# Patient Record
Sex: MALE | Race: WHITE | NOT HISPANIC OR LATINO | Employment: OTHER | URBAN - METROPOLITAN AREA
[De-identification: names, ages, dates, MRNs, and addresses within clinical notes are randomized per-mention and may not be internally consistent; named-entity substitution may affect disease eponyms.]

---

## 2017-01-24 ENCOUNTER — HOSPITAL ENCOUNTER (OUTPATIENT)
Dept: OTHER | Facility: HOSPITAL | Age: 57
Setting detail: RECURRING SERIES
Discharge: HOME | End: 2017-01-24
Attending: INTERNAL MEDICINE | Admitting: INTERNAL MEDICINE

## 2017-01-24 LAB
FERRITIN SERPL-MCNC: 44 NG/ML (ref 24–336)
HCT VFR BLD AUTO: 42.8 % (ref 40–54)
HGB BLD-MCNC: 14.6 G/DL (ref 14–18)

## 2017-03-02 ENCOUNTER — CONVERSION ENCOUNTER (OUTPATIENT)
Dept: FAMILY MEDICINE CLINIC | Facility: CLINIC | Age: 57
End: 2017-03-02

## 2017-03-07 ENCOUNTER — HOSPITAL ENCOUNTER (OUTPATIENT)
Dept: INFUSION THERAPY | Facility: HOSPITAL | Age: 57
Setting detail: RECURRING SERIES
Discharge: HOME | End: 2017-03-08
Attending: INTERNAL MEDICINE | Admitting: INTERNAL MEDICINE

## 2017-03-07 LAB
FERRITIN SERPL-MCNC: 66 NG/ML (ref 24–336)
HCT VFR BLD AUTO: 42 % (ref 40–54)
HGB BLD-MCNC: 14.4 G/DL (ref 14–18)

## 2017-04-18 ENCOUNTER — HOSPITAL ENCOUNTER (OUTPATIENT)
Dept: OTHER | Facility: HOSPITAL | Age: 57
Setting detail: RECURRING SERIES
Discharge: HOME | End: 2017-04-18
Attending: INTERNAL MEDICINE | Admitting: INTERNAL MEDICINE

## 2017-04-18 LAB
FERRITIN SERPL-MCNC: 50 NG/ML (ref 24–336)
HCT VFR BLD AUTO: 39.3 % (ref 40–54)
HGB BLD-MCNC: 13.6 G/DL (ref 14–18)

## 2017-05-30 ENCOUNTER — HOSPITAL ENCOUNTER (OUTPATIENT)
Dept: OTHER | Facility: HOSPITAL | Age: 57
Discharge: HOME | End: 2017-05-31
Attending: INTERNAL MEDICINE | Admitting: INTERNAL MEDICINE

## 2017-05-30 LAB
FERRITIN SERPL-MCNC: 84 NG/ML (ref 24–336)
HCT VFR BLD AUTO: 40.6 % (ref 40–54)
HGB BLD-MCNC: 14.1 G/DL (ref 14–18)

## 2017-06-06 ENCOUNTER — OFFICE (AMBULATORY)
Dept: URBAN - METROPOLITAN AREA CLINIC 64 | Facility: CLINIC | Age: 57
End: 2017-06-06

## 2017-06-06 VITALS
HEART RATE: 66 BPM | WEIGHT: 219 LBS | SYSTOLIC BLOOD PRESSURE: 158 MMHG | DIASTOLIC BLOOD PRESSURE: 88 MMHG | HEIGHT: 72 IN

## 2017-06-06 DIAGNOSIS — Z72.89 OTHER PROBLEMS RELATED TO LIFESTYLE: ICD-10-CM

## 2017-06-06 DIAGNOSIS — E83.110 HEREDITARY HEMOCHROMATOSIS: ICD-10-CM

## 2017-06-06 PROCEDURE — 99214 OFFICE O/P EST MOD 30 MIN: CPT | Performed by: INTERNAL MEDICINE

## 2017-08-22 ENCOUNTER — HOSPITAL ENCOUNTER (OUTPATIENT)
Dept: INFUSION THERAPY | Facility: HOSPITAL | Age: 57
Setting detail: RECURRING SERIES
Discharge: HOME | End: 2017-08-23
Attending: INTERNAL MEDICINE | Admitting: INTERNAL MEDICINE

## 2017-08-22 LAB
FERRITIN SERPL-MCNC: 51 NG/ML (ref 24–336)
HCT VFR BLD AUTO: 42.1 % (ref 40–54)
HGB BLD-MCNC: 14.5 G/DL (ref 14–18)

## 2017-08-31 ENCOUNTER — HOSPITAL ENCOUNTER (OUTPATIENT)
Dept: FAMILY MEDICINE CLINIC | Facility: CLINIC | Age: 57
Setting detail: SPECIMEN
Discharge: HOME | End: 2017-08-31
Attending: INTERNAL MEDICINE | Admitting: INTERNAL MEDICINE

## 2017-08-31 ENCOUNTER — CONVERSION ENCOUNTER (OUTPATIENT)
Dept: FAMILY MEDICINE CLINIC | Facility: CLINIC | Age: 57
End: 2017-08-31

## 2017-08-31 ENCOUNTER — HOSPITAL ENCOUNTER (OUTPATIENT)
Dept: LAB | Facility: HOSPITAL | Age: 57
Setting detail: SPECIMEN
Discharge: HOME | End: 2017-08-31
Attending: INTERNAL MEDICINE | Admitting: INTERNAL MEDICINE

## 2017-08-31 LAB
ALBUMIN SERPL-MCNC: 4.1 G/DL (ref 3.5–4.8)
ALBUMIN/GLOB SERPL: 1.3 {RATIO} (ref 1–1.7)
ALP SERPL-CCNC: 49 IU/L (ref 32–91)
ALT SERPL-CCNC: 68 IU/L (ref 17–63)
ANION GAP SERPL CALC-SCNC: 17 MMOL/L (ref 10–20)
AST SERPL-CCNC: 109 IU/L (ref 15–41)
BASOPHILS # BLD AUTO: 0.1 10*3/UL (ref 0–0.2)
BASOPHILS NFR BLD AUTO: 2 % (ref 0–2)
BILIRUB SERPL-MCNC: 1.4 MG/DL (ref 0.3–1.2)
BUN SERPL-MCNC: 6 MG/DL (ref 8–20)
BUN/CREAT SERPL: 10 (ref 6.2–20.3)
CALCIUM SERPL-MCNC: 9.5 MG/DL (ref 8.9–10.3)
CHLORIDE SERPL-SCNC: 100 MMOL/L (ref 101–111)
CHOLEST SERPL-MCNC: 249 MG/DL
CHOLEST/HDLC SERPL: 3.9 {RATIO}
CONV CO2: 25 MMOL/L (ref 22–32)
CONV LDL CHOLESTEROL DIRECT: 169 MG/DL (ref 0–100)
CONV TOTAL PROTEIN: 7.2 G/DL (ref 6.1–7.9)
CREAT UR-MCNC: 0.6 MG/DL (ref 0.7–1.2)
DIFFERENTIAL METHOD BLD: (no result)
EOSINOPHIL # BLD AUTO: 0.1 10*3/UL (ref 0–0.3)
EOSINOPHIL # BLD AUTO: 2 % (ref 0–3)
ERYTHROCYTE [DISTWIDTH] IN BLOOD BY AUTOMATED COUNT: 16.8 % (ref 11.5–14.5)
GLOBULIN UR ELPH-MCNC: 3.1 G/DL (ref 2.5–3.8)
GLUCOSE SERPL-MCNC: 107 MG/DL (ref 65–99)
HCT VFR BLD AUTO: 42.3 % (ref 40–54)
HDLC SERPL-MCNC: 64 MG/DL
HGB BLD-MCNC: 14.3 G/DL (ref 14–18)
LDLC/HDLC SERPL: 2.7 {RATIO}
LIPID INTERPRETATION: ABNORMAL
LYMPHOCYTES # BLD AUTO: 1.6 10*3/UL (ref 0.8–4.8)
LYMPHOCYTES NFR BLD AUTO: 33 % (ref 18–42)
MCH RBC QN AUTO: 32.2 PG (ref 26–32)
MCHC RBC AUTO-ENTMCNC: 33.9 G/DL (ref 32–36)
MCV RBC AUTO: 94.9 FL (ref 80–94)
MONOCYTES # BLD AUTO: 0.6 10*3/UL (ref 0.1–1.3)
MONOCYTES NFR BLD AUTO: 13 % (ref 2–11)
NEUTROPHILS # BLD AUTO: 2.6 10*3/UL (ref 2.3–8.6)
NEUTROPHILS NFR BLD AUTO: 50 % (ref 50–75)
NRBC BLD AUTO-RTO: 0 /100{WBCS}
NRBC/RBC NFR BLD MANUAL: 0 10*3/UL
PLATELET # BLD AUTO: 192 10*3/UL (ref 150–450)
PMV BLD AUTO: 8.7 FL (ref 7.4–10.4)
POTASSIUM SERPL-SCNC: 4 MMOL/L (ref 3.6–5.1)
RBC # BLD AUTO: 4.46 10*6/UL (ref 4.6–6)
SODIUM SERPL-SCNC: 138 MMOL/L (ref 136–144)
TRIGL SERPL-MCNC: 145 MG/DL
TSH SERPL-ACNC: 3.69 UIU/ML (ref 0.34–5.6)
VLDLC SERPL CALC-MCNC: 16.6 MG/DL
WBC # BLD AUTO: 5 10*3/UL (ref 4.5–11.5)

## 2017-09-18 ENCOUNTER — CONVERSION ENCOUNTER (OUTPATIENT)
Dept: FAMILY MEDICINE CLINIC | Facility: CLINIC | Age: 57
End: 2017-09-18

## 2017-11-21 ENCOUNTER — HOSPITAL ENCOUNTER (OUTPATIENT)
Dept: OTHER | Facility: HOSPITAL | Age: 57
Setting detail: RECURRING SERIES
Discharge: HOME | End: 2017-11-22
Attending: INTERNAL MEDICINE | Admitting: INTERNAL MEDICINE

## 2017-11-21 LAB
FERRITIN SERPL-MCNC: 86 NG/ML (ref 24–336)
HCT VFR BLD AUTO: 43.9 % (ref 40–54)
HGB BLD-MCNC: 15.4 G/DL (ref 14–18)

## 2018-02-20 ENCOUNTER — HOSPITAL ENCOUNTER (OUTPATIENT)
Dept: OTHER | Facility: HOSPITAL | Age: 58
Setting detail: RECURRING SERIES
Discharge: HOME | End: 2018-02-21
Attending: INTERNAL MEDICINE | Admitting: INTERNAL MEDICINE

## 2018-02-20 LAB
HCT VFR BLD AUTO: 41.6 % (ref 40–54)
HGB BLD-MCNC: 14.5 G/DL (ref 14–18)

## 2018-03-29 ENCOUNTER — CONVERSION ENCOUNTER (OUTPATIENT)
Dept: FAMILY MEDICINE CLINIC | Facility: CLINIC | Age: 58
End: 2018-03-29

## 2018-03-29 ENCOUNTER — HOSPITAL ENCOUNTER (OUTPATIENT)
Dept: LAB | Facility: HOSPITAL | Age: 58
Setting detail: SPECIMEN
Discharge: HOME | End: 2018-03-29
Attending: INTERNAL MEDICINE | Admitting: INTERNAL MEDICINE

## 2018-03-29 ENCOUNTER — HOSPITAL ENCOUNTER (OUTPATIENT)
Dept: FAMILY MEDICINE CLINIC | Facility: CLINIC | Age: 58
Setting detail: SPECIMEN
Discharge: HOME | End: 2018-03-29
Attending: INTERNAL MEDICINE | Admitting: INTERNAL MEDICINE

## 2018-03-29 LAB
ALBUMIN SERPL-MCNC: 4.3 G/DL (ref 3.5–4.8)
ALBUMIN/GLOB SERPL: 1.4 {RATIO} (ref 1–1.7)
ALP SERPL-CCNC: 52 IU/L (ref 32–91)
ALT SERPL-CCNC: 62 IU/L (ref 17–63)
ANION GAP SERPL CALC-SCNC: 16.2 MMOL/L (ref 10–20)
AST SERPL-CCNC: 113 IU/L (ref 15–41)
BILIRUB SERPL-MCNC: 1.4 MG/DL (ref 0.3–1.2)
BUN SERPL-MCNC: 6 MG/DL (ref 8–20)
BUN/CREAT SERPL: 10 (ref 6.2–20.3)
CALCIUM SERPL-MCNC: 9.4 MG/DL (ref 8.9–10.3)
CHLORIDE SERPL-SCNC: 87 MMOL/L (ref 101–111)
CHOLEST SERPL-MCNC: 244 MG/DL
CHOLEST/HDLC SERPL: 2.7 {RATIO}
CONV CO2: 29 MMOL/L (ref 22–32)
CONV LDL CHOLESTEROL DIRECT: 136 MG/DL (ref 0–100)
CONV TOTAL PROTEIN: 7.3 G/DL (ref 6.1–7.9)
CREAT UR-MCNC: 0.6 MG/DL (ref 0.7–1.2)
GLOBULIN UR ELPH-MCNC: 3 G/DL (ref 2.5–3.8)
GLUCOSE SERPL-MCNC: 96 MG/DL (ref 65–99)
HDLC SERPL-MCNC: 91 MG/DL
LDLC/HDLC SERPL: 1.5 {RATIO}
LIPID INTERPRETATION: ABNORMAL
POTASSIUM SERPL-SCNC: 3.2 MMOL/L (ref 3.6–5.1)
SODIUM SERPL-SCNC: 129 MMOL/L (ref 136–144)
TRIGL SERPL-MCNC: 91 MG/DL
VLDLC SERPL CALC-MCNC: 16.5 MG/DL

## 2018-05-15 ENCOUNTER — HOSPITAL ENCOUNTER (OUTPATIENT)
Dept: OTHER | Facility: HOSPITAL | Age: 58
Setting detail: RECURRING SERIES
Discharge: HOME | End: 2018-05-16
Attending: INTERNAL MEDICINE | Admitting: INTERNAL MEDICINE

## 2018-05-15 LAB
HCT VFR BLD AUTO: 42 % (ref 40–54)
HGB BLD-MCNC: 14.5 G/DL (ref 14–18)

## 2018-06-11 ENCOUNTER — OFFICE (AMBULATORY)
Dept: URBAN - METROPOLITAN AREA CLINIC 64 | Facility: CLINIC | Age: 58
End: 2018-06-11

## 2018-06-11 VITALS
HEART RATE: 77 BPM | DIASTOLIC BLOOD PRESSURE: 89 MMHG | SYSTOLIC BLOOD PRESSURE: 151 MMHG | HEIGHT: 72 IN | WEIGHT: 215 LBS

## 2018-06-11 DIAGNOSIS — E83.110 HEREDITARY HEMOCHROMATOSIS: ICD-10-CM

## 2018-06-11 DIAGNOSIS — Z12.11 ENCOUNTER FOR SCREENING FOR MALIGNANT NEOPLASM OF COLON: ICD-10-CM

## 2018-06-11 PROCEDURE — 99214 OFFICE O/P EST MOD 30 MIN: CPT | Performed by: INTERNAL MEDICINE

## 2018-07-02 ENCOUNTER — ON CAMPUS - OUTPATIENT (AMBULATORY)
Dept: URBAN - METROPOLITAN AREA HOSPITAL 2 | Facility: HOSPITAL | Age: 58
End: 2018-07-02
Payer: COMMERCIAL

## 2018-07-02 ENCOUNTER — OFFICE (AMBULATORY)
Dept: URBAN - METROPOLITAN AREA PATHOLOGY 4 | Facility: PATHOLOGY | Age: 58
End: 2018-07-02
Payer: COMMERCIAL

## 2018-07-02 VITALS
SYSTOLIC BLOOD PRESSURE: 166 MMHG | HEART RATE: 90 BPM | OXYGEN SATURATION: 91 % | SYSTOLIC BLOOD PRESSURE: 135 MMHG | HEIGHT: 72 IN | SYSTOLIC BLOOD PRESSURE: 152 MMHG | DIASTOLIC BLOOD PRESSURE: 83 MMHG | DIASTOLIC BLOOD PRESSURE: 93 MMHG | DIASTOLIC BLOOD PRESSURE: 97 MMHG | OXYGEN SATURATION: 92 % | RESPIRATION RATE: 18 BRPM | WEIGHT: 213 LBS | OXYGEN SATURATION: 96 % | SYSTOLIC BLOOD PRESSURE: 154 MMHG | SYSTOLIC BLOOD PRESSURE: 115 MMHG | HEART RATE: 73 BPM | DIASTOLIC BLOOD PRESSURE: 96 MMHG | HEART RATE: 99 BPM | HEART RATE: 97 BPM | TEMPERATURE: 98.3 F | HEART RATE: 80 BPM | SYSTOLIC BLOOD PRESSURE: 162 MMHG | DIASTOLIC BLOOD PRESSURE: 92 MMHG | OXYGEN SATURATION: 97 % | DIASTOLIC BLOOD PRESSURE: 99 MMHG | RESPIRATION RATE: 16 BRPM | SYSTOLIC BLOOD PRESSURE: 138 MMHG | HEART RATE: 85 BPM | DIASTOLIC BLOOD PRESSURE: 82 MMHG | OXYGEN SATURATION: 95 % | HEART RATE: 107 BPM | SYSTOLIC BLOOD PRESSURE: 155 MMHG | DIASTOLIC BLOOD PRESSURE: 61 MMHG

## 2018-07-02 DIAGNOSIS — K64.1 SECOND DEGREE HEMORRHOIDS: ICD-10-CM

## 2018-07-02 DIAGNOSIS — Z12.11 ENCOUNTER FOR SCREENING FOR MALIGNANT NEOPLASM OF COLON: ICD-10-CM

## 2018-07-02 DIAGNOSIS — K57.30 DIVERTICULOSIS OF LARGE INTESTINE WITHOUT PERFORATION OR ABS: ICD-10-CM

## 2018-07-02 DIAGNOSIS — D12.3 BENIGN NEOPLASM OF TRANSVERSE COLON: ICD-10-CM

## 2018-07-02 DIAGNOSIS — D12.2 BENIGN NEOPLASM OF ASCENDING COLON: ICD-10-CM

## 2018-07-02 LAB
GI HISTOLOGY: A. SELECT: (no result)
GI HISTOLOGY: PDF REPORT: (no result)

## 2018-07-02 PROCEDURE — 88305 TISSUE EXAM BY PATHOLOGIST: CPT | Mod: 33 | Performed by: INTERNAL MEDICINE

## 2018-07-02 PROCEDURE — 45385 COLONOSCOPY W/LESION REMOVAL: CPT | Mod: 33 | Performed by: INTERNAL MEDICINE

## 2018-07-02 RX ADMIN — PROPOFOL: 10 INJECTION, EMULSION INTRAVENOUS at 16:04

## 2018-09-17 ENCOUNTER — HOSPITAL ENCOUNTER (OUTPATIENT)
Dept: FAMILY MEDICINE CLINIC | Facility: CLINIC | Age: 58
Setting detail: SPECIMEN
Discharge: HOME | End: 2018-09-17
Attending: INTERNAL MEDICINE | Admitting: INTERNAL MEDICINE

## 2018-09-17 LAB
ALBUMIN SERPL-MCNC: 4 G/DL (ref 3.5–4.8)
ALBUMIN/GLOB SERPL: 1.1 {RATIO} (ref 1–1.7)
ALP SERPL-CCNC: 54 IU/L (ref 32–91)
ALT SERPL-CCNC: 56 IU/L (ref 17–63)
ANION GAP SERPL CALC-SCNC: 14.4 MMOL/L (ref 10–20)
AST SERPL-CCNC: 104 IU/L (ref 15–41)
BASOPHILS # BLD AUTO: 0.1 10*3/UL (ref 0–0.2)
BASOPHILS NFR BLD AUTO: 2 % (ref 0–2)
BILIRUB SERPL-MCNC: 1.1 MG/DL (ref 0.3–1.2)
BUN SERPL-MCNC: 4 MG/DL (ref 8–20)
BUN/CREAT SERPL: 6.7 (ref 6.2–20.3)
CALCIUM SERPL-MCNC: 9.1 MG/DL (ref 8.9–10.3)
CHLORIDE SERPL-SCNC: 98 MMOL/L (ref 101–111)
CHOLEST SERPL-MCNC: 241 MG/DL
CHOLEST/HDLC SERPL: 3.6 {RATIO}
CONV CO2: 26 MMOL/L (ref 22–32)
CONV LDL CHOLESTEROL DIRECT: 170 MG/DL (ref 0–100)
CONV TOTAL PROTEIN: 7.6 G/DL (ref 6.1–7.9)
CREAT UR-MCNC: 0.6 MG/DL (ref 0.7–1.2)
DIFFERENTIAL METHOD BLD: (no result)
EOSINOPHIL # BLD AUTO: 0.1 10*3/UL (ref 0–0.3)
EOSINOPHIL # BLD AUTO: 1 % (ref 0–3)
ERYTHROCYTE [DISTWIDTH] IN BLOOD BY AUTOMATED COUNT: 16 % (ref 11.5–14.5)
GLOBULIN UR ELPH-MCNC: 3.6 G/DL (ref 2.5–3.8)
GLUCOSE SERPL-MCNC: 113 MG/DL (ref 65–99)
HCT VFR BLD AUTO: 42.9 % (ref 40–54)
HDLC SERPL-MCNC: 67 MG/DL
HGB BLD-MCNC: 14.7 G/DL (ref 14–18)
LDLC/HDLC SERPL: 2.5 {RATIO}
LIPID INTERPRETATION: ABNORMAL
LYMPHOCYTES # BLD AUTO: 1.4 10*3/UL (ref 0.8–4.8)
LYMPHOCYTES NFR BLD AUTO: 35 % (ref 18–42)
MCH RBC QN AUTO: 32.2 PG (ref 26–32)
MCHC RBC AUTO-ENTMCNC: 34.2 G/DL (ref 32–36)
MCV RBC AUTO: 93.9 FL (ref 80–94)
MONOCYTES # BLD AUTO: 0.5 10*3/UL (ref 0.1–1.3)
MONOCYTES NFR BLD AUTO: 12 % (ref 2–11)
NEUTROPHILS # BLD AUTO: 2 10*3/UL (ref 2.3–8.6)
NEUTROPHILS NFR BLD AUTO: 50 % (ref 50–75)
NRBC BLD AUTO-RTO: 0 /100{WBCS}
NRBC/RBC NFR BLD MANUAL: 0 10*3/UL
PLATELET # BLD AUTO: 224 10*3/UL (ref 150–450)
PMV BLD AUTO: 9.1 FL (ref 7.4–10.4)
POTASSIUM SERPL-SCNC: 3.4 MMOL/L (ref 3.6–5.1)
RBC # BLD AUTO: 4.57 10*6/UL (ref 4.6–6)
SODIUM SERPL-SCNC: 135 MMOL/L (ref 136–144)
TRIGL SERPL-MCNC: 89 MG/DL
VLDLC SERPL CALC-MCNC: 4.2 MG/DL
WBC # BLD AUTO: 4 10*3/UL (ref 4.5–11.5)

## 2018-09-24 ENCOUNTER — CONVERSION ENCOUNTER (OUTPATIENT)
Dept: FAMILY MEDICINE CLINIC | Facility: CLINIC | Age: 58
End: 2018-09-24

## 2018-11-13 ENCOUNTER — HOSPITAL ENCOUNTER (OUTPATIENT)
Dept: OTHER | Facility: HOSPITAL | Age: 58
Setting detail: RECURRING SERIES
Discharge: HOME | End: 2018-11-14
Attending: INTERNAL MEDICINE | Admitting: INTERNAL MEDICINE

## 2018-11-13 LAB
HCT VFR BLD AUTO: 41.6 % (ref 40–54)
HGB BLD-MCNC: 14.7 G/DL (ref 14–18)

## 2018-12-27 ENCOUNTER — CONVERSION ENCOUNTER (OUTPATIENT)
Dept: FAMILY MEDICINE CLINIC | Facility: CLINIC | Age: 58
End: 2018-12-27

## 2019-01-09 ENCOUNTER — HOSPITAL ENCOUNTER (OUTPATIENT)
Dept: FAMILY MEDICINE CLINIC | Facility: CLINIC | Age: 59
Setting detail: SPECIMEN
Discharge: HOME | End: 2019-01-09
Attending: INTERNAL MEDICINE | Admitting: INTERNAL MEDICINE

## 2019-01-09 LAB
ALBUMIN SERPL-MCNC: 4 G/DL (ref 3.5–4.8)
ALBUMIN/GLOB SERPL: 1.3 {RATIO} (ref 1–1.7)
ALP SERPL-CCNC: 81 IU/L (ref 32–91)
ALT SERPL-CCNC: 89 IU/L (ref 17–63)
ANION GAP SERPL CALC-SCNC: 18.5 MMOL/L (ref 10–20)
AST SERPL-CCNC: 139 IU/L (ref 15–41)
BILIRUB SERPL-MCNC: 1.4 MG/DL (ref 0.3–1.2)
BUN SERPL-MCNC: 7 MG/DL (ref 8–20)
BUN/CREAT SERPL: 14 (ref 6.2–20.3)
CALCIUM SERPL-MCNC: 9.1 MG/DL (ref 8.9–10.3)
CHLORIDE SERPL-SCNC: 83 MMOL/L (ref 101–111)
CHOLEST SERPL-MCNC: 210 MG/DL
CHOLEST/HDLC SERPL: 2.8 {RATIO}
CONV CO2: 23 MMOL/L (ref 22–32)
CONV LDL CHOLESTEROL DIRECT: 131 MG/DL (ref 0–100)
CONV TOTAL PROTEIN: 7.2 G/DL (ref 6.1–7.9)
CREAT UR-MCNC: 0.5 MG/DL (ref 0.7–1.2)
GLOBULIN UR ELPH-MCNC: 3.2 G/DL (ref 2.5–3.8)
GLUCOSE SERPL-MCNC: 111 MG/DL (ref 65–99)
HDLC SERPL-MCNC: 75 MG/DL
LDLC/HDLC SERPL: 1.8 {RATIO}
LIPID INTERPRETATION: ABNORMAL
POTASSIUM SERPL-SCNC: 3.5 MMOL/L (ref 3.6–5.1)
SODIUM SERPL-SCNC: 121 MMOL/L (ref 136–144)
TRIGL SERPL-MCNC: 78 MG/DL
VLDLC SERPL CALC-MCNC: 4.6 MG/DL

## 2019-01-25 ENCOUNTER — HOSPITAL ENCOUNTER (OUTPATIENT)
Dept: FAMILY MEDICINE CLINIC | Facility: CLINIC | Age: 59
Setting detail: SPECIMEN
Discharge: HOME | End: 2019-01-25
Attending: INTERNAL MEDICINE | Admitting: INTERNAL MEDICINE

## 2019-01-25 LAB
ALBUMIN SERPL-MCNC: 4.1 G/DL (ref 3.5–4.8)
ALBUMIN/GLOB SERPL: 1.3 {RATIO} (ref 1–1.7)
ALP SERPL-CCNC: 75 IU/L (ref 32–91)
ALT SERPL-CCNC: 67 IU/L (ref 17–63)
ANION GAP SERPL CALC-SCNC: 14.7 MMOL/L (ref 10–20)
AST SERPL-CCNC: 97 IU/L (ref 15–41)
BILIRUB SERPL-MCNC: 1.6 MG/DL (ref 0.3–1.2)
BUN SERPL-MCNC: 6 MG/DL (ref 8–20)
BUN/CREAT SERPL: 10 (ref 6.2–20.3)
CALCIUM SERPL-MCNC: 9.5 MG/DL (ref 8.9–10.3)
CHLORIDE SERPL-SCNC: 89 MMOL/L (ref 101–111)
CONV CO2: 27 MMOL/L (ref 22–32)
CONV TOTAL PROTEIN: 7.2 G/DL (ref 6.1–7.9)
CREAT UR-MCNC: 0.6 MG/DL (ref 0.7–1.2)
GLOBULIN UR ELPH-MCNC: 3.1 G/DL (ref 2.5–3.8)
GLUCOSE SERPL-MCNC: 106 MG/DL (ref 65–99)
POTASSIUM SERPL-SCNC: 3.7 MMOL/L (ref 3.6–5.1)
SODIUM SERPL-SCNC: 127 MMOL/L (ref 136–144)

## 2019-02-25 ENCOUNTER — CONVERSION ENCOUNTER (OUTPATIENT)
Dept: FAMILY MEDICINE CLINIC | Facility: CLINIC | Age: 59
End: 2019-02-25

## 2019-04-14 ENCOUNTER — CONVERSION ENCOUNTER (OUTPATIENT)
Dept: FAMILY MEDICINE CLINIC | Facility: CLINIC | Age: 59
End: 2019-04-14

## 2019-04-14 ENCOUNTER — HOSPITAL ENCOUNTER (OUTPATIENT)
Dept: URGENT CARE | Facility: CLINIC | Age: 59
Discharge: HOME | End: 2019-04-14
Attending: FAMILY MEDICINE | Admitting: FAMILY MEDICINE

## 2019-04-19 ENCOUNTER — CONVERSION ENCOUNTER (OUTPATIENT)
Dept: FAMILY MEDICINE CLINIC | Facility: CLINIC | Age: 59
End: 2019-04-19

## 2019-05-14 ENCOUNTER — HOSPITAL ENCOUNTER (OUTPATIENT)
Dept: OTHER | Facility: HOSPITAL | Age: 59
Setting detail: RECURRING SERIES
Discharge: HOME | End: 2019-05-15
Attending: INTERNAL MEDICINE | Admitting: INTERNAL MEDICINE

## 2019-05-14 LAB
ALBUMIN SERPL-MCNC: 4.1 G/DL (ref 3.5–4.8)
ALBUMIN/GLOB SERPL: 1.3 {RATIO} (ref 1–1.7)
ALP SERPL-CCNC: 53 IU/L (ref 32–91)
ALT SERPL-CCNC: 32 IU/L (ref 17–63)
ANION GAP SERPL CALC-SCNC: 18.3 MMOL/L (ref 10–20)
AST SERPL-CCNC: 51 IU/L (ref 15–41)
BASOPHILS # BLD AUTO: 0.1 10*3/UL (ref 0–0.2)
BASOPHILS NFR BLD AUTO: 1 % (ref 0–2)
BILIRUB SERPL-MCNC: 1.1 MG/DL (ref 0.3–1.2)
BUN SERPL-MCNC: 5 MG/DL (ref 8–20)
BUN/CREAT SERPL: 10 (ref 6.2–20.3)
CALCIUM SERPL-MCNC: 9.1 MG/DL (ref 8.9–10.3)
CHLORIDE SERPL-SCNC: 92 MMOL/L (ref 101–111)
CONV CO2: 24 MMOL/L (ref 22–32)
CONV TOTAL PROTEIN: 7.2 G/DL (ref 6.1–7.9)
CREAT UR-MCNC: 0.5 MG/DL (ref 0.7–1.2)
DIFFERENTIAL METHOD BLD: (no result)
EOSINOPHIL # BLD AUTO: 0.1 10*3/UL (ref 0–0.3)
EOSINOPHIL # BLD AUTO: 2 % (ref 0–3)
ERYTHROCYTE [DISTWIDTH] IN BLOOD BY AUTOMATED COUNT: 14 % (ref 11.5–14.5)
FERRITIN SERPL-MCNC: 185 NG/ML (ref 24–336)
GLOBULIN UR ELPH-MCNC: 3.1 G/DL (ref 2.5–3.8)
GLUCOSE SERPL-MCNC: 108 MG/DL (ref 65–99)
HCT VFR BLD AUTO: 39.4 % (ref 40–54)
HGB BLD-MCNC: 14 G/DL (ref 14–18)
IRON SERPL-MCNC: 130 UG/DL (ref 45–182)
LYMPHOCYTES # BLD AUTO: 2.2 10*3/UL (ref 0.8–4.8)
LYMPHOCYTES NFR BLD AUTO: 45 % (ref 18–42)
MCH RBC QN AUTO: 35.2 PG (ref 26–32)
MCHC RBC AUTO-ENTMCNC: 35.4 G/DL (ref 32–36)
MCV RBC AUTO: 99.4 FL (ref 80–94)
MONOCYTES # BLD AUTO: 0.5 10*3/UL (ref 0.1–1.3)
MONOCYTES NFR BLD AUTO: 10 % (ref 2–11)
NEUTROPHILS # BLD AUTO: 2.1 10*3/UL (ref 2.3–8.6)
NEUTROPHILS NFR BLD AUTO: 42 % (ref 50–75)
NRBC BLD AUTO-RTO: 0 /100{WBCS}
NRBC/RBC NFR BLD MANUAL: 0 10*3/UL
PLATELET # BLD AUTO: 253 10*3/UL (ref 150–450)
PMV BLD AUTO: 8 FL (ref 7.4–10.4)
POTASSIUM SERPL-SCNC: 3.3 MMOL/L (ref 3.6–5.1)
RBC # BLD AUTO: 3.96 10*6/UL (ref 4.6–6)
SODIUM SERPL-SCNC: 131 MMOL/L (ref 136–144)
WBC # BLD AUTO: 4.9 10*3/UL (ref 4.5–11.5)

## 2019-06-04 VITALS
HEART RATE: 109 BPM | WEIGHT: 217.6 LBS | HEIGHT: 72 IN | HEART RATE: 66 BPM | OXYGEN SATURATION: 94 % | BODY MASS INDEX: 28.91 KG/M2 | BODY MASS INDEX: 30.42 KG/M2 | HEART RATE: 92 BPM | HEIGHT: 72 IN | BODY MASS INDEX: 28.74 KG/M2 | WEIGHT: 217.8 LBS | SYSTOLIC BLOOD PRESSURE: 140 MMHG | DIASTOLIC BLOOD PRESSURE: 77 MMHG | RESPIRATION RATE: 20 BRPM | WEIGHT: 213.4 LBS | SYSTOLIC BLOOD PRESSURE: 162 MMHG | SYSTOLIC BLOOD PRESSURE: 170 MMHG | RESPIRATION RATE: 18 BRPM | DIASTOLIC BLOOD PRESSURE: 71 MMHG | HEART RATE: 124 BPM | HEART RATE: 102 BPM | WEIGHT: 224 LBS | HEART RATE: 84 BPM | SYSTOLIC BLOOD PRESSURE: 160 MMHG | WEIGHT: 224.4 LBS | RESPIRATION RATE: 20 BRPM | DIASTOLIC BLOOD PRESSURE: 86 MMHG | BODY MASS INDEX: 29.47 KG/M2 | OXYGEN SATURATION: 95 % | OXYGEN SATURATION: 94 % | DIASTOLIC BLOOD PRESSURE: 81 MMHG | SYSTOLIC BLOOD PRESSURE: 118 MMHG | RESPIRATION RATE: 16 BRPM | DIASTOLIC BLOOD PRESSURE: 72 MMHG | SYSTOLIC BLOOD PRESSURE: 149 MMHG | DIASTOLIC BLOOD PRESSURE: 84 MMHG | SYSTOLIC BLOOD PRESSURE: 147 MMHG | HEART RATE: 75 BPM | HEIGHT: 72 IN | SYSTOLIC BLOOD PRESSURE: 158 MMHG | RESPIRATION RATE: 16 BRPM | BODY MASS INDEX: 28.13 KG/M2 | WEIGHT: 223.4 LBS | DIASTOLIC BLOOD PRESSURE: 66 MMHG | DIASTOLIC BLOOD PRESSURE: 81 MMHG | RESPIRATION RATE: 16 BRPM | OXYGEN SATURATION: 96 % | BODY MASS INDEX: 30.26 KG/M2 | HEART RATE: 68 BPM | WEIGHT: 212.2 LBS | HEART RATE: 77 BPM | DIASTOLIC BLOOD PRESSURE: 86 MMHG | HEIGHT: 72 IN | SYSTOLIC BLOOD PRESSURE: 132 MMHG | RESPIRATION RATE: 12 BRPM | WEIGHT: 224.6 LBS | WEIGHT: 207.38 LBS | HEIGHT: 72 IN

## 2019-07-08 ENCOUNTER — OFFICE (AMBULATORY)
Dept: URBAN - METROPOLITAN AREA CLINIC 64 | Facility: CLINIC | Age: 59
End: 2019-07-08

## 2019-07-08 VITALS
HEIGHT: 72 IN | SYSTOLIC BLOOD PRESSURE: 133 MMHG | HEART RATE: 91 BPM | DIASTOLIC BLOOD PRESSURE: 80 MMHG | WEIGHT: 210 LBS

## 2019-07-08 DIAGNOSIS — Z86.010 PERSONAL HISTORY OF COLONIC POLYPS: ICD-10-CM

## 2019-07-08 DIAGNOSIS — E83.110 HEREDITARY HEMOCHROMATOSIS: ICD-10-CM

## 2019-07-08 PROCEDURE — 99214 OFFICE O/P EST MOD 30 MIN: CPT | Performed by: INTERNAL MEDICINE

## 2019-07-18 DIAGNOSIS — R53.83 FATIGUE, UNSPECIFIED TYPE: ICD-10-CM

## 2019-07-18 DIAGNOSIS — Z12.5 SCREENING FOR PROSTATE CANCER: ICD-10-CM

## 2019-07-18 DIAGNOSIS — E78.5 HYPERLIPIDEMIA, UNSPECIFIED HYPERLIPIDEMIA TYPE: Primary | ICD-10-CM

## 2019-07-18 DIAGNOSIS — I10 HYPERTENSION, UNSPECIFIED TYPE: ICD-10-CM

## 2019-07-19 ENCOUNTER — LAB (OUTPATIENT)
Dept: FAMILY MEDICINE CLINIC | Facility: CLINIC | Age: 59
End: 2019-07-19

## 2019-07-19 DIAGNOSIS — E78.5 HYPERLIPIDEMIA, UNSPECIFIED HYPERLIPIDEMIA TYPE: ICD-10-CM

## 2019-07-19 DIAGNOSIS — Z12.5 SCREENING FOR PROSTATE CANCER: ICD-10-CM

## 2019-07-19 DIAGNOSIS — I10 HYPERTENSION, UNSPECIFIED TYPE: ICD-10-CM

## 2019-07-19 DIAGNOSIS — R53.83 FATIGUE, UNSPECIFIED TYPE: ICD-10-CM

## 2019-07-19 LAB
ALBUMIN SERPL-MCNC: 4.5 G/DL (ref 3.5–4.8)
ALBUMIN/GLOB SERPL: 1.5 G/DL (ref 1–1.7)
ALP SERPL-CCNC: 63 U/L (ref 32–91)
ALT SERPL W P-5'-P-CCNC: 57 U/L (ref 17–63)
ANION GAP SERPL CALCULATED.3IONS-SCNC: 18.8 MMOL/L (ref 5–15)
ARTICHOKE IGE QN: 119 MG/DL (ref 0–100)
AST SERPL-CCNC: 94 U/L (ref 15–41)
BASOPHILS # BLD AUTO: 0 10*3/MM3 (ref 0–0.2)
BASOPHILS NFR BLD AUTO: 0.9 % (ref 0–1.5)
BILIRUB SERPL-MCNC: 2 MG/DL (ref 0.3–1.2)
BUN BLD-MCNC: 6 MG/DL (ref 8–20)
BUN/CREAT SERPL: 8.6 (ref 6.2–20.3)
CALCIUM SPEC-SCNC: 9.6 MG/DL (ref 8.9–10.3)
CHLORIDE SERPL-SCNC: 88 MMOL/L (ref 101–111)
CHOLEST SERPL-MCNC: 223 MG/DL
CO2 SERPL-SCNC: 26 MMOL/L (ref 22–32)
CREAT BLD-MCNC: 0.7 MG/DL (ref 0.7–1.2)
DEPRECATED RDW RBC AUTO: 46.4 FL (ref 37–54)
EOSINOPHIL # BLD AUTO: 0.1 10*3/MM3 (ref 0–0.4)
EOSINOPHIL NFR BLD AUTO: 1.2 % (ref 0.3–6.2)
ERYTHROCYTE [DISTWIDTH] IN BLOOD BY AUTOMATED COUNT: 13.6 % (ref 12.3–15.4)
GFR SERPL CREATININE-BSD FRML MDRD: 115 ML/MIN/1.73
GLOBULIN UR ELPH-MCNC: 3.1 GM/DL (ref 2.5–3.8)
GLUCOSE BLD-MCNC: 96 MG/DL (ref 65–99)
HCT VFR BLD AUTO: 43.3 % (ref 37.5–51)
HDLC SERPL QL: 2.4
HDLC SERPL-MCNC: 93 MG/DL
HGB BLD-MCNC: 14.9 G/DL (ref 13–17.7)
LDLC/HDLC SERPL: 1.31 {RATIO}
LYMPHOCYTES # BLD AUTO: 1.1 10*3/MM3 (ref 0.7–3.1)
LYMPHOCYTES NFR BLD AUTO: 20.9 % (ref 19.6–45.3)
MCH RBC QN AUTO: 34.2 PG (ref 26.6–33)
MCHC RBC AUTO-ENTMCNC: 34.5 G/DL (ref 31.5–35.7)
MCV RBC AUTO: 99 FL (ref 79–97)
MONOCYTES # BLD AUTO: 0.6 10*3/MM3 (ref 0.1–0.9)
MONOCYTES NFR BLD AUTO: 11.3 % (ref 5–12)
NEUTROPHILS # BLD AUTO: 3.5 10*3/MM3 (ref 1.7–7)
NEUTROPHILS NFR BLD AUTO: 65.7 % (ref 42.7–76)
NRBC BLD AUTO-RTO: 0 /100 WBC (ref 0–0.2)
PLATELET # BLD AUTO: 224 10*3/MM3 (ref 140–450)
PMV BLD AUTO: 8.8 FL (ref 6–12)
POTASSIUM BLD-SCNC: 3.8 MMOL/L (ref 3.6–5.1)
PROT SERPL-MCNC: 7.6 G/DL (ref 6.1–7.9)
PSA SERPL-MCNC: 4.64 NG/ML (ref 0–4)
RBC # BLD AUTO: 4.37 10*6/MM3 (ref 4.14–5.8)
SODIUM BLD-SCNC: 129 MMOL/L (ref 136–144)
TRIGL SERPL-MCNC: 43 MG/DL
TSH SERPL DL<=0.05 MIU/L-ACNC: 4.25 MIU/ML (ref 0.34–5.6)
VLDLC SERPL-MCNC: 8.6 MG/DL
WBC NRBC COR # BLD: 5.3 10*3/MM3 (ref 3.4–10.8)

## 2019-07-19 PROCEDURE — 80053 COMPREHEN METABOLIC PANEL: CPT | Performed by: INTERNAL MEDICINE

## 2019-07-19 PROCEDURE — G0103 PSA SCREENING: HCPCS | Performed by: INTERNAL MEDICINE

## 2019-07-19 PROCEDURE — 85025 COMPLETE CBC W/AUTO DIFF WBC: CPT | Performed by: INTERNAL MEDICINE

## 2019-07-19 PROCEDURE — 84443 ASSAY THYROID STIM HORMONE: CPT | Performed by: INTERNAL MEDICINE

## 2019-07-19 PROCEDURE — 36415 COLL VENOUS BLD VENIPUNCTURE: CPT | Performed by: INTERNAL MEDICINE

## 2019-07-19 PROCEDURE — 80061 LIPID PANEL: CPT | Performed by: INTERNAL MEDICINE

## 2019-07-26 ENCOUNTER — TELEPHONE (OUTPATIENT)
Dept: FAMILY MEDICINE CLINIC | Facility: CLINIC | Age: 59
End: 2019-07-26

## 2019-07-26 ENCOUNTER — OFFICE VISIT (OUTPATIENT)
Dept: FAMILY MEDICINE CLINIC | Facility: CLINIC | Age: 59
End: 2019-07-26

## 2019-07-26 VITALS
HEIGHT: 73 IN | WEIGHT: 211 LBS | RESPIRATION RATE: 14 BRPM | OXYGEN SATURATION: 95 % | TEMPERATURE: 97.8 F | HEART RATE: 73 BPM | SYSTOLIC BLOOD PRESSURE: 122 MMHG | DIASTOLIC BLOOD PRESSURE: 62 MMHG | BODY MASS INDEX: 27.96 KG/M2

## 2019-07-26 DIAGNOSIS — M25.561 ARTHRALGIA OF RIGHT KNEE: ICD-10-CM

## 2019-07-26 DIAGNOSIS — E78.5 HYPERLIPIDEMIA, UNSPECIFIED HYPERLIPIDEMIA TYPE: ICD-10-CM

## 2019-07-26 DIAGNOSIS — I10 ESSENTIAL HYPERTENSION: Primary | ICD-10-CM

## 2019-07-26 DIAGNOSIS — E87.1 HYPONATREMIA: ICD-10-CM

## 2019-07-26 PROBLEM — N40.0 BENIGN PROSTATIC HYPERPLASIA: Status: ACTIVE | Noted: 2018-09-07

## 2019-07-26 PROCEDURE — 99213 OFFICE O/P EST LOW 20 MIN: CPT | Performed by: INTERNAL MEDICINE

## 2019-07-26 RX ORDER — TRIAMTERENE AND HYDROCHLOROTHIAZIDE 37.5; 25 MG/1; MG/1
1 TABLET ORAL DAILY
COMMUNITY
End: 2019-09-20 | Stop reason: SDUPTHER

## 2019-07-26 RX ORDER — FEXOFENADINE HCL 180 MG/1
180 TABLET ORAL DAILY
COMMUNITY

## 2019-07-26 RX ORDER — AZELASTINE HYDROCHLORIDE 0.5 MG/ML
2 SOLUTION/ DROPS OPHTHALMIC 2 TIMES DAILY
COMMUNITY
Start: 2019-06-12 | End: 2021-02-08

## 2019-07-26 RX ORDER — AMLODIPINE BESYLATE AND BENAZEPRIL HYDROCHLORIDE 5; 40 MG/1; MG/1
5-40 CAPSULE ORAL
COMMUNITY
Start: 2019-07-19 | End: 2019-10-24 | Stop reason: SDUPTHER

## 2019-07-26 NOTE — PROGRESS NOTES
Subjective   Erik Fuentes is a 59 y.o. male.     Pt is here for med check htn, hpld, hemochromatosis, and hyponatremia - here to f/u labs  Generally tries to stick with fluid restriction  But has been hot lately, so drinking gatorade  R knee is finally becoming painful enough that he is considering surgery  Has an appt with dr whitten on 8/5  Will aim to have surgery in October, when this golfing season is done    Overall has been doing fine except for fatigue  After having pna in April  Would like to have pna vaccine but will wait til after golf season           The following portions of the patient's history were reviewed and updated as appropriate: allergies, current medications, past family history, past medical history, past social history, past surgical history and problem list.    Review of Systems   Constitutional: Positive for fatigue. Negative for fever.   HENT: Negative for congestion, ear pain, rhinorrhea and sore throat.    Eyes: Negative for blurred vision and itching.   Respiratory: Negative for cough and shortness of breath.    Cardiovascular: Negative for chest pain and palpitations.   Gastrointestinal: Positive for diarrhea. Negative for abdominal pain and vomiting.   Endocrine: Negative for polydipsia and polyuria.   Genitourinary: Negative for dysuria, frequency, hematuria and urgency.   Musculoskeletal: Positive for arthralgias and joint swelling. Negative for myalgias.   Skin: Negative for rash and skin lesions.   Neurological: Negative for dizziness, numbness and headache.   Psychiatric/Behavioral: Negative for depressed mood. The patient is not nervous/anxious.          Current Outpatient Medications:   •  amLODIPine-benazepril (LOTREL) 5-40 MG per capsule, 5-40 capsules., Disp: , Rfl:   •  azelastine (OPTIVAR) 0.05 % ophthalmic solution, , Disp: , Rfl:   •  B Complex-C (SUPER B COMPLEX/VITAMIN C PO), Take  by mouth., Disp: , Rfl:   •  fexofenadine (ALLEGRA) 180 MG tablet, Take 180 mg by  "mouth Daily., Disp: , Rfl:   •  Glucosamine-Chondroitin (OSTEO BI-FLEX REGULAR STRENGTH PO), Take  by mouth., Disp: , Rfl:   •  Omega-3 Fatty Acids (FISH OIL ADULT GUMMIES PO), Take  by mouth., Disp: , Rfl:   •  triamterene-hydrochlorothiazide (MAXZIDE-25) 37.5-25 MG per tablet, Take 1 tablet by mouth Daily., Disp: , Rfl:     Objective   /62 (BP Location: Left arm, Patient Position: Sitting, Cuff Size: Large Adult)   Pulse 73   Temp 97.8 °F (36.6 °C) (Oral)   Resp 14   Ht 185.4 cm (73\")   Wt 95.7 kg (211 lb)   SpO2 95%   BMI 27.84 kg/m²   Physical Exam   Constitutional: He is oriented to person, place, and time. He appears well-developed and well-nourished. No distress.   HENT:   Head: Normocephalic and atraumatic.   Right Ear: External ear normal.   Left Ear: External ear normal.   Mouth/Throat: Oropharynx is clear and moist. No oropharyngeal exudate.   Eyes: Conjunctivae and EOM are normal. Right eye exhibits no discharge. Left eye exhibits no discharge. No scleral icterus.   Neck: Normal range of motion. Neck supple. No thyromegaly present.   Cardiovascular: Normal rate, regular rhythm and normal heart sounds. Exam reveals no gallop and no friction rub.   No murmur heard.  Pulmonary/Chest: Effort normal and breath sounds normal. No respiratory distress. He has no wheezes. He has no rales.   Musculoskeletal: Normal range of motion. He exhibits no edema or deformity.   Lymphadenopathy:     He has no cervical adenopathy.   Neurological: He is alert and oriented to person, place, and time. No cranial nerve deficit.   Skin: Skin is warm and dry. No rash noted. He is not diaphoretic. No erythema.   Psychiatric: He has a normal mood and affect. His behavior is normal. Thought content normal.   Vitals reviewed.        Assessment/Plan   Problems Addressed this Visit        Cardiovascular and Mediastinum    Essential hypertension - Primary    Relevant Medications    amLODIPine-benazepril (LOTREL) 5-40 MG per " capsule    triamterene-hydrochlorothiazide (MAXZIDE-25) 37.5-25 MG per tablet    Hyperlipidemia       Musculoskeletal and Integument    Arthralgia of right knee       Other    Hyponatremia        Labs and bp all acceptable  Cont current meds  Try probiotics - may not help completely resolve issue but worth a try  Ok to have pna vaccine after golf season  psa elevated but apparently lower than when urology check it previously         Procedures

## 2019-08-26 DIAGNOSIS — Z79.899 LONG-TERM USE OF HIGH-RISK MEDICATION: ICD-10-CM

## 2019-08-26 DIAGNOSIS — I10 HYPERTENSION, UNSPECIFIED TYPE: Primary | ICD-10-CM

## 2019-08-26 DIAGNOSIS — R77.2 ELEVATED ALPHA FETOPROTEIN: ICD-10-CM

## 2019-08-26 DIAGNOSIS — E78.5 HYPERLIPIDEMIA, UNSPECIFIED HYPERLIPIDEMIA TYPE: ICD-10-CM

## 2019-09-20 RX ORDER — TRIAMTERENE AND HYDROCHLOROTHIAZIDE 37.5; 25 MG/1; MG/1
TABLET ORAL
Qty: 30 TABLET | Refills: 5 | Status: SHIPPED | OUTPATIENT
Start: 2019-09-20 | End: 2020-03-19

## 2019-10-24 RX ORDER — AMLODIPINE BESYLATE AND BENAZEPRIL HYDROCHLORIDE 5; 40 MG/1; MG/1
CAPSULE ORAL
Qty: 30 CAPSULE | Refills: 5 | Status: SHIPPED | OUTPATIENT
Start: 2019-10-24 | End: 2020-04-20

## 2020-01-20 ENCOUNTER — LAB (OUTPATIENT)
Dept: FAMILY MEDICINE CLINIC | Facility: CLINIC | Age: 60
End: 2020-01-20

## 2020-01-20 DIAGNOSIS — Z00.00 ROUTINE MEDICAL EXAM: Primary | ICD-10-CM

## 2020-01-20 DIAGNOSIS — I10 HYPERTENSION, UNSPECIFIED TYPE: ICD-10-CM

## 2020-01-20 DIAGNOSIS — Z79.899 LONG-TERM USE OF HIGH-RISK MEDICATION: ICD-10-CM

## 2020-01-20 DIAGNOSIS — R77.2 ELEVATED ALPHA FETOPROTEIN: ICD-10-CM

## 2020-01-20 DIAGNOSIS — E78.5 HYPERLIPIDEMIA, UNSPECIFIED HYPERLIPIDEMIA TYPE: ICD-10-CM

## 2020-01-20 LAB
ALBUMIN SERPL-MCNC: 4.6 G/DL (ref 3.5–5.2)
ALBUMIN/GLOB SERPL: 1.5 G/DL
ALP SERPL-CCNC: 67 U/L (ref 39–117)
ALPHA-FETOPROTEIN: 6.18 NG/ML (ref 0–8.3)
ALT SERPL W P-5'-P-CCNC: 43 U/L (ref 1–41)
ANION GAP SERPL CALCULATED.3IONS-SCNC: 17.3 MMOL/L (ref 5–15)
AST SERPL-CCNC: 89 U/L (ref 1–40)
BASOPHILS # BLD AUTO: 0.08 10*3/MM3 (ref 0–0.2)
BASOPHILS NFR BLD AUTO: 1.6 % (ref 0–1.5)
BILIRUB SERPL-MCNC: 0.9 MG/DL (ref 0.2–1.2)
BUN BLD-MCNC: 8 MG/DL (ref 8–23)
BUN/CREAT SERPL: 11.1 (ref 7–25)
CALCIUM SPEC-SCNC: 9.1 MG/DL (ref 8.6–10.5)
CHLORIDE SERPL-SCNC: 86 MMOL/L (ref 98–107)
CHOLEST SERPL-MCNC: 208 MG/DL (ref 0–200)
CO2 SERPL-SCNC: 27.7 MMOL/L (ref 22–29)
CREAT BLD-MCNC: 0.72 MG/DL (ref 0.76–1.27)
DEPRECATED RDW RBC AUTO: 46.3 FL (ref 37–54)
EOSINOPHIL # BLD AUTO: 0.17 10*3/MM3 (ref 0–0.4)
EOSINOPHIL NFR BLD AUTO: 3.4 % (ref 0.3–6.2)
ERYTHROCYTE [DISTWIDTH] IN BLOOD BY AUTOMATED COUNT: 14 % (ref 12.3–15.4)
GFR SERPL CREATININE-BSD FRML MDRD: 111 ML/MIN/1.73
GLOBULIN UR ELPH-MCNC: 3 GM/DL
GLUCOSE BLD-MCNC: 100 MG/DL (ref 65–99)
HBA1C MFR BLD: 5.2 % (ref 3.5–5.6)
HCT VFR BLD AUTO: 39.1 % (ref 37.5–51)
HDLC SERPL-MCNC: 84 MG/DL (ref 40–60)
HGB BLD-MCNC: 13.4 G/DL (ref 13–17.7)
IMM GRANULOCYTES # BLD AUTO: 0.02 10*3/MM3 (ref 0–0.05)
IMM GRANULOCYTES NFR BLD AUTO: 0.4 % (ref 0–0.5)
LDLC SERPL CALC-MCNC: 102 MG/DL (ref 0–100)
LDLC/HDLC SERPL: 1.21 {RATIO}
LYMPHOCYTES # BLD AUTO: 1.88 10*3/MM3 (ref 0.7–3.1)
LYMPHOCYTES NFR BLD AUTO: 38 % (ref 19.6–45.3)
MCH RBC QN AUTO: 31.3 PG (ref 26.6–33)
MCHC RBC AUTO-ENTMCNC: 34.3 G/DL (ref 31.5–35.7)
MCV RBC AUTO: 91.4 FL (ref 79–97)
MONOCYTES # BLD AUTO: 0.66 10*3/MM3 (ref 0.1–0.9)
MONOCYTES NFR BLD AUTO: 13.3 % (ref 5–12)
NEUTROPHILS # BLD AUTO: 2.14 10*3/MM3 (ref 1.7–7)
NEUTROPHILS NFR BLD AUTO: 43.3 % (ref 42.7–76)
NRBC BLD AUTO-RTO: 0 /100 WBC (ref 0–0.2)
PLATELET # BLD AUTO: 299 10*3/MM3 (ref 140–450)
PMV BLD AUTO: 10.3 FL (ref 6–12)
POTASSIUM BLD-SCNC: 3.6 MMOL/L (ref 3.5–5.2)
PROT SERPL-MCNC: 7.6 G/DL (ref 6–8.5)
RBC # BLD AUTO: 4.28 10*6/MM3 (ref 4.14–5.8)
SODIUM BLD-SCNC: 131 MMOL/L (ref 136–145)
TRIGL SERPL-MCNC: 111 MG/DL (ref 0–150)
TSH SERPL DL<=0.05 MIU/L-ACNC: 3.07 UIU/ML (ref 0.27–4.2)
VLDLC SERPL-MCNC: 22.2 MG/DL (ref 5–40)
WBC NRBC COR # BLD: 4.95 10*3/MM3 (ref 3.4–10.8)

## 2020-01-20 PROCEDURE — 85025 COMPLETE CBC W/AUTO DIFF WBC: CPT | Performed by: INTERNAL MEDICINE

## 2020-01-20 PROCEDURE — 82105 ALPHA-FETOPROTEIN SERUM: CPT | Performed by: INTERNAL MEDICINE

## 2020-01-20 PROCEDURE — 83036 HEMOGLOBIN GLYCOSYLATED A1C: CPT | Performed by: INTERNAL MEDICINE

## 2020-01-20 PROCEDURE — 80061 LIPID PANEL: CPT | Performed by: INTERNAL MEDICINE

## 2020-01-20 PROCEDURE — 80053 COMPREHEN METABOLIC PANEL: CPT | Performed by: INTERNAL MEDICINE

## 2020-01-20 PROCEDURE — 36415 COLL VENOUS BLD VENIPUNCTURE: CPT

## 2020-01-20 PROCEDURE — 84443 ASSAY THYROID STIM HORMONE: CPT | Performed by: INTERNAL MEDICINE

## 2020-01-27 ENCOUNTER — OFFICE VISIT (OUTPATIENT)
Dept: FAMILY MEDICINE CLINIC | Facility: CLINIC | Age: 60
End: 2020-01-27

## 2020-01-27 VITALS
TEMPERATURE: 98.5 F | HEIGHT: 73 IN | WEIGHT: 202.6 LBS | DIASTOLIC BLOOD PRESSURE: 75 MMHG | SYSTOLIC BLOOD PRESSURE: 121 MMHG | HEART RATE: 89 BPM | RESPIRATION RATE: 20 BRPM | BODY MASS INDEX: 26.85 KG/M2

## 2020-01-27 DIAGNOSIS — E87.1 HYPONATREMIA: ICD-10-CM

## 2020-01-27 DIAGNOSIS — I10 ESSENTIAL HYPERTENSION: ICD-10-CM

## 2020-01-27 DIAGNOSIS — Z00.00 PREVENTATIVE HEALTH CARE: Primary | ICD-10-CM

## 2020-01-27 DIAGNOSIS — E83.119 HEMOCHROMATOSIS, UNSPECIFIED HEMOCHROMATOSIS TYPE: ICD-10-CM

## 2020-01-27 PROCEDURE — 99396 PREV VISIT EST AGE 40-64: CPT | Performed by: INTERNAL MEDICINE

## 2020-02-19 ENCOUNTER — TELEPHONE (OUTPATIENT)
Dept: FAMILY MEDICINE CLINIC | Facility: CLINIC | Age: 60
End: 2020-02-19

## 2020-02-19 NOTE — TELEPHONE ENCOUNTER
VM MESSAGE.  Wife, Valencia, called the Billing Office regarding a bill for patient. She was told it was a coding problem and asked if you could call her to get this corrected. Thank you.

## 2020-02-20 NOTE — TELEPHONE ENCOUNTER
Called and spoke to wife.   Erik received a bill for $65.23 which was patients deductible.  She states  came in for his physical blood work and it should have been coded as such.  I told her that since he was having a physical this could be corrected.  However, I did advise her that he had a AFP Tumor Marker drawn and I did not think this would be covered under physical.  I wanted her to be clear that once I changed the coding I would not change again and that Erik may receive a bill for that test.  She agreed and ask that it be changed to physical anyway.  Diagnosis has been changed per patients request.

## 2020-03-19 RX ORDER — TRIAMTERENE AND HYDROCHLOROTHIAZIDE 37.5; 25 MG/1; MG/1
TABLET ORAL
Qty: 30 TABLET | Refills: 4 | Status: SHIPPED | OUTPATIENT
Start: 2020-03-19 | End: 2020-05-21 | Stop reason: HOSPADM

## 2020-04-20 RX ORDER — AMLODIPINE BESYLATE AND BENAZEPRIL HYDROCHLORIDE 5; 40 MG/1; MG/1
CAPSULE ORAL
Qty: 30 CAPSULE | Refills: 4 | Status: SHIPPED | OUTPATIENT
Start: 2020-04-20 | End: 2020-09-15

## 2020-05-11 DIAGNOSIS — E83.110 HEMOCHROMATOSIS, HEREDITARY (HCC): Primary | ICD-10-CM

## 2020-05-11 RX ORDER — SODIUM CHLORIDE 9 MG/ML
250 INJECTION, SOLUTION INTRAVENOUS ONCE
Status: CANCELLED | OUTPATIENT
Start: 2020-05-11

## 2020-05-12 ENCOUNTER — HOSPITAL ENCOUNTER (OUTPATIENT)
Dept: INFUSION THERAPY | Facility: HOSPITAL | Age: 60
Setting detail: INFUSION SERIES
Discharge: HOME | End: 2020-05-12

## 2020-05-12 VITALS
TEMPERATURE: 98.3 F | SYSTOLIC BLOOD PRESSURE: 124 MMHG | HEART RATE: 98 BPM | HEIGHT: 73 IN | DIASTOLIC BLOOD PRESSURE: 71 MMHG | BODY MASS INDEX: 24.78 KG/M2 | WEIGHT: 187 LBS

## 2020-05-12 DIAGNOSIS — E83.110 HEMOCHROMATOSIS, HEREDITARY (HCC): ICD-10-CM

## 2020-05-12 LAB
ALBUMIN SERPL-MCNC: 4.8 G/DL (ref 3.5–5.2)
ALBUMIN/GLOB SERPL: 1.7 G/DL
ALP SERPL-CCNC: 82 U/L (ref 39–117)
ALT SERPL W P-5'-P-CCNC: 49 U/L (ref 1–41)
ANION GAP SERPL CALCULATED.3IONS-SCNC: 18 MMOL/L (ref 5–15)
AST SERPL-CCNC: 100 U/L (ref 1–40)
BASOPHILS # BLD AUTO: 0.1 10*3/MM3 (ref 0–0.2)
BASOPHILS NFR BLD AUTO: 1.5 % (ref 0–1.5)
BILIRUB SERPL-MCNC: 1.1 MG/DL (ref 0.2–1.2)
BUN BLD-MCNC: 10 MG/DL (ref 8–23)
BUN/CREAT SERPL: 14.3 (ref 7–25)
CALCIUM SPEC-SCNC: 9.6 MG/DL (ref 8.6–10.5)
CHLORIDE SERPL-SCNC: 84 MMOL/L (ref 98–107)
CO2 SERPL-SCNC: 24 MMOL/L (ref 22–29)
CREAT BLD-MCNC: 0.7 MG/DL (ref 0.76–1.27)
DEPRECATED RDW RBC AUTO: 45.9 FL (ref 37–54)
EOSINOPHIL # BLD AUTO: 0.1 10*3/MM3 (ref 0–0.4)
EOSINOPHIL NFR BLD AUTO: 2.6 % (ref 0.3–6.2)
ERYTHROCYTE [DISTWIDTH] IN BLOOD BY AUTOMATED COUNT: 13.5 % (ref 12.3–15.4)
FERRITIN SERPL-MCNC: 224.1 NG/ML (ref 30–400)
GFR SERPL CREATININE-BSD FRML MDRD: 114 ML/MIN/1.73
GLOBULIN UR ELPH-MCNC: 2.8 GM/DL
GLUCOSE BLD-MCNC: 101 MG/DL (ref 65–99)
HCT VFR BLD AUTO: 42.4 % (ref 37.5–51)
HGB BLD-MCNC: 15.4 G/DL (ref 13–17.7)
IRON 24H UR-MRATE: 168 MCG/DL (ref 59–158)
IRON SATN MFR SERPL: 44 % (ref 20–50)
LYMPHOCYTES # BLD AUTO: 1.3 10*3/MM3 (ref 0.7–3.1)
LYMPHOCYTES NFR BLD AUTO: 24.8 % (ref 19.6–45.3)
MCH RBC QN AUTO: 35.5 PG (ref 26.6–33)
MCHC RBC AUTO-ENTMCNC: 36.2 G/DL (ref 31.5–35.7)
MCV RBC AUTO: 97.9 FL (ref 79–97)
MONOCYTES # BLD AUTO: 0.8 10*3/MM3 (ref 0.1–0.9)
MONOCYTES NFR BLD AUTO: 15.3 % (ref 5–12)
NEUTROPHILS # BLD AUTO: 3 10*3/MM3 (ref 1.7–7)
NEUTROPHILS NFR BLD AUTO: 55.8 % (ref 42.7–76)
NRBC BLD AUTO-RTO: 0 /100 WBC (ref 0–0.2)
PLATELET # BLD AUTO: 214 10*3/MM3 (ref 140–450)
PMV BLD AUTO: 7.6 FL (ref 6–12)
POTASSIUM BLD-SCNC: 3.1 MMOL/L (ref 3.5–5.2)
PROT SERPL-MCNC: 7.6 G/DL (ref 6–8.5)
RBC # BLD AUTO: 4.33 10*6/MM3 (ref 4.14–5.8)
SODIUM BLD-SCNC: 126 MMOL/L (ref 136–145)
TIBC SERPL-MCNC: 386 MCG/DL (ref 298–536)
TRANSFERRIN SERPL-MCNC: 259 MG/DL (ref 200–360)
WBC NRBC COR # BLD: 5.3 10*3/MM3 (ref 3.4–10.8)

## 2020-05-12 PROCEDURE — 36415 COLL VENOUS BLD VENIPUNCTURE: CPT

## 2020-05-12 PROCEDURE — 80053 COMPREHEN METABOLIC PANEL: CPT | Performed by: INTERNAL MEDICINE

## 2020-05-12 PROCEDURE — 83540 ASSAY OF IRON: CPT | Performed by: INTERNAL MEDICINE

## 2020-05-12 PROCEDURE — 84466 ASSAY OF TRANSFERRIN: CPT | Performed by: INTERNAL MEDICINE

## 2020-05-12 PROCEDURE — 82728 ASSAY OF FERRITIN: CPT | Performed by: INTERNAL MEDICINE

## 2020-05-12 PROCEDURE — 85025 COMPLETE CBC W/AUTO DIFF WBC: CPT | Performed by: INTERNAL MEDICINE

## 2020-05-13 ENCOUNTER — HOSPITAL ENCOUNTER (OUTPATIENT)
Dept: INFUSION THERAPY | Facility: HOSPITAL | Age: 60
Setting detail: INFUSION SERIES
Discharge: HOME | End: 2020-05-13

## 2020-05-13 VITALS
HEIGHT: 73 IN | WEIGHT: 193 LBS | BODY MASS INDEX: 25.58 KG/M2 | HEART RATE: 76 BPM | TEMPERATURE: 98.2 F | SYSTOLIC BLOOD PRESSURE: 116 MMHG | RESPIRATION RATE: 16 BRPM | DIASTOLIC BLOOD PRESSURE: 63 MMHG

## 2020-05-13 DIAGNOSIS — E83.110 HEMOCHROMATOSIS, HEREDITARY (HCC): Primary | ICD-10-CM

## 2020-05-13 PROCEDURE — 99195 PHLEBOTOMY: CPT

## 2020-05-13 RX ORDER — SODIUM CHLORIDE 9 MG/ML
250 INJECTION, SOLUTION INTRAVENOUS ONCE
Status: CANCELLED | OUTPATIENT
Start: 2020-05-20

## 2020-05-13 NOTE — PROGRESS NOTES
PHLEBOTOMY    Start Time:0750    End Time: 0801    Hemaglobin Value: 15.4  Hematocrit Value:42.4    Ferritin Value: 222    Permit Signed: Self Signed    Prep with Chlorhexidine: yes    Needle Size: 16 Gauge    Number of Attempts: 1      Scale Calibration: yes    Amount Drawn: 450ml = 476grams    Phlebotomy Completed: yes    Phlebotomy Site: Left Antecubital    Dressing: Pressure Dressing Applied    Anchored: Held    Blood Discarded Per Protocol: yes    200+/- 5 grams: yes  500+/- 5 grams: yes    Self Care Assistance: Unassisted and Steady

## 2020-05-14 ENCOUNTER — TELEPHONE (OUTPATIENT)
Dept: FAMILY MEDICINE CLINIC | Facility: CLINIC | Age: 60
End: 2020-05-14

## 2020-05-14 NOTE — TELEPHONE ENCOUNTER
PATIENT WAS CALLING BECAUSE HE THOUGHT HE HAD A LAB APPT FOR DR DASH 5-20-20 BUT IT'S NOT ON THE SCHEDULE PLEASE ADVISE.     PATIENT ADVISED HE HAS HAD BLOOD TAKEN TWICE THIS WEEK AND FEELS LIKE IT'S REDUNDANT TO DO IT AGAIN NEXT WEEK.     PATIENT C/B #  [phone]

## 2020-05-19 ENCOUNTER — APPOINTMENT (OUTPATIENT)
Dept: CT IMAGING | Facility: HOSPITAL | Age: 60
End: 2020-05-19

## 2020-05-19 ENCOUNTER — HOSPITAL ENCOUNTER (OUTPATIENT)
Facility: HOSPITAL | Age: 60
Discharge: HOME | End: 2020-05-21
Attending: NURSE PRACTITIONER | Admitting: INTERNAL MEDICINE

## 2020-05-19 DIAGNOSIS — R42 ORTHOSTATIC DIZZINESS: ICD-10-CM

## 2020-05-19 DIAGNOSIS — F10.10 ALCOHOL ABUSE: ICD-10-CM

## 2020-05-19 DIAGNOSIS — R11.2 NON-INTRACTABLE VOMITING WITH NAUSEA, UNSPECIFIED VOMITING TYPE: Primary | ICD-10-CM

## 2020-05-19 PROBLEM — R11.10 NON-INTRACTABLE VOMITING: Status: ACTIVE | Noted: 2020-05-19

## 2020-05-19 LAB
ALBUMIN SERPL-MCNC: 4.1 G/DL (ref 3.5–5.2)
ALBUMIN/GLOB SERPL: 1.5 G/DL
ALP SERPL-CCNC: 60 U/L (ref 39–117)
ALT SERPL W P-5'-P-CCNC: 33 U/L (ref 1–41)
ANION GAP SERPL CALCULATED.3IONS-SCNC: 17 MMOL/L (ref 5–15)
AST SERPL-CCNC: 59 U/L (ref 1–40)
BASOPHILS # BLD AUTO: 0.1 10*3/MM3 (ref 0–0.2)
BASOPHILS NFR BLD AUTO: 0.7 % (ref 0–1.5)
BILIRUB SERPL-MCNC: 1 MG/DL (ref 0.2–1.2)
BUN BLD-MCNC: 22 MG/DL (ref 8–23)
BUN/CREAT SERPL: 31.4 (ref 7–25)
CALCIUM SPEC-SCNC: 8.9 MG/DL (ref 8.6–10.5)
CHLORIDE SERPL-SCNC: 90 MMOL/L (ref 98–107)
CO2 SERPL-SCNC: 23 MMOL/L (ref 22–29)
CREAT BLD-MCNC: 0.7 MG/DL (ref 0.76–1.27)
DEPRECATED RDW RBC AUTO: 45.1 FL (ref 37–54)
EOSINOPHIL # BLD AUTO: 0 10*3/MM3 (ref 0–0.4)
EOSINOPHIL NFR BLD AUTO: 0.1 % (ref 0.3–6.2)
ERYTHROCYTE [DISTWIDTH] IN BLOOD BY AUTOMATED COUNT: 13.2 % (ref 12.3–15.4)
GFR SERPL CREATININE-BSD FRML MDRD: 114 ML/MIN/1.73
GLOBULIN UR ELPH-MCNC: 2.7 GM/DL
GLUCOSE BLD-MCNC: 120 MG/DL (ref 65–99)
HCT VFR BLD AUTO: 33.4 % (ref 37.5–51)
HGB BLD-MCNC: 11.8 G/DL (ref 13–17.7)
HOLD SPECIMEN: NORMAL
LYMPHOCYTES # BLD AUTO: 0.4 10*3/MM3 (ref 0.7–3.1)
LYMPHOCYTES NFR BLD AUTO: 5.1 % (ref 19.6–45.3)
MCH RBC QN AUTO: 34.9 PG (ref 26.6–33)
MCHC RBC AUTO-ENTMCNC: 35.4 G/DL (ref 31.5–35.7)
MCV RBC AUTO: 98.6 FL (ref 79–97)
MONOCYTES # BLD AUTO: 0.6 10*3/MM3 (ref 0.1–0.9)
MONOCYTES NFR BLD AUTO: 7 % (ref 5–12)
NEUTROPHILS # BLD AUTO: 7.2 10*3/MM3 (ref 1.7–7)
NEUTROPHILS NFR BLD AUTO: 87.1 % (ref 42.7–76)
NRBC BLD AUTO-RTO: 0 /100 WBC (ref 0–0.2)
NT-PROBNP SERPL-MCNC: 12.9 PG/ML (ref 5–900)
PLATELET # BLD AUTO: 277 10*3/MM3 (ref 140–450)
PMV BLD AUTO: 8 FL (ref 6–12)
POTASSIUM BLD-SCNC: 3.5 MMOL/L (ref 3.5–5.2)
PROT SERPL-MCNC: 6.8 G/DL (ref 6–8.5)
RBC # BLD AUTO: 3.39 10*6/MM3 (ref 4.14–5.8)
SODIUM BLD-SCNC: 130 MMOL/L (ref 136–145)
TROPONIN T SERPL-MCNC: <0.01 NG/ML (ref 0–0.03)
WBC NRBC COR # BLD: 8.2 10*3/MM3 (ref 3.4–10.8)
WHOLE BLOOD HOLD SPECIMEN: NORMAL

## 2020-05-19 PROCEDURE — G0378 HOSPITAL OBSERVATION PER HR: HCPCS

## 2020-05-19 PROCEDURE — 84484 ASSAY OF TROPONIN QUANT: CPT | Performed by: NURSE PRACTITIONER

## 2020-05-19 PROCEDURE — 96374 THER/PROPH/DIAG INJ IV PUSH: CPT

## 2020-05-19 PROCEDURE — 25010000002 ONDANSETRON PER 1 MG: Performed by: NURSE PRACTITIONER

## 2020-05-19 PROCEDURE — 93005 ELECTROCARDIOGRAM TRACING: CPT | Performed by: NURSE PRACTITIONER

## 2020-05-19 PROCEDURE — 96361 HYDRATE IV INFUSION ADD-ON: CPT

## 2020-05-19 PROCEDURE — 83880 ASSAY OF NATRIURETIC PEPTIDE: CPT | Performed by: NURSE PRACTITIONER

## 2020-05-19 PROCEDURE — 96375 TX/PRO/DX INJ NEW DRUG ADDON: CPT

## 2020-05-19 PROCEDURE — 74150 CT ABDOMEN W/O CONTRAST: CPT

## 2020-05-19 PROCEDURE — 80307 DRUG TEST PRSMV CHEM ANLYZR: CPT | Performed by: NURSE PRACTITIONER

## 2020-05-19 PROCEDURE — 96376 TX/PRO/DX INJ SAME DRUG ADON: CPT

## 2020-05-19 PROCEDURE — 99219 PR INITIAL OBSERVATION CARE/DAY 50 MINUTES: CPT | Performed by: NURSE PRACTITIONER

## 2020-05-19 PROCEDURE — 36415 COLL VENOUS BLD VENIPUNCTURE: CPT

## 2020-05-19 PROCEDURE — 80053 COMPREHEN METABOLIC PANEL: CPT | Performed by: NURSE PRACTITIONER

## 2020-05-19 PROCEDURE — 82105 ALPHA-FETOPROTEIN SERUM: CPT | Performed by: NURSE PRACTITIONER

## 2020-05-19 PROCEDURE — 70450 CT HEAD/BRAIN W/O DYE: CPT

## 2020-05-19 PROCEDURE — 85025 COMPLETE CBC W/AUTO DIFF WBC: CPT | Performed by: NURSE PRACTITIONER

## 2020-05-19 PROCEDURE — 99284 EMERGENCY DEPT VISIT MOD MDM: CPT

## 2020-05-19 RX ORDER — CETIRIZINE HYDROCHLORIDE 10 MG/1
10 TABLET ORAL DAILY
Status: DISCONTINUED | OUTPATIENT
Start: 2020-05-20 | End: 2020-05-21 | Stop reason: HOSPADM

## 2020-05-19 RX ORDER — TRIAMTERENE AND HYDROCHLOROTHIAZIDE 37.5; 25 MG/1; MG/1
1 TABLET ORAL DAILY
Status: DISCONTINUED | OUTPATIENT
Start: 2020-05-20 | End: 2020-05-21

## 2020-05-19 RX ORDER — ACETAMINOPHEN 650 MG/1
650 SUPPOSITORY RECTAL EVERY 4 HOURS PRN
Status: DISCONTINUED | OUTPATIENT
Start: 2020-05-19 | End: 2020-05-21 | Stop reason: HOSPADM

## 2020-05-19 RX ORDER — SODIUM CHLORIDE 9 MG/ML
100 INJECTION, SOLUTION INTRAVENOUS CONTINUOUS
Status: DISCONTINUED | OUTPATIENT
Start: 2020-05-19 | End: 2020-05-21

## 2020-05-19 RX ORDER — LORAZEPAM 2 MG/ML
2 INJECTION INTRAMUSCULAR
Status: DISCONTINUED | OUTPATIENT
Start: 2020-05-19 | End: 2020-05-21 | Stop reason: HOSPADM

## 2020-05-19 RX ORDER — ONDANSETRON 4 MG/1
4 TABLET, FILM COATED ORAL EVERY 6 HOURS PRN
Status: DISCONTINUED | OUTPATIENT
Start: 2020-05-19 | End: 2020-05-21 | Stop reason: HOSPADM

## 2020-05-19 RX ORDER — LORAZEPAM 1 MG/1
2 TABLET ORAL
Status: DISCONTINUED | OUTPATIENT
Start: 2020-05-19 | End: 2020-05-21 | Stop reason: HOSPADM

## 2020-05-19 RX ORDER — KETOTIFEN FUMARATE 0.35 MG/ML
1 SOLUTION/ DROPS OPHTHALMIC 2 TIMES DAILY
Status: DISCONTINUED | OUTPATIENT
Start: 2020-05-20 | End: 2020-05-21 | Stop reason: HOSPADM

## 2020-05-19 RX ORDER — ONDANSETRON 2 MG/ML
4 INJECTION INTRAMUSCULAR; INTRAVENOUS ONCE
Status: COMPLETED | OUTPATIENT
Start: 2020-05-19 | End: 2020-05-19

## 2020-05-19 RX ORDER — SODIUM CHLORIDE 0.9 % (FLUSH) 0.9 %
10 SYRINGE (ML) INJECTION EVERY 12 HOURS SCHEDULED
Status: DISCONTINUED | OUTPATIENT
Start: 2020-05-19 | End: 2020-05-21 | Stop reason: HOSPADM

## 2020-05-19 RX ORDER — CHOLECALCIFEROL (VITAMIN D3) 125 MCG
5 CAPSULE ORAL NIGHTLY PRN
Status: DISCONTINUED | OUTPATIENT
Start: 2020-05-19 | End: 2020-05-21 | Stop reason: HOSPADM

## 2020-05-19 RX ORDER — SODIUM CHLORIDE 0.9 % (FLUSH) 0.9 %
10 SYRINGE (ML) INJECTION AS NEEDED
Status: DISCONTINUED | OUTPATIENT
Start: 2020-05-19 | End: 2020-05-21 | Stop reason: HOSPADM

## 2020-05-19 RX ORDER — ACETAMINOPHEN 160 MG/5ML
650 SOLUTION ORAL EVERY 4 HOURS PRN
Status: DISCONTINUED | OUTPATIENT
Start: 2020-05-19 | End: 2020-05-21 | Stop reason: HOSPADM

## 2020-05-19 RX ORDER — ACETAMINOPHEN 325 MG/1
650 TABLET ORAL EVERY 4 HOURS PRN
Status: DISCONTINUED | OUTPATIENT
Start: 2020-05-19 | End: 2020-05-21 | Stop reason: HOSPADM

## 2020-05-19 RX ORDER — THIAMINE HCL 100 MG
100 TABLET ORAL ONCE
Status: COMPLETED | OUTPATIENT
Start: 2020-05-20 | End: 2020-05-20

## 2020-05-19 RX ORDER — ONDANSETRON 2 MG/ML
4 INJECTION INTRAMUSCULAR; INTRAVENOUS EVERY 6 HOURS PRN
Status: DISCONTINUED | OUTPATIENT
Start: 2020-05-19 | End: 2020-05-21 | Stop reason: HOSPADM

## 2020-05-19 RX ORDER — NITROGLYCERIN 0.4 MG/1
0.4 TABLET SUBLINGUAL
Status: DISCONTINUED | OUTPATIENT
Start: 2020-05-19 | End: 2020-05-21 | Stop reason: HOSPADM

## 2020-05-19 RX ORDER — LORAZEPAM 2 MG/ML
1 INJECTION INTRAMUSCULAR
Status: DISCONTINUED | OUTPATIENT
Start: 2020-05-19 | End: 2020-05-21 | Stop reason: HOSPADM

## 2020-05-19 RX ORDER — LORAZEPAM 1 MG/1
1 TABLET ORAL
Status: DISCONTINUED | OUTPATIENT
Start: 2020-05-19 | End: 2020-05-21 | Stop reason: HOSPADM

## 2020-05-19 RX ADMIN — Medication 10 ML: at 22:00

## 2020-05-19 RX ADMIN — SODIUM CHLORIDE 500 ML: 0.9 INJECTION, SOLUTION INTRAVENOUS at 18:55

## 2020-05-19 RX ADMIN — ONDANSETRON 4 MG: 2 INJECTION INTRAMUSCULAR; INTRAVENOUS at 20:48

## 2020-05-19 RX ADMIN — ONDANSETRON 4 MG: 2 INJECTION INTRAMUSCULAR; INTRAVENOUS at 17:11

## 2020-05-19 RX ADMIN — SODIUM CHLORIDE 100 ML/HR: 900 INJECTION, SOLUTION INTRAVENOUS at 23:39

## 2020-05-19 RX ADMIN — ONDANSETRON 4 MG: 2 INJECTION INTRAMUSCULAR; INTRAVENOUS at 23:37

## 2020-05-19 RX ADMIN — SODIUM CHLORIDE 1000 ML: 0.9 INJECTION, SOLUTION INTRAVENOUS at 17:11

## 2020-05-19 RX ADMIN — ACETAMINOPHEN 650 MG: 325 TABLET ORAL at 23:37

## 2020-05-19 NOTE — ED NOTES
Pt is resting in bed vitals are stable denies pain no signs of distress awaiting labs to be resulted      Edith Velázquez RN  05/19/20 5819

## 2020-05-19 NOTE — ED NOTES
I attempted to get full set of orthostatics on pt when pt was laid flat he became very dizzy vitals were 136/62 with HR of 120 when he sat up the dizziness got even worse /57 with HR of 98.  Np is aware of pt status he is now back to baseline. Pt was given 500 normal saline and ordered for head CT     Edith Velázquez RN  05/19/20 2467

## 2020-05-20 ENCOUNTER — ANESTHESIA (OUTPATIENT)
Dept: GASTROENTEROLOGY | Facility: HOSPITAL | Age: 60
End: 2020-05-20

## 2020-05-20 ENCOUNTER — ANESTHESIA EVENT (OUTPATIENT)
Dept: GASTROENTEROLOGY | Facility: HOSPITAL | Age: 60
End: 2020-05-20

## 2020-05-20 ENCOUNTER — ON CAMPUS - OUTPATIENT (AMBULATORY)
Dept: URBAN - METROPOLITAN AREA HOSPITAL 85 | Facility: HOSPITAL | Age: 60
End: 2020-05-20

## 2020-05-20 DIAGNOSIS — K92.0 HEMATEMESIS: ICD-10-CM

## 2020-05-20 DIAGNOSIS — K70.30 ALCOHOLIC CIRRHOSIS OF LIVER WITHOUT ASCITES: ICD-10-CM

## 2020-05-20 DIAGNOSIS — K92.1 MELENA: ICD-10-CM

## 2020-05-20 DIAGNOSIS — E83.110 HEREDITARY HEMOCHROMATOSIS: ICD-10-CM

## 2020-05-20 DIAGNOSIS — F10.188 ALCOHOL ABUSE WITH OTHER ALCOHOL-INDUCED DISORDER: ICD-10-CM

## 2020-05-20 DIAGNOSIS — R93.3 ABNORMAL FINDINGS ON DIAGNOSTIC IMAGING OF OTHER PARTS OF DI: ICD-10-CM

## 2020-05-20 DIAGNOSIS — K25.9 GASTRIC ULCER, UNSPECIFIED AS ACUTE OR CHRONIC, WITHOUT HEMO: ICD-10-CM

## 2020-05-20 PROBLEM — F10.10 ALCOHOL ABUSE: Status: ACTIVE | Noted: 2020-05-20

## 2020-05-20 LAB
ALPHA-FETOPROTEIN: 6.65 NG/ML (ref 0–8.3)
AMMONIA BLD-SCNC: 30 UMOL/L (ref 16–60)
ANION GAP SERPL CALCULATED.3IONS-SCNC: 13 MMOL/L (ref 5–15)
BASOPHILS # BLD AUTO: 0.1 10*3/MM3 (ref 0–0.2)
BASOPHILS NFR BLD AUTO: 0.7 % (ref 0–1.5)
BUN BLD-MCNC: 22 MG/DL (ref 8–23)
BUN/CREAT SERPL: 25.9 (ref 7–25)
CALCIUM SPEC-SCNC: 8.8 MG/DL (ref 8.6–10.5)
CHLORIDE SERPL-SCNC: 91 MMOL/L (ref 98–107)
CK SERPL-CCNC: 77 U/L (ref 20–200)
CO2 SERPL-SCNC: 27 MMOL/L (ref 22–29)
CREAT BLD-MCNC: 0.85 MG/DL (ref 0.76–1.27)
DEPRECATED RDW RBC AUTO: 44.2 FL (ref 37–54)
EOSINOPHIL # BLD AUTO: 0 10*3/MM3 (ref 0–0.4)
EOSINOPHIL NFR BLD AUTO: 0.3 % (ref 0.3–6.2)
ERYTHROCYTE [DISTWIDTH] IN BLOOD BY AUTOMATED COUNT: 12.9 % (ref 12.3–15.4)
ETHANOL UR QL: <0.01 %
GFR SERPL CREATININE-BSD FRML MDRD: 91 ML/MIN/1.73
GLUCOSE BLD-MCNC: 93 MG/DL (ref 65–99)
HCT VFR BLD AUTO: 29 % (ref 37.5–51)
HEMOCCULT STL QL IA: NEGATIVE
HGB BLD-MCNC: 10.2 G/DL (ref 13–17.7)
LYMPHOCYTES # BLD AUTO: 1.1 10*3/MM3 (ref 0.7–3.1)
LYMPHOCYTES NFR BLD AUTO: 15.6 % (ref 19.6–45.3)
MAGNESIUM SERPL-MCNC: 1.3 MG/DL (ref 1.6–2.4)
MCH RBC QN AUTO: 34.7 PG (ref 26.6–33)
MCHC RBC AUTO-ENTMCNC: 35.2 G/DL (ref 31.5–35.7)
MCV RBC AUTO: 98.7 FL (ref 79–97)
MONOCYTES # BLD AUTO: 1 10*3/MM3 (ref 0.1–0.9)
MONOCYTES NFR BLD AUTO: 13.1 % (ref 5–12)
NEUTROPHILS # BLD AUTO: 5.1 10*3/MM3 (ref 1.7–7)
NEUTROPHILS NFR BLD AUTO: 70.3 % (ref 42.7–76)
NRBC BLD AUTO-RTO: 0 /100 WBC (ref 0–0.2)
PLATELET # BLD AUTO: 212 10*3/MM3 (ref 140–450)
PMV BLD AUTO: 7.4 FL (ref 6–12)
POTASSIUM BLD-SCNC: 3.3 MMOL/L (ref 3.5–5.2)
RBC # BLD AUTO: 2.93 10*6/MM3 (ref 4.14–5.8)
SODIUM BLD-SCNC: 131 MMOL/L (ref 136–145)
WBC NRBC COR # BLD: 7.2 10*3/MM3 (ref 3.4–10.8)

## 2020-05-20 PROCEDURE — 85025 COMPLETE CBC W/AUTO DIFF WBC: CPT | Performed by: NURSE PRACTITIONER

## 2020-05-20 PROCEDURE — G0378 HOSPITAL OBSERVATION PER HR: HCPCS

## 2020-05-20 PROCEDURE — 96361 HYDRATE IV INFUSION ADD-ON: CPT

## 2020-05-20 PROCEDURE — 82274 ASSAY TEST FOR BLOOD FECAL: CPT | Performed by: INTERNAL MEDICINE

## 2020-05-20 PROCEDURE — 80048 BASIC METABOLIC PNL TOTAL CA: CPT | Performed by: NURSE PRACTITIONER

## 2020-05-20 PROCEDURE — 96365 THER/PROPH/DIAG IV INF INIT: CPT

## 2020-05-20 PROCEDURE — 25010000002 LORAZEPAM PER 2 MG: Performed by: NURSE PRACTITIONER

## 2020-05-20 PROCEDURE — 25010000002 MAGNESIUM SULFATE 2 GM/50ML SOLUTION: Performed by: INTERNAL MEDICINE

## 2020-05-20 PROCEDURE — 25010000002 THIAMINE PER 100 MG: Performed by: NURSE PRACTITIONER

## 2020-05-20 PROCEDURE — 99243 OFF/OP CNSLTJ NEW/EST LOW 30: CPT | Mod: 25 | Performed by: NURSE PRACTITIONER

## 2020-05-20 PROCEDURE — 88342 IMHCHEM/IMCYTCHM 1ST ANTB: CPT | Performed by: INTERNAL MEDICINE

## 2020-05-20 PROCEDURE — 82550 ASSAY OF CK (CPK): CPT | Performed by: NURSE PRACTITIONER

## 2020-05-20 PROCEDURE — 88305 TISSUE EXAM BY PATHOLOGIST: CPT | Performed by: INTERNAL MEDICINE

## 2020-05-20 PROCEDURE — 25010000002 PROPOFOL 10 MG/ML EMULSION: Performed by: NURSE ANESTHETIST, CERTIFIED REGISTERED

## 2020-05-20 PROCEDURE — 96375 TX/PRO/DX INJ NEW DRUG ADDON: CPT

## 2020-05-20 PROCEDURE — 83735 ASSAY OF MAGNESIUM: CPT | Performed by: NURSE PRACTITIONER

## 2020-05-20 PROCEDURE — 82140 ASSAY OF AMMONIA: CPT | Performed by: NURSE PRACTITIONER

## 2020-05-20 PROCEDURE — 99225 PR SBSQ OBSERVATION CARE/DAY 25 MINUTES: CPT | Performed by: INTERNAL MEDICINE

## 2020-05-20 PROCEDURE — 43239 EGD BIOPSY SINGLE/MULTIPLE: CPT | Performed by: INTERNAL MEDICINE

## 2020-05-20 RX ORDER — PROMETHAZINE HYDROCHLORIDE 25 MG/ML
6.25 INJECTION, SOLUTION INTRAMUSCULAR; INTRAVENOUS ONCE AS NEEDED
Status: DISCONTINUED | OUTPATIENT
Start: 2020-05-20 | End: 2020-05-20 | Stop reason: HOSPADM

## 2020-05-20 RX ORDER — ACETAMINOPHEN 650 MG/1
650 SUPPOSITORY RECTAL ONCE AS NEEDED
Status: DISCONTINUED | OUTPATIENT
Start: 2020-05-20 | End: 2020-05-20 | Stop reason: HOSPADM

## 2020-05-20 RX ORDER — POTASSIUM CHLORIDE 7.45 MG/ML
10 INJECTION INTRAVENOUS
Status: DISCONTINUED | OUTPATIENT
Start: 2020-05-20 | End: 2020-05-21 | Stop reason: HOSPADM

## 2020-05-20 RX ORDER — SODIUM CHLORIDE 0.9 % (FLUSH) 0.9 %
3 SYRINGE (ML) INJECTION EVERY 12 HOURS SCHEDULED
Status: DISCONTINUED | OUTPATIENT
Start: 2020-05-20 | End: 2020-05-20 | Stop reason: HOSPADM

## 2020-05-20 RX ORDER — HYDRALAZINE HYDROCHLORIDE 20 MG/ML
5 INJECTION INTRAMUSCULAR; INTRAVENOUS
Status: DISCONTINUED | OUTPATIENT
Start: 2020-05-20 | End: 2020-05-20 | Stop reason: HOSPADM

## 2020-05-20 RX ORDER — LABETALOL HYDROCHLORIDE 5 MG/ML
5 INJECTION, SOLUTION INTRAVENOUS
Status: DISCONTINUED | OUTPATIENT
Start: 2020-05-20 | End: 2020-05-20 | Stop reason: HOSPADM

## 2020-05-20 RX ORDER — MAGNESIUM SULFATE HEPTAHYDRATE 40 MG/ML
4 INJECTION, SOLUTION INTRAVENOUS ONCE
Status: COMPLETED | OUTPATIENT
Start: 2020-05-20 | End: 2020-05-20

## 2020-05-20 RX ORDER — ONDANSETRON 2 MG/ML
4 INJECTION INTRAMUSCULAR; INTRAVENOUS ONCE AS NEEDED
Status: DISCONTINUED | OUTPATIENT
Start: 2020-05-20 | End: 2020-05-20 | Stop reason: HOSPADM

## 2020-05-20 RX ORDER — THIAMINE HCL 100 MG
100 TABLET ORAL DAILY
Status: DISCONTINUED | OUTPATIENT
Start: 2020-05-20 | End: 2020-05-21 | Stop reason: HOSPADM

## 2020-05-20 RX ORDER — PHENYLEPHRINE HCL IN 0.9% NACL 0.5 MG/5ML
.5-3 SYRINGE (ML) INTRAVENOUS
Status: DISCONTINUED | OUTPATIENT
Start: 2020-05-20 | End: 2020-05-20 | Stop reason: HOSPADM

## 2020-05-20 RX ORDER — PANTOPRAZOLE SODIUM 40 MG/10ML
40 INJECTION, POWDER, LYOPHILIZED, FOR SOLUTION INTRAVENOUS
Status: DISCONTINUED | OUTPATIENT
Start: 2020-05-20 | End: 2020-05-20

## 2020-05-20 RX ORDER — POTASSIUM CHLORIDE 1.5 G/1.77G
40 POWDER, FOR SOLUTION ORAL AS NEEDED
Status: DISCONTINUED | OUTPATIENT
Start: 2020-05-20 | End: 2020-05-21 | Stop reason: HOSPADM

## 2020-05-20 RX ORDER — ACETAMINOPHEN 325 MG/1
650 TABLET ORAL ONCE AS NEEDED
Status: DISCONTINUED | OUTPATIENT
Start: 2020-05-20 | End: 2020-05-20 | Stop reason: HOSPADM

## 2020-05-20 RX ORDER — ONDANSETRON 4 MG/1
4 TABLET, FILM COATED ORAL EVERY 6 HOURS PRN
Status: DISCONTINUED | OUTPATIENT
Start: 2020-05-20 | End: 2020-05-21 | Stop reason: HOSPADM

## 2020-05-20 RX ORDER — DIPHENHYDRAMINE HYDROCHLORIDE 50 MG/ML
12.5 INJECTION INTRAMUSCULAR; INTRAVENOUS
Status: DISCONTINUED | OUTPATIENT
Start: 2020-05-20 | End: 2020-05-20 | Stop reason: HOSPADM

## 2020-05-20 RX ORDER — ZINC SULFATE 50(220)MG
220 CAPSULE ORAL DAILY
Status: DISCONTINUED | OUTPATIENT
Start: 2020-05-20 | End: 2020-05-21 | Stop reason: HOSPADM

## 2020-05-20 RX ORDER — SODIUM CHLORIDE 0.9 % (FLUSH) 0.9 %
10 SYRINGE (ML) INJECTION AS NEEDED
Status: DISCONTINUED | OUTPATIENT
Start: 2020-05-20 | End: 2020-05-20 | Stop reason: HOSPADM

## 2020-05-20 RX ORDER — PROPOFOL 10 MG/ML
VIAL (ML) INTRAVENOUS AS NEEDED
Status: DISCONTINUED | OUTPATIENT
Start: 2020-05-20 | End: 2020-05-20 | Stop reason: SURG

## 2020-05-20 RX ORDER — POTASSIUM CHLORIDE 20 MEQ/1
40 TABLET, EXTENDED RELEASE ORAL AS NEEDED
Status: DISCONTINUED | OUTPATIENT
Start: 2020-05-20 | End: 2020-05-21 | Stop reason: HOSPADM

## 2020-05-20 RX ORDER — PANTOPRAZOLE SODIUM 40 MG/1
40 TABLET, DELAYED RELEASE ORAL
Status: DISCONTINUED | OUTPATIENT
Start: 2020-05-20 | End: 2020-05-21 | Stop reason: HOSPADM

## 2020-05-20 RX ORDER — SUCRALFATE 1 G/1
1 TABLET ORAL
Status: DISCONTINUED | OUTPATIENT
Start: 2020-05-20 | End: 2020-05-21 | Stop reason: HOSPADM

## 2020-05-20 RX ORDER — EPHEDRINE SULFATE 50 MG/ML
5 INJECTION, SOLUTION INTRAVENOUS ONCE AS NEEDED
Status: DISCONTINUED | OUTPATIENT
Start: 2020-05-20 | End: 2020-05-20 | Stop reason: HOSPADM

## 2020-05-20 RX ORDER — ONDANSETRON 2 MG/ML
4 INJECTION INTRAMUSCULAR; INTRAVENOUS EVERY 6 HOURS PRN
Status: DISCONTINUED | OUTPATIENT
Start: 2020-05-20 | End: 2020-05-21 | Stop reason: HOSPADM

## 2020-05-20 RX ORDER — LIDOCAINE HYDROCHLORIDE 20 MG/ML
INJECTION, SOLUTION EPIDURAL; INFILTRATION; INTRACAUDAL; PERINEURAL AS NEEDED
Status: DISCONTINUED | OUTPATIENT
Start: 2020-05-20 | End: 2020-05-20 | Stop reason: SURG

## 2020-05-20 RX ORDER — IPRATROPIUM BROMIDE AND ALBUTEROL SULFATE 2.5; .5 MG/3ML; MG/3ML
3 SOLUTION RESPIRATORY (INHALATION) ONCE AS NEEDED
Status: DISCONTINUED | OUTPATIENT
Start: 2020-05-20 | End: 2020-05-20 | Stop reason: HOSPADM

## 2020-05-20 RX ORDER — PROMETHAZINE HYDROCHLORIDE 25 MG/1
25 SUPPOSITORY RECTAL ONCE AS NEEDED
Status: DISCONTINUED | OUTPATIENT
Start: 2020-05-20 | End: 2020-05-20 | Stop reason: HOSPADM

## 2020-05-20 RX ORDER — PROMETHAZINE HYDROCHLORIDE 25 MG/1
25 TABLET ORAL ONCE AS NEEDED
Status: DISCONTINUED | OUTPATIENT
Start: 2020-05-20 | End: 2020-05-20 | Stop reason: HOSPADM

## 2020-05-20 RX ADMIN — POTASSIUM CHLORIDE 40 MEQ: 1500 TABLET, EXTENDED RELEASE ORAL at 14:14

## 2020-05-20 RX ADMIN — LORAZEPAM 2 MG: 2 INJECTION INTRAMUSCULAR; INTRAVENOUS at 00:43

## 2020-05-20 RX ADMIN — MAGNESIUM SULFATE HEPTAHYDRATE 4 G: 40 INJECTION, SOLUTION INTRAVENOUS at 14:01

## 2020-05-20 RX ADMIN — TRIAMTERENE AND HYDROCHLOROTHIAZIDE 1 TABLET: 37.5; 25 TABLET ORAL at 12:55

## 2020-05-20 RX ADMIN — PROPOFOL 100 MG: 10 INJECTION, EMULSION INTRAVENOUS at 12:08

## 2020-05-20 RX ADMIN — POTASSIUM CHLORIDE 40 MEQ: 1500 TABLET, EXTENDED RELEASE ORAL at 16:31

## 2020-05-20 RX ADMIN — SUCRALFATE 1 G: 1 TABLET ORAL at 16:31

## 2020-05-20 RX ADMIN — PROPOFOL 100 MG: 10 INJECTION, EMULSION INTRAVENOUS at 12:05

## 2020-05-20 RX ADMIN — PANTOPRAZOLE SODIUM 40 MG: 40 TABLET, DELAYED RELEASE ORAL at 16:31

## 2020-05-20 RX ADMIN — SUCRALFATE 1 G: 1 TABLET ORAL at 20:30

## 2020-05-20 RX ADMIN — ZINC SULFATE 220 MG (50 MG) CAPSULE 220 MG: CAPSULE at 12:45

## 2020-05-20 RX ADMIN — THIAMINE HYDROCHLORIDE 100 MG: 100 INJECTION, SOLUTION INTRAMUSCULAR; INTRAVENOUS at 00:41

## 2020-05-20 RX ADMIN — Medication 10 ML: at 08:59

## 2020-05-20 RX ADMIN — CETIRIZINE HYDROCHLORIDE 10 MG: 10 TABLET, FILM COATED ORAL at 08:52

## 2020-05-20 RX ADMIN — LIDOCAINE HYDROCHLORIDE 100 MG: 20 INJECTION, SOLUTION EPIDURAL; INFILTRATION; INTRACAUDAL; PERINEURAL at 12:03

## 2020-05-20 RX ADMIN — Medication 100 MG: at 12:56

## 2020-05-20 RX ADMIN — MELATONIN TAB 5 MG 5 MG: 5 TAB at 23:53

## 2020-05-20 RX ADMIN — PROPOFOL 40 MG: 10 INJECTION, EMULSION INTRAVENOUS at 12:11

## 2020-05-20 RX ADMIN — SUCRALFATE 1 G: 1 TABLET ORAL at 12:56

## 2020-05-20 RX ADMIN — MAGNESIUM SULFATE HEPTAHYDRATE 4 G: 40 INJECTION, SOLUTION INTRAVENOUS at 08:55

## 2020-05-20 RX ADMIN — SODIUM CHLORIDE 9 ML/HR: 900 INJECTION, SOLUTION INTRAVENOUS at 11:05

## 2020-05-20 RX ADMIN — LISINOPRIL: 20 TABLET ORAL at 08:51

## 2020-05-20 RX ADMIN — KETOTIFEN FUMARATE 1 DROP: 0.35 SOLUTION/ DROPS OPHTHALMIC at 20:34

## 2020-05-20 RX ADMIN — KETOTIFEN FUMARATE 1 DROP: 0.35 SOLUTION/ DROPS OPHTHALMIC at 12:55

## 2020-05-20 NOTE — ANESTHESIA POSTPROCEDURE EVALUATION
Patient: Erik Fuentes    Procedure Summary     Date:  05/20/20 Room / Location:  [facility] ENDOSCOPY 1 / [facility] ENDOSCOPY    Anesthesia Start:  1200 Anesthesia Stop:  1215    Procedure:  ESOPHAGOGASTRODUODENOSCOPY with biopsy x1 (N/A ) Diagnosis:       Non-intractable vomiting with nausea, unspecified vomiting type      Alcohol abuse      (Non-intractable vomiting with nausea, unspecified vomiting type [R11.2])      (Alcohol abuse [F10.10])    Surgeon:  Brody Boggs MD Provider:  Erik Muñoz MD    Anesthesia Type:  MAC ASA Status:  3          Anesthesia Type: MAC    Vitals  Vitals Value Taken Time   /56 5/20/2020 12:38 PM   Temp     Pulse 90 5/20/2020 12:41 PM   Resp 15 5/20/2020 12:38 PM   SpO2 95 % 5/20/2020 12:41 PM   Vitals shown include unvalidated device data.        Post Anesthesia Care and Evaluation    Patient location during evaluation: PACU  Patient participation: complete - patient participated  Level of consciousness: awake  Pain scale: See nurse's notes for pain score.  Pain management: adequate  Airway patency: patent  Anesthetic complications: No anesthetic complications  PONV Status: none  Cardiovascular status: acceptable  Respiratory status: acceptable  Hydration status: acceptable    Comments: Patient seen and examined postoperatively; vital signs stable; SpO2 greater than or equal to 90%; cardiopulmonary status stable; nausea/vomiting adequately controlled; pain adequately controlled; no apparent anesthesia complications; patient discharged from anesthesia care when discharge criteria were met

## 2020-05-20 NOTE — PROGRESS NOTES
"      [facility] Daily Progress Note      Hospitalist Team  LOS 0 days      Patient Care Team:  Meri Bahena MD as PCP - General  Meri Bahena MD as PCP - Family Medicine    Patient Location: [facility]/1      Subjective   Subjective     Chief Complaint / Subjective  Chief Complaint   Patient presents with   • Vomiting     Still feels generally weak.  Had some loose stool this morning that was with some darker material.    Brief Synopsis of Hospital Course/HPI            Review of Systems   Constitution: Positive for malaise/fatigue.   Gastrointestinal: Positive for abdominal pain, change in bowel habit and melena.         Objective   Objective      Vital Signs  Temp:  [98.1 °F (36.7 °C)-99 °F (37.2 °C)] 98.2 °F (36.8 °C)  Heart Rate:  [] 90  Resp:  [12-20] 15  BP: ()/(45-87) 102/56  Oxygen Therapy  SpO2: 96 %  Pulse Oximetry Type: Continuous  Device (Oxygen Therapy): room air  ETCO2 (mmHg): (!) 0 mmHg  Flowsheet Rows      First Filed Value   Admission Height  182.9 cm (72\") Documented at 05/19/2020 1658   Admission Weight  84.8 kg (187 lb) Documented at 05/19/2020 1658        Intake & Output (last 3 days)       05/17 0701 - 05/18 0700 05/18 0701 - 05/19 0700 05/19 0701 - 05/20 0700 05/20 0701 - 05/21 0700    I.V. (mL/kg)    200 (2.4)    IV Piggyback   1500     Total Intake(mL/kg)   1500 (17.7) 200 (2.4)    Net   +1500 +200                Lines, Drains & Airways    Active LDAs     Name:   Placement date:   Placement time:   Site:   Days:    Peripheral IV 05/19/20 1711 Right Hand   05/19/20    1711    Hand   less than 1                  Physical Exam:    Physical Exam   Constitutional: He is oriented to person, place, and time. No distress.   HENT:   Rhinophyma noted   Eyes: Pupils are equal, round, and reactive to light. No scleral icterus.   Cardiovascular: Normal rate and regular rhythm.   Pulmonary/Chest: Effort normal. No respiratory distress.   "   Abdominal: Soft.   Mild tender to palpate in the epigastric area   Musculoskeletal: He exhibits no tenderness.   Neurological: He is alert and oriented to person, place, and time.   Skin: Skin is warm and dry.             Procedures:    Procedure(s):  ESOPHAGOGASTRODUODENOSCOPY with biopsy x1          Results Review:     I reviewed the patient's new clinical results.      Lab Results (last 24 hours)     Procedure Component Value Units Date/Time    Occult Blood, Fecal By Immunoassay - Stool, Per Rectum [[id number]]  (Normal) Collected:  05/20/20 1011    Specimen:  Stool from Per Rectum Updated:  05/20/20 1132     Occult Blood, Fecal by Immunoassay Negative    AFP Tumor Marker [[id number]] Collected:  05/19/20 1723    Specimen:  Blood Updated:  05/20/20 1008    Basic Metabolic Panel [[id number]]  (Abnormal) Collected:  05/20/20 0433    Specimen:  Blood Updated:  05/20/20 0546     Glucose 93 mg/dL      BUN 22 mg/dL      Creatinine 0.85 mg/dL      Sodium 131 mmol/L      Potassium 3.3 mmol/L      Chloride 91 mmol/L      CO2 27.0 mmol/L      Calcium 8.8 mg/dL      eGFR Non African Amer 91 mL/min/1.73      BUN/Creatinine Ratio 25.9     Anion Gap 13.0 mmol/L     Narrative:       GFR Normal >60  Chronic Kidney Disease <60  Kidney Failure <15      CK [[id number]]  (Normal) Collected:  05/20/20 0433    Specimen:  Blood Updated:  05/20/20 0546     Creatine Kinase 77 U/L     Magnesium [[id number]]  (Abnormal) Collected:  05/20/20 0433    Specimen:  Blood Updated:  05/20/20 0511     Magnesium 1.3 mg/dL     Ammonia [[id number]]  (Normal) Collected:  05/20/20 0433    Specimen:  Blood Updated:  05/20/20 0500     Ammonia 30 umol/L     CBC Auto Differential [[id number]]  (Abnormal) Collected:  05/20/20 0433    Specimen:  Blood Updated:  05/20/20 0446     WBC 7.20 10*3/mm3      RBC 2.93 10*6/mm3      Hemoglobin 10.2 g/dL      Hematocrit 29.0 %      MCV 98.7 fL      MCH 34.7 pg      MCHC 35.2 g/dL      RDW 12.9 %      RDW-SD 44.2 fl      MPV  7.4 fL      Platelets 212 10*3/mm3      Neutrophil % 70.3 %      Lymphocyte % 15.6 %      Monocyte % 13.1 %      Eosinophil % 0.3 %      Basophil % 0.7 %      Neutrophils, Absolute 5.10 10*3/mm3      Lymphocytes, Absolute 1.10 10*3/mm3      Monocytes, Absolute 1.00 10*3/mm3      Eosinophils, Absolute 0.00 10*3/mm3      Basophils, Absolute 0.10 10*3/mm3      nRBC 0.0 /100 WBC     Ethanol [[id number]] Collected:  05/19/20 1723    Specimen:  Blood Updated:  05/20/20 0133     Ethanol % <0.010 %     Narrative:       Plasma Ethanol Clinical Symptoms:    ETOH (%)               Clinical Symptom  .01-.05              No apparent influence  .03-.12              Euphoria, Diminished judgment and attention   .09-.25              Impaired comprehension, Muscle incoordination  .18-.30              Confusion, Staggered gait, Slurred speech  .25-.40              Markedly decreased response to stimuli, unable to stand or                        walk, vomitting, sleep or stupor  .35-.50              Comatose, Anesthesia, Subnormal body temperature        Extra Tubes [[id number]] Collected:  05/19/20 1723    Specimen:  Blood Updated:  05/19/20 1830    Narrative:       The following orders were created for panel order Extra Tubes.  Procedure                               Abnormality         Status                     ---------                               -----------         ------                     Light Blue Top[[id number]]                                   Final result               Gold Top - SST[[id number]]                                   Final result                 Please view results for these tests on the individual orders.    Light Blue Top [[id number]] Collected:  05/19/20 1723    Specimen:  Blood Updated:  05/19/20 1830     Extra Tube hold for add-on     Comment: Auto resulted       Gold Top - SST [[id number]] Collected:  05/19/20 1723    Specimen:  Blood Updated:  05/19/20 1830     Extra Tube Hold for add-ons.     Comment:  Auto resulted.       CBC & Differential [[id number]] Collected:  05/19/20 1723    Specimen:  Blood from Arm, Right Updated:  05/19/20 1814    Narrative:       The following orders were created for panel order CBC & Differential.  Procedure                               Abnormality         Status                     ---------                               -----------         ------                     CBC Auto Differential[[id number]]        Abnormal            Final result                 Please view results for these tests on the individual orders.    CBC Auto Differential [[id number]]  (Abnormal) Collected:  05/19/20 1723    Specimen:  Blood from Arm, Right Updated:  05/19/20 1814     WBC 8.20 10*3/mm3      RBC 3.39 10*6/mm3      Hemoglobin 11.8 g/dL      Hematocrit 33.4 %      MCV 98.6 fL      MCH 34.9 pg      MCHC 35.4 g/dL      RDW 13.2 %      RDW-SD 45.1 fl      MPV 8.0 fL      Platelets 277 10*3/mm3      Neutrophil % 87.1 %      Lymphocyte % 5.1 %      Monocyte % 7.0 %      Eosinophil % 0.1 %      Basophil % 0.7 %      Neutrophils, Absolute 7.20 10*3/mm3      Lymphocytes, Absolute 0.40 10*3/mm3      Monocytes, Absolute 0.60 10*3/mm3      Eosinophils, Absolute 0.00 10*3/mm3      Basophils, Absolute 0.10 10*3/mm3      nRBC 0.0 /100 WBC     Narrative:       Appended report. These results have been appended to a previously verified report.    Comprehensive Metabolic Panel [[id number]]  (Abnormal) Collected:  05/19/20 1723    Specimen:  Blood from Arm, Right Updated:  05/19/20 1811     Glucose 120 mg/dL      BUN 22 mg/dL      Creatinine 0.70 mg/dL      Sodium 130 mmol/L      Potassium 3.5 mmol/L      Chloride 90 mmol/L      CO2 23.0 mmol/L      Calcium 8.9 mg/dL      Total Protein 6.8 g/dL      Albumin 4.10 g/dL      ALT (SGPT) 33 U/L      AST (SGOT) 59 U/L      Alkaline Phosphatase 60 U/L      Total Bilirubin 1.0 mg/dL      eGFR Non African Amer 114 mL/min/1.73      Globulin 2.7 gm/dL      A/G Ratio 1.5 g/dL       BUN/Creatinine Ratio 31.4     Anion Gap 17.0 mmol/L     Narrative:       GFR Normal >60  Chronic Kidney Disease <60  Kidney Failure <15      Troponin [[id number]]  (Normal) Collected:  05/19/20 1723    Specimen:  Blood from Arm, Right Updated:  05/19/20 1811     Troponin T <0.010 ng/mL     Narrative:       Troponin T Reference Range:  <= 0.03 ng/mL-   Negative for AMI  >0.03 ng/mL-     Abnormal for myocardial necrosis.  Clinicians would have to utilize clinical acumen, EKG, Troponin and serial changes to determine if it is an Acute Myocardial Infarction or myocardial injury due to an underlying chronic condition.       Results may be falsely decreased if patient taking Biotin.      BNP [[id number]]  (Normal) Collected:  05/19/20 1723    Specimen:  Blood from Arm, Right Updated:  05/19/20 1806     proBNP 12.9 pg/mL     Narrative:       Among patients with dyspnea, NT-proBNP is highly sensitive for the detection of acute congestive heart failure. In addition NT-proBNP of <300 pg/ml effectively rules out acute congestive heart failure with 99% negative predictive value.    Results may be falsely decreased if patient taking Biotin.          No results found for: HGBA1C            Lab Results   Component Value Date    LIPASE 34 02/22/2017     Lab Results   Component Value Date    CHOL 208 (H) 01/20/2020    TRIG 111 01/20/2020    HDL 84 (H) 01/20/2020     (H) 01/20/2020       No results found for: INTRAOP, PREDX, FINALDX, COMDX    Microbiology Results (last 10 days)     ** No results found for the last 240 hours. **          ECG/EMG Results (most recent)     Procedure Component Value Units Date/Time    ECG 12 Lead [[id number]] Collected:  05/19/20 1717     Updated:  05/19/20 1718    Narrative:       HEART RATE= 111  bpm  RR Interval= 540  ms  MI Interval= 187  ms  P Horizontal Axis= 0  deg  P Front Axis= 35  deg  QRSD Interval= 84  ms  QT Interval= 317  ms  QRS Axis= 43  deg  T Wave Axis= 19  deg  - ABNORMAL ECG  -  Sinus tachycardia  Multiform ventricular premature complexes  Anterior infarct, old  No previous ECG available for comparison  Electronically Signed By:   Date and Time of Study: 2020-05-19 17:17:13    ECG 12 Lead [[id number]]  (Abnormal) Resulted:  05/19/20 2044     Updated:  05/19/20 2044                    Ct Abdomen Without Contrast    Result Date: 5/19/2020   1. Advanced hepatic cirrhosis. 2. Bilateral renal enlargement with perinephric soft tissue stranding similar to the previous exam and of questionable etiology and significance. 3. The study is limited by noncontrast technique. The pelvis was not imaged per physician order.  Electronically Signed By-Farooq Sims On:5/19/2020 10:17 PM This report was finalized on [id number] by  Farooq Sims, .    Ct Head Without Contrast    Result Date: 5/19/2020   1. No acute findings. 2. Mild volume loss secondary to age-appropriate cerebral atrophy. 3. Stable small old lacunar infarct within the deep white matter of the right frontal lobe.  Electronically Signed By-Farooq Sims On:5/19/2020 7:21 PM This report was finalized on [id number] by  Farooq Sims, .          Xrays, labs reviewed personally by physician.    Medication Review:   I have reviewed the patient's current medication list      Scheduled Meds    amLODIPine-lisinopril 5-40 mg combo dose  Oral Q24H   cetirizine 10 mg Oral Daily   ketotifen 1 drop Both Eyes BID   pantoprazole 40 mg Oral BID AC   sodium chloride 10 mL Intravenous Q12H   sucralfate 1 g Oral 4x Daily AC & at Bedtime   triamterene-hydrochlorothiazide 1 tablet Oral Daily   thiamine 100 mg Oral Daily   zinc sulfate 220 mg Oral Daily       Meds Infusions    sodium chloride 100 mL/hr Last Rate: 9 mL/hr (05/20/20 1105)       Meds PRN  •  acetaminophen **OR** acetaminophen **OR** acetaminophen  •  LORazepam **OR** LORazepam **OR** LORazepam **OR** LORazepam **OR** LORazepam **OR** LORazepam  •  melatonin  •  nitroglycerin  •  ondansetron **OR**  ondansetron  •  ondansetron **OR** ondansetron  •  potassium chloride **OR** potassium chloride **OR** potassium chloride  •  [COMPLETED] Insert peripheral IV **AND** sodium chloride  •  sodium chloride        Assessment/Plan   Assessment/Plan     Active Hospital Problems    Diagnosis  POA   • Alcohol abuse [F10.10]  Yes   • Non-intractable vomiting [R11.10]  Yes      Resolved Hospital Problems   No resolved problems to display.       MEDICAL DECISION MAKING COMPLEXITY BY PROBLEM:     Anemia likely blood loss from antral ulcer  -That is post EGD 5/20/2020: Cratered ulcer on the antrum no intervention was done  -PPI twice daily for 3 months, repeat EGD in 3 months to assess ulcer  -Follow hemoglobin tomorrow  -No NSAIDs or blood thinners    Non-intractable vomiting  -Multifactorial to above  -Supportive care    Hepatic cirrhosis  Per CT with alcohol abuse   -GI following     Alcohol abuse-  -CIWA precautions in place,   -multivitamin ,  consulted      Dizziness  secondary to above   - troponin negative, CT head negative,   -Should resolve as above is improving     Hereditary hemachromatosis   - recent phlebotomy Wednesday   -Follow hemoglobin as noted above     Hyponatremia   -Sodium currently stable      Hypertension chronic   - controlled on Lotrel, Maxide, monitor      Allergic rhinitis   - on azelastine and Allegra- cetirizine used here       VTE Prophylaxis -   Mechanical Order History:      Ordered        05/19/20 2149  Place Sequential Compression Device  Once         05/19/20 2149  Maintain Sequential Compression Device  Continuous                 Pharmalogical Order History:     None            Code Status -   Code Status and Medical Interventions:   Ordered at: 05/19/20 2100     Code Status:    CPR     Medical Interventions (Level of Support Prior to Arrest):    Full       This patient has been examined wearing appropriate Personal Protective Equipment. 05/20/20        Discharge  Planning    Possibly home 24 hours if symptoms improve and hemoglobin stable      Destination      Coordination has not been started for this encounter.      Durable Medical Equipment      Coordination has not been started for this encounter.      Dialysis/Infusion      Coordination has not been started for this encounter.      Home Medical Care      Coordination has not been started for this encounter.      Therapy      Coordination has not been started for this encounter.      Community Resources      Coordination has not been started for this encounter.            Electronically signed by Hoagn Armijo DO, 05/20/20, 13:27.  [facility] Hospitalist Team

## 2020-05-20 NOTE — PLAN OF CARE
Problem: Patient Care Overview  Goal: Plan of Care Review  Outcome: Ongoing (interventions implemented as appropriate)  Flowsheets (Taken 5/20/2020 8299)  Outcome Summary: Pt had EGD done this AM. Potassium and magnesium replacement today. Doing well.

## 2020-05-20 NOTE — ED NOTES
Attempted to call report to floor. Stated nurse would call back.     Heidi Shelton, RN  05/19/20 3477

## 2020-05-20 NOTE — ANESTHESIA PREPROCEDURE EVALUATION
Anesthesia Evaluation     Patient summary reviewed and Nursing notes reviewed   no history of anesthetic complications:  NPO Solid Status: > 8 hours  NPO Liquid Status: > 8 hours           Airway   Dental      Pulmonary    Cardiovascular     ECG reviewed    (+) hypertension, hyperlipidemia,       Neuro/Psych  GI/Hepatic/Renal/Endo      Musculoskeletal     Abdominal    Substance History   (+) alcohol use,      OB/GYN          Other   blood dyscrasia anemia,     ROS/Med Hx Other: N/V, hyponatremia, hypomagnesemia, hypokalemia, allergies, hemachromotosis    Stress  No evidence for reversible myocardial ischemia noted.  Normal left ventricular ejection fraction of  52%.                Anesthesia Plan    ASA 3     MAC   (Patient identified; pre-operative vital signs, all relevant labs/studies, complete medical/surgical/anesthetic history, full medication list, full allergy list, and NPO status obtained/reviewed; physical assessment performed; anesthetic options, side effects, potential complications, risks, and benefits discussed; questions answered; written anesthesia consent obtained; patient cleared for procedure; anesthesia machine and equipment checked and functioning)    Anesthetic plan, all risks, benefits, and alternatives have been provided, discussed and informed consent has been obtained with: patient.    Plan discussed with CRNA.

## 2020-05-20 NOTE — H&P
"      [facility]      Patient Name: Erik Fuentes  : 1960  MRN: [id number]  Primary Care Physician: Meir Bahena MD  Date of admission: 2020    Patient Care Team:  Meri Bahena MD as PCP - General  Meri Bahena MD as PCP - Family Medicine          Subjective   History Present Illness     Chief Complaint:   Chief Complaint   Patient presents with   • Vomiting                60 year old male presents with complaints of severe nausea and vomiting since this morning. He denies abdominal pain, hematemesis , diarrhea, fever or hematochezia. He reports his stool is dark. His hands were shaking and he was asked if he drinks alcohol. He reports he drinks about a pint of hard liquor daily. He has rhinophyma. Blood alcohol ordered and pending . Ct abdomen WO today  Per radiology:    \" 1. Advanced hepatic cirrhosis.  2. Bilateral renal enlargement with perinephric soft tissue stranding  similar to the previous exam and of questionable etiology and  significance.  3. The study is limited by noncontrast technique. The pelvis was not  imaged per physician order.\"    EKG shows sinus tachycardia with PVC. He became dizzy in ED when orthostatic BP were attempted. CT head was done in ED which showed:  \"1. No acute findings.  2. Mild volume loss secondary to age-appropriate cerebral atrophy.  3. Stable small old lacunar infarct within the deep white matter of the  right frontal lobe.\"    Troponin was negative for ischemia , Na 130, recent TSH 3.070, Recent A1C 6.4. WBC was normal , AST 59 was 100 on 20. He has a PMH of hereditary hemochromatosis. Hgb today is 11.8. He reports he just has a phlebotomy on Wednesday because his ferritin was 224.   Review of records shows he had similar symptoms to today in 2017 and had a normal lexiscan and echo showing EF 50-55%. With enlarged left atrium. He denies cough, dyspnea, fever or known Covid-19 exposure. " He was given 1500cc bolus IVF in ED and will be admitted under CIWA precautions and GI consulted for melena and cirrhosis. He has seen Dr Ruth in past.      Review of Systems   Constitution: Negative.   HENT: Negative.    Eyes: Negative.    Cardiovascular: Negative.    Respiratory: Negative.    Endocrine: Negative.    Hematologic/Lymphatic: Negative.    Skin: Negative.    Musculoskeletal: Negative.    Gastrointestinal: Positive for nausea and vomiting.   Genitourinary: Negative.    Neurological: Positive for dizziness.   Psychiatric/Behavioral: Positive for substance abuse.   Allergic/Immunologic: Negative.            Personal History     Past Medical History:   Past Medical History:   Diagnosis Date   • Hemochromatosis, hereditary (CMS/HCC) 5/11/2020   • Hyperlipidemia    • Hypertension        Surgical History:      Past Surgical History:   Procedure Laterality Date   • APPENDECTOMY     • FINGER SURGERY     • REPLACEMENT TOTAL KNEE Left    • REPLACEMENT TOTAL KNEE Right 10/2019           Family History: family history is not on file. Otherwise pertinent FHx was reviewed and unremarkable.     Social History:  reports that he has never smoked. He has never used smokeless tobacco. He reports that he drinks alcohol. He reports that he does not use drugs.      Medications:  Prior to Admission medications    Medication Sig Start Date End Date Taking? Authorizing Provider   amLODIPine-benazepril (LOTREL) 5-40 MG per capsule TAKE ONE CAPSULE BY MOUTH DAILY  Patient taking differently: Take 1 capsule by mouth Daily. 4/20/20  Yes Meri Bahena MD   azelastine (OPTIVAR) 0.05 % ophthalmic solution Administer 2 drops to both eyes 2 (Two) Times a Day. 6/12/19  Yes Cade Yusuf MD   B Complex-C (SUPER B COMPLEX/VITAMIN C PO) Take 1 tablet by mouth Daily.   Yes ProviderCade MD   fexofenadine (ALLEGRA) 180 MG tablet Take 180 mg by mouth Daily.   Yes ProviderCade MD   Glucosamine-Chondroitin  (OSTEO BI-FLEX REGULAR STRENGTH PO) Take 1 tablet by mouth Daily.   Yes Provider, MD Cade   Omega-3 Fatty Acids (FISH OIL ADULT GUMMIES PO) Take 1 tablet by mouth Daily.   Yes ProviderCade MD   triamterene-hydrochlorothiazide (MAXZIDE-25) 37.5-25 MG per tablet TAKE ONE TABLET BY MOUTH DAILY  Patient taking differently: Take 1 tablet by mouth Daily. 3/19/20  Yes Meri Bahena MD       Allergies:  No Known Allergies    Objective   Objective     Vital Signs  Temp:  [98.1 °F (36.7 °C)-99 °F (37.2 °C)] 99 °F (37.2 °C)  Heart Rate:  [] 99  Resp:  [12-20] 16  BP: (112-150)/(45-87) 144/73  SpO2:  [96 %-99 %] 98 %  on   ;   Device (Oxygen Therapy): room air  Body mass index is 25.36 kg/m².    Physical Exam   Constitutional: He is oriented to person, place, and time. He appears well-developed and well-nourished.   HENT:   Head: Normocephalic and atraumatic.   Eyes: EOM are normal.   Neck: Normal range of motion. Neck supple.   Cardiovascular: Normal rate, regular rhythm and normal heart sounds.   Pulmonary/Chest: Effort normal and breath sounds normal.   Abdominal: Soft. Bowel sounds are normal.   Genitourinary:   Genitourinary Comments: deferred   Musculoskeletal: Normal range of motion.   Neurological: He is alert and oriented to person, place, and time.   Skin: Skin is warm and dry.   Psychiatric: He has a normal mood and affect. His behavior is normal. Judgment and thought content normal.   Vitals reviewed.      Results Review:  I have personally reviewed most recent radiology images and interpretations and agree with findings, most notably: Ct abdomen , CMP and clinical presentation .    Results from last 7 days   Lab Units 05/19/20  1723   WBC 10*3/mm3 8.20   HEMOGLOBIN g/dL 11.8*   HEMATOCRIT % 33.4*   PLATELETS 10*3/mm3 277     Results from last 7 days   Lab Units 05/19/20  1723   SODIUM mmol/L 130*   POTASSIUM mmol/L 3.5   CHLORIDE mmol/L 90*   CO2 mmol/L 23.0   BUN mg/dL 22   CREATININE  mg/dL 0.70*   GLUCOSE mg/dL 120*   CALCIUM mg/dL 8.9   ALT (SGPT) U/L 33   AST (SGOT) U/L 59*   TROPONIN T ng/mL <0.010   PROBNP pg/mL 12.9     Estimated Creatinine Clearance: 131.2 mL/min (A) (by C-G formula based on SCr of 0.7 mg/dL (L)).  Brief Urine Lab Results  (Last result in the past 365 days)      Color   Clarity   Blood   Leuk Est   Nitrite   Protein   CREAT   Urine HCG        08/20/19 1614 Amanda Slightly Hazy Negative Negative Negative                 Microbiology Results (last 10 days)     ** No results found for the last 240 hours. **          ECG/EMG Results (most recent)     Procedure Component Value Units Date/Time    ECG 12 Lead [[id number]] Collected:  05/19/20 1717     Updated:  05/19/20 1718    Narrative:       HEART RATE= 111  bpm  RR Interval= 540  ms  UT Interval= 187  ms  P Horizontal Axis= 0  deg  P Front Axis= 35  deg  QRSD Interval= 84  ms  QT Interval= 317  ms  QRS Axis= 43  deg  T Wave Axis= 19  deg  - ABNORMAL ECG -  Sinus tachycardia  Multiform ventricular premature complexes  Anterior infarct, old  No previous ECG available for comparison  Electronically Signed By:   Date and Time of Study: 2020-05-19 17:17:13    ECG 12 Lead [[id number]]  (Abnormal) Resulted:  05/19/20 2044     Updated:  05/19/20 2044                    Ct Abdomen Without Contrast    Result Date: 5/19/2020   1. Advanced hepatic cirrhosis. 2. Bilateral renal enlargement with perinephric soft tissue stranding similar to the previous exam and of questionable etiology and significance. 3. The study is limited by noncontrast technique. The pelvis was not imaged per physician order.  Electronically Signed By-Farooq Sims On:5/19/2020 10:17 PM This report was finalized on [id number] by  Farooq Sims, .    Ct Head Without Contrast    Result Date: 5/19/2020   1. No acute findings. 2. Mild volume loss secondary to age-appropriate cerebral atrophy. 3. Stable small old lacunar infarct within the deep white matter of the right  frontal lobe.  Electronically Signed By-Farooq Sims On:5/19/2020 7:21 PM This report was finalized on [id number] by  Farooq Sims, .        Estimated Creatinine Clearance: 131.2 mL/min (A) (by C-G formula based on SCr of 0.7 mg/dL (L)).    Assessment/Plan   Assessment/Plan       Active Hospital Problems    Diagnosis  POA   • Alcohol abuse [F10.10]  Yes   • Non-intractable vomiting [R11.10]  Yes      Resolved Hospital Problems   No resolved problems to display.     Non intractable vomiting- likely multifactorial    Hepatic cirrhosis  Per CT with alcohol abuse , LFT not too bad, GI consulted for complaints of dark stool and occult stool pending , ammonia pending     Alcohol abuse- reports 1 pint hard liquor daily- has rhinopyma , CIWA precautions in place, Mg in am , multivitamin ,  consulted , blood alcohol pending     Dizziness - maybe secondary to above - troponin negative, CT head negative, negative cardiac evaluation in 2017, fall precautions, NIH 0 no focal deficits , NS IVF hydration     Hereditary hemachromatosis - recent phlebotomy Wednesday for ferritin 224- Hgb today 11.8    Hyponatremia Na 130 likely secondary to dehydration- NS IVF , antiemetics and repeat Na in am       Hypertension chronic - controlled on Lotrel, Maxide, monitor     Allergic rhinitis - on azelastine and Allegra- cetirizine used here    Diet supp- no glucosamine chondroitin, Omega 3 or Super Vit B in pharmacy       VTE Prophylaxis -   Mechanical Order History:      Ordered        Signed and Held  Place Sequential Compression Device  Once         Signed and Held  Maintain Sequential Compression Device  Continuous                 Pharmalogical Order History:     None          CODE STATUS:    Code Status and Medical Interventions:   Ordered at: 05/19/20 2100     Code Status:    CPR     Medical Interventions (Level of Support Prior to Arrest):    Full       This patient has been examined wearing appropriate Personal  Protective Equipment  05/20/20      I discussed the patient's findings and my recommendations with patient.        Electronically signed by CARMEN Puckett, 05/19/20, 9:02 PM.  [facility]

## 2020-05-20 NOTE — PROGRESS NOTES
Discharge Planning Assessment  [facility]     Patient Name: Erik Fuentes  MRN: [id number]  Today's Date: 5/20/2020    Admit Date: 5/19/2020    Discharge Needs Assessment     Row Name 05/20/20 1022       Living Environment    Lives With  spouse    Current Living Arrangements  home/apartment/condo    Primary Care Provided by  self    Provides Primary Care For  no one    Family Caregiver if Needed  spouse    Quality of Family Relationships  helpful;supportive    Able to Return to Prior Arrangements  yes       Resource/Environmental Concerns    Resource/Environmental Concerns  none    Transportation Concerns  car, none       Transition Planning    Patient/Family Anticipates Transition to  home with family    Patient/Family Anticipated Services at Transition  none    Transportation Anticipated  family or friend will provide       Discharge Needs Assessment    Readmission Within the Last 30 Days  no previous admission in last 30 days    Concerns to be Addressed  denies needs/concerns at this time    Equipment Currently Used at Home  none    Anticipated Changes Related to Illness  none    Equipment Needed After Discharge  none        Discharge Plan     Row Name 05/20/20 1023       Plan    Plan  D/C Plan: Home with family.     Patient/Family in Agreement with Plan  yes    Plan Comments  Due to COVID-19 pandemic,  working off site. Called and spoke to patient's wife, Valencia, in room. Patient IADLs and drives. PCP JOSI Bahena. Pharmacy verified. Denies any difficulty affording meds. Denies any discharge needs at this time. Barrier to D/C: IVFs, EGD pending.         Expected Discharge Date and Time     Expected Discharge Date Expected Discharge Time    May 21, 2020         Demographic Summary     Row Name 05/20/20 1022       General Information    Admission Type  observation    Arrived From  emergency department    Referral Source  admission list    Reason for Consult  discharge planning    Preferred Language  English      Used During This Interaction  no        Functional Status     Row Name 05/20/20 1022       Functional Status    Usual Activity Tolerance  good    Current Activity Tolerance  good       Functional Status, IADL    Medications  independent    Meal Preparation  independent    Housekeeping  independent    Laundry  independent    Shopping  independent       Mental Status Summary    Recent Changes in Mental Status/Cognitive Functioning  no changes          Amanda Soto

## 2020-05-20 NOTE — ED PROVIDER NOTES
Subjective   Patient is a 60-year-old male who presents today with severe nausea and vomiting since 6:00 this morning.  He states that he has a slight headache that has started since his vomiting at the noon.  He denies any abdominal pain.-He states he has had some slight dizziness as well.  He feels that he is dehydrated.  He rates his pain a 0 at this time.          Review of Systems   Constitutional: Negative for chills, fatigue and fever.   HENT: Negative for congestion, tinnitus and trouble swallowing.    Eyes: Negative for photophobia, discharge and redness.   Respiratory: Negative for cough and shortness of breath.    Cardiovascular: Negative for chest pain and palpitations.   Gastrointestinal: Positive for nausea and vomiting. Negative for abdominal pain and diarrhea.   Genitourinary: Negative for dysuria, frequency and urgency.   Musculoskeletal: Negative for back pain, joint swelling and myalgias.   Skin: Negative for rash.   Neurological: Positive for dizziness and headaches.   Psychiatric/Behavioral: Negative for confusion.   All other systems reviewed and are negative.      Past Medical History:   Diagnosis Date   • Hemochromatosis, hereditary (CMS/HCC) 5/11/2020   • Hyperlipidemia    • Hypertension        No Known Allergies    Past Surgical History:   Procedure Laterality Date   • APPENDECTOMY     • FINGER SURGERY     • REPLACEMENT TOTAL KNEE Left    • REPLACEMENT TOTAL KNEE Right 10/2019       History reviewed. No pertinent family history.    Social History     Socioeconomic History   • Marital status:      Spouse name: Not on file   • Number of children: Not on file   • Years of education: Not on file   • Highest education level: Not on file   Tobacco Use   • Smoking status: Never Smoker   • Smokeless tobacco: Never Used   Substance and Sexual Activity   • Alcohol use: Yes   • Drug use: No   • Sexual activity: Defer           Objective   Physical Exam   Constitutional: He is oriented to  person, place, and time. He appears well-developed and well-nourished.   HENT:   Head: Normocephalic and atraumatic.   Right Ear: External ear normal.   Left Ear: External ear normal.   Nose: Nose normal.   Mouth/Throat: Oropharynx is clear and moist.   Eyes: Pupils are equal, round, and reactive to light. Conjunctivae and EOM are normal.   Neck: Normal range of motion. Neck supple. No JVD present.   Cardiovascular: Normal rate, regular rhythm, normal heart sounds and intact distal pulses.   No murmur heard.  Pulmonary/Chest: Breath sounds normal. He is in respiratory distress. He has no wheezes. He exhibits no tenderness.   Abdominal: Soft. Bowel sounds are normal. He exhibits no mass. There is no tenderness. There is no guarding.   Musculoskeletal: Normal range of motion.   Neurological: He is alert and oriented to person, place, and time. He has normal strength and normal reflexes. No cranial nerve deficit or sensory deficit. He displays a negative Romberg sign. GCS eye subscore is 4. GCS verbal subscore is 5. GCS motor subscore is 6.   Skin: Skin is warm and dry. Capillary refill takes less than 2 seconds. No rash noted.   Psychiatric: He has a normal mood and affect. His speech is normal and behavior is normal. Judgment and thought content normal. Cognition and memory are normal.   Vitals reviewed.      ECG 12 Lead    Date/Time: 5/19/2020 8:41 PM  Performed by: Radha Alcala APRN  Authorized by: Radha Alcala APRN   Interpreted by physician (Dr. Hobbs)  Comparison: not compared with previous ECG   Previous ECG: no previous ECG available  Rhythm: sinus tachycardia  Rate: tachycardic  BPM: 111  QRS axis: normal  Conduction: conduction normal  ST Segments: ST segments normal  T Waves: T waves normal  Other: no other findings  Clinical impression: abnormal ECG                 ED Course  ED Course as of May 19 2037   Tue May 19, 2020   1852 Nursing staff attempted orthostatics and the patient became  "very dizzy and lightheaded upon sitting he was laid back down and 500 cc normal saline was added and CT of the head was added.    [KW]      ED Course User Index  [KW] Radha Alcala, APRN      /50   Pulse 118   Temp 98.1 °F (36.7 °C) (Oral)   Resp 12   Ht 182.9 cm (72\")   Wt 84.8 kg (187 lb)   SpO2 98%   BMI 25.36 kg/m²   Labs Reviewed   COMPREHENSIVE METABOLIC PANEL - Abnormal; Notable for the following components:       Result Value    Glucose 120 (*)     Creatinine 0.70 (*)     Sodium 130 (*)     Chloride 90 (*)     AST (SGOT) 59 (*)     BUN/Creatinine Ratio 31.4 (*)     Anion Gap 17.0 (*)     All other components within normal limits    Narrative:     GFR Normal >60  Chronic Kidney Disease <60  Kidney Failure <15     CBC WITH AUTO DIFFERENTIAL - Abnormal; Notable for the following components:    RBC 3.39 (*)     Hemoglobin 11.8 (*)     Hematocrit 33.4 (*)     MCV 98.6 (*)     MCH 34.9 (*)     Neutrophil % 87.1 (*)     Lymphocyte % 5.1 (*)     Eosinophil % 0.1 (*)     Neutrophils, Absolute 7.20 (*)     Lymphocytes, Absolute 0.40 (*)     All other components within normal limits    Narrative:     Appended report. These results have been appended to a previously verified report.   BNP (IN-HOUSE) - Normal    Narrative:     Among patients with dyspnea, NT-proBNP is highly sensitive for the detection of acute congestive heart failure. In addition NT-proBNP of <300 pg/ml effectively rules out acute congestive heart failure with 99% negative predictive value.    Results may be falsely decreased if patient taking Biotin.     TROPONIN (IN-HOUSE) - Normal    Narrative:     Troponin T Reference Range:  <= 0.03 ng/mL-   Negative for AMI  >0.03 ng/mL-     Abnormal for myocardial necrosis.  Clinicians would have to utilize clinical acumen, EKG, Troponin and serial changes to determine if it is an Acute Myocardial Infarction or myocardial injury due to an underlying chronic condition.       Results may be " falsely decreased if patient taking Biotin.     CBC AND DIFFERENTIAL    Narrative:     The following orders were created for panel order CBC & Differential.  Procedure                               Abnormality         Status                     ---------                               -----------         ------                     CBC Auto Differential[[id number]]        Abnormal            Final result                 Please view results for these tests on the individual orders.   EXTRA TUBES    Narrative:     The following orders were created for panel order Extra Tubes.  Procedure                               Abnormality         Status                     ---------                               -----------         ------                     Light Blue Top[[id number]]                                   Final result               Gold Top - SST[[id number]]                                   Final result                 Please view results for these tests on the individual orders.   LIGHT BLUE TOP   GOLD TOP - SST     Medications   sodium chloride 0.9 % flush 10 mL (has no administration in time range)   ondansetron (ZOFRAN) injection 4 mg (has no administration in time range)   sodium chloride 0.9 % bolus 1,000 mL (0 mL Intravenous Stopped 5/19/20 1847)   ondansetron (ZOFRAN) injection 4 mg (4 mg Intravenous Given 5/19/20 1711)   sodium chloride 0.9 % bolus 500 mL (500 mL Intravenous New Bag 5/19/20 1855)     Ct Head Without Contrast    Result Date: 5/19/2020   1. No acute findings. 2. Mild volume loss secondary to age-appropriate cerebral atrophy. 3. Stable small old lacunar infarct within the deep white matter of the right frontal lobe.  Electronically Signed By-Farooq Sims On:5/19/2020 7:21 PM This report was finalized on [id number] by  Farooq Sims, .                                         MDM  Number of Diagnoses or Management Options  Non-intractable vomiting with nausea, unspecified vomiting type:   Orthostatic  dizziness:   Diagnosis management comments: Appropriate PPE was used in the evaluation of this patient.    Patient had IV established and blood work was obtained.  Patient was tachycardic and believes to be dehydrated was given 1 L normal saline and 4 of Zofran then orthostatics were attempted patient could not tolerate past sitting as he continued to have severe dizziness.  He was given another 1500 cc of normal saline.  His blood work returned and CT of the head was found to be normal.  The patient continued to have dizziness and nausea and will be admitted overnight for further evaluation and treatment patient verbalized understood the need for admission and was agreeable.  The patient was discussed with Sadie and will be admitted to Dr. Armijo       Amount and/or Complexity of Data Reviewed  Clinical lab tests: reviewed  Tests in the radiology section of CPT®: reviewed  Tests in the medicine section of CPT®: reviewed    Patient Progress  Patient progress: stable      Final diagnoses:   Non-intractable vomiting with nausea, unspecified vomiting type   Orthostatic dizziness            Radha Alcala, APRN  05/19/20 4487

## 2020-05-20 NOTE — PLAN OF CARE
Problem: Patient Care Overview  Goal: Plan of Care Review  Outcome: Ongoing (interventions implemented as appropriate)  Goal: Individualization and Mutuality  Outcome: Ongoing (interventions implemented as appropriate)  Goal: Discharge Needs Assessment  Outcome: Ongoing (interventions implemented as appropriate)  Flowsheets  Taken 5/19/2020 2215 by Olivia Moreno RN  Equipment Currently Used at Home: none  Taken 5/19/2020 2216 by Olivia Moreno RN  Transportation Anticipated: family or friend will provide  Patient/Family Anticipated Services at Transition: none  Patient/Family Anticipates Transition to: home with family  Taken 5/20/2020 0215 by Alexandria Thakkar RN  Concerns to be Addressed: no discharge needs identified  Readmission Within the Last 30 Days: no previous admission in last 30 days  Goal: Interprofessional Rounds/Family Conf  Outcome: Ongoing (interventions implemented as appropriate)     Problem: Fall Risk (Adult)  Goal: Identify Related Risk Factors and Signs and Symptoms  Outcome: Ongoing (interventions implemented as appropriate)  Goal: Absence of Fall  Outcome: Ongoing (interventions implemented as appropriate)  Flowsheets (Taken 5/20/2020 0215)  Absence of Fall: making progress toward outcome     Problem: Pain, Chronic (Adult)  Goal: Identify Related Risk Factors and Signs and Symptoms  Outcome: Ongoing (interventions implemented as appropriate)  Goal: Acceptable Pain/Comfort Level and Functional Ability  Outcome: Ongoing (interventions implemented as appropriate)  Flowsheets (Taken 5/20/2020 0215)  Acceptable Pain/Comfort Level and Functional Ability: making progress toward outcome     Problem: Alcohol Withdrawal Acute, Risk/Actual (Adult)  Goal: Signs and Symptoms of Listed Potential Problems Will be Absent, Minimized or Managed (Alcohol Withdrawal Acute, Risk/Actual)  Outcome: Ongoing (interventions implemented as appropriate)

## 2020-05-20 NOTE — OP NOTE
ESOPHAGOGASTRODUODENOSCOPY Procedure Report    Patient Name:  Erik Fuentes  YOB: 1960    Date of Surgery:  5/20/2020     Pre-Op Diagnosis:  Nausea vomiting  Hematemesis  Melena       Post-Op Diagnosis Codes:  Gastric ulcers      Procedure/CPT® Codes:      Procedure(s):  ESOPHAGOGASTRODUODENOSCOPY with biopsy x1    Staff:  Surgeon(s):  Brody Boggs MD      Anesthesia: Monitored Anesthesia Care    Description of Procedure:  A description of the procedure as well as risks, benefits and alternative methods were explained to the patient who voiced understanding and signed the corresponding consent form. A physical exam was performed and vital signs were monitored throughout the procedure.    An upper GI endoscope was placed into the mouth and proceeded through the esophagus, stomach and second portion of the duodenum without difficulty. The scope was then retroflexed and the fundus was visualized. The procedure was not difficult and there were no immediate complications.    Impression:  1.  Normal esophageal mucosa entire esophagus  2.  1 large 2 cm cratered ulcer on the anterior wall of the antrum, cold forcep biopsies were taken and sent for histopathology and H. Pylori.  The ulcer was clean based so no intervention was done.  3.  A second ulcer that was much smaller was on the posterior wall of the antrum.  4.  Normal duodenal mucosa visualized to D3    Recommendations:  P.o. twice daily PPI x3 months  Home tomorrow if hemoglobin stable  Repeat EGD in 3 months to assess gastric ulcer  Avoid alcohol and NSAIDs and blood thinners      Brody Boggs MD     Date: 5/20/2020    Time: 12:44

## 2020-05-20 NOTE — CONSULTS
"GI CONSULT  NOTE:    Referring Provider:  Dr. Armijo    Chief complaint: Nausea with vomiting    Subjective . \"I was vomiting yesterday and became dizzy\"    History of present illness: Erik Fuentes is a 60 y.o. male who is known to the [facility] practice and is followed by Dr. Arias.  He has a history of iron overload and is a carrier of hereditary hemochromatosis with H63 heterozygote and gets phlebotomy yearly.  His last phlebotomy was last Wednesday.  He also has a history of alcohol abuse and drinks 1 pint per day despite encouragement to stop.  He now presents with acute onset nausea and vomiting yesterday with dark emesis and 3 episodes of melena.  He denies abdominal pain and reports NSAID use is rare.  He became dizzy when standing but denies any syncope, chest pain or shortness of breath.    Endo History:  7/2018 colonoscopy by Dr. Ruth -diverticulosis, hemorrhoids, polyps (SSA)    Past Medical History:  Past Medical History:   Diagnosis Date   • Hemochromatosis, hereditary (CMS/HCC) 5/11/2020   • Hyperlipidemia    • Hypertension        Past Surgical History:  Past Surgical History:   Procedure Laterality Date   • APPENDECTOMY     • FINGER SURGERY     • REPLACEMENT TOTAL KNEE Left    • REPLACEMENT TOTAL KNEE Right 10/2019       Social History:  Social History     Tobacco Use   • Smoking status: Never Smoker   • Smokeless tobacco: Never Used   Substance Use Topics   • Alcohol use: Yes     Comment: daily i pint hard liquor    • Drug use: No   1 pint of liquor daily, no tobacco or illicit drug use    Family History:  Brother with cirrhosis    Medications:  Medications Prior to Admission   Medication Sig Dispense Refill Last Dose   • amLODIPine-benazepril (LOTREL) 5-40 MG per capsule TAKE ONE CAPSULE BY MOUTH DAILY (Patient taking differently: Take 1 capsule by mouth Daily.) 30 capsule 4    • azelastine (OPTIVAR) 0.05 % ophthalmic solution Administer 2 drops to both eyes 2 (Two) Times a Day.   Taking   "   • B Complex-C (SUPER B COMPLEX/VITAMIN C PO) Take 1 tablet by mouth Daily.   Taking   • fexofenadine (ALLEGRA) 180 MG tablet Take 180 mg by mouth Daily.   Taking   • Glucosamine-Chondroitin (OSTEO BI-FLEX REGULAR STRENGTH PO) Take 1 tablet by mouth Daily.   Taking   • Omega-3 Fatty Acids (FISH OIL ADULT GUMMIES PO) Take 1 tablet by mouth Daily.   Taking   • triamterene-hydrochlorothiazide (MAXZIDE-25) 37.5-25 MG per tablet TAKE ONE TABLET BY MOUTH DAILY (Patient taking differently: Take 1 tablet by mouth Daily.) 30 tablet 4        Scheduled Meds:  amLODIPine-lisinopril 5-40 mg combo dose  Oral Q24H   cetirizine 10 mg Oral Daily   ketotifen 1 drop Both Eyes BID   pantoprazole 40 mg Intravenous Q AM   sodium chloride 10 mL Intravenous Q12H   triamterene-hydrochlorothiazide 1 tablet Oral Daily   thiamine 100 mg Oral Daily   zinc sulfate 220 mg Oral Daily     Continuous Infusions:  sodium chloride 100 mL/hr Last Rate: 100 mL/hr (05/20/20 0550)     PRN Meds:.•  acetaminophen **OR** acetaminophen **OR** acetaminophen  •  LORazepam **OR** LORazepam **OR** LORazepam **OR** LORazepam **OR** LORazepam **OR** LORazepam  •  melatonin  •  nitroglycerin  •  ondansetron **OR** ondansetron  •  potassium chloride **OR** potassium chloride **OR** potassium chloride  •  [COMPLETED] Insert peripheral IV **AND** sodium chloride  •  sodium chloride    ALLERGIES:  Patient has no known allergies.    ROS:  The following systems were reviewed   Constitution:  No fevers, chills, no unintentional weight loss  Skin: no rash, no jaundice  Eyes:  No blurry vision, no eye pain  HENT:  No change in hearing or smell  Resp:  No dyspnea or cough  CV:  No chest pain or palpitations  :  No dysuria, hematuria  Musculoskeletal:  No leg cramps or arthralgias  Neuro:  No tremor, no numbness, + dizzy  Psych:  No depression or confusion    Objective Resting in bed, no acute distress, nurse at bedside    Vital Signs:   Vitals:    05/19/20 2135 05/19/20  2206 05/20/20 0453 05/20/20 0900   BP: 128/58 144/73 110/63 117/74   BP Location:  Right arm Left arm Right arm   Patient Position:  Lying Lying Lying   Pulse: 107 99 92 91   Resp: 20 16 14    Temp: 98.9 °F (37.2 °C) 99 °F (37.2 °C) 98.5 °F (36.9 °C)    TempSrc:  Oral Oral    SpO2: 97% 98% 97%    Weight:       Height:           Physical Exam:       General Appearance:    Awake and alert, in no acute distress   Head:    Normocephalic, without obvious abnormality, atraumatic   Throat:   No oral lesions, no thrush, oral mucosa moist   Lungs:     Respirations regular, even and unlabored   Chest Wall:    No abnormalities observed   Abdomen:     Soft, non-tender, no rebound or guarding, non-distended   Rectal:     Deferred   Extremities:   Moves all extremities, no edema, no cyanosis   Pulses:   Pulses palpable and equal bilaterally   Skin:   No rash, no jaundice, normal palpation       Neurologic:   Cranial nerves 2 - 12 grossly intact, flat affect       Results Review:   I reviewed the patient's labs and imaging.  Lab Results (last 24 hours)     Procedure Component Value Units Date/Time    Basic Metabolic Panel [[id number]]  (Abnormal) Collected:  05/20/20 0433    Specimen:  Blood Updated:  05/20/20 0546     Glucose 93 mg/dL      BUN 22 mg/dL      Creatinine 0.85 mg/dL      Sodium 131 mmol/L      Potassium 3.3 mmol/L      Chloride 91 mmol/L      CO2 27.0 mmol/L      Calcium 8.8 mg/dL      eGFR Non African Amer 91 mL/min/1.73      BUN/Creatinine Ratio 25.9     Anion Gap 13.0 mmol/L     Narrative:       GFR Normal >60  Chronic Kidney Disease <60  Kidney Failure <15      CK [[id number]]  (Normal) Collected:  05/20/20 0433    Specimen:  Blood Updated:  05/20/20 0546     Creatine Kinase 77 U/L     Magnesium [[id number]]  (Abnormal) Collected:  05/20/20 0433    Specimen:  Blood Updated:  05/20/20 0511     Magnesium 1.3 mg/dL     Ammonia [[id number]]  (Normal) Collected:  05/20/20 0433    Specimen:  Blood Updated:  05/20/20 0500      Ammonia 30 umol/L     CBC Auto Differential [[id number]]  (Abnormal) Collected:  05/20/20 0433    Specimen:  Blood Updated:  05/20/20 0446     WBC 7.20 10*3/mm3      RBC 2.93 10*6/mm3      Hemoglobin 10.2 g/dL      Hematocrit 29.0 %      MCV 98.7 fL      MCH 34.7 pg      MCHC 35.2 g/dL      RDW 12.9 %      RDW-SD 44.2 fl      MPV 7.4 fL      Platelets 212 10*3/mm3      Neutrophil % 70.3 %      Lymphocyte % 15.6 %      Monocyte % 13.1 %      Eosinophil % 0.3 %      Basophil % 0.7 %      Neutrophils, Absolute 5.10 10*3/mm3      Lymphocytes, Absolute 1.10 10*3/mm3      Monocytes, Absolute 1.00 10*3/mm3      Eosinophils, Absolute 0.00 10*3/mm3      Basophils, Absolute 0.10 10*3/mm3      nRBC 0.0 /100 WBC     Ethanol [[id number]] Collected:  05/19/20 1723    Specimen:  Blood Updated:  05/20/20 0133     Ethanol % <0.010 %     Narrative:       Plasma Ethanol Clinical Symptoms:    ETOH (%)               Clinical Symptom  .01-.05              No apparent influence  .03-.12              Euphoria, Diminished judgment and attention   .09-.25              Impaired comprehension, Muscle incoordination  .18-.30              Confusion, Staggered gait, Slurred speech  .25-.40              Markedly decreased response to stimuli, unable to stand or                        walk, vomitting, sleep or stupor  .35-.50              Comatose, Anesthesia, Subnormal body temperature        Extra Tubes [[id number]] Collected:  05/19/20 1723    Specimen:  Blood Updated:  05/19/20 2160    Narrative:       The following orders were created for panel order Extra Tubes.  Procedure                               Abnormality         Status                     ---------                               -----------         ------                     Light Blue Top[[id number]]                                   Final result               Gold Top - [facility][[id number]]                                   Final result                 Please view results for these tests  on the individual orders.    Light Blue Top [[id number]] Collected:  05/19/20 1723    Specimen:  Blood Updated:  05/19/20 1830     Extra Tube hold for add-on     Comment: Auto resulted       Gold Top - [facility] [[id number]] Collected:  05/19/20 1723    Specimen:  Blood Updated:  05/19/20 1830     Extra Tube Hold for add-ons.     Comment: Auto resulted.       CBC & Differential [[id number]] Collected:  05/19/20 1723    Specimen:  Blood from Arm, Right Updated:  05/19/20 1814    Narrative:       The following orders were created for panel order CBC & Differential.  Procedure                               Abnormality         Status                     ---------                               -----------         ------                     CBC Auto Differential[[id number]]        Abnormal            Final result                 Please view results for these tests on the individual orders.    CBC Auto Differential [[id number]]  (Abnormal) Collected:  05/19/20 1723    Specimen:  Blood from Arm, Right Updated:  05/19/20 1814     WBC 8.20 10*3/mm3      RBC 3.39 10*6/mm3      Hemoglobin 11.8 g/dL      Hematocrit 33.4 %      MCV 98.6 fL      MCH 34.9 pg      MCHC 35.4 g/dL      RDW 13.2 %      RDW-SD 45.1 fl      MPV 8.0 fL      Platelets 277 10*3/mm3      Neutrophil % 87.1 %      Lymphocyte % 5.1 %      Monocyte % 7.0 %      Eosinophil % 0.1 %      Basophil % 0.7 %      Neutrophils, Absolute 7.20 10*3/mm3      Lymphocytes, Absolute 0.40 10*3/mm3      Monocytes, Absolute 0.60 10*3/mm3      Eosinophils, Absolute 0.00 10*3/mm3      Basophils, Absolute 0.10 10*3/mm3      nRBC 0.0 /100 WBC     Narrative:       Appended report. These results have been appended to a previously verified report.    Comprehensive Metabolic Panel [[id number]]  (Abnormal) Collected:  05/19/20 1723    Specimen:  Blood from Arm, Right Updated:  05/19/20 1811     Glucose 120 mg/dL      BUN 22 mg/dL      Creatinine 0.70 mg/dL      Sodium 130 mmol/L      Potassium 3.5  mmol/L      Chloride 90 mmol/L      CO2 23.0 mmol/L      Calcium 8.9 mg/dL      Total Protein 6.8 g/dL      Albumin 4.10 g/dL      ALT (SGPT) 33 U/L      AST (SGOT) 59 U/L      Alkaline Phosphatase 60 U/L      Total Bilirubin 1.0 mg/dL      eGFR Non African Amer 114 mL/min/1.73      Globulin 2.7 gm/dL      A/G Ratio 1.5 g/dL      BUN/Creatinine Ratio 31.4     Anion Gap 17.0 mmol/L     Narrative:       GFR Normal >60  Chronic Kidney Disease <60  Kidney Failure <15      Troponin [[id number]]  (Normal) Collected:  05/19/20 1723    Specimen:  Blood from Arm, Right Updated:  05/19/20 1811     Troponin T <0.010 ng/mL     Narrative:       Troponin T Reference Range:  <= 0.03 ng/mL-   Negative for AMI  >0.03 ng/mL-     Abnormal for myocardial necrosis.  Clinicians would have to utilize clinical acumen, EKG, Troponin and serial changes to determine if it is an Acute Myocardial Infarction or myocardial injury due to an underlying chronic condition.       Results may be falsely decreased if patient taking Biotin.      BNP [[id number]]  (Normal) Collected:  05/19/20 1723    Specimen:  Blood from Arm, Right Updated:  05/19/20 1806     proBNP 12.9 pg/mL     Narrative:       Among patients with dyspnea, NT-proBNP is highly sensitive for the detection of acute congestive heart failure. In addition NT-proBNP of <300 pg/ml effectively rules out acute congestive heart failure with 99% negative predictive value.    Results may be falsely decreased if patient taking Biotin.            Imaging Results (Last 24 Hours)     Procedure Component Value Units Date/Time    CT Abdomen Without Contrast [[id number]] Collected:  05/19/20 2214     Updated:  05/19/20 2219    Narrative:          DATE OF EXAM:  5/19/2020 9:14 PM     PROCEDURE:  CT ABDOMEN WO CONTRAST-     INDICATIONS:   Nausea and vomiting, dizziness, diarrhea, abdominal pain.     COMPARISON:   02/21/2017.     TECHNIQUE:  Routine transaxial slices were obtained through the abdomen without  the  administration of intravenous contrast. Reconstructed coronal and  sagittal images were also obtained. Automated exposure control and  iterative construction methods were used.     FINDINGS:  The exam is limited by noncontrast technique. The liver has a nodular  contour consistent with hepatic cirrhosis. The spleen is not enlarged.  The pancreas and adrenal glands are normal. The gallbladder is  contracted. Both kidneys are enlarged with perinephric soft tissue  stranding. This is similar to the prior exam. There are no dilated loops  of bowel within the abdomen to indicate an obstructive process. There is  no abnormal bowel wall thickening. Note is made that the pelvis was not  imaged per physician order. There are atherosclerotic vascular  calcifications within the abdominal aorta extending into the common  iliac arteries and visceral arteries. There is a minor linear scarring  in the lung bases. There are no suspicious osteolytic or sclerotic  lesions within the bony structures.        Impression:          1. Advanced hepatic cirrhosis.  2. Bilateral renal enlargement with perinephric soft tissue stranding  similar to the previous exam and of questionable etiology and  significance.  3. The study is limited by noncontrast technique. The pelvis was not  imaged per physician order.     Electronically Signed By-Farooq Sims On:5/19/2020 10:17 PM  This report was finalized on [id number] by  Farooq Sims, .    CT Head Without Contrast [[id number]] Collected:  05/19/20 1919     Updated:  05/19/20 1923    Narrative:          DATE OF EXAM:  5/19/2020 7:14 PM     PROCEDURE:   CT HEAD WO CONTRAST-     INDICATIONS:   Dizziness, nausea, vomiting.     COMPARISON:  02/21/2017.     TECHNIQUE:   Routine transaxial cuts were obtained through the head without the  administration of contrast. Automated exposure control and iterative  reconstruction methods were used.      FINDINGS:  There is mild prominence of the sulci,  fissures, and ventricles  indicating volume loss due to age-appropriate cerebral atrophy. The  basal cisterns are well-maintained. There is no acute hemorrhage,  midline shift, or suspicious extra-axial fluid collections. There is a  stable old lacunar infarct within the deep white matter of the right  frontal lobe. The visualized orbital contents, paranasal, and mastoid  sinuses are unremarkable.       Impression:          1. No acute findings.  2. Mild volume loss secondary to age-appropriate cerebral atrophy.  3. Stable small old lacunar infarct within the deep white matter of the  right frontal lobe.     Electronically Signed By-Farooq Sims On:5/19/2020 7:21 PM  This report was finalized on [id number] by  Farooq Sims, .             ASSESSMENT    Acute nausea with vomiting  Melena  Anemia - s/p recent phlebotomy  Hereditary hemochromatosis  Abnormal CT suggesting cirrhosis  Alcohol abuse  Hypokalemia    PLAN:    Patient presents with acute onset nausea vomiting and reported melena.  Hemoglobin 10.2 but also status post phlebotomy last week for hereditary hemochromatosis.  CT suggest cirrhosis with known alcohol abuse.  Complete alcohol cessation encouraged.  Check screening AFP without mass noted on CT.  Continue PPI and will plan EGD evaluation today for nausea, vomiting and possible melena.  Monitor hemoglobin.  Further recommendations to follow EGD findings.      I discussed the patients findings and my recommendations with the patient.    We appreciate the referral    CARMEN Pretty  05/20/20  10:02

## 2020-05-21 ENCOUNTER — READMISSION MANAGEMENT (OUTPATIENT)
Dept: CALL CENTER | Facility: HOSPITAL | Age: 60
End: 2020-05-21

## 2020-05-21 ENCOUNTER — TELEPHONE (OUTPATIENT)
Dept: FAMILY MEDICINE CLINIC | Facility: CLINIC | Age: 60
End: 2020-05-21

## 2020-05-21 VITALS
WEIGHT: 187 LBS | OXYGEN SATURATION: 95 % | HEIGHT: 72 IN | DIASTOLIC BLOOD PRESSURE: 64 MMHG | SYSTOLIC BLOOD PRESSURE: 110 MMHG | HEART RATE: 78 BPM | RESPIRATION RATE: 16 BRPM | BODY MASS INDEX: 25.33 KG/M2 | TEMPERATURE: 98.3 F

## 2020-05-21 PROBLEM — R11.10 NON-INTRACTABLE VOMITING: Status: RESOLVED | Noted: 2020-05-19 | Resolved: 2020-05-21

## 2020-05-21 LAB
ALBUMIN SERPL-MCNC: 3.9 G/DL (ref 3.5–5.2)
ALBUMIN/GLOB SERPL: 1.7 G/DL
ALP SERPL-CCNC: 56 U/L (ref 39–117)
ALT SERPL W P-5'-P-CCNC: 25 U/L (ref 1–41)
ANION GAP SERPL CALCULATED.3IONS-SCNC: 13 MMOL/L (ref 5–15)
AST SERPL-CCNC: 48 U/L (ref 1–40)
BASOPHILS # BLD AUTO: 0 10*3/MM3 (ref 0–0.2)
BASOPHILS NFR BLD AUTO: 0.7 % (ref 0–1.5)
BILIRUB SERPL-MCNC: 1.1 MG/DL (ref 0.2–1.2)
BUN BLD-MCNC: 8 MG/DL (ref 8–23)
BUN/CREAT SERPL: 12.1 (ref 7–25)
CALCIUM SPEC-SCNC: 8.3 MG/DL (ref 8.6–10.5)
CHLORIDE SERPL-SCNC: 89 MMOL/L (ref 98–107)
CO2 SERPL-SCNC: 25 MMOL/L (ref 22–29)
CREAT BLD-MCNC: 0.66 MG/DL (ref 0.76–1.27)
DEPRECATED RDW RBC AUTO: 44.6 FL (ref 37–54)
EOSINOPHIL # BLD AUTO: 0 10*3/MM3 (ref 0–0.4)
EOSINOPHIL NFR BLD AUTO: 0.1 % (ref 0.3–6.2)
ERYTHROCYTE [DISTWIDTH] IN BLOOD BY AUTOMATED COUNT: 13.1 % (ref 12.3–15.4)
GFR SERPL CREATININE-BSD FRML MDRD: 122 ML/MIN/1.73
GLOBULIN UR ELPH-MCNC: 2.3 GM/DL
GLUCOSE BLD-MCNC: 102 MG/DL (ref 65–99)
HCT VFR BLD AUTO: 28 % (ref 37.5–51)
HGB BLD-MCNC: 10.1 G/DL (ref 13–17.7)
INR PPP: 1.32 (ref 0.9–1.1)
LAB AP CASE REPORT: NORMAL
LAB AP DIAGNOSIS COMMENT: NORMAL
LYMPHOCYTES # BLD AUTO: 0.8 10*3/MM3 (ref 0.7–3.1)
LYMPHOCYTES NFR BLD AUTO: 13.3 % (ref 19.6–45.3)
MAGNESIUM SERPL-MCNC: 1.5 MG/DL (ref 1.6–2.4)
MCH RBC QN AUTO: 35.4 PG (ref 26.6–33)
MCHC RBC AUTO-ENTMCNC: 35.9 G/DL (ref 31.5–35.7)
MCV RBC AUTO: 98.5 FL (ref 79–97)
MONOCYTES # BLD AUTO: 0.5 10*3/MM3 (ref 0.1–0.9)
MONOCYTES NFR BLD AUTO: 9.2 % (ref 5–12)
NEUTROPHILS # BLD AUTO: 4.6 10*3/MM3 (ref 1.7–7)
NEUTROPHILS NFR BLD AUTO: 76.7 % (ref 42.7–76)
NRBC BLD AUTO-RTO: 0.1 /100 WBC (ref 0–0.2)
PATH REPORT.FINAL DX SPEC: NORMAL
PATH REPORT.GROSS SPEC: NORMAL
PLATELET # BLD AUTO: 190 10*3/MM3 (ref 140–450)
PMV BLD AUTO: 7.7 FL (ref 6–12)
POTASSIUM BLD-SCNC: 3.8 MMOL/L (ref 3.5–5.2)
PROT SERPL-MCNC: 6.2 G/DL (ref 6–8.5)
PROTHROMBIN TIME: 13.3 SECONDS (ref 9.6–11.7)
RBC # BLD AUTO: 2.85 10*6/MM3 (ref 4.14–5.8)
SODIUM BLD-SCNC: 127 MMOL/L (ref 136–145)
WBC NRBC COR # BLD: 6 10*3/MM3 (ref 3.4–10.8)

## 2020-05-21 PROCEDURE — 25010000002 MAGNESIUM SULFATE 2 GM/50ML SOLUTION: Performed by: INTERNAL MEDICINE

## 2020-05-21 PROCEDURE — 85610 PROTHROMBIN TIME: CPT | Performed by: INTERNAL MEDICINE

## 2020-05-21 PROCEDURE — 99217 PR OBSERVATION CARE DISCHARGE MANAGEMENT: CPT | Performed by: INTERNAL MEDICINE

## 2020-05-21 PROCEDURE — G0378 HOSPITAL OBSERVATION PER HR: HCPCS

## 2020-05-21 PROCEDURE — 80053 COMPREHEN METABOLIC PANEL: CPT | Performed by: INTERNAL MEDICINE

## 2020-05-21 PROCEDURE — 83735 ASSAY OF MAGNESIUM: CPT | Performed by: INTERNAL MEDICINE

## 2020-05-21 PROCEDURE — 85025 COMPLETE CBC W/AUTO DIFF WBC: CPT | Performed by: INTERNAL MEDICINE

## 2020-05-21 RX ORDER — ONDANSETRON 4 MG/1
4 TABLET, FILM COATED ORAL EVERY 6 HOURS PRN
Qty: 30 TABLET | Refills: 0 | Status: SHIPPED | OUTPATIENT
Start: 2020-05-21 | End: 2020-06-01

## 2020-05-21 RX ORDER — MAGNESIUM SULFATE HEPTAHYDRATE 40 MG/ML
2 INJECTION, SOLUTION INTRAVENOUS ONCE
Status: COMPLETED | OUTPATIENT
Start: 2020-05-21 | End: 2020-05-21

## 2020-05-21 RX ORDER — SUCRALFATE 1 G/1
1 TABLET ORAL 4 TIMES DAILY
Qty: 90 TABLET | Refills: 0 | Status: SHIPPED | OUTPATIENT
Start: 2020-05-21 | End: 2020-06-10 | Stop reason: SDUPTHER

## 2020-05-21 RX ORDER — PANTOPRAZOLE SODIUM 40 MG/1
40 TABLET, DELAYED RELEASE ORAL
Qty: 60 TABLET | Refills: 0 | Status: SHIPPED | OUTPATIENT
Start: 2020-05-21 | End: 2021-02-08

## 2020-05-21 RX ORDER — LANOLIN ALCOHOL/MO/W.PET/CERES
100 CREAM (GRAM) TOPICAL DAILY
Qty: 30 TABLET | Refills: 0 | Status: SHIPPED | OUTPATIENT
Start: 2020-05-21 | End: 2020-06-01

## 2020-05-21 RX ADMIN — PANTOPRAZOLE SODIUM 40 MG: 40 TABLET, DELAYED RELEASE ORAL at 10:17

## 2020-05-21 RX ADMIN — ZINC SULFATE 220 MG (50 MG) CAPSULE 220 MG: CAPSULE at 10:17

## 2020-05-21 RX ADMIN — SODIUM CHLORIDE 100 ML/HR: 900 INJECTION, SOLUTION INTRAVENOUS at 01:37

## 2020-05-21 RX ADMIN — Medication 100 MG: at 10:17

## 2020-05-21 RX ADMIN — MAGNESIUM SULFATE HEPTAHYDRATE 2 G: 40 INJECTION, SOLUTION INTRAVENOUS at 10:26

## 2020-05-21 RX ADMIN — CETIRIZINE HYDROCHLORIDE 10 MG: 10 TABLET, FILM COATED ORAL at 10:17

## 2020-05-21 RX ADMIN — SUCRALFATE 1 G: 1 TABLET ORAL at 10:18

## 2020-05-21 RX ADMIN — KETOTIFEN FUMARATE 1 DROP: 0.35 SOLUTION/ DROPS OPHTHALMIC at 10:18

## 2020-05-21 NOTE — PLAN OF CARE
Problem: Patient Care Overview  Goal: Plan of Care Review  Outcome: Ongoing (interventions implemented as appropriate)  Flowsheets (Taken 5/21/2020 0619)  Plan of Care Reviewed With: patient  Note:   Patient without complaints during shift. Possible discharge home today pending labs. Will continue to monitor.     Problem: Pain, Chronic (Adult)  Goal: Identify Related Risk Factors and Signs and Symptoms  Outcome: Ongoing (interventions implemented as appropriate)     Problem: Alcohol Withdrawal Acute, Risk/Actual (Adult)  Goal: Signs and Symptoms of Listed Potential Problems Will be Absent, Minimized or Managed (Alcohol Withdrawal Acute, Risk/Actual)  Outcome: Ongoing (interventions implemented as appropriate)     Problem: Fall Risk (Adult)  Goal: Identify Related Risk Factors and Signs and Symptoms  Outcome: Ongoing (interventions implemented as appropriate)     Problem: Fall Risk (Adult)  Goal: Absence of Fall  Outcome: Ongoing (interventions implemented as appropriate)

## 2020-05-21 NOTE — OUTREACH NOTE
Prep Survey      Responses   [facility] patient discharged from?  [facility]   Is LACE score < 7 ?  Yes   Eligibility  [facility]   Date of Admission  05/19/20   Date of Discharge  05/21/20   Discharge Disposition  Home or Self Care   Discharge diagnosis  Orthostatic dizziness Non-intractable vomiting with nausea  Alcohol abuse Hepatic cirrhosis  ESOPHAGOGASTRODUODENOSCOPY with biopsy x1   Does the patient have one of the following disease processes/diagnoses(primary or secondary)?  Other   Does the patient have Home health ordered?  No   Is there a DME ordered?  No   Prep survey completed?  Yes          Maria Eugenia Angulo RN

## 2020-05-21 NOTE — DISCHARGE SUMMARY
Date of Admission: 5/19/2020    Date of Discharge:  5/21/2020    Length of stay:  LOS: 0 days     Presenting Problem:   Orthostatic dizziness [R42]  Non-intractable vomiting with nausea, unspecified vomiting type [R11.2]      Principal and Active Diagnosis During Hospital Stay:     Active Hospital Problems    Diagnosis  POA   • Alcohol abuse [F10.10]  Yes   • Hemochromatosis, hereditary (CMS/HCC) [E83.110]  Yes      Resolved Hospital Problems    Diagnosis Date Resolved POA   • Non-intractable vomiting [R11.10] 05/21/2020 Yes     Anemia likely blood loss from antral ulcer  -That is post EGD 5/20/2020: Cratered ulcer on the antrum no intervention was done  -PPI twice daily for 3 months, repeat EGD in 3 months to assess ulcer  -hgb stable at d/c   -No NSAIDs or blood thinners  -GI follow up      Non-intractable vomiting  -Multifactorial to above  -Supportive care     Hepatic cirrhosis  Per CT with alcohol abuse   -GI followed as above     Alcohol abuse-  -CIWA precautions in place,   -multivitamins and thiamine at home     Dizziness  secondary to above   - troponin negative, CT head negative,   -improving      Hereditary hemachromatosis   - recent phlebotomy Wednesday   -Follow hemoglobin as noted above     Hyponatremia   -Sodium currently stable      Hypertension chronic   - controlled on Lotrel, Maxide, monitor      Allergic rhinitis   - on azelastine and Allegra- cetirizine used here      Hospital Course  Patient is a 60 y.o. male presented with N/V and melena. Was admitted seen by GI and underwent EGD with large antral ulcer that was non-bleeding. He improved with supportive care. He does have ETOH abuse and also hemachromatosis, and was counseled extensively of the detriments of ETOH abuse and this is exacerbating the issues noted above. He was felt stable to d/c.         Procedures Performed:none    Procedure(s):  ESOPHAGOGASTRODUODENOSCOPY with biopsy x1  -------------------       Consults:   Consults     Date  and Time Order Name Status Description    5/20/2020 0108 Inpatient Gastroenterology Consult Completed     5/19/2020 2024 Hospitalist (on-call MD unless specified) Completed           Pertinent Test Results:     Lab Results (last 72 hours)     Procedure Component Value Units Date/Time    Comprehensive Metabolic Panel [[id number]]  (Abnormal) Collected:  05/21/20 0326    Specimen:  Blood Updated:  05/21/20 0427     Glucose 102 mg/dL      BUN 8 mg/dL      Creatinine 0.66 mg/dL      Sodium 127 mmol/L      Potassium 3.8 mmol/L      Chloride 89 mmol/L      CO2 25.0 mmol/L      Calcium 8.3 mg/dL      Total Protein 6.2 g/dL      Albumin 3.90 g/dL      ALT (SGPT) 25 U/L      AST (SGOT) 48 U/L      Alkaline Phosphatase 56 U/L      Total Bilirubin 1.1 mg/dL      eGFR Non African Amer 122 mL/min/1.73      Globulin 2.3 gm/dL      A/G Ratio 1.7 g/dL      BUN/Creatinine Ratio 12.1     Anion Gap 13.0 mmol/L     Narrative:       GFR Normal >60  Chronic Kidney Disease <60  Kidney Failure <15      Magnesium [[id number]]  (Abnormal) Collected:  05/21/20 0326    Specimen:  Blood Updated:  05/21/20 0424     Magnesium 1.5 mg/dL     Protime-INR [[id number]]  (Abnormal) Collected:  05/21/20 0326    Specimen:  Blood Updated:  05/21/20 0405     Protime 13.3 Seconds      INR 1.32    CBC & Differential [[id number]] Collected:  05/21/20 0326    Specimen:  Blood Updated:  05/21/20 0400    Narrative:       The following orders were created for panel order CBC & Differential.  Procedure                               Abnormality         Status                     ---------                               -----------         ------                     CBC Auto Differential[[id number]]        Abnormal            Final result                 Please view results for these tests on the individual orders.    CBC Auto Differential [[id number]]  (Abnormal) Collected:  05/21/20 0326    Specimen:  Blood Updated:  05/21/20 0400     WBC 6.00 10*3/mm3      RBC 2.85  10*6/mm3      Hemoglobin 10.1 g/dL      Hematocrit 28.0 %      MCV 98.5 fL      MCH 35.4 pg      MCHC 35.9 g/dL      RDW 13.1 %      RDW-SD 44.6 fl      MPV 7.7 fL      Platelets 190 10*3/mm3      Neutrophil % 76.7 %      Lymphocyte % 13.3 %      Monocyte % 9.2 %      Eosinophil % 0.1 %      Basophil % 0.7 %      Neutrophils, Absolute 4.60 10*3/mm3      Lymphocytes, Absolute 0.80 10*3/mm3      Monocytes, Absolute 0.50 10*3/mm3      Eosinophils, Absolute 0.00 10*3/mm3      Basophils, Absolute 0.00 10*3/mm3      nRBC 0.1 /100 WBC     AFP Tumor Marker [[id number]]  (Normal) Collected:  05/19/20 1723    Specimen:  Blood Updated:  05/20/20 1637     ALPHA-FETOPROTEIN 6.65 ng/mL     Narrative:       Alpha Fetoprotein Tumor Marker Reference Range:    0.0-8.3 ng/mL    Note: Normal values apply only to males and nonpregnant females. These results are not interpretable for pregnant females.    Results may be falsely decreased if patient taking Biotin.      Tissue Pathology Exam [[id number]] Collected:  05/20/20 1209    Specimen:  Tissue from Stomach Updated:  05/20/20 1352    Occult Blood, Fecal By Immunoassay - Stool, Per Rectum [[id number]]  (Normal) Collected:  05/20/20 1011    Specimen:  Stool from Per Rectum Updated:  05/20/20 1132     Occult Blood, Fecal by Immunoassay Negative    Basic Metabolic Panel [[id number]]  (Abnormal) Collected:  05/20/20 0433    Specimen:  Blood Updated:  05/20/20 0546     Glucose 93 mg/dL      BUN 22 mg/dL      Creatinine 0.85 mg/dL      Sodium 131 mmol/L      Potassium 3.3 mmol/L      Chloride 91 mmol/L      CO2 27.0 mmol/L      Calcium 8.8 mg/dL      eGFR Non African Amer 91 mL/min/1.73      BUN/Creatinine Ratio 25.9     Anion Gap 13.0 mmol/L     Narrative:       GFR Normal >60  Chronic Kidney Disease <60  Kidney Failure <15      CK [[id number]]  (Normal) Collected:  05/20/20 0433    Specimen:  Blood Updated:  05/20/20 0546     Creatine Kinase 77 U/L     Magnesium [[id number]]  (Abnormal)  Collected:  05/20/20 0433    Specimen:  Blood Updated:  05/20/20 0511     Magnesium 1.3 mg/dL     Ammonia [[id number]]  (Normal) Collected:  05/20/20 0433    Specimen:  Blood Updated:  05/20/20 0500     Ammonia 30 umol/L     CBC Auto Differential [[id number]]  (Abnormal) Collected:  05/20/20 0433    Specimen:  Blood Updated:  05/20/20 0446     WBC 7.20 10*3/mm3      RBC 2.93 10*6/mm3      Hemoglobin 10.2 g/dL      Hematocrit 29.0 %      MCV 98.7 fL      MCH 34.7 pg      MCHC 35.2 g/dL      RDW 12.9 %      RDW-SD 44.2 fl      MPV 7.4 fL      Platelets 212 10*3/mm3      Neutrophil % 70.3 %      Lymphocyte % 15.6 %      Monocyte % 13.1 %      Eosinophil % 0.3 %      Basophil % 0.7 %      Neutrophils, Absolute 5.10 10*3/mm3      Lymphocytes, Absolute 1.10 10*3/mm3      Monocytes, Absolute 1.00 10*3/mm3      Eosinophils, Absolute 0.00 10*3/mm3      Basophils, Absolute 0.10 10*3/mm3      nRBC 0.0 /100 WBC     Ethanol [[id number]] Collected:  05/19/20 1723    Specimen:  Blood Updated:  05/20/20 0133     Ethanol % <0.010 %     Narrative:       Plasma Ethanol Clinical Symptoms:    ETOH (%)               Clinical Symptom  .01-.05              No apparent influence  .03-.12              Euphoria, Diminished judgment and attention   .09-.25              Impaired comprehension, Muscle incoordination  .18-.30              Confusion, Staggered gait, Slurred speech  .25-.40              Markedly decreased response to stimuli, unable to stand or                        walk, vomitting, sleep or stupor  .35-.50              Comatose, Anesthesia, Subnormal body temperature        Extra Tubes [[id number]] Collected:  05/19/20 1723    Specimen:  Blood Updated:  05/19/20 1830    Narrative:       The following orders were created for panel order Extra Tubes.  Procedure                               Abnormality         Status                     ---------                               -----------         ------                     Light Blue  Top[[id number]]                                   Final result               Gold Top - SST[[id number]]                                   Final result                 Please view results for these tests on the individual orders.    Light Blue Top [[id number]] Collected:  05/19/20 1723    Specimen:  Blood Updated:  05/19/20 1830     Extra Tube hold for add-on     Comment: Auto resulted       Gold Top - SST [[id number]] Collected:  05/19/20 1723    Specimen:  Blood Updated:  05/19/20 1830     Extra Tube Hold for add-ons.     Comment: Auto resulted.       CBC & Differential [[id number]] Collected:  05/19/20 1723    Specimen:  Blood from Arm, Right Updated:  05/19/20 1814    Narrative:       The following orders were created for panel order CBC & Differential.  Procedure                               Abnormality         Status                     ---------                               -----------         ------                     CBC Auto Differential[[id number]]        Abnormal            Final result                 Please view results for these tests on the individual orders.    CBC Auto Differential [[id number]]  (Abnormal) Collected:  05/19/20 1723    Specimen:  Blood from Arm, Right Updated:  05/19/20 1814     WBC 8.20 10*3/mm3      RBC 3.39 10*6/mm3      Hemoglobin 11.8 g/dL      Hematocrit 33.4 %      MCV 98.6 fL      MCH 34.9 pg      MCHC 35.4 g/dL      RDW 13.2 %      RDW-SD 45.1 fl      MPV 8.0 fL      Platelets 277 10*3/mm3      Neutrophil % 87.1 %      Lymphocyte % 5.1 %      Monocyte % 7.0 %      Eosinophil % 0.1 %      Basophil % 0.7 %      Neutrophils, Absolute 7.20 10*3/mm3      Lymphocytes, Absolute 0.40 10*3/mm3      Monocytes, Absolute 0.60 10*3/mm3      Eosinophils, Absolute 0.00 10*3/mm3      Basophils, Absolute 0.10 10*3/mm3      nRBC 0.0 /100 WBC     Narrative:       Appended report. These results have been appended to a previously verified report.    Comprehensive Metabolic Panel [[id number]]   (Abnormal) Collected:  05/19/20 1723    Specimen:  Blood from Arm, Right Updated:  05/19/20 1811     Glucose 120 mg/dL      BUN 22 mg/dL      Creatinine 0.70 mg/dL      Sodium 130 mmol/L      Potassium 3.5 mmol/L      Chloride 90 mmol/L      CO2 23.0 mmol/L      Calcium 8.9 mg/dL      Total Protein 6.8 g/dL      Albumin 4.10 g/dL      ALT (SGPT) 33 U/L      AST (SGOT) 59 U/L      Alkaline Phosphatase 60 U/L      Total Bilirubin 1.0 mg/dL      eGFR Non African Amer 114 mL/min/1.73      Globulin 2.7 gm/dL      A/G Ratio 1.5 g/dL      BUN/Creatinine Ratio 31.4     Anion Gap 17.0 mmol/L     Narrative:       GFR Normal >60  Chronic Kidney Disease <60  Kidney Failure <15      Troponin [[id number]]  (Normal) Collected:  05/19/20 1723    Specimen:  Blood from Arm, Right Updated:  05/19/20 1811     Troponin T <0.010 ng/mL     Narrative:       Troponin T Reference Range:  <= 0.03 ng/mL-   Negative for AMI  >0.03 ng/mL-     Abnormal for myocardial necrosis.  Clinicians would have to utilize clinical acumen, EKG, Troponin and serial changes to determine if it is an Acute Myocardial Infarction or myocardial injury due to an underlying chronic condition.       Results may be falsely decreased if patient taking Biotin.      BNP [[id number]]  (Normal) Collected:  05/19/20 1723    Specimen:  Blood from Arm, Right Updated:  05/19/20 1806     proBNP 12.9 pg/mL     Narrative:       Among patients with dyspnea, NT-proBNP is highly sensitive for the detection of acute congestive heart failure. In addition NT-proBNP of <300 pg/ml effectively rules out acute congestive heart failure with 99% negative predictive value.    Results may be falsely decreased if patient taking Biotin.                 Microbiology Results (last 10 days)     ** No results found for the last 240 hours. **                 Imaging Results (All)     Procedure Component Value Units Date/Time    CT Abdomen Without Contrast [[id number]] Collected:  05/19/20 0165      Updated:  05/19/20 2219    Narrative:          DATE OF EXAM:  5/19/2020 9:14 PM     PROCEDURE:  CT ABDOMEN WO CONTRAST-     INDICATIONS:   Nausea and vomiting, dizziness, diarrhea, abdominal pain.     COMPARISON:   02/21/2017.     TECHNIQUE:  Routine transaxial slices were obtained through the abdomen without the  administration of intravenous contrast. Reconstructed coronal and  sagittal images were also obtained. Automated exposure control and  iterative construction methods were used.     FINDINGS:  The exam is limited by noncontrast technique. The liver has a nodular  contour consistent with hepatic cirrhosis. The spleen is not enlarged.  The pancreas and adrenal glands are normal. The gallbladder is  contracted. Both kidneys are enlarged with perinephric soft tissue  stranding. This is similar to the prior exam. There are no dilated loops  of bowel within the abdomen to indicate an obstructive process. There is  no abnormal bowel wall thickening. Note is made that the pelvis was not  imaged per physician order. There are atherosclerotic vascular  calcifications within the abdominal aorta extending into the common  iliac arteries and visceral arteries. There is a minor linear scarring  in the lung bases. There are no suspicious osteolytic or sclerotic  lesions within the bony structures.        Impression:          1. Advanced hepatic cirrhosis.  2. Bilateral renal enlargement with perinephric soft tissue stranding  similar to the previous exam and of questionable etiology and  significance.  3. The study is limited by noncontrast technique. The pelvis was not  imaged per physician order.     Electronically Signed By-Farooq Sims On:5/19/2020 10:17 PM  This report was finalized on [id number] by  Farooq Sims, .    CT Head Without Contrast [[id number]] Collected:  05/19/20 1919     Updated:  05/19/20 1923    Narrative:          DATE OF EXAM:  5/19/2020 7:14 PM     PROCEDURE:   CT HEAD WO CONTRAST-     INDICATIONS:    Dizziness, nausea, vomiting.     COMPARISON:  02/21/2017.     TECHNIQUE:   Routine transaxial cuts were obtained through the head without the  administration of contrast. Automated exposure control and iterative  reconstruction methods were used.      FINDINGS:  There is mild prominence of the sulci, fissures, and ventricles  indicating volume loss due to age-appropriate cerebral atrophy. The  basal cisterns are well-maintained. There is no acute hemorrhage,  midline shift, or suspicious extra-axial fluid collections. There is a  stable old lacunar infarct within the deep white matter of the right  frontal lobe. The visualized orbital contents, paranasal, and mastoid  sinuses are unremarkable.       Impression:          1. No acute findings.  2. Mild volume loss secondary to age-appropriate cerebral atrophy.  3. Stable small old lacunar infarct within the deep white matter of the  right frontal lobe.     Electronically Signed By-Farooq Sims On:5/19/2020 7:21 PM  This report was finalized on [id number] by  Farooq Sims, .            Condition on Discharge:  Stable     Vital Signs  Temp:  [98 °F (36.7 °C)-98.9 °F (37.2 °C)] 98.4 °F (36.9 °C)  Heart Rate:  [] 80  Resp:  [15-19] 16  BP: ()/(48-75) 112/66    Physical Exam:  Physical Exam   Constitutional: He is oriented to person, place, and time. No distress.   Eyes: No scleral icterus.   Cardiovascular: Normal rate and regular rhythm.   Pulmonary/Chest: Effort normal. No respiratory distress.   Abdominal: Soft. He exhibits no distension. There is no tenderness.   Neurological: He is alert and oriented to person, place, and time.       Discharge Disposition  Home or Self Care    Discharge Medications     Discharge Medications      New Medications      Instructions Start Date   ondansetron 4 MG tablet  Commonly known as:  ZOFRAN   4 mg, Oral, Every 6 Hours PRN      pantoprazole 40 MG EC tablet  Commonly known as:  PROTONIX   40 mg, Oral, 2 Times Daily  Before Meals      sucralfate 1 g tablet  Commonly known as:  Carafate   1 g, Oral, 4 Times Daily      thiamine 100 MG tablet  Commonly known as:  VITAMIN B1   100 mg, Oral, Daily         Continue These Medications      Instructions Start Date   amLODIPine-benazepril 5-40 MG per capsule  Commonly known as:  LOTREL   TAKE ONE CAPSULE BY MOUTH DAILY      azelastine 0.05 % ophthalmic solution  Commonly known as:  OPTIVAR   2 drops, Both Eyes, 2 Times Daily      fexofenadine 180 MG tablet  Commonly known as:  ALLEGRA   180 mg, Oral, Daily      FISH OIL ADULT GUMMIES PO   1 tablet, Oral, Daily      OSTEO BI-FLEX REGULAR STRENGTH PO   1 tablet, Oral, Daily      SUPER B COMPLEX/VITAMIN C PO   1 tablet, Oral, Daily         Stop These Medications    triamterene-hydrochlorothiazide 37.5-25 MG per tablet  Commonly known as:  MAXZIDE-25            Discharge Diet:   Diet Instructions     Diet: Specialty Diet; Thin Liquids, No Restrictions; Low Fat      Discharge Diet:  Specialty Diet    Fluid Consistency:  Thin Liquids, No Restrictions    Specialty Diets:  Low Fat          Activity at Discharge:     Follow-up Appointments  Future Appointments   Date Time Provider Department Center   1/29/2021 10:30 AM Meri Bahena MD [facility] None   5/18/2021  8:00 AM ROOM 03 [facility] None   5/19/2021  8:00 AM ROOM 03 - [facility] None     Additional Instructions for the Follow-ups that You Need to Schedule     Discharge Follow-up with PCP   As directed       Currently Documented PCP:    Meri Bahena MD    PCP Phone Number:    [phone]     Follow Up Details:  one week         Discharge Follow-up with Specialty: GI; 2 Weeks   As directed      Specialty:  GI    Follow Up:  2 Weeks               Test Results Pending at Discharge   Order Current Status    Tissue Pathology Exam In process           Risk for Readmission (LACE) Score: 2 (5/21/2020  6:00 AM)      This patient has been  examined wearing appropriate Personal Protective Equipment. 05/21/20      Hoang STEELE Lowell, DO  05/21/20  10:11

## 2020-05-21 NOTE — NURSING NOTE
Notified wife Valencia that pt left behind his phone . She states she will pick it up tomorrow. Labelled with pt sticker and given to charge nurse.

## 2020-05-22 ENCOUNTER — TRANSITIONAL CARE MANAGEMENT TELEPHONE ENCOUNTER (OUTPATIENT)
Dept: CALL CENTER | Facility: HOSPITAL | Age: 60
End: 2020-05-22

## 2020-05-22 NOTE — PROGRESS NOTES
Discharge Planning Assessment   [facility]     Patient Name: Erik Fuentes  MRN: [id number]  Today's Date: 5/22/2020    Admit Date: 5/19/2020          Plan    Final Discharge Disposition Code  01 - home or self-care    Final Note  return home       Carol naegele rn  Case management  Office number [phone]  Cell phone [phone]

## 2020-05-22 NOTE — OUTREACH NOTE
Call Center TCM Note      Responses   [facility] patient discharged from?  [facility]   COVID-19 Test Status  Not tested   Does the patient have one of the following disease processes/diagnoses(primary or secondary)?  Other   TCM attempt successful?  Yes   Call start time  0948   Call end time  0951   Discharge diagnosis  Orthostatic dizziness Non-intractable vomiting with nausea  Alcohol abuse Hepatic cirrhosis  ESOPHAGOGASTRODUODENOSCOPY with biopsy x1   Meds reviewed with patient/caregiver?  Yes   Is the patient having any side effects they believe may be caused by any medication additions or changes?  No   Does the patient have all medications ordered at discharge?  Yes   Is the patient taking all medications as directed (includes completed medication regime)?  Yes   Comments regarding appointments  GI appt no scheduled at time of call   Does the patient have a primary care provider?   Yes   Does the patient have an appointment with their PCP within 7 days of discharge?  Greater than 7 days   Comments regarding PCP  6/1/20   What is preventing the patient from scheduling follow up appointments within 7 days of discharge?  Earlier appointment not available   Nursing Interventions  Verified appointment date/time/provider   Has the patient kept scheduled appointments due by today?  N/A   Pulse Ox monitoring  None   Psychosocial issues?  No   Did the patient receive a copy of their discharge instructions?  Yes   Nursing interventions  Reviewed instructions with patient   What is the patient's perception of their health status since discharge?  Improving   Is the patient/caregiver able to teach back signs and symptoms related to disease process for when to call PCP?  Yes   Is the patient/caregiver able to teach back signs and symptoms related to disease process for when to call 911?  Yes   Is the patient/caregiver able to teach back the hierarchy of who to call/visit for symptoms/problems? PCP, Specialist, Home  health nurse, Urgent Care, ED, 911  Yes   TCM call completed?  Yes          Vicki Guerra, RN    5/22/2020, 09:51

## 2020-06-01 ENCOUNTER — OFFICE VISIT (OUTPATIENT)
Dept: FAMILY MEDICINE CLINIC | Facility: CLINIC | Age: 60
End: 2020-06-01

## 2020-06-01 VITALS
WEIGHT: 199.6 LBS | BODY MASS INDEX: 27.04 KG/M2 | RESPIRATION RATE: 20 BRPM | SYSTOLIC BLOOD PRESSURE: 146 MMHG | HEART RATE: 99 BPM | HEIGHT: 72 IN | TEMPERATURE: 99.6 F | DIASTOLIC BLOOD PRESSURE: 74 MMHG

## 2020-06-01 DIAGNOSIS — N52.9 ERECTILE DYSFUNCTION, UNSPECIFIED ERECTILE DYSFUNCTION TYPE: ICD-10-CM

## 2020-06-01 DIAGNOSIS — E83.42 HYPOMAGNESEMIA: ICD-10-CM

## 2020-06-01 DIAGNOSIS — I10 ESSENTIAL HYPERTENSION: ICD-10-CM

## 2020-06-01 DIAGNOSIS — E83.110 HEMOCHROMATOSIS, HEREDITARY (HCC): ICD-10-CM

## 2020-06-01 DIAGNOSIS — F10.10 ALCOHOL ABUSE: ICD-10-CM

## 2020-06-01 DIAGNOSIS — K25.3 ACUTE GASTRIC ULCER WITHOUT HEMORRHAGE OR PERFORATION: Primary | ICD-10-CM

## 2020-06-01 DIAGNOSIS — K74.60 CIRRHOSIS OF LIVER WITHOUT ASCITES, UNSPECIFIED HEPATIC CIRRHOSIS TYPE (HCC): ICD-10-CM

## 2020-06-01 DIAGNOSIS — R60.9 PERIPHERAL EDEMA: ICD-10-CM

## 2020-06-01 PROCEDURE — 99495 TRANSJ CARE MGMT MOD F2F 14D: CPT | Performed by: INTERNAL MEDICINE

## 2020-06-01 RX ORDER — TRIAMTERENE AND HYDROCHLOROTHIAZIDE 37.5; 25 MG/1; MG/1
1 TABLET ORAL DAILY
Start: 2020-06-01 | End: 2020-08-31

## 2020-06-01 RX ORDER — SILDENAFIL CITRATE 20 MG/1
TABLET ORAL
Qty: 50 TABLET | Refills: 1 | Status: SHIPPED | OUTPATIENT
Start: 2020-06-01 | End: 2023-02-27 | Stop reason: SDUPTHER

## 2020-06-01 RX ORDER — TRIAMCINOLONE ACETONIDE 1 MG/G
CREAM TOPICAL DAILY PRN
COMMUNITY
Start: 2020-05-21

## 2020-06-01 RX ORDER — MAGNESIUM OXIDE 400 MG/1
400 TABLET ORAL DAILY
Qty: 90 TABLET | Refills: 0 | Status: SHIPPED | OUTPATIENT
Start: 2020-06-01 | End: 2020-12-09

## 2020-06-01 NOTE — PROGRESS NOTES
Transitional Care Follow Up Visit    Chief Complaint   Patient presents with   • Nausea     Hospital f/u   • Vomiting   • Rectal Bleeding       Subjective     History of Present Illness     Erik Fuentes is a 60 y.o. male who presents for a transitional care management visit from recent stay at [facility] .  Within 48 business hours after discharge our office contacted him via telephone to coordinate his care and needs.  I reviewed and discussed the details of that call along with the discharge summary, hospital problems, inpatient lab results, inpatient diagnostic studies, and consultation reports with Erik.     Date of TCM Phone Call 5/21/2020   [facility]   Date of Admission 5/19/2020   Date of Discharge 5/21/2020   Discharge Disposition Home or Self Care       Risk for Readmission (LACE) Score: 2 (5/21/2020  6:00 AM)      Course During Hospital Stay(Reviewed from Discharge Summary Note)      Admission Diagnosis/es:   Orthostatic dizziness [R42]  Non-intractable vomiting with nausea, unspecified vomiting type [R11.2]    Discharge Diagnosis/es:  Orthostatic dizziness [R42]  Non-intractable vomiting with nausea, unspecified vomiting type [R11.2]  Antral ulcer    Hospital Course:  Patient is a 60 y.o. male presented with N/V and melena. Was admitted seen by GI and underwent EGD with large antral ulcer that was non-bleeding. He improved with supportive care. He does have ETOH abuse and also hemachromatosis, and was counseled extensively of the detriments of ETOH abuse and this is exacerbating the issues noted above. He was felt stable to d/c.       Hospital Consultants: GI    Scheduled  Or Suggested Follow ups:    Pending Test Results: none    Current outpatient and discharge medications have been reconciled for the patient.  Reviewed by: Meri Bahena MD      Review of Systems   Constitutional: Negative for activity change, appetite change, fatigue and fever.   HENT: Negative for  "congestion, ear pain, postnasal drip, sinus pain and sore throat.    Eyes: Negative for photophobia, pain and redness.   Respiratory: Negative for cough and shortness of breath.    Cardiovascular: Negative for chest pain and palpitations.   Gastrointestinal: Negative for abdominal pain, diarrhea, nausea and vomiting.   Endocrine: Negative for polydipsia and polyuria.   Genitourinary: Negative for dysuria and hematuria.   Musculoskeletal: Negative for arthralgias and myalgias.   Skin: Negative for rash and wound.   Neurological: Negative for dizziness, light-headedness and headaches.   Psychiatric/Behavioral: Negative for dysphoric mood and sleep disturbance. The patient is not nervous/anxious.        Objective     /74 (BP Location: Right arm, Patient Position: Sitting, Cuff Size: Adult)   Pulse 99   Temp 99.6 °F (37.6 °C) (Oral)   Resp 20   Ht 182.9 cm (72\")   Wt 90.5 kg (199 lb 9.6 oz)   BMI 27.07 kg/m²     Physical Exam   Constitutional: He is oriented to person, place, and time. He appears well-developed and well-nourished. No distress.   HENT:   Head: Normocephalic and atraumatic.   Right Ear: External ear normal.   Left Ear: External ear normal.   Mouth/Throat: Oropharynx is clear and moist.   Eyes: Conjunctivae and EOM are normal. Right eye exhibits no discharge. Left eye exhibits no discharge. No scleral icterus.   Neck: Normal range of motion. Neck supple. No tracheal deviation present.   Cardiovascular: Normal rate, regular rhythm and normal heart sounds. Exam reveals no gallop and no friction rub.   No murmur heard.  Pulmonary/Chest: Effort normal and breath sounds normal. No respiratory distress. He has no wheezes. He has no rales.   Abdominal: Soft. Bowel sounds are normal. He exhibits no distension. There is no tenderness. There is no guarding.   Musculoskeletal: Normal range of motion. He exhibits no edema or deformity.   Lymphadenopathy:     He has no cervical adenopathy.   Neurological: " He is alert and oriented to person, place, and time. No cranial nerve deficit. He exhibits normal muscle tone.   Skin: Skin is warm and dry. No rash noted. He is not diaphoretic.   Psychiatric: He has a normal mood and affect. His behavior is normal. Thought content normal.   Vitals reviewed.      Hospital Data Reviewed:    Lab Results   Component Value Date    BUN 8 05/21/2020    CREATININE 0.66 (L) 05/21/2020    EGFRIFNONA 122 05/21/2020     (L) 05/21/2020    K 3.8 05/21/2020    CL 89 (L) 05/21/2020    CALCIUM 8.3 (L) 05/21/2020    ALBUMIN 3.90 05/21/2020    BILITOT 1.1 05/21/2020    ALKPHOS 56 05/21/2020    AST 48 (H) 05/21/2020    ALT 25 05/21/2020    CKTOTAL 77 05/20/2020    WBC 6.00 05/21/2020    RBC 2.85 (L) 05/21/2020    HCT 28.0 (L) 05/21/2020    MCV 98.5 (H) 05/21/2020    MCH 35.4 (H) 05/21/2020    INR 1.32 (H) 05/21/2020      Assessment/Plan     Ct Abdomen Without Contrast    Result Date: 5/19/2020  Impression:  1. Advanced hepatic cirrhosis. 2. Bilateral renal enlargement with perinephric soft tissue stranding similar to the previous exam and of questionable etiology and significance. 3. The study is limited by noncontrast technique. The pelvis was not imaged per physician order.  Electronically Signed ByNate Sims On:5/19/2020 10:17 PM This report was finalized on [id number] by  Farooq Sims, .    Ct Head Without Contrast    Result Date: 5/19/2020  Impression:  1. No acute findings. 2. Mild volume loss secondary to age-appropriate cerebral atrophy. 3. Stable small old lacunar infarct within the deep white matter of the right frontal lobe.  Electronically Signed ByNate Sims On:5/19/2020 7:21 PM This report was finalized on [id number] by  Farooq Sims, Brenda      Assessment/Plan     Diagnoses and all orders for this visit:    1. Acute gastric ulcer without hemorrhage or perforation (Primary)    2. Hemochromatosis, hereditary (CMS/HCC)    3. Alcohol abuse    4. Cirrhosis of liver without ascites,  unspecified hepatic cirrhosis type (CMS/HCC)    5. Hypomagnesemia    6. Peripheral edema    7. Erectile dysfunction, unspecified erectile dysfunction type    Other orders  -     triamterene-hydrochlorothiazide (Maxzide-25) 37.5-25 MG per tablet; Take 1 tablet by mouth Daily.  -     magnesium oxide (MAG-OX) 400 MG tablet; Take 1 tablet by mouth Daily.  Dispense: 90 tablet; Refill: 0  -     sildenafil (REVATIO) 20 MG tablet; Use 2-5 tabs prior to intercourse  Dispense: 50 tablet; Refill: 1    pt was unaware of severity of cirrhosis  Had orders for ruq US but did not know why it was ordered  So will reschedule and get that done  Unsure if cirrhosis is 2/2 etoh or hemachromatosis - would need to ask GI  But did tell pt etoh is much more commonly the cause of cirrhosis  Pt has peripheral edema, likely from cirrhosis  Will start maxzide, milder diuretic bc of recent orthostatic dizziness, but did let pt know he may end getting changed to lasix + aldactone, which is more commonly used to keep fluid retention under control when pts have cirrhosis  Continue PPI BID  Start magnesium - was low inpt  Refill sildenafil      No follow-ups on file.

## 2020-06-08 ENCOUNTER — OFFICE (AMBULATORY)
Dept: URBAN - METROPOLITAN AREA CLINIC 64 | Facility: CLINIC | Age: 60
End: 2020-06-08

## 2020-06-08 VITALS
DIASTOLIC BLOOD PRESSURE: 77 MMHG | HEIGHT: 72 IN | SYSTOLIC BLOOD PRESSURE: 117 MMHG | HEART RATE: 80 BPM | WEIGHT: 195 LBS

## 2020-06-08 DIAGNOSIS — K25.9 GASTRIC ULCER, UNSPECIFIED AS ACUTE OR CHRONIC, WITHOUT HEMO: ICD-10-CM

## 2020-06-08 DIAGNOSIS — F10.10 ALCOHOL ABUSE, UNCOMPLICATED: ICD-10-CM

## 2020-06-08 DIAGNOSIS — K74.69 OTHER CIRRHOSIS OF LIVER: ICD-10-CM

## 2020-06-08 DIAGNOSIS — K72.90 HEPATIC FAILURE, UNSPECIFIED WITHOUT COMA: ICD-10-CM

## 2020-06-08 PROCEDURE — 99214 OFFICE O/P EST MOD 30 MIN: CPT | Performed by: INTERNAL MEDICINE

## 2020-06-08 RX ORDER — PANTOPRAZOLE 40 MG/1
40 TABLET, DELAYED RELEASE ORAL
Qty: 90 | Refills: 3 | Status: ACTIVE

## 2020-06-08 RX ORDER — SUCRALFATE 1 G/1
4 TABLET ORAL
Qty: 0 | Refills: 0 | Status: COMPLETED
End: 2020-06-08

## 2020-06-08 RX ORDER — RIFAXIMIN 550 MG/1
TABLET ORAL
Qty: 60 | Refills: 11 | Status: COMPLETED
Start: 2020-06-08 | End: 2022-11-14

## 2020-06-10 RX ORDER — SUCRALFATE 1 G/1
1 TABLET ORAL 4 TIMES DAILY
Qty: 90 TABLET | Refills: 0 | Status: SHIPPED | OUTPATIENT
Start: 2020-06-10 | End: 2020-07-01

## 2020-06-29 PROBLEM — E83.42 HYPOMAGNESEMIA: Status: ACTIVE | Noted: 2020-06-29

## 2020-06-29 PROBLEM — I10 ESSENTIAL HYPERTENSION: Status: ACTIVE | Noted: 2020-06-29

## 2020-06-29 PROBLEM — R60.0 PERIPHERAL EDEMA: Status: ACTIVE | Noted: 2020-06-29

## 2020-06-29 PROBLEM — R60.9 PERIPHERAL EDEMA: Status: ACTIVE | Noted: 2020-06-29

## 2020-06-29 PROBLEM — K25.3 ACUTE GASTRIC ULCER WITHOUT HEMORRHAGE OR PERFORATION: Status: ACTIVE | Noted: 2020-06-29

## 2020-06-29 PROBLEM — K74.60 CIRRHOSIS OF LIVER WITHOUT ASCITES: Status: ACTIVE | Noted: 2020-06-29

## 2020-07-01 RX ORDER — SUCRALFATE 1 G/1
TABLET ORAL
Qty: 90 TABLET | Refills: 0 | Status: SHIPPED | OUTPATIENT
Start: 2020-07-01 | End: 2020-08-20 | Stop reason: HOSPADM

## 2020-08-18 ENCOUNTER — LAB (OUTPATIENT)
Dept: LAB | Facility: HOSPITAL | Age: 60
End: 2020-08-18

## 2020-08-18 PROCEDURE — U0004 COV-19 TEST NON-CDC HGH THRU: HCPCS

## 2020-08-18 PROCEDURE — C9803 HOPD COVID-19 SPEC COLLECT: HCPCS

## 2020-08-18 PROCEDURE — U0002 COVID-19 LAB TEST NON-CDC: HCPCS

## 2020-08-19 ENCOUNTER — ANESTHESIA EVENT (OUTPATIENT)
Dept: GASTROENTEROLOGY | Facility: HOSPITAL | Age: 60
End: 2020-08-19

## 2020-08-19 LAB
REF LAB TEST METHOD: NORMAL
SARS-COV-2 RNA RESP QL NAA+PROBE: NOT DETECTED

## 2020-08-20 ENCOUNTER — HOSPITAL ENCOUNTER (OUTPATIENT)
Facility: HOSPITAL | Age: 60
Setting detail: HOSPITAL OUTPATIENT SURGERY
Discharge: HOME | End: 2020-08-20
Attending: INTERNAL MEDICINE | Admitting: INTERNAL MEDICINE

## 2020-08-20 ENCOUNTER — ANESTHESIA (OUTPATIENT)
Dept: GASTROENTEROLOGY | Facility: HOSPITAL | Age: 60
End: 2020-08-20

## 2020-08-20 ENCOUNTER — ON CAMPUS - OUTPATIENT (AMBULATORY)
Dept: URBAN - METROPOLITAN AREA HOSPITAL 85 | Facility: HOSPITAL | Age: 60
End: 2020-08-20

## 2020-08-20 VITALS
BODY MASS INDEX: 27.38 KG/M2 | OXYGEN SATURATION: 100 % | HEART RATE: 78 BPM | RESPIRATION RATE: 16 BRPM | TEMPERATURE: 98.2 F | DIASTOLIC BLOOD PRESSURE: 88 MMHG | HEIGHT: 72 IN | WEIGHT: 202.16 LBS | SYSTOLIC BLOOD PRESSURE: 127 MMHG

## 2020-08-20 DIAGNOSIS — K29.70 GASTRITIS, UNSPECIFIED, WITHOUT BLEEDING: ICD-10-CM

## 2020-08-20 DIAGNOSIS — K25.9 GASTRIC ULCER, UNSPECIFIED AS ACUTE OR CHRONIC, WITHOUT HEMO: ICD-10-CM

## 2020-08-20 DIAGNOSIS — K70.30 ALCOHOLIC CIRRHOSIS OF LIVER WITHOUT ASCITES: ICD-10-CM

## 2020-08-20 DIAGNOSIS — K25.9 GASTRIC ULCER: ICD-10-CM

## 2020-08-20 PROCEDURE — 43239 EGD BIOPSY SINGLE/MULTIPLE: CPT | Performed by: INTERNAL MEDICINE

## 2020-08-20 PROCEDURE — 88305 TISSUE EXAM BY PATHOLOGIST: CPT | Performed by: INTERNAL MEDICINE

## 2020-08-20 PROCEDURE — 88342 IMHCHEM/IMCYTCHM 1ST ANTB: CPT | Performed by: INTERNAL MEDICINE

## 2020-08-20 PROCEDURE — 25010000002 PROPOFOL 10 MG/ML EMULSION: Performed by: ANESTHESIOLOGY

## 2020-08-20 RX ORDER — ALCOHOL 0.98 ML/ML
INJECTION INTRASPINAL
Status: DISCONTINUED
Start: 2020-08-20 | End: 2020-08-20 | Stop reason: WASHOUT

## 2020-08-20 RX ORDER — ONDANSETRON 2 MG/ML
4 INJECTION INTRAMUSCULAR; INTRAVENOUS ONCE AS NEEDED
Status: DISCONTINUED | OUTPATIENT
Start: 2020-08-20 | End: 2020-08-20 | Stop reason: HOSPADM

## 2020-08-20 RX ORDER — EPINEPHRINE 0.1 MG/ML
SYRINGE (ML) INJECTION
Status: DISCONTINUED
Start: 2020-08-20 | End: 2020-08-20 | Stop reason: WASHOUT

## 2020-08-20 RX ORDER — LIDOCAINE HYDROCHLORIDE 10 MG/ML
0.5 INJECTION, SOLUTION EPIDURAL; INFILTRATION; INTRACAUDAL; PERINEURAL ONCE AS NEEDED
Status: DISCONTINUED | OUTPATIENT
Start: 2020-08-20 | End: 2020-08-20 | Stop reason: HOSPADM

## 2020-08-20 RX ORDER — SODIUM CHLORIDE 0.9 % (FLUSH) 0.9 %
3 SYRINGE (ML) INJECTION EVERY 12 HOURS SCHEDULED
Status: DISCONTINUED | OUTPATIENT
Start: 2020-08-20 | End: 2020-08-20 | Stop reason: HOSPADM

## 2020-08-20 RX ORDER — PROPOFOL 10 MG/ML
VIAL (ML) INTRAVENOUS AS NEEDED
Status: DISCONTINUED | OUTPATIENT
Start: 2020-08-20 | End: 2020-08-20 | Stop reason: SURG

## 2020-08-20 RX ORDER — SODIUM CHLORIDE 9 MG/ML
9 INJECTION, SOLUTION INTRAVENOUS CONTINUOUS PRN
Status: DISCONTINUED | OUTPATIENT
Start: 2020-08-20 | End: 2020-08-20 | Stop reason: HOSPADM

## 2020-08-20 RX ORDER — SODIUM CHLORIDE 0.9 % (FLUSH) 0.9 %
3-10 SYRINGE (ML) INJECTION AS NEEDED
Status: DISCONTINUED | OUTPATIENT
Start: 2020-08-20 | End: 2020-08-20 | Stop reason: HOSPADM

## 2020-08-20 RX ADMIN — SODIUM CHLORIDE 9 ML/HR: 900 INJECTION, SOLUTION INTRAVENOUS at 09:39

## 2020-08-20 RX ADMIN — PROPOFOL 240 MG: 10 INJECTION, EMULSION INTRAVENOUS at 10:40

## 2020-08-20 NOTE — H&P
GI CONSULT  NOTE:    Referring Provider:    Meri Bahena MD  [unfilled]    Chief complaint: Gastric ulcer    History of present illness:      Erik Fuentes is a 60 y.o. male who presents today for Procedure(s):  ESOPHAGOGASTRODUODENOSCOPY for the indications listed below.     The updated Patient Profile was reviewed prior to the procedure, in conjunction with the Physical Exam, including medical conditions, surgical procedures, medications, allergies, family history and social history.     Pre-operatively, I reviewed the indication(s) for the procedure, the risks of the procedure [including but not limited to: unexpected bleeding possibly requiring hospitalization and/or unplanned repeat procedures, perforation possibly requiring surgical treatment, missed lesions and complications of sedation/MAC (also explained by anesthesia staff)].     I have evaluated the patient for risks associated with the planned anesthesia and the procedure to be performed and find the patient an acceptable candidate for IV sedation.    Multiple opportunities were provided for any questions or concerns, and all questions were answered satisfactorily before any anesthesia was administered. We will proceed with the planned procedure.    Past Medical History:  Past Medical History:   Diagnosis Date   • Cirrhosis of liver (CMS/HCC) 06/2020   • Hemochromatosis, hereditary (CMS/HCC) 5/11/2020   • Hyperlipidemia    • Hypertension        Past Surgical History:  Past Surgical History:   Procedure Laterality Date   • APPENDECTOMY     • ENDOSCOPY N/A 5/20/2020    Procedure: ESOPHAGOGASTRODUODENOSCOPY with biopsy x1;  Surgeon: Brody Boggs MD;  Location: [facility] ENDOSCOPY;  Service: Gastroenterology;  Laterality: N/A;  gastric ulcer   • FINGER SURGERY     • KNEE ACL RECONSTRUCTION Left 1994   • REPLACEMENT TOTAL KNEE Right 10/2019       Social History:  Social History     Tobacco Use   • Smoking status: Never Smoker   • Smokeless  tobacco: Never Used   Substance Use Topics   • Alcohol use: Yes     Comment: daily i pint hard liquor    • Drug use: No       Family History:  History reviewed. No pertinent family history.    Medications:  Medications Prior to Admission   Medication Sig Dispense Refill Last Dose   • amLODIPine-benazepril (LOTREL) 5-40 MG per capsule TAKE ONE CAPSULE BY MOUTH DAILY (Patient taking differently: Take 1 capsule by mouth Every Morning.) 30 capsule 4    • azelastine (OPTIVAR) 0.05 % ophthalmic solution Administer 2 drops to both eyes 2 (Two) Times a Day. Use dos   Taking   • B Complex-C (SUPER B COMPLEX/VITAMIN C PO) Take 1 tablet by mouth Daily. Not dos   Taking   • fexofenadine (ALLEGRA) 180 MG tablet Take 180 mg by mouth Daily.   Taking   • Glucosamine-Chondroitin (OSTEO BI-FLEX REGULAR STRENGTH PO) Take 1 tablet by mouth Daily. Not dos   Taking   • magnesium oxide (MAG-OX) 400 MG tablet Take 1 tablet by mouth Daily. (Patient taking differently: Take 400 mg by mouth Daily. Not dos) 90 tablet 0    • Omega-3 Fatty Acids (FISH OIL ADULT GUMMIES PO) Take 1 tablet by mouth Daily.   7/15/2020   • pantoprazole (PROTONIX) 40 MG EC tablet Take 1 tablet by mouth 2 (Two) Times a Day Before Meals. 60 tablet 0    • sildenafil (REVATIO) 20 MG tablet Use 2-5 tabs prior to intercourse 50 tablet 1    • sucralfate (CARAFATE) 1 g tablet TAKE ONE TABLET BY MOUTH FOUR TIMES A DAY 90 tablet 0    • triamcinolone (KENALOG) 0.1 % cream Apply  topically to the appropriate area as directed Daily As Needed.      • triamterene-hydrochlorothiazide (Maxzide-25) 37.5-25 MG per tablet Take 1 tablet by mouth Daily. (Patient taking differently: Take 1 tablet by mouth Daily. Do not take dos)          Scheduled Meds:  Continuous Infusions:  No current facility-administered medications for this encounter.   PRN Meds:.    ALLERGIES:  Patient has no known allergies.    ROS:  The following systems were reviewed and negative;   Constitution:  No fevers,  "chills, no unintentional weight loss  Skin: no rash, no jaundice  Eyes:  No blurry vision, no eye pain  HENT:  No change in hearing or smell  Resp:  No dyspnea or cough  CV:  No chest pain or palpitations  :  No dysuria, hematuria  Musculoskeletal:  No leg cramps or arthralgias  Neuro:  No tremor, no numbness  Psych:  No depression or confsuion    Objective     Vital Signs:   Vitals:    08/12/20 1326   Weight: 86.2 kg (190 lb)   Height: 185.4 cm (73\")       Physical Exam:       General Appearance:    Awake and alert, in no acute distress   Head:    Normocephalic, without obvious abnormality, atraumatic   Throat:   No oral lesions, no thrush, oral mucosa moist   Lungs:     respirations regular, even and unlabored   Skin:   No rash, no jaundice       Results Review:  Lab Results (last 24 hours)     ** No results found for the last 24 hours. **          Imaging Results (Last 24 Hours)     ** No results found for the last 24 hours. **           I reviewed the patient's labs and imaging.    ASSESSMENT AND PLAN:      Principal Problem:    Gastric ulcer       Procedure(s):  ESOPHAGOGASTRODUODENOSCOPY      I discussed the patients findings and my recommendations with the patient.    Rito Ruth MD  08/20/20  09:12            "

## 2020-08-20 NOTE — ANESTHESIA PREPROCEDURE EVALUATION
Anesthesia Evaluation     Patient summary reviewed and Nursing notes reviewed   NPO Solid Status: > 8 hours  NPO Liquid Status: > 8 hours           Airway   Mallampati: II  TM distance: >3 FB  Neck ROM: full  No difficulty expected  Dental - normal exam     Pulmonary - normal exam   Cardiovascular - normal exam    (+) hypertension, hyperlipidemia,       Neuro/Psych  (+) psychiatric history,     GI/Hepatic/Renal/Endo    (+)  PUD, GI bleeding , liver disease,     Musculoskeletal     Abdominal  - normal exam    Bowel sounds: normal.   Substance History   (+) alcohol use,      OB/GYN          Other                        Anesthesia Plan    ASA 3     MAC     intravenous induction     Anesthetic plan, all risks, benefits, and alternatives have been provided, discussed and informed consent has been obtained with: patient.

## 2020-08-20 NOTE — OP NOTE
ESOPHAGOGASTRODUODENOSCOPY Procedure Report    Patient Name:  Erik Fuentes  YOB: 1960    Date of Surgery:  8/20/2020     Pre-Op Diagnosis:  Gastric ulcer  Alcoholic cirrhosis    Post-Op Diagnosis:  Healing gastric antral ulcers  No esophageal or gastric varices    Procedure/CPT® Codes:  EGD with biopsy    Staff:  Surgeon(s):  Rito Ruth MD      Anesthesia: Monitored Anesthesia Care    Implants:    Nothing was implanted during the procedure    Specimen:        See Below    Estimated blood loss: N/A     Complications:  None    Description of Procedure:  Informed consent was obtained for the procedure, including sedation.  Risks of perforation, hemorrhage, adverse drug reaction and aspiration were discussed.  The patient was brought into the endoscopy suite. Continuous cardiopulmonary monitoring was performed. The patient was placed in the left lateral decubitus position.  The bite block was inserted into the patient's mouth. After adequate sedation was attained, the Olympus gastroscope was inserted into the patient's mouth and advanced to the second portion of the duodenum without difficulty.  Circumferential examination was performed. A retroflex exam was performed in the patient's stomach.  On completion of the exam, the bowel was decompressed, the scope was removed from the patient, the patient tolerated the procedure well, there were no immediate post-operative complications.     Examination of the esophagus: Normal mucosa with no evidence of esophageal varices.  Examination of the stomach: Erythema and friability in the antrum consistent with healing antral ulcers.  Cold forceps biopsies were obtained for histology and to rule out H. pylori.  Retroflexed view of the fundus and cardia was negative for varices.  Examination of the duodenum: Duodenal bulb and second portion of duodenum were normal.          Impression:  60-year-old alcoholic male with cirrhosis and gastric ulcers  presumed from Advil with repeat EGD showing healing gastric ulcers in the antrum with no varices.    Recommendations:  Follow-up on pathology  If H. pylori is positive we will treat with twice daily PPI and Pylera x14 days  Repeat EGD in 2 years to screen for varices  Recommend complete alcohol abstinence  Avoid NSAIDs  Follow-up in GI office in 3 to 4 months    We appreciate the referral    Rito Ruth MD     Date: 8/20/2020  Time: 10:53

## 2020-08-20 NOTE — ANESTHESIA POSTPROCEDURE EVALUATION
Patient: Erik Fuentes    Procedure Summary     Date:  08/20/20 Room / Location:  [facility] ENDOSCOPY 4 / [facility] ENDOSCOPY    Anesthesia Start:  1040 Anesthesia Stop:  1054    Procedure:  ESOPHAGOGASTRODUODENOSCOPY with biopsy (N/A ) Diagnosis:       Gastric ulcer      (Gastric ulcer [K25.9])    Surgeon:  Rito Ruth MD Provider:  Praful Bolton MD    Anesthesia Type:  MAC ASA Status:  3          Anesthesia Type: MAC    Vitals  Vitals Value Taken Time   /88 8/20/2020 11:14 AM   Temp     Pulse 78 8/20/2020 11:14 AM   Resp 16 8/20/2020 11:14 AM   SpO2 100 % 8/20/2020 11:14 AM           Post Anesthesia Care and Evaluation    Patient location during evaluation: PACU  Patient participation: complete - patient participated  Level of consciousness: awake  Pain scale: See nurse's notes for pain score.  Pain management: adequate  Airway patency: patent  Anesthetic complications: No anesthetic complications  PONV Status: none  Cardiovascular status: acceptable  Respiratory status: acceptable  Hydration status: acceptable    Comments: Patient seen and examined postoperatively; vital signs stable; SpO2 greater than or equal to 90%; cardiopulmonary status stable; nausea/vomiting adequately controlled; pain adequately controlled; no apparent anesthesia complications; patient discharged from anesthesia care when discharge criteria were met

## 2020-08-20 NOTE — DISCHARGE INSTRUCTIONS
A responsible adult should stay with you and you should rest quietly for the rest of the day.    Do not drink alcohol, drive, operate any heavy machinery or power tools or make any legal/important decisions for the next 24 hours.     Progress your diet as tolerated.  If you begin to experience severe pain, increased shortness of breath, racing heartbeat or a fever above 101 F, seek immediate medical attention.     Follow up with MD as instructed. Call office for results in 3 to 5 days if needed.    [phone]

## 2020-08-21 LAB
LAB AP CASE REPORT: NORMAL
PATH REPORT.FINAL DX SPEC: NORMAL
PATH REPORT.GROSS SPEC: NORMAL

## 2020-08-31 RX ORDER — TRIAMTERENE AND HYDROCHLOROTHIAZIDE 37.5; 25 MG/1; MG/1
TABLET ORAL
Qty: 30 TABLET | Refills: 3 | Status: SHIPPED | OUTPATIENT
Start: 2020-08-31 | End: 2020-12-28

## 2020-09-15 RX ORDER — AMLODIPINE BESYLATE AND BENAZEPRIL HYDROCHLORIDE 5; 40 MG/1; MG/1
CAPSULE ORAL
Qty: 30 CAPSULE | Refills: 3 | Status: SHIPPED | OUTPATIENT
Start: 2020-09-15 | End: 2021-01-19

## 2020-12-09 ENCOUNTER — OFFICE VISIT (OUTPATIENT)
Dept: FAMILY MEDICINE CLINIC | Facility: CLINIC | Age: 60
End: 2020-12-09

## 2020-12-09 VITALS
BODY MASS INDEX: 29.4 KG/M2 | WEIGHT: 210 LBS | TEMPERATURE: 98.4 F | HEIGHT: 71 IN | OXYGEN SATURATION: 98 % | SYSTOLIC BLOOD PRESSURE: 120 MMHG | HEART RATE: 87 BPM | DIASTOLIC BLOOD PRESSURE: 73 MMHG

## 2020-12-09 DIAGNOSIS — K70.30 ALCOHOLIC CIRRHOSIS OF LIVER WITHOUT ASCITES (HCC): Primary | ICD-10-CM

## 2020-12-09 DIAGNOSIS — I10 ESSENTIAL HYPERTENSION: ICD-10-CM

## 2020-12-09 PROBLEM — D31.31 NEVUS OF CHOROID OF RIGHT EYE: Status: ACTIVE | Noted: 2019-06-12

## 2020-12-09 PROBLEM — H52.4 PRESBYOPIA: Status: ACTIVE | Noted: 2018-05-08

## 2020-12-09 PROBLEM — H25.13 AGE-RELATED NUCLEAR CATARACT OF BOTH EYES: Status: ACTIVE | Noted: 2018-05-08

## 2020-12-09 PROBLEM — H02.30 EXCESS SKIN OF EYELID: Status: ACTIVE | Noted: 2018-05-08

## 2020-12-09 PROCEDURE — 99213 OFFICE O/P EST LOW 20 MIN: CPT | Performed by: FAMILY MEDICINE

## 2020-12-09 NOTE — PATIENT INSTRUCTIONS
Mediterranean Diet  A Mediterranean diet refers to food and lifestyle choices that are based on the traditions of countries located on the [place]. This way of eating has been shown to help prevent certain conditions and improve outcomes for people who have chronic diseases, like kidney disease and heart disease.  What are tips for following this plan?  Lifestyle  · Cook and eat meals together with your family, when possible.  · Drink enough fluid to keep your urine clear or pale yellow.  · Be physically active every day. This includes:  ? Aerobic exercise like running or swimming.  ? Leisure activities like gardening, walking, or housework.  · Get 7-8 hours of sleep each night.  · If recommended by your health care provider, drink red wine in moderation. This means 1 glass a day for nonpregnant women and 2 glasses a day for men. A glass of wine equals 5 oz (150 mL).  Reading food labels    · Check the serving size of packaged foods. For foods such as rice and pasta, the serving size refers to the amount of cooked product, not dry.  · Check the total fat in packaged foods. Avoid foods that have saturated fat or trans fats.  · Check the ingredients list for added sugars, such as corn syrup.  Shopping  · At the grocery store, buy most of your food from the areas near the walls of the store. This includes:  ? Fresh fruits and vegetables (produce).  ? Grains, beans, nuts, and seeds. Some of these may be available in unpackaged forms or large amounts (in bulk).  ? Fresh seafood.  ? Poultry and eggs.  ? Low-fat dairy products.  · Buy whole ingredients instead of prepackaged foods.  · Buy fresh fruits and vegetables in-season from local farmers markets.  · Buy frozen fruits and vegetables in resealable bags.  · If you do not have access to quality fresh seafood, buy precooked frozen shrimp or canned fish, such as tuna, salmon, or sardines.  · Buy small amounts of raw or cooked vegetables, salads, or olives from  the deli or salad bar at your store.  · Stock your pantry so you always have certain foods on hand, such as olive oil, canned tuna, canned tomatoes, rice, pasta, and beans.  Cooking  · Cook foods with extra-virgin olive oil instead of using butter or other vegetable oils.  · Have meat as a side dish, and have vegetables or grains as your main dish. This means having meat in small portions or adding small amounts of meat to foods like pasta or stew.  · Use beans or vegetables instead of meat in common dishes like chili or lasagna.  · [place] with different cooking methods. Try roasting or broiling vegetables instead of steaming or sautéeing them.  · Add frozen vegetables to soups, stews, pasta, or rice.  · Add nuts or seeds for added healthy fat at each meal. You can add these to yogurt, salads, or vegetable dishes.  · Marinate fish or vegetables using olive oil, lemon juice, garlic, and fresh herbs.  Meal planning    · Plan to eat 1 vegetarian meal one day each week. Try to work up to 2 vegetarian meals, if possible.  · Eat seafood 2 or more times a week.  · Have healthy snacks readily available, such as:  ? Vegetable sticks with hummus.  ? Greek yogurt.  ? Fruit and nut trail mix.  · Eat balanced meals throughout the week. This includes:  ? Fruit: 2-3 servings a day  ? Vegetables: 4-5 servings a day  ? Low-fat dairy: 2 servings a day  ? Fish, poultry, or lean meat: 1 serving a day  ? Beans and legumes: 2 or more servings a week  ? Nuts and seeds: 1-2 servings a day  ? Whole grains: 6-8 servings a day  ? Extra-virgin olive oil: 3-4 servings a day  · Limit red meat and sweets to only a few servings a month  What are my food choices?  · Mediterranean diet  ? Recommended  § Grains: Whole-grain pasta. Brown rice. Bulgar wheat. Polenta. Couscous. Whole-wheat bread. Oatmeal. Quinoa.  § Vegetables: Artichokes. Beets. Broccoli. Cabbage. Carrots. Eggplant. Green beans. Chard. Kale. Spinach. Onions. Leeks. Peas. Squash.  Tomatoes. Peppers. Radishes.  § Fruits: Apples. Apricots. Avocado. Berries. Bananas. Cherries. Dates. Figs. Grapes. [place]. Melon. Oranges. Peaches. Plums. Pomegranate.  § Meats and other protein foods: Beans. Almonds. Sunflower seeds. Pine nuts. Peanuts. Cod. [place]. Scallops. Shrimp. Tuna. Tilapia. Clams. Oysters. Eggs.  § Dairy: Low-fat milk. Cheese. Greek yogurt.  § Beverages: Water. Red wine. Herbal tea.  § Fats and oils: Extra virgin olive oil. Avocado oil. Grape seed oil.  § Sweets and desserts: Greek yogurt with honey. Baked apples. Poached pears. Trail mix.  § Seasoning and other foods: Basil. Cilantro. Coriander. Cumin. Mint. Parsley. Jimmy. Rosemary. Tarragon. Garlic. Oregano. Thyme. Pepper. Balsalmic vinegar. Tahini. Hummus. Tomato sauce. Olives. Mushrooms.  ? Limit these  § Grains: Prepackaged pasta or rice dishes. Prepackaged cereal with added sugar.  § Vegetables: Deep fried potatoes (french fries).  § Fruits: Fruit canned in syrup.  § Meats and other protein foods: Beef. Pork. Lamb. Poultry with skin. Hot dogs. [place].  § Dairy: Ice cream. Sour cream. Whole milk.  § Beverages: Juice. Sugar-sweetened soft drinks. Beer. Liquor and spirits.  § Fats and oils: Butter. Canola oil. Vegetable oil. Beef fat (tallow). Lard.  § Sweets and desserts: Cookies. Cakes. Pies. Candy.  § Seasoning and other foods: Mayonnaise. Premade sauces and marinades.  The items listed may not be a complete list. Talk with your dietitian about what dietary choices are right for you.  Summary  · The Mediterranean diet includes both food and lifestyle choices.  · Eat a variety of fresh fruits and vegetables, beans, nuts, seeds, and whole grains.  · Limit the amount of red meat and sweets that you eat.  · Talk with your health care provider about whether it is safe for you to drink red wine in moderation. This means 1 glass a day for nonpregnant women and 2 glasses a day for men. A glass of wine equals 5 oz (150 mL).  This information  is not intended to replace advice given to you by your health care provider. Make sure you discuss any questions you have with your health care provider.  Document Revised: 08/17/2017 Document Reviewed: 08/10/2017  [employer] Patient Education © 2020 [employer]      Exercising to Stay Healthy  To become healthy and stay healthy, it is recommended that you do moderate-intensity and vigorous-intensity exercise. You can tell that you are exercising at a moderate intensity if your heart starts beating faster and you start breathing faster but can still hold a conversation. You can tell that you are exercising at a vigorous intensity if you are breathing much harder and faster and cannot hold a conversation while exercising.  Exercising regularly is important. It has many health benefits, such as:  · Improving overall fitness, flexibility, and endurance.  · Increasing bone density.  · Helping with weight control.  · Decreasing body fat.  · Increasing muscle strength.  · Reducing stress and tension.  · Improving overall health.  How often should I exercise?  Choose an activity that you enjoy, and set realistic goals. Your health care provider can help you make an activity plan that works for you.  Exercise regularly as told by your health care provider. This may include:  · Doing strength training two times a week, such as:  ? Lifting weights.  ? Using resistance bands.  ? Push-ups.  ? Sit-ups.  ? Yoga.  · Doing a certain intensity of exercise for a given amount of time. Choose from these options:  ? A total of 150 minutes of moderate-intensity exercise every week.  ? A total of 75 minutes of vigorous-intensity exercise every week.  ? A mix of moderate-intensity and vigorous-intensity exercise every week.  Children, pregnant women, people who have not exercised regularly, people who are overweight, and older adults may need to talk with a health care provider about what activities are safe to do. If you have a medical  condition, be sure to talk with your health care provider before you start a new exercise program.  What are some exercise ideas?  Moderate-intensity exercise ideas include:  · Walking 1 mile (1.6 km) in about 15 minutes.  · Biking.  · Hiking.  · Golfing.  · Dancing.  · Water aerobics.  Vigorous-intensity exercise ideas include:  · Walking 4.5 miles (7.2 km) or more in about 1 hour.  · Jogging or running 5 miles (8 km) in about 1 hour.  · Biking 10 miles (16.1 km) or more in about 1 hour.  · Lap swimming.  · Roller-skating or in-line skating.  · Cross-country skiing.  · Vigorous competitive sports, such as football, basketball, and soccer.  · Jumping rope.  · Aerobic dancing.  What are some everyday activities that can help me to get exercise?  · Yard work, such as:  ? Pushing a .  ? Raking and bagging leaves.  · Washing your car.  · Pushing a stroller.  · Shoveling snow.  · Gardening.  · Washing windows or floors.  How can I be more active in my day-to-day activities?  · Use stairs instead of an elevator.  · Take a walk during your lunch break.  · If you drive, park your car farther away from your work or school.  · If you take public transportation, get off one stop early and walk the rest of the way.  · Stand up or walk around during all of your indoor phone calls.  · Get up, stretch, and walk around every 30 minutes throughout the day.  · Enjoy exercise with a friend. Support to continue exercising will help you keep a regular routine of activity.  What guidelines can I follow while exercising?  · Before you start a new exercise program, talk with your health care provider.  · Do not exercise so much that you hurt yourself, feel dizzy, or get very short of breath.  · Wear comfortable clothes and wear shoes with good support.  · Drink plenty of water while you exercise to prevent dehydration or heat stroke.  · Work out until your breathing and your heartbeat get faster.  Where to find more  information  · [place] Department of Health and Human Services: www.hhs.gov  · Centers for Disease Control and Prevention (CDC): www.cdc.gov  Summary  · Exercising regularly is important. It will improve your overall fitness, flexibility, and endurance.  · Regular exercise also will improve your overall health. It can help you control your weight, reduce stress, and improve your bone density.  · Do not exercise so much that you hurt yourself, feel dizzy, or get very short of breath.  · Before you start a new exercise program, talk with your health care provider.  This information is not intended to replace advice given to you by your health care provider. Make sure you discuss any questions you have with your health care provider.  Document Revised: 11/30/2018 Document Reviewed: 11/08/2018  [employer]vier Patient Education © 2020 [employer]       ADVANCE CARE PLANNING  Conversations that Matter  PLANNING GUIDE    LOOKING BACK ...  Who we are, what we believe, and what we value are all shaped by experiences we have had. [place], family traditions, jobs and friends affect us deeply.    Has anything happened in your past that shaped your feelings about medical treatment?    Think about an experience you may have had with a family member or friend who was faced with a decision about medical care near the end of life. What was positive about that experience? What do you wish would have been done differently?    HERE AND NOW ...  Do you have any significant health problems now? What kinds of things bring you such nissa that, should a health problem prevent you from doing them any more, life would have little meaning? What short- or long-term goals do you have? How might medical treatment help you or hinder you in accomplishing those goals?    WHAT ABOUT TOMORROW?  What significant health problems do you fear may affect you in the future? How do you feel about the possibility of having to go to a nursing home? How would decisions  "be made if you could not make them?    WHO SHOULD MAKE DECISIONS?  An important part of planning is to consider whether or not you could appoint someone to make your healthcare decisions if you could not make them yourself. Many people select a close family member, but you are free to pick anyone you think could best represent you. Whoever you appoint should have all of the following qualifications:    • Can be trusted.  • Is willing to accept this responsibility.  • Is willing to follow the values and instructions you have discussed.  • Is able to make complex, difficult decisions.    It is helpful, but not required, to appoint one or more alternate persons in case your first choice becomes unable or unwilling to represent you. It is best if only one person has authority at a time, but you can instruct your representatives to discuss decisions together if time permits.    WHAT FUTURE DECISIONS NEED TO BE CONSIDERED?  Providing instructions for future healthcare decisions may seem like an impossible task. How can anyone plan for all the possibilities? You cannot … but you do not have to.    You need to plan for situations where you:  1. Become unexpectedly incapable of making your own decisions  2. It is clear you will have little or no recovery, and  3. The injury or loss of function is significant.    Such a situation might arise because of an injury to the brain from an accident, a stroke, or a slowly progressive disease such as Alzheimer's.    To plan for this type of situation, many people state, \"If I'm going to be a vegetable, let me go,\" or \"No heroics,\" or \"Don't keep me alive on machines.\" While these remarks are a beginning, they simply are too vague to guide decision-making.    You need to completely describe under what circumstances your goals for medical care should be changed from attempting to prolong life to being allowed to die. In some situations, certain treatments may not make sense because they " will not help, but other treatments will be of important benefit.    Consider these three questions:  1. When would it make sense to continue certain treatments in an effort to prolong life and seek recovery?  2. When would it make sense to stop or withhold certain treatments and accept death when it comes?  3. Under any circumstance, what kind of comfort care would you want, including medication, spiritual and environmental options?    Making these choices requires understanding the information, weighing the benefits and burdens from your perspective, and then discussing your choices with those closest to you.    WHAT'S NEXT?  How do you make sure that your choices are respected? First talk, about them with your family, friends, clergy and physician, then put your choices in writing. Information about putting your plans into writing -- in an advance directive -- is available from your healthcare organization or .    Do you have any significant health problems? What health problems do you fear in the future?     Consider what frightens you most about medical treatment. What role does Caodaism, alec or spirituality play in how you live your life? How does cost influence your decisions about medical care?   In terms of future medical care, under what circumstances would you want the goals of medical treatment to switch from attempting to prolong life to focusing on comfort?     Describe these circumstances in as much detail as possible.   Ask yourself, what will most help me live well at this point in my life?     Share your views with the person or people who would make your medical decisions if you could not make them.     THERE'S NO EASY WAY TO PLAN FOR FUTURE HEALTHCARE CHOICES.  It's a process that involves thinking and talking about complex and sensitive issues.    The questions that follow will help in the advance care planning process. This is a guide for your own benefit; it's not a test, and there  are no right or wrong answers. It does not need to be completed all at once. You may use it to share your feelings with healthcare providers, your family and your friends. The answers to these questions will help those you love make choices for you when you cannot make them yourself.    These are things I need to tell my loved ones:  What is your idea of comfort care? Describe how you would want medications to be used to provide comfort. What type of spiritual care would you want?     I need to learn about: ____________________________  I need to ask my healthcare provider: ________________    Naltrexone tablets  What is this medicine?  NALTREXONE (nal TREX one) helps you to remain free of your dependence on opiate drugs or alcohol. It blocks the 'high' that these substances can give you. This medicine is combined with counseling and support groups.  This medicine may be used for other purposes; ask your health care provider or pharmacist if you have questions.  COMMON BRAND NAME(S): Yandy Oliva  What should I tell my health care provider before I take this medicine?  They need to know if you have any of these conditions:  · if you have used drugs or alcohol within 7 to 10 days  · kidney disease  · liver disease, including hepatitis  · an unusual or allergic reaction to naltrexone, other medicines, foods, dyes, or preservatives  · pregnant or trying to get pregnant  · breast-feeding  How should I use this medicine?  Take this medicine by mouth with a full glass of water. Follow the directions on the prescription label. Do not take this medicine within 7 to 10 days of taking any opioid drugs. Take your medicine at regular intervals. Do not take your medicine more often than directed. Do not stop taking except on your doctor's advice.  Talk to your pediatrician regarding the use of this medicine in children. Special care may be needed.  Overdosage: If you think you have taken too much of this medicine contact a  poison control center or emergency room at once.  NOTE: This medicine is only for you. Do not share this medicine with others.  What if I miss a dose?  If you miss a dose and remember on the same day, take the missed dose. If you do not remember until the next day, ask your doctor or health care professional about rescheduling your doses. Do not take double or extra doses.  What may interact with this medicine?  Do not take this medicine with any of the following medications:  · any prescription or street opioid drug like codiene, heroin, methadone  This medicine may also interact with the following medications:  · disulfiram  · thioridazine  This list may not describe all possible interactions. Give your health care provider a list of all the medicines, herbs, non-prescription drugs, or dietary supplements you use. Also tell them if you smoke, drink alcohol, or use illegal drugs. Some items may interact with your medicine.  What should I watch for while using this medicine?  Your condition will be monitored carefully while you are receiving this medicine. Visit your doctor or health care professional regularly. For this medicine to be most effective you should attend any counseling or support groups that your doctor or health care professional recommends. Do not try to overcome the effects of the medicine by taking large amounts of narcotics or by drinking large amounts of alcohol. This can cause severe problems including death. Also, you may be more sensitive to lower doses of narcotics after you stop taking this medicine.  If you are going to have surgery, tell your doctor or health care professional that you are taking this medicine.  Do not treat yourself for coughs, colds, pain, or diarrhea. Ask your doctor or health care professional for advice. Some of the ingredients may interact with this medicine and cause side effects.  Wear a medical ID bracelet or chain, and carry a card that describes your disease and  details of your medicine and dosage times.  You may get drowsy or dizzy. Do not drive, use machinery, or do anything that needs mental alertness until you know how this medicine affects you. Do not stand or sit up quickly, especially if you are an older patient. This reduces the risk of dizzy or fainting spells. Alcohol may interfere with the effect of this medicine. Avoid alcoholic drinks.  What side effects may I notice from receiving this medicine?  Side effects that you should report to your doctor or health care professional as soon as possible:  · allergic reactions like skin rash, itching or hives, swelling of the face, lips, or tongue  · breathing problems  · changes in vision, hearing  · confusion  · dark urine  · depressed mood  · diarrhea  · fast or irregular heart beat  · hallucination, loss of contact with reality  · light-colored stools  · right upper belly pain  · suicidal thoughts or other mood changes  · unusually weak or tired  · vomiting  · yellowing of the eyes or skin  Side effects that usually do not require medical attention (report to your doctor or health care professional if they continue or are bothersome):  · aches, pains  · change in sex drive or performance  · feeling anxious  · headache  · loss of appetite, nausea  · runny nose, sinus problems, sneezing  · stomach pain  · trouble sleeping  This list may not describe all possible side effects. Call your doctor for medical advice about side effects. You may report side effects to FDA at 4-420-QKC-4869.  Where should I keep my medicine?  Keep out of the reach of children.  Store at room temperature between 20 and 25 degrees C (68 and 77 degrees F). Throw away any unused medicine after the expiration date.  NOTE: This sheet is a summary. It may not cover all possible information. If you have questions about this medicine, talk to your doctor, pharmacist, or health care provider.  © 2020 [employer] (2013-10-10 10:33:18)

## 2020-12-09 NOTE — PROGRESS NOTES
Subjective:     Erik Fuentes is a 60 y.o. male who presents for  Chief Complaint   Patient presents with   • Establish Care     new pt - previous doctor is leaving practice. he is here to est care with new pcp- no concerns today        This is a new patient to me.  Presents establish care.    HPI     male with a concurrent medical history of alcohol induced liver cirrhosis and gastric ulcer disease presents to establish care.  Patient is followed by GI.  Underwent EGD in May 2020 and August 2020.  Reports compliance with PPI.  Also has a CMHx of hereditary hemochromatosis and undergoes phlebotomies annually, next scheduled for May 2021.     Patient also has a concurrent medical history of essential hypertension that is well controlled with triamterene-HCTZ 37.5 mg - 25 mg daily and amlodipine-benazepril 5-40 mg daily.  Patient is normotensive in office.  Does not monitor ambulatory BP.  Associated cardiac comorbidity includes hyperlipidemia that is currently managed with diet and exercise.    Past Medical Hx:  Past Medical History:   Diagnosis Date   • Cirrhosis of liver (CMS/HCC) 06/2020   • Hemochromatosis, hereditary (CMS/HCC) 5/11/2020   • Hyperlipidemia    • Hypertension        Past Surgical Hx:  Past Surgical History:   Procedure Laterality Date   • APPENDECTOMY     • ENDOSCOPY N/A 5/20/2020    Procedure: ESOPHAGOGASTRODUODENOSCOPY with biopsy x1;  Surgeon: Brody Boggs MD;  Location: [facility] ENDOSCOPY;  Service: Gastroenterology;  Laterality: N/A;  gastric ulcer   • ENDOSCOPY N/A 8/20/2020    Procedure: ESOPHAGOGASTRODUODENOSCOPY with biopsy;  Surgeon: Rito Ruth MD;  Location: [facility] ENDOSCOPY;  Service: Gastroenterology;  Laterality: N/A;  healing gastric ulcer   • FINGER SURGERY     • KNEE ACL RECONSTRUCTION Left 1994   • REPLACEMENT TOTAL KNEE Right 10/2019       Family Hx:  Family History   Problem Relation Age of Onset   • Hypertension Father    • Stroke Paternal  Grandfather    • Melanoma Mother         Social History:  Social History     Socioeconomic History   • Marital status:      Spouse name: Not on file   • Number of children: Not on file   • Years of education: Not on file   • Highest education level: Not on file   Tobacco Use   • Smoking status: Never Smoker   • Smokeless tobacco: Never Used   Substance and Sexual Activity   • Alcohol use: Yes     Frequency: 4 or more times a week     Comment: daily; 0.5-1 pint hard liquor    • Drug use: No   • Sexual activity: Defer       Allergies:  Patient has no known allergies.    Current Meds:    Current Outpatient Medications:   •  amLODIPine-benazepril (LOTREL) 5-40 MG per capsule, TAKE ONE CAPSULE BY MOUTH DAILY, Disp: 30 capsule, Rfl: 3  •  azelastine (OPTIVAR) 0.05 % ophthalmic solution, Administer 2 drops to both eyes 2 (Two) Times a Day. Use dos, Disp: , Rfl:   •  B Complex-C (SUPER B COMPLEX/VITAMIN C PO), Take 1 tablet by mouth Daily. Not dos, Disp: , Rfl:   •  fexofenadine (ALLEGRA) 180 MG tablet, Take 180 mg by mouth Daily., Disp: , Rfl:   •  Glucosamine-Chondroitin (OSTEO BI-FLEX REGULAR STRENGTH PO), Take 1 tablet by mouth Daily. Not dos, Disp: , Rfl:   •  magnesium oxide (MAG-OX) 400 MG tablet, Take 1 tablet by mouth Daily. (Patient taking differently: Take 400 mg by mouth Daily. Not dos), Disp: 90 tablet, Rfl: 0  •  Omega-3 Fatty Acids (FISH OIL ADULT GUMMIES PO), Take 1 tablet by mouth Daily., Disp: , Rfl:   •  pantoprazole (PROTONIX) 40 MG EC tablet, Take 1 tablet by mouth 2 (Two) Times a Day Before Meals., Disp: 60 tablet, Rfl: 0  •  sildenafil (REVATIO) 20 MG tablet, Use 2-5 tabs prior to intercourse, Disp: 50 tablet, Rfl: 1  •  triamcinolone (KENALOG) 0.1 % cream, Apply  topically to the appropriate area as directed Daily As Needed., Disp: , Rfl:   •  triamterene-hydrochlorothiazide (MAXZIDE-25) 37.5-25 MG per tablet, TAKE ONE TABLET BY MOUTH DAILY, Disp: 30 tablet, Rfl: 3    The following portions of  "the patient's history were reviewed and updated as appropriate: allergies, current medications, past family history, past medical history, past social history, past surgical history and problem list.    Review of Systems  Review of Systems   Constitutional: Negative for chills, diaphoresis, fatigue and fever.   HENT: Negative for congestion, rhinorrhea, sinus pressure, sneezing and sore throat.    Eyes: Negative for blurred vision, double vision and redness.   Respiratory: Negative for cough, shortness of breath and wheezing.    Cardiovascular: Negative for chest pain and leg swelling.   Gastrointestinal: Negative for abdominal pain, constipation, diarrhea, nausea and vomiting.   Genitourinary: Negative for difficulty urinating, dysuria and hematuria.   Musculoskeletal: Negative for arthralgias, gait problem and myalgias.   Skin: Negative for rash and skin lesions.   Neurological: Negative for tremors, seizures, syncope and headache.   Psychiatric/Behavioral: Negative for sleep disturbance and depressed mood. The patient is not nervous/anxious.        Objective:     /73 (BP Location: Right arm, Patient Position: Sitting, Cuff Size: Large Adult)   Pulse 87   Temp 98.4 °F (36.9 °C) (Infrared)   Ht 180.3 cm (71\")   Wt 95.3 kg (210 lb)   SpO2 98%   BMI 29.29 kg/m²     Physical Exam  Vitals signs reviewed.   Constitutional:       General: He is not in acute distress.     Appearance: He is well-developed. He is not diaphoretic.   HENT:      Head: Normocephalic and atraumatic.      Right Ear: Tympanic membrane and ear canal normal.      Left Ear: Tympanic membrane and ear canal normal.   Eyes:      General: No scleral icterus.     Extraocular Movements: Extraocular movements intact.      Conjunctiva/sclera: Conjunctivae normal.   Neck:      Musculoskeletal: Normal range of motion.   Cardiovascular:      Rate and Rhythm: Normal rate and regular rhythm.      Heart sounds: Normal heart sounds.   Pulmonary:      " Effort: Pulmonary effort is normal.      Breath sounds: Normal breath sounds. No wheezing.   Abdominal:      General: Bowel sounds are normal.      Palpations: Abdomen is soft.   Skin:     General: Skin is warm and dry.      Capillary Refill: Capillary refill takes less than 2 seconds.      Findings: No rash.   Neurological:      Mental Status: He is alert and oriented to person, place, and time.   Psychiatric:         Mood and Affect: Mood normal.         Behavior: Behavior normal.         Lab Results   Component Value Date    WBC 6.00 05/21/2020    HGB 10.1 (L) 05/21/2020    HCT 28.0 (L) 05/21/2020    MCV 98.5 (H) 05/21/2020     05/21/2020     Lab Results   Component Value Date    IRON 168 (H) 05/12/2020    TIBC 386 05/12/2020    FERRITIN 224.10 05/12/2020     Lab Results   Component Value Date    GLUCOSE 102 (H) 05/21/2020    BUN 8 05/21/2020    CREATININE 0.66 (L) 05/21/2020    EGFRIFNONA 122 05/21/2020    EGFRIFAFRI 105 05/28/2016    BCR 12.1 05/21/2020    K 3.8 05/21/2020    CO2 25.0 05/21/2020    CALCIUM 8.3 (L) 05/21/2020    ALBUMIN 3.90 05/21/2020    LABIL2 1.3 05/14/2019    AST 48 (H) 05/21/2020    ALT 25 05/21/2020     Lab Results   Component Value Date    CHOL 208 (H) 01/20/2020    TRIG 111 01/20/2020    HDL 84 (H) 01/20/2020     (H) 01/20/2020     The 10-year ASCVD risk score (Rock[place] DC Jr., et al., 2013) is: 7.8%    Values used to calculate the score:      Age: 60 years      Sex: Male      Is Non- : No      Diabetic: No      Tobacco smoker: No      Systolic Blood Pressure: 120 mmHg      Is BP treated: Yes      HDL Cholesterol: 84 mg/dL      Total Cholesterol: 208 mg/dL     Assessment/Plan:     Diagnoses and all orders for this visit:    1. Alcoholic cirrhosis of liver without ascites (CMS/HCC) (Primary)  Comments:  Encouraged EtOH cessation.  Advised taking multivitamin or vitamin B complex.  Continue PPI for GI prophylaxis.  FU with GI.    2. Essential  hypertension  Comments:  BP goal < 140/90.  Encouraged low Na+ diet & 150 min exercise/week.  Continue amlodipine-benazepril 5-40 mg & triamterene-HCTZ 37.5-25 mg.       Follow-up:     Return in about 8 weeks (around 2/3/2021) for Annual Physical Exam.      Signature    Mable Cruz MD  Family Medicine  [facility]        This document has been electronically signed by Mable Cruz MD on December 9, 2020 13:27 EST

## 2020-12-26 DIAGNOSIS — I10 ESSENTIAL HYPERTENSION: Primary | ICD-10-CM

## 2020-12-28 RX ORDER — TRIAMTERENE AND HYDROCHLOROTHIAZIDE 37.5; 25 MG/1; MG/1
1 TABLET ORAL DAILY
Qty: 90 TABLET | Refills: 1 | Status: SHIPPED | OUTPATIENT
Start: 2020-12-28 | End: 2021-07-02 | Stop reason: SDUPTHER

## 2020-12-28 NOTE — TELEPHONE ENCOUNTER
BP Readings from Last 3 Encounters:   12/09/20 120/73   08/20/20 127/88   06/01/20 146/74     Lab Results   Component Value Date    CREATININE 0.66 (L) 05/21/2020     Lab Results   Component Value Date    K 3.8 05/21/2020

## 2020-12-30 ENCOUNTER — TELEPHONE (OUTPATIENT)
Dept: FAMILY MEDICINE CLINIC | Facility: CLINIC | Age: 60
End: 2020-12-30

## 2020-12-30 RX ORDER — PNEUMOCOCCAL VACCINE POLYVALENT 25; 25; 25; 25; 25; 25; 25; 25; 25; 25; 25; 25; 25; 25; 25; 25; 25; 25; 25; 25; 25; 25; 25 UG/.5ML; UG/.5ML; UG/.5ML; UG/.5ML; UG/.5ML; UG/.5ML; UG/.5ML; UG/.5ML; UG/.5ML; UG/.5ML; UG/.5ML; UG/.5ML; UG/.5ML; UG/.5ML; UG/.5ML; UG/.5ML; UG/.5ML; UG/.5ML; UG/.5ML; UG/.5ML; UG/.5ML; UG/.5ML; UG/.5ML
0.5 INJECTION, SOLUTION INTRAMUSCULAR; SUBCUTANEOUS ONCE
Qty: 0.5 ML | Refills: 0 | Status: SHIPPED | OUTPATIENT
Start: 2020-12-30 | End: 2020-12-30

## 2020-12-30 NOTE — TELEPHONE ENCOUNTER
Pt is at pharmacy telling them Dr. Cruz recommended he get a pneumonia vaccine but he doesn't fall as eligible under their written protocol. They need script

## 2021-01-12 ENCOUNTER — OFFICE (AMBULATORY)
Dept: URBAN - METROPOLITAN AREA CLINIC 64 | Facility: CLINIC | Age: 61
End: 2021-01-12

## 2021-01-12 VITALS
SYSTOLIC BLOOD PRESSURE: 126 MMHG | HEART RATE: 72 BPM | DIASTOLIC BLOOD PRESSURE: 72 MMHG | HEIGHT: 72 IN | WEIGHT: 207 LBS

## 2021-01-12 DIAGNOSIS — K74.69 OTHER CIRRHOSIS OF LIVER: ICD-10-CM

## 2021-01-12 PROCEDURE — 99213 OFFICE O/P EST LOW 20 MIN: CPT | Performed by: INTERNAL MEDICINE

## 2021-01-17 DIAGNOSIS — I10 ESSENTIAL HYPERTENSION: Primary | ICD-10-CM

## 2021-01-19 RX ORDER — AMLODIPINE BESYLATE AND BENAZEPRIL HYDROCHLORIDE 5; 40 MG/1; MG/1
1 CAPSULE ORAL DAILY
Qty: 90 CAPSULE | Refills: 1 | Status: SHIPPED | OUTPATIENT
Start: 2021-01-19 | End: 2021-07-19

## 2021-02-08 ENCOUNTER — LAB (OUTPATIENT)
Dept: FAMILY MEDICINE CLINIC | Facility: CLINIC | Age: 61
End: 2021-02-08

## 2021-02-08 ENCOUNTER — OFFICE VISIT (OUTPATIENT)
Dept: FAMILY MEDICINE CLINIC | Facility: CLINIC | Age: 61
End: 2021-02-08

## 2021-02-08 VITALS
HEIGHT: 71 IN | OXYGEN SATURATION: 100 % | TEMPERATURE: 97.5 F | DIASTOLIC BLOOD PRESSURE: 72 MMHG | BODY MASS INDEX: 28.56 KG/M2 | WEIGHT: 204 LBS | HEART RATE: 61 BPM | SYSTOLIC BLOOD PRESSURE: 131 MMHG

## 2021-02-08 DIAGNOSIS — K70.30 ALCOHOLIC CIRRHOSIS OF LIVER WITHOUT ASCITES (HCC): ICD-10-CM

## 2021-02-08 DIAGNOSIS — I10 ESSENTIAL HYPERTENSION: Primary | ICD-10-CM

## 2021-02-08 DIAGNOSIS — Z00.00 ANNUAL PHYSICAL EXAM: ICD-10-CM

## 2021-02-08 LAB
ALBUMIN SERPL-MCNC: 4.6 G/DL (ref 3.5–5.2)
ALBUMIN/GLOB SERPL: 1.5 G/DL
ALP SERPL-CCNC: 61 U/L (ref 39–117)
ALT SERPL W P-5'-P-CCNC: 22 U/L (ref 1–41)
ANION GAP SERPL CALCULATED.3IONS-SCNC: 15.2 MMOL/L (ref 5–15)
AST SERPL-CCNC: 40 U/L (ref 1–40)
BASOPHILS # BLD AUTO: 0.06 10*3/MM3 (ref 0–0.2)
BASOPHILS NFR BLD AUTO: 0.8 % (ref 0–1.5)
BILIRUB SERPL-MCNC: 0.8 MG/DL (ref 0–1.2)
BUN SERPL-MCNC: 7 MG/DL (ref 8–23)
BUN/CREAT SERPL: 12.1 (ref 7–25)
CALCIUM SPEC-SCNC: 10.1 MG/DL (ref 8.6–10.5)
CHLORIDE SERPL-SCNC: 92 MMOL/L (ref 98–107)
CHOLEST SERPL-MCNC: 170 MG/DL (ref 0–200)
CO2 SERPL-SCNC: 25.8 MMOL/L (ref 22–29)
CREAT SERPL-MCNC: 0.58 MG/DL (ref 0.76–1.27)
DEPRECATED RDW RBC AUTO: 49.9 FL (ref 37–54)
EOSINOPHIL # BLD AUTO: 0.1 10*3/MM3 (ref 0–0.4)
EOSINOPHIL NFR BLD AUTO: 1.4 % (ref 0.3–6.2)
ERYTHROCYTE [DISTWIDTH] IN BLOOD BY AUTOMATED COUNT: 15.3 % (ref 12.3–15.4)
GFR SERPL CREATININE-BSD FRML MDRD: 142 ML/MIN/1.73
GLOBULIN UR ELPH-MCNC: 3 GM/DL
GLUCOSE SERPL-MCNC: 86 MG/DL (ref 65–99)
HCT VFR BLD AUTO: 37.4 % (ref 37.5–51)
HCV AB SER DONR QL: NORMAL
HDLC SERPL-MCNC: 61 MG/DL (ref 40–60)
HGB BLD-MCNC: 13.1 G/DL (ref 13–17.7)
IMM GRANULOCYTES # BLD AUTO: 0.02 10*3/MM3 (ref 0–0.05)
IMM GRANULOCYTES NFR BLD AUTO: 0.3 % (ref 0–0.5)
LDLC SERPL CALC-MCNC: 95 MG/DL (ref 0–100)
LDLC/HDLC SERPL: 1.54 {RATIO}
LYMPHOCYTES # BLD AUTO: 2.02 10*3/MM3 (ref 0.7–3.1)
LYMPHOCYTES NFR BLD AUTO: 28.1 % (ref 19.6–45.3)
MCH RBC QN AUTO: 31.5 PG (ref 26.6–33)
MCHC RBC AUTO-ENTMCNC: 35 G/DL (ref 31.5–35.7)
MCV RBC AUTO: 89.9 FL (ref 79–97)
MONOCYTES # BLD AUTO: 0.86 10*3/MM3 (ref 0.1–0.9)
MONOCYTES NFR BLD AUTO: 12 % (ref 5–12)
NEUTROPHILS NFR BLD AUTO: 4.13 10*3/MM3 (ref 1.7–7)
NEUTROPHILS NFR BLD AUTO: 57.4 % (ref 42.7–76)
NRBC BLD AUTO-RTO: 0 /100 WBC (ref 0–0.2)
PLATELET # BLD AUTO: 306 10*3/MM3 (ref 140–450)
PMV BLD AUTO: 10.8 FL (ref 6–12)
POTASSIUM SERPL-SCNC: 3.8 MMOL/L (ref 3.5–5.2)
PROT SERPL-MCNC: 7.6 G/DL (ref 6–8.5)
RBC # BLD AUTO: 4.16 10*6/MM3 (ref 4.14–5.8)
SODIUM SERPL-SCNC: 133 MMOL/L (ref 136–145)
TRIGL SERPL-MCNC: 74 MG/DL (ref 0–150)
VLDLC SERPL-MCNC: 14 MG/DL (ref 5–40)
WBC # BLD AUTO: 7.19 10*3/MM3 (ref 3.4–10.8)

## 2021-02-08 PROCEDURE — 80061 LIPID PANEL: CPT | Performed by: FAMILY MEDICINE

## 2021-02-08 PROCEDURE — 36415 COLL VENOUS BLD VENIPUNCTURE: CPT | Performed by: FAMILY MEDICINE

## 2021-02-08 PROCEDURE — 80053 COMPREHEN METABOLIC PANEL: CPT | Performed by: FAMILY MEDICINE

## 2021-02-08 PROCEDURE — 99396 PREV VISIT EST AGE 40-64: CPT | Performed by: FAMILY MEDICINE

## 2021-02-08 PROCEDURE — 86803 HEPATITIS C AB TEST: CPT | Performed by: FAMILY MEDICINE

## 2021-02-08 PROCEDURE — 85025 COMPLETE CBC W/AUTO DIFF WBC: CPT | Performed by: FAMILY MEDICINE

## 2021-02-08 RX ORDER — IVERMECTIN 10 MG/G
CREAM TOPICAL
COMMUNITY
Start: 2021-02-02

## 2021-02-08 RX ORDER — UBIDECARENONE 100 MG
100 CAPSULE ORAL DAILY
COMMUNITY
End: 2023-02-02

## 2021-02-08 RX ORDER — PANTOPRAZOLE SODIUM 40 MG/1
40 TABLET, DELAYED RELEASE ORAL
Qty: 90 TABLET | Refills: 1 | Status: SHIPPED | OUTPATIENT
Start: 2021-02-08

## 2021-02-08 NOTE — PATIENT INSTRUCTIONS
Mediterranean Diet  A Mediterranean diet refers to food and lifestyle choices that are based on the traditions of countries located on the [place]. This way of eating has been shown to help prevent certain conditions and improve outcomes for people who have chronic diseases, like kidney disease and heart disease.  What are tips for following this plan?  Lifestyle  · Cook and eat meals together with your family, when possible.  · Drink enough fluid to keep your urine clear or pale yellow.  · Be physically active every day. This includes:  ? Aerobic exercise like running or swimming.  ? Leisure activities like gardening, walking, or housework.  · Get 7-8 hours of sleep each night.  · If recommended by your health care provider, drink red wine in moderation. This means 1 glass a day for nonpregnant women and 2 glasses a day for men. A glass of wine equals 5 oz (150 mL).  Reading food labels    · Check the serving size of packaged foods. For foods such as rice and pasta, the serving size refers to the amount of cooked product, not dry.  · Check the total fat in packaged foods. Avoid foods that have saturated fat or trans fats.  · Check the ingredients list for added sugars, such as corn syrup.  Shopping  · At the grocery store, buy most of your food from the areas near the walls of the store. This includes:  ? Fresh fruits and vegetables (produce).  ? Grains, beans, nuts, and seeds. Some of these may be available in unpackaged forms or large amounts (in bulk).  ? Fresh seafood.  ? Poultry and eggs.  ? Low-fat dairy products.  · Buy whole ingredients instead of prepackaged foods.  · Buy fresh fruits and vegetables in-season from local farmers markets.  · Buy frozen fruits and vegetables in resealable bags.  · If you do not have access to quality fresh seafood, buy precooked frozen shrimp or canned fish, such as tuna, salmon, or sardines.  · Buy small amounts of raw or cooked vegetables, salads, or olives from  the deli or salad bar at your store.  · Stock your pantry so you always have certain foods on hand, such as olive oil, canned tuna, canned tomatoes, rice, pasta, and beans.  Cooking  · Cook foods with extra-virgin olive oil instead of using butter or other vegetable oils.  · Have meat as a side dish, and have vegetables or grains as your main dish. This means having meat in small portions or adding small amounts of meat to foods like pasta or stew.  · Use beans or vegetables instead of meat in common dishes like chili or lasagna.  · [place] with different cooking methods. Try roasting or broiling vegetables instead of steaming or sautéeing them.  · Add frozen vegetables to soups, stews, pasta, or rice.  · Add nuts or seeds for added healthy fat at each meal. You can add these to yogurt, salads, or vegetable dishes.  · Marinate fish or vegetables using olive oil, lemon juice, garlic, and fresh herbs.  Meal planning    · Plan to eat 1 vegetarian meal one day each week. Try to work up to 2 vegetarian meals, if possible.  · Eat seafood 2 or more times a week.  · Have healthy snacks readily available, such as:  ? Vegetable sticks with hummus.  ? Greek yogurt.  ? Fruit and nut trail mix.  · Eat balanced meals throughout the week. This includes:  ? Fruit: 2-3 servings a day  ? Vegetables: 4-5 servings a day  ? Low-fat dairy: 2 servings a day  ? Fish, poultry, or lean meat: 1 serving a day  ? Beans and legumes: 2 or more servings a week  ? Nuts and seeds: 1-2 servings a day  ? Whole grains: 6-8 servings a day  ? Extra-virgin olive oil: 3-4 servings a day  · Limit red meat and sweets to only a few servings a month  What are my food choices?  · Mediterranean diet  ? Recommended  § Grains: Whole-grain pasta. Brown rice. Bulgar wheat. Polenta. Couscous. Whole-wheat bread. Oatmeal. Quinoa.  § Vegetables: Artichokes. Beets. Broccoli. Cabbage. Carrots. Eggplant. Green beans. Chard. Kale. Spinach. Onions. Leeks. Peas. Squash.  Tomatoes. Peppers. Radishes.  § Fruits: Apples. Apricots. Avocado. Berries. Bananas. Cherries. Dates. Figs. Grapes. [place]. Melon. Oranges. Peaches. Plums. Pomegranate.  § Meats and other protein foods: Beans. Almonds. Sunflower seeds. Pine nuts. Peanuts. Cod. Mounds. Scallops. Shrimp. Tuna. Tilapia. Clams. Oysters. Eggs.  § Dairy: Low-fat milk. Cheese. Greek yogurt.  § Beverages: Water. Red wine. Herbal tea.  § Fats and oils: Extra virgin olive oil. Avocado oil. Grape seed oil.  § Sweets and desserts: Greek yogurt with honey. Baked apples. Poached pears. Trail mix.  § Seasoning and other foods: Basil. Cilantro. Coriander. Cumin. Mint. Parsley. Jimmy. Rosemary. Tarragon. Garlic. Oregano. Thyme. Pepper. Balsalmic vinegar. Tahini. Hummus. Tomato sauce. Olives. Mushrooms.  ? Limit these  § Grains: Prepackaged pasta or rice dishes. Prepackaged cereal with added sugar.  § Vegetables: Deep fried potatoes (french fries).  § Fruits: Fruit canned in syrup.  § Meats and other protein foods: Beef. Pork. Lamb. Poultry with skin. Hot dogs. [place].  § Dairy: Ice cream. Sour cream. Whole milk.  § Beverages: Juice. Sugar-sweetened soft drinks. Beer. Liquor and spirits.  § Fats and oils: Butter. Canola oil. Vegetable oil. Beef fat (tallow). Lard.  § Sweets and desserts: Cookies. Cakes. Pies. Candy.  § Seasoning and other foods: Mayonnaise. Premade sauces and marinades.  The items listed may not be a complete list. Talk with your dietitian about what dietary choices are right for you.  Summary  · The Mediterranean diet includes both food and lifestyle choices.  · Eat a variety of fresh fruits and vegetables, beans, nuts, seeds, and whole grains.  · Limit the amount of red meat and sweets that you eat.  · Talk with your health care provider about whether it is safe for you to drink red wine in moderation. This means 1 glass a day for nonpregnant women and 2 glasses a day for men. A glass of wine equals 5 oz (150 mL).  This information  is not intended to replace advice given to you by your health care provider. Make sure you discuss any questions you have with your health care provider.  Document Revised: 08/17/2017 Document Reviewed: 08/10/2017  [employer] Patient Education © 2020 [employer]      Exercising to Stay Healthy  To become healthy and stay healthy, it is recommended that you do moderate-intensity and vigorous-intensity exercise. You can tell that you are exercising at a moderate intensity if your heart starts beating faster and you start breathing faster but can still hold a conversation. You can tell that you are exercising at a vigorous intensity if you are breathing much harder and faster and cannot hold a conversation while exercising.  Exercising regularly is important. It has many health benefits, such as:  · Improving overall fitness, flexibility, and endurance.  · Increasing bone density.  · Helping with weight control.  · Decreasing body fat.  · Increasing muscle strength.  · Reducing stress and tension.  · Improving overall health.  How often should I exercise?  Choose an activity that you enjoy, and set realistic goals. Your health care provider can help you make an activity plan that works for you.  Exercise regularly as told by your health care provider. This may include:  · Doing strength training two times a week, such as:  ? Lifting weights.  ? Using resistance bands.  ? Push-ups.  ? Sit-ups.  ? Yoga.  · Doing a certain intensity of exercise for a given amount of time. Choose from these options:  ? A total of 150 minutes of moderate-intensity exercise every week.  ? A total of 75 minutes of vigorous-intensity exercise every week.  ? A mix of moderate-intensity and vigorous-intensity exercise every week.  Children, pregnant women, people who have not exercised regularly, people who are overweight, and older adults may need to talk with a health care provider about what activities are safe to do. If you have a medical  condition, be sure to talk with your health care provider before you start a new exercise program.  What are some exercise ideas?  Moderate-intensity exercise ideas include:  · Walking 1 mile (1.6 km) in about 15 minutes.  · Biking.  · Hiking.  · Golfing.  · Dancing.  · Water aerobics.  Vigorous-intensity exercise ideas include:  · Walking 4.5 miles (7.2 km) or more in about 1 hour.  · Jogging or running 5 miles (8 km) in about 1 hour.  · Biking 10 miles (16.1 km) or more in about 1 hour.  · Lap swimming.  · Roller-skating or in-line skating.  · Cross-country skiing.  · Vigorous competitive sports, such as football, basketball, and soccer.  · Jumping rope.  · Aerobic dancing.  What are some everyday activities that can help me to get exercise?  · Yard work, such as:  ? Pushing a .  ? Raking and bagging leaves.  · Washing your car.  · Pushing a stroller.  · Shoveling snow.  · Gardening.  · Washing windows or floors.  How can I be more active in my day-to-day activities?  · Use stairs instead of an elevator.  · Take a walk during your lunch break.  · If you drive, park your car farther away from your work or school.  · If you take public transportation, get off one stop early and walk the rest of the way.  · Stand up or walk around during all of your indoor phone calls.  · Get up, stretch, and walk around every 30 minutes throughout the day.  · Enjoy exercise with a friend. Support to continue exercising will help you keep a regular routine of activity.  What guidelines can I follow while exercising?  · Before you start a new exercise program, talk with your health care provider.  · Do not exercise so much that you hurt yourself, feel dizzy, or get very short of breath.  · Wear comfortable clothes and wear shoes with good support.  · Drink plenty of water while you exercise to prevent dehydration or heat stroke.  · Work out until your breathing and your heartbeat get faster.  Where to find more  information  · [place] Department of Health and Human Services: www.hhs.gov  · Centers for Disease Control and Prevention (CDC): www.cdc.gov  Summary  · Exercising regularly is important. It will improve your overall fitness, flexibility, and endurance.  · Regular exercise also will improve your overall health. It can help you control your weight, reduce stress, and improve your bone density.  · Do not exercise so much that you hurt yourself, feel dizzy, or get very short of breath.  · Before you start a new exercise program, talk with your health care provider.  This information is not intended to replace advice given to you by your health care provider. Make sure you discuss any questions you have with your health care provider.  Document Revised: 11/30/2018 Document Reviewed: 11/08/2018  [employer] Patient Education © 2020 [employer]       ADVANCE CARE PLANNING  Conversations that Matter  PLANNING GUIDE    LOOKING BACK ...  Who we are, what we believe, and what we value are all shaped by experiences we have had. Temple, family traditions, jobs and friends affect us deeply.    Has anything happened in your past that shaped your feelings about medical treatment?    Think about an experience you may have had with a family member or friend who was faced with a decision about medical care near the end of life. What was positive about that experience? What do you wish would have been done differently?    HERE AND NOW ...  Do you have any significant health problems now? What kinds of things bring you such nissa that, should a health problem prevent you from doing them any more, life would have little meaning? What short- or long-term goals do you have? How might medical treatment help you or hinder you in accomplishing those goals?    WHAT ABOUT TOMORROW?  What significant health problems do you fear may affect you in the future? How do you feel about the possibility of having to go to a nursing home? How would decisions  "be made if you could not make them?    WHO SHOULD MAKE DECISIONS?  An important part of planning is to consider whether or not you could appoint someone to make your healthcare decisions if you could not make them yourself. Many people select a close family member, but you are free to pick anyone you think could best represent you. Whoever you appoint should have all of the following qualifications:    • Can be trusted.  • Is willing to accept this responsibility.  • Is willing to follow the values and instructions you have discussed.  • Is able to make complex, difficult decisions.    It is helpful, but not required, to appoint one or more alternate persons in case your first choice becomes unable or unwilling to represent you. It is best if only one person has authority at a time, but you can instruct your representatives to discuss decisions together if time permits.    WHAT FUTURE DECISIONS NEED TO BE CONSIDERED?  Providing instructions for future healthcare decisions may seem like an impossible task. How can anyone plan for all the possibilities? You cannot … but you do not have to.    You need to plan for situations where you:  1. Become unexpectedly incapable of making your own decisions  2. It is clear you will have little or no recovery, and  3. The injury or loss of function is significant.    Such a situation might arise because of an injury to the brain from an accident, a stroke, or a slowly progressive disease such as Alzheimer's.    To plan for this type of situation, many people state, \"If I'm going to be a vegetable, let me go,\" or \"No heroics,\" or \"Don't keep me alive on machines.\" While these remarks are a beginning, they simply are too vague to guide decision-making.    You need to completely describe under what circumstances your goals for medical care should be changed from attempting to prolong life to being allowed to die. In some situations, certain treatments may not make sense because they " will not help, but other treatments will be of important benefit.    Consider these three questions:  1. When would it make sense to continue certain treatments in an effort to prolong life and seek recovery?  2. When would it make sense to stop or withhold certain treatments and accept death when it comes?  3. Under any circumstance, what kind of comfort care would you want, including medication, spiritual and environmental options?    Making these choices requires understanding the information, weighing the benefits and burdens from your perspective, and then discussing your choices with those closest to you.    WHAT'S NEXT?  How do you make sure that your choices are respected? First talk, about them with your family, friends, clergy and physician, then put your choices in writing. Information about putting your plans into writing -- in an advance directive -- is available from your healthcare organization or .    Do you have any significant health problems? What health problems do you fear in the future?     Consider what frightens you most about medical treatment. What role does [place], alec or spirituality play in how you live your life? How does cost influence your decisions about medical care?   In terms of future medical care, under what circumstances would you want the goals of medical treatment to switch from attempting to prolong life to focusing on comfort?     Describe these circumstances in as much detail as possible.   Ask yourself, what will most help me live well at this point in my life?     Share your views with the person or people who would make your medical decisions if you could not make them.     THERE'S NO EASY WAY TO PLAN FOR FUTURE HEALTHCARE CHOICES.  It's a process that involves thinking and talking about complex and sensitive issues.    The questions that follow will help in the advance care planning process. This is a guide for your own benefit; it's not a test, and there  are no right or wrong answers. It does not need to be completed all at once. You may use it to share your feelings with healthcare providers, your family and your friends. The answers to these questions will help those you love make choices for you when you cannot make them yourself.    These are things I need to tell my loved ones:  What is your idea of comfort care? Describe how you would want medications to be used to provide comfort. What type of spiritual care would you want?     I need to learn about: ____________________________  I need to ask my healthcare provider: ________________
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
## 2021-05-17 RX ORDER — SODIUM CHLORIDE 9 MG/ML
250 INJECTION, SOLUTION INTRAVENOUS ONCE
Status: CANCELLED | OUTPATIENT
Start: 2021-05-17

## 2021-05-18 ENCOUNTER — HOSPITAL ENCOUNTER (OUTPATIENT)
Dept: INFUSION THERAPY | Facility: HOSPITAL | Age: 61
Setting detail: INFUSION SERIES
Discharge: HOME | End: 2021-05-18

## 2021-05-18 DIAGNOSIS — E83.110 HEMOCHROMATOSIS, HEREDITARY (HCC): Primary | ICD-10-CM

## 2021-05-18 LAB
FERRITIN SERPL-MCNC: 47.41 NG/ML (ref 30–400)
HCT VFR BLD AUTO: 37.5 % (ref 37.5–51)
HGB BLD-MCNC: 12.9 G/DL (ref 13–17.7)

## 2021-05-18 PROCEDURE — G0463 HOSPITAL OUTPT CLINIC VISIT: HCPCS

## 2021-05-18 PROCEDURE — 85018 HEMOGLOBIN: CPT | Performed by: INTERNAL MEDICINE

## 2021-05-18 PROCEDURE — 36415 COLL VENOUS BLD VENIPUNCTURE: CPT

## 2021-05-18 PROCEDURE — 82728 ASSAY OF FERRITIN: CPT | Performed by: INTERNAL MEDICINE

## 2021-05-18 PROCEDURE — 85014 HEMATOCRIT: CPT | Performed by: INTERNAL MEDICINE

## 2021-05-18 RX ORDER — TRIAMCINOLONE ACETONIDE 55 UG/1
2 SPRAY, METERED NASAL DAILY
COMMUNITY

## 2021-05-19 ENCOUNTER — APPOINTMENT (OUTPATIENT)
Dept: INFUSION THERAPY | Facility: HOSPITAL | Age: 61
End: 2021-05-19

## 2021-07-01 DIAGNOSIS — I10 ESSENTIAL HYPERTENSION: ICD-10-CM

## 2021-07-02 RX ORDER — TRIAMTERENE AND HYDROCHLOROTHIAZIDE 37.5; 25 MG/1; MG/1
TABLET ORAL
Qty: 90 TABLET | Refills: 1 | Status: SHIPPED | OUTPATIENT
Start: 2021-07-02 | End: 2021-08-16 | Stop reason: SDUPTHER

## 2021-07-17 DIAGNOSIS — I10 ESSENTIAL HYPERTENSION: ICD-10-CM

## 2021-07-19 RX ORDER — AMLODIPINE BESYLATE AND BENAZEPRIL HYDROCHLORIDE 5; 40 MG/1; MG/1
1 CAPSULE ORAL DAILY
Qty: 90 CAPSULE | Refills: 1 | Status: SHIPPED | OUTPATIENT
Start: 2021-07-19 | End: 2021-08-16 | Stop reason: SDUPTHER

## 2021-08-16 ENCOUNTER — OFFICE VISIT (OUTPATIENT)
Dept: FAMILY MEDICINE CLINIC | Facility: CLINIC | Age: 61
End: 2021-08-16

## 2021-08-16 VITALS
HEIGHT: 71 IN | TEMPERATURE: 98.2 F | BODY MASS INDEX: 29.82 KG/M2 | DIASTOLIC BLOOD PRESSURE: 76 MMHG | WEIGHT: 213 LBS | OXYGEN SATURATION: 98 % | SYSTOLIC BLOOD PRESSURE: 138 MMHG | HEART RATE: 63 BPM

## 2021-08-16 DIAGNOSIS — I10 ESSENTIAL HYPERTENSION: Primary | ICD-10-CM

## 2021-08-16 DIAGNOSIS — K70.30 ALCOHOLIC CIRRHOSIS OF LIVER WITHOUT ASCITES (HCC): ICD-10-CM

## 2021-08-16 PROCEDURE — 99213 OFFICE O/P EST LOW 20 MIN: CPT | Performed by: FAMILY MEDICINE

## 2021-08-16 RX ORDER — TRIAMTERENE AND HYDROCHLOROTHIAZIDE 37.5; 25 MG/1; MG/1
1 TABLET ORAL DAILY
Qty: 90 TABLET | Refills: 1 | Status: SHIPPED | OUTPATIENT
Start: 2021-08-16 | End: 2022-06-22

## 2021-08-16 RX ORDER — AMLODIPINE BESYLATE AND BENAZEPRIL HYDROCHLORIDE 5; 40 MG/1; MG/1
1 CAPSULE ORAL DAILY
Qty: 90 CAPSULE | Refills: 1 | Status: SHIPPED | OUTPATIENT
Start: 2021-08-16 | End: 2022-06-22

## 2021-08-16 NOTE — PATIENT INSTRUCTIONS
Mediterranean Diet  A Mediterranean diet refers to food and lifestyle choices that are based on the traditions of countries located on the [place]. This way of eating has been shown to help prevent certain conditions and improve outcomes for people who have chronic diseases, like kidney disease and heart disease.  What are tips for following this plan?  Lifestyle  · Cook and eat meals together with your family, when possible.  · Drink enough fluid to keep your urine clear or pale yellow.  · Be physically active every day. This includes:  ? Aerobic exercise like running or swimming.  ? Leisure activities like gardening, walking, or housework.  · Get 7-8 hours of sleep each night.  · If recommended by your health care provider, drink red wine in moderation. This means 1 glass a day for nonpregnant women and 2 glasses a day for men. A glass of wine equals 5 oz (150 mL).  Reading food labels    · Check the serving size of packaged foods. For foods such as rice and pasta, the serving size refers to the amount of cooked product, not dry.  · Check the total fat in packaged foods. Avoid foods that have saturated fat or trans fats.  · Check the ingredients list for added sugars, such as corn syrup.  Shopping  · At the grocery store, buy most of your food from the areas near the walls of the store. This includes:  ? Fresh fruits and vegetables (produce).  ? Grains, beans, nuts, and seeds. Some of these may be available in unpackaged forms or large amounts (in bulk).  ? Fresh seafood.  ? Poultry and eggs.  ? Low-fat dairy products.  · Buy whole ingredients instead of prepackaged foods.  · Buy fresh fruits and vegetables in-season from local farmers markets.  · Buy frozen fruits and vegetables in resealable bags.  · If you do not have access to quality fresh seafood, buy precooked frozen shrimp or canned fish, such as tuna, salmon, or sardines.  · Buy small amounts of raw or cooked vegetables, salads, or olives from  the deli or salad bar at your store.  · Stock your pantry so you always have certain foods on hand, such as olive oil, canned tuna, canned tomatoes, rice, pasta, and beans.  Cooking  · Cook foods with extra-virgin olive oil instead of using butter or other vegetable oils.  · Have meat as a side dish, and have vegetables or grains as your main dish. This means having meat in small portions or adding small amounts of meat to foods like pasta or stew.  · Use beans or vegetables instead of meat in common dishes like chili or lasagna.  · [place] with different cooking methods. Try roasting or broiling vegetables instead of steaming or sautéeing them.  · Add frozen vegetables to soups, stews, pasta, or rice.  · Add nuts or seeds for added healthy fat at each meal. You can add these to yogurt, salads, or vegetable dishes.  · Marinate fish or vegetables using olive oil, lemon juice, garlic, and fresh herbs.  Meal planning    · Plan to eat 1 vegetarian meal one day each week. Try to work up to 2 vegetarian meals, if possible.  · Eat seafood 2 or more times a week.  · Have healthy snacks readily available, such as:  ? Vegetable sticks with hummus.  ? Greek yogurt.  ? Fruit and nut trail mix.  · Eat balanced meals throughout the week. This includes:  ? Fruit: 2-3 servings a day  ? Vegetables: 4-5 servings a day  ? Low-fat dairy: 2 servings a day  ? Fish, poultry, or lean meat: 1 serving a day  ? Beans and legumes: 2 or more servings a week  ? Nuts and seeds: 1-2 servings a day  ? Whole grains: 6-8 servings a day  ? Extra-virgin olive oil: 3-4 servings a day  · Limit red meat and sweets to only a few servings a month  What are my food choices?  · Mediterranean diet  ? Recommended  § Grains: Whole-grain pasta. Brown rice. Bulgar wheat. Polenta. Couscous. Whole-wheat bread. Oatmeal. Quinoa.  § Vegetables: Artichokes. Beets. Broccoli. Cabbage. Carrots. Eggplant. Green beans. Chard. Kale. Spinach. Onions. Leeks. Peas. Squash.  Tomatoes. Peppers. Radishes.  § Fruits: Apples. Apricots. Avocado. Berries. Bananas. Cherries. Dates. Figs. Grapes. [place]. Melon. Oranges. Peaches. Plums. Pomegranate.  § Meats and other protein foods: Beans. Almonds. Sunflower seeds. Pine nuts. Peanuts. Cod. [place]. Scallops. Shrimp. Tuna. Tilapia. Clams. Oysters. Eggs.  § Dairy: Low-fat milk. Cheese. Greek yogurt.  § Beverages: Water. Red wine. Herbal tea.  § Fats and oils: Extra virgin olive oil. Avocado oil. Grape seed oil.  § Sweets and desserts: Greek yogurt with honey. Baked apples. Poached pears. Trail mix.  § Seasoning and other foods: Basil. Cilantro. Coriander. Cumin. Mint. Parsley. Jimmy. Rosemary. Tarragon. Garlic. Oregano. Thyme. Pepper. Balsalmic vinegar. Tahini. Hummus. Tomato sauce. Olives. Mushrooms.  ? Limit these  § Grains: Prepackaged pasta or rice dishes. Prepackaged cereal with added sugar.  § Vegetables: Deep fried potatoes (french fries).  § Fruits: Fruit canned in syrup.  § Meats and other protein foods: Beef. Pork. Lamb. Poultry with skin. Hot dogs. [place].  § Dairy: Ice cream. Sour cream. Whole milk.  § Beverages: Juice. Sugar-sweetened soft drinks. Beer. Liquor and spirits.  § Fats and oils: Butter. Canola oil. Vegetable oil. Beef fat (tallow). Lard.  § Sweets and desserts: Cookies. Cakes. Pies. Candy.  § Seasoning and other foods: Mayonnaise. Premade sauces and marinades.  The items listed may not be a complete list. Talk with your dietitian about what dietary choices are right for you.  Summary  · The Mediterranean diet includes both food and lifestyle choices.  · Eat a variety of fresh fruits and vegetables, beans, nuts, seeds, and whole grains.  · Limit the amount of red meat and sweets that you eat.  · Talk with your health care provider about whether it is safe for you to drink red wine in moderation. This means 1 glass a day for nonpregnant women and 2 glasses a day for men. A glass of wine equals 5 oz (150 mL).  This information  is not intended to replace advice given to you by your health care provider. Make sure you discuss any questions you have with your health care provider.  Document Revised: 08/17/2017 Document Reviewed: 08/10/2017  [employer] Patient Education © 2020 [employer]      Exercising to Stay Healthy  To become healthy and stay healthy, it is recommended that you do moderate-intensity and vigorous-intensity exercise. You can tell that you are exercising at a moderate intensity if your heart starts beating faster and you start breathing faster but can still hold a conversation. You can tell that you are exercising at a vigorous intensity if you are breathing much harder and faster and cannot hold a conversation while exercising.  Exercising regularly is important. It has many health benefits, such as:  · Improving overall fitness, flexibility, and endurance.  · Increasing bone density.  · Helping with weight control.  · Decreasing body fat.  · Increasing muscle strength.  · Reducing stress and tension.  · Improving overall health.  How often should I exercise?  Choose an activity that you enjoy, and set realistic goals. Your health care provider can help you make an activity plan that works for you.  Exercise regularly as told by your health care provider. This may include:  · Doing strength training two times a week, such as:  ? Lifting weights.  ? Using resistance bands.  ? Push-ups.  ? Sit-ups.  ? Yoga.  · Doing a certain intensity of exercise for a given amount of time. Choose from these options:  ? A total of 150 minutes of moderate-intensity exercise every week.  ? A total of 75 minutes of vigorous-intensity exercise every week.  ? A mix of moderate-intensity and vigorous-intensity exercise every week.  Children, pregnant women, people who have not exercised regularly, people who are overweight, and older adults may need to talk with a health care provider about what activities are safe to do. If you have a medical  condition, be sure to talk with your health care provider before you start a new exercise program.  What are some exercise ideas?  Moderate-intensity exercise ideas include:  · Walking 1 mile (1.6 km) in about 15 minutes.  · Biking.  · Hiking.  · Golfing.  · Dancing.  · Water aerobics.  Vigorous-intensity exercise ideas include:  · Walking 4.5 miles (7.2 km) or more in about 1 hour.  · Jogging or running 5 miles (8 km) in about 1 hour.  · Biking 10 miles (16.1 km) or more in about 1 hour.  · Lap swimming.  · Roller-skating or in-line skating.  · Cross-country skiing.  · Vigorous competitive sports, such as football, basketball, and soccer.  · Jumping rope.  · Aerobic dancing.  What are some everyday activities that can help me to get exercise?  · Yard work, such as:  ? Pushing a .  ? Raking and bagging leaves.  · Washing your car.  · Pushing a stroller.  · Shoveling snow.  · Gardening.  · Washing windows or floors.  How can I be more active in my day-to-day activities?  · Use stairs instead of an elevator.  · Take a walk during your lunch break.  · If you drive, park your car farther away from your work or school.  · If you take public transportation, get off one stop early and walk the rest of the way.  · Stand up or walk around during all of your indoor phone calls.  · Get up, stretch, and walk around every 30 minutes throughout the day.  · Enjoy exercise with a friend. Support to continue exercising will help you keep a regular routine of activity.  What guidelines can I follow while exercising?  · Before you start a new exercise program, talk with your health care provider.  · Do not exercise so much that you hurt yourself, feel dizzy, or get very short of breath.  · Wear comfortable clothes and wear shoes with good support.  · Drink plenty of water while you exercise to prevent dehydration or heat stroke.  · Work out until your breathing and your heartbeat get faster.  Where to find more  information  · [place] Department of Health and Human Services: www.hhs.gov  · Centers for Disease Control and Prevention (CDC): www.cdc.gov  Summary  · Exercising regularly is important. It will improve your overall fitness, flexibility, and endurance.  · Regular exercise also will improve your overall health. It can help you control your weight, reduce stress, and improve your bone density.  · Do not exercise so much that you hurt yourself, feel dizzy, or get very short of breath.  · Before you start a new exercise program, talk with your health care provider.  This information is not intended to replace advice given to you by your health care provider. Make sure you discuss any questions you have with your health care provider.  Document Revised: 11/30/2018 Document Reviewed: 11/08/2018  [employer] Patient Education © 2021 [employer]
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
## 2021-09-08 ENCOUNTER — OFFICE (AMBULATORY)
Dept: URBAN - METROPOLITAN AREA CLINIC 64 | Facility: CLINIC | Age: 61
End: 2021-09-08

## 2021-09-08 VITALS
HEIGHT: 72 IN | SYSTOLIC BLOOD PRESSURE: 127 MMHG | WEIGHT: 215 LBS | HEART RATE: 69 BPM | DIASTOLIC BLOOD PRESSURE: 71 MMHG

## 2021-09-08 DIAGNOSIS — K74.69 OTHER CIRRHOSIS OF LIVER: ICD-10-CM

## 2021-09-08 DIAGNOSIS — Z86.010 PERSONAL HISTORY OF COLONIC POLYPS: ICD-10-CM

## 2021-09-08 DIAGNOSIS — K25.9 GASTRIC ULCER, UNSPECIFIED AS ACUTE OR CHRONIC, WITHOUT HEMO: ICD-10-CM

## 2021-09-08 PROCEDURE — 99213 OFFICE O/P EST LOW 20 MIN: CPT | Performed by: INTERNAL MEDICINE

## 2022-01-11 NOTE — PROGRESS NOTES
Subjective   Erik Fuentes is a 61 y.o. male.       HPI   Pt here today with headache, loss of taste or smell, sore throat and fever (highest is 99.6), cough.   Symptoms started Friday (1/7).  Was exposed to someone with COVID early last week.  Denies any CP; palpitations; SOA; dizziness.  Drinking plenty of fluids.  Has been isolating from his wife since start of symptoms.     The following portions of the patient's history were reviewed and updated as appropriate: allergies, current medications, past family history, past medical history, past social history, past surgical history and problem list.    Review of Systems   Constitutional: Positive for fever. Negative for activity change, appetite change, chills, diaphoresis, fatigue and unexpected weight loss.   HENT: Positive for sinus pressure and sore throat. Negative for ear discharge, ear pain, postnasal drip and rhinorrhea.    Eyes: Negative for pain, discharge, redness and itching.   Respiratory: Positive for cough. Negative for chest tightness, shortness of breath and wheezing.    Cardiovascular: Negative for chest pain and palpitations.   Gastrointestinal: Negative for diarrhea, nausea and vomiting.   Genitourinary: Negative for dysuria, flank pain, frequency and urgency.   Skin: Negative for rash.   Neurological: Positive for headache. Negative for dizziness, weakness, light-headedness, numbness and confusion.   Psychiatric/Behavioral: Negative for depressed mood. The patient is not nervous/anxious.        Objective   Physical Exam  Vitals reviewed.   Constitutional:       General: He is not in acute distress.     Appearance: Normal appearance.   Cardiovascular:      Rate and Rhythm: Normal rate and regular rhythm.      Pulses: Normal pulses.      Heart sounds: Normal heart sounds. No murmur heard.      Pulmonary:      Effort: Pulmonary effort is normal. No respiratory distress.      Breath sounds: Normal breath sounds. No wheezing, rhonchi or rales.    Chest:      Chest wall: No tenderness.   Neurological:      General: No focal deficit present.      Mental Status: He is alert and oriented to person, place, and time.   Psychiatric:         Mood and Affect: Mood normal.           Assessment/Plan   Diagnoses and all orders for this visit:    1. Cough (Primary)  Comments:  COVID swab completed.   Discussed increasing fluids and rest.   Continue quarantinine.   Can use Mucinex if needed.   Call for worsening.   Orders:  -     COVID-19,APTIMA PANTHER(ARUNA), CASTRO/ MATTHEW, NP/OP SWAB IN UTM/VTM/S[facility] TRANSPORT MEDIA,24 HR TAT - Swab, Nasopharynx    2. Mild headache  Comments:  COVID swab.   Tylenol can be used as needed.       COVID handouts in AVS.

## 2022-01-12 ENCOUNTER — OFFICE VISIT (OUTPATIENT)
Dept: FAMILY MEDICINE CLINIC | Facility: CLINIC | Age: 62
End: 2022-01-12

## 2022-01-12 ENCOUNTER — LAB (OUTPATIENT)
Dept: FAMILY MEDICINE CLINIC | Facility: CLINIC | Age: 62
End: 2022-01-12

## 2022-01-12 VITALS — DIASTOLIC BLOOD PRESSURE: 81 MMHG | HEART RATE: 109 BPM | SYSTOLIC BLOOD PRESSURE: 134 MMHG | OXYGEN SATURATION: 96 %

## 2022-01-12 DIAGNOSIS — R05.9 COUGH: Primary | ICD-10-CM

## 2022-01-12 DIAGNOSIS — R51.9 MILD HEADACHE: ICD-10-CM

## 2022-01-12 LAB — SARS-COV-2 ORF1AB RESP QL NAA+PROBE: NOT DETECTED

## 2022-01-12 PROCEDURE — U0004 COV-19 TEST NON-CDC HGH THRU: HCPCS | Performed by: NURSE PRACTITIONER

## 2022-01-12 PROCEDURE — 99213 OFFICE O/P EST LOW 20 MIN: CPT | Performed by: NURSE PRACTITIONER

## 2022-01-13 ENCOUNTER — TELEPHONE (OUTPATIENT)
Dept: FAMILY MEDICINE CLINIC | Facility: CLINIC | Age: 62
End: 2022-01-13

## 2022-01-13 DIAGNOSIS — J06.9 UPPER RESPIRATORY TRACT INFECTION, UNSPECIFIED TYPE: Primary | ICD-10-CM

## 2022-01-13 RX ORDER — AZITHROMYCIN 250 MG/1
TABLET, FILM COATED ORAL
Qty: 6 TABLET | Refills: 0 | Status: SHIPPED | OUTPATIENT
Start: 2022-01-13 | End: 2022-01-14 | Stop reason: SDUPTHER

## 2022-01-13 NOTE — TELEPHONE ENCOUNTER
Caller: Erik Fuentes    Relationship to patient: Self    Best call back number:  [phone]    Patient is needing: PATIENT CALLED IN AND STATED HE WAS GOING TO HAVE AN ANTIBIOTIC SENT FOR HIS SINUS SYMPTOMS PATIENT WOULD LIKE IT SENT TO [facility] #[id number] - [place], [place] - [address] AT [facility] - [phone] [place] [phone] [phone]

## 2022-01-14 DIAGNOSIS — J06.9 UPPER RESPIRATORY TRACT INFECTION, UNSPECIFIED TYPE: ICD-10-CM

## 2022-01-14 RX ORDER — AZITHROMYCIN 250 MG/1
TABLET, FILM COATED ORAL
Qty: 6 TABLET | Refills: 0 | Status: SHIPPED | OUTPATIENT
Start: 2022-01-14 | End: 2022-02-25

## 2022-02-25 ENCOUNTER — OFFICE VISIT (OUTPATIENT)
Dept: FAMILY MEDICINE CLINIC | Facility: CLINIC | Age: 62
End: 2022-02-25

## 2022-02-25 VITALS
SYSTOLIC BLOOD PRESSURE: 127 MMHG | HEART RATE: 80 BPM | BODY MASS INDEX: 29.68 KG/M2 | HEIGHT: 71 IN | OXYGEN SATURATION: 97 % | DIASTOLIC BLOOD PRESSURE: 78 MMHG | WEIGHT: 212 LBS | TEMPERATURE: 100.2 F

## 2022-02-25 DIAGNOSIS — K70.30 ALCOHOLIC CIRRHOSIS OF LIVER WITHOUT ASCITES: ICD-10-CM

## 2022-02-25 DIAGNOSIS — I10 ESSENTIAL HYPERTENSION: Primary | ICD-10-CM

## 2022-02-25 DIAGNOSIS — E83.110 HEMOCHROMATOSIS, HEREDITARY: ICD-10-CM

## 2022-02-25 PROCEDURE — 99213 OFFICE O/P EST LOW 20 MIN: CPT | Performed by: FAMILY MEDICINE

## 2022-05-02 DIAGNOSIS — E83.110 HEMOCHROMATOSIS, HEREDITARY: Primary | ICD-10-CM

## 2022-05-03 ENCOUNTER — HOSPITAL ENCOUNTER (OUTPATIENT)
Dept: INFUSION THERAPY | Facility: HOSPITAL | Age: 62
Setting detail: INFUSION SERIES
Discharge: HOME | End: 2022-05-03

## 2022-05-03 DIAGNOSIS — E83.110 HEMOCHROMATOSIS, HEREDITARY: ICD-10-CM

## 2022-05-03 LAB
FERRITIN SERPL-MCNC: 85.14 NG/ML (ref 30–400)
HCT VFR BLD AUTO: 37.8 % (ref 37.5–51)
HGB BLD-MCNC: 13.1 G/DL (ref 13–17.7)

## 2022-05-03 PROCEDURE — 82728 ASSAY OF FERRITIN: CPT | Performed by: NURSE PRACTITIONER

## 2022-05-03 PROCEDURE — G0463 HOSPITAL OUTPT CLINIC VISIT: HCPCS

## 2022-05-03 PROCEDURE — 85014 HEMATOCRIT: CPT | Performed by: NURSE PRACTITIONER

## 2022-05-03 PROCEDURE — 36415 COLL VENOUS BLD VENIPUNCTURE: CPT

## 2022-05-03 PROCEDURE — 85018 HEMOGLOBIN: CPT | Performed by: NURSE PRACTITIONER

## 2022-05-04 ENCOUNTER — HOSPITAL ENCOUNTER (OUTPATIENT)
Dept: INFUSION THERAPY | Facility: HOSPITAL | Age: 62
Setting detail: INFUSION SERIES
Discharge: HOME | End: 2022-05-04

## 2022-05-04 VITALS
SYSTOLIC BLOOD PRESSURE: 92 MMHG | RESPIRATION RATE: 16 BRPM | OXYGEN SATURATION: 95 % | DIASTOLIC BLOOD PRESSURE: 58 MMHG | HEART RATE: 94 BPM

## 2022-05-04 DIAGNOSIS — E83.110 HEMOCHROMATOSIS, HEREDITARY: Primary | ICD-10-CM

## 2022-05-04 PROCEDURE — 99195 PHLEBOTOMY: CPT

## 2022-05-04 RX ORDER — SODIUM CHLORIDE 9 MG/ML
250 INJECTION, SOLUTION INTRAVENOUS ONCE
OUTPATIENT
Start: 2022-05-11

## 2022-05-04 RX ORDER — SODIUM CHLORIDE 9 MG/ML
250 INJECTION, SOLUTION INTRAVENOUS ONCE
Status: DISCONTINUED | OUTPATIENT
Start: 2022-05-04 | End: 2022-05-06 | Stop reason: HOSPADM

## 2022-05-04 NOTE — PROGRESS NOTES
PHLEBOTOMY    Start Time: 0855    End Time: 0900    Hemaglobin Value: 13.1    Hematocrit Value: 37.8    Ferritin Value: 85.14      Permit Signed: Self Signed    Prep with Chlorhexidine: yes    Needle Size: 16 Gauge    Number of Attempts: 1      Scale Calibration: yes    Amount Drawn: 450ml = 476grams    Phlebotomy Completed: yes    Phlebotomy Site: Left Antecubital    Dressing: Pressure Dressing Applied    Anchored: Held    Blood Discarded Per Protocol: yes    200+/- 5 grams: yes  500+/- 5 grams: yes    Self Care Assistance: Unassisted and Steady

## 2022-05-04 NOTE — PROGRESS NOTES
Pt states he use to get phlebotomies every other week, last one 2 years ago. He stated he passed out every time and it was always at the end. Pt also states he never eats breakfast and didn't when he use to pass out 2 years ago. Suggested to pt to schedule in afternoon, after he eats lunch. He said no,he had to come first thing in the morning. Pt consented to drinking orange juice before we started but would not eat. Pt stayed alert through out but said he usually gets dizzy when standing. Orthostatic blood pressures done. Upon standing the patients blood pressure dropped. He sat back down, refused any offers of drinks or food. After 5 minutes he stood and was better. He drove himself and said he was fine to go.  Ambulatory with steady gait.

## 2022-05-13 ENCOUNTER — TELEPHONE (OUTPATIENT)
Dept: FAMILY MEDICINE CLINIC | Facility: CLINIC | Age: 62
End: 2022-05-13

## 2022-05-13 NOTE — TELEPHONE ENCOUNTER
I called pt and lvm and told him to let us know when he tested positive and his Sx. (Hub okay to read)

## 2022-05-13 NOTE — TELEPHONE ENCOUNTER
PATIENT STATES: THAT SHE HAS COVID AND SHE WOULD LIKE TO HAVE SOMETHING TO CALLED IN TO HELP HER PLEASE ADVISE      PATIENT CAN BE REACHED ON: [phone]     [facility] [place], [place] - [address] AT [address] - [phone]  - [phone] FX  [phone]

## 2022-05-13 NOTE — TELEPHONE ENCOUNTER
Please call patient and let him know that he does not have symptoms or risk factors that could potentially land him in the hospital as a result of his infection. I recommend no more than 3000 mg Tylenol (acetaminophen) in 24 hours for fever and pain. Extra strength Tylenol (acetaminophen) is 500 mg. You can take 2 tablets up to 3 times per day as needed.     I also recommend over the counter Nasocort and Mucinex to help with congestion and cough.

## 2022-05-23 ENCOUNTER — TELEPHONE (OUTPATIENT)
Dept: FAMILY MEDICINE CLINIC | Facility: CLINIC | Age: 62
End: 2022-05-23

## 2022-05-23 NOTE — TELEPHONE ENCOUNTER
Please call patient and let him know that I sent a prescription for Tessalon Perles to his pharmacy for his cough.  A postviral cough can last for 6 to 8 weeks postrecovery.  The fatigue will be slower to recover.

## 2022-05-23 NOTE — TELEPHONE ENCOUNTER
Caller: JYOTHI CHACON    Relationship: Emergency Contact    Best call back number: [phone]    What medication are you requesting: N/A    What are your current symptoms: COUGH AND FATIGUE-2 WEEKS SINCE COVID DIAGNOSIS    If a prescription is needed, what is your preferred pharmacy and phone number: [facility] [address] [place], [place] - [address] AT [facility] - [phone]  - [phone]      Additional notes: PLEASE ADVISE

## 2022-06-21 NOTE — PROGRESS NOTES
Subjective   Erik Fuentes is a 62 y.o. male.       HPI   Pt is here today with concern of dizziness and lower blood pressures. He currently takes Lotrel 5/40 mg daily; Maxzide 37.5/25 mg daily for HTN.   Has not taken any BP medication today and /72; HR 79.   He reports he is not feeling dizzy or weaker today.    Dx prostate cancer 3 weeks ago; [facility].   Was noting an overall dizzy/weak feeling for several months; started to track his BP at home; noting readings staying in the lower 100's/50-60's.  He says he has been staying well hydrated.   Has passed out recently when getting blood draw for urology and with a recent prostate procedure.  No LOC at home.    Denies any CP; palpitations; SOA; dizziness; headache; trouble with vision.     The following portions of the patient's history were reviewed and updated as appropriate: allergies, current medications, past family history, past medical history, past social history, past surgical history and problem list.    Review of Systems   Constitutional: Negative for chills, fatigue and fever.   Eyes: Negative for visual disturbance.   Respiratory: Negative for cough, chest tightness, shortness of breath and wheezing.    Cardiovascular: Negative for chest pain and palpitations.   Gastrointestinal: Negative for diarrhea, nausea, vomiting and indigestion.   Genitourinary: Negative for dysuria, frequency and urgency.   Neurological: Positive for dizziness and syncope (with lab procedure only). Negative for weakness and headache.   Psychiatric/Behavioral: Positive for stress. Negative for self-injury, suicidal ideas and depressed mood. The patient is not nervous/anxious.        Objective   Physical Exam  Vitals reviewed.   Constitutional:       General: He is not in acute distress.     Appearance: Normal appearance.   Cardiovascular:      Rate and Rhythm: Normal rate and regular rhythm.      Pulses: Normal pulses.      Heart sounds: Normal heart sounds. No  murmur heard.  Pulmonary:      Effort: Pulmonary effort is normal. No respiratory distress.      Breath sounds: Normal breath sounds. No wheezing.   Chest:      Chest wall: No tenderness.   Abdominal:      Tenderness: There is no right CVA tenderness or left CVA tenderness.   Musculoskeletal:      Right lower leg: No edema.      Left lower leg: No edema.   Skin:     General: Skin is warm and dry.      Findings: No erythema.   Neurological:      General: No focal deficit present.      Mental Status: He is alert and oriented to person, place, and time.   Psychiatric:         Mood and Affect: Mood normal.           Assessment & Plan   Diagnoses and all orders for this visit:    1. Hypotension, unspecified hypotension type (Primary)  Comments:  Pt will continue to hold his BP medications for the rest of this week while monitoring BP daily at home.   He is to call in readings on Friday for review.     Consider d/c HTN meds vs. lowering doses.    Normal H & H and Ferritin in May 2022.

## 2022-06-22 ENCOUNTER — OFFICE VISIT (OUTPATIENT)
Dept: FAMILY MEDICINE CLINIC | Facility: CLINIC | Age: 62
End: 2022-06-22

## 2022-06-22 VITALS
SYSTOLIC BLOOD PRESSURE: 128 MMHG | HEIGHT: 71 IN | OXYGEN SATURATION: 100 % | HEART RATE: 79 BPM | DIASTOLIC BLOOD PRESSURE: 72 MMHG | BODY MASS INDEX: 28.14 KG/M2 | WEIGHT: 201 LBS

## 2022-06-22 DIAGNOSIS — I95.9 HYPOTENSION, UNSPECIFIED HYPOTENSION TYPE: Primary | ICD-10-CM

## 2022-06-22 PROCEDURE — 99213 OFFICE O/P EST LOW 20 MIN: CPT | Performed by: NURSE PRACTITIONER

## 2022-07-05 DIAGNOSIS — I10 ESSENTIAL HYPERTENSION: ICD-10-CM

## 2022-07-05 RX ORDER — AMLODIPINE BESYLATE AND BENAZEPRIL HYDROCHLORIDE 5; 40 MG/1; MG/1
CAPSULE ORAL
Qty: 30 CAPSULE | Refills: 3 | Status: SHIPPED | OUTPATIENT
Start: 2022-07-05 | End: 2023-02-02

## 2022-09-12 ENCOUNTER — ANESTHESIA (OUTPATIENT)
Dept: GASTROENTEROLOGY | Facility: HOSPITAL | Age: 62
End: 2022-09-12

## 2022-09-12 ENCOUNTER — ANESTHESIA EVENT (OUTPATIENT)
Dept: GASTROENTEROLOGY | Facility: HOSPITAL | Age: 62
End: 2022-09-12

## 2022-09-12 ENCOUNTER — HOSPITAL ENCOUNTER (OUTPATIENT)
Facility: HOSPITAL | Age: 62
Setting detail: HOSPITAL OUTPATIENT SURGERY
Discharge: HOME | End: 2022-09-12
Attending: INTERNAL MEDICINE | Admitting: INTERNAL MEDICINE

## 2022-09-12 ENCOUNTER — ON CAMPUS - OUTPATIENT (AMBULATORY)
Dept: URBAN - METROPOLITAN AREA HOSPITAL 85 | Facility: HOSPITAL | Age: 62
End: 2022-09-12

## 2022-09-12 VITALS
RESPIRATION RATE: 19 BRPM | WEIGHT: 195.8 LBS | TEMPERATURE: 98.5 F | OXYGEN SATURATION: 100 % | HEIGHT: 72 IN | BODY MASS INDEX: 26.52 KG/M2 | SYSTOLIC BLOOD PRESSURE: 152 MMHG | DIASTOLIC BLOOD PRESSURE: 92 MMHG | HEART RATE: 86 BPM

## 2022-09-12 DIAGNOSIS — I85.00 ESOPHAGEAL VARICES WITHOUT BLEEDING: ICD-10-CM

## 2022-09-12 DIAGNOSIS — K92.2 GASTROINTESTINAL HEMORRHAGE, UNSPECIFIED: ICD-10-CM

## 2022-09-12 DIAGNOSIS — K31.7 POLYP OF STOMACH AND DUODENUM: ICD-10-CM

## 2022-09-12 DIAGNOSIS — K25.9 GASTRIC ULCER, UNSPECIFIED AS ACUTE OR CHRONIC, WITHOUT HEMO: ICD-10-CM

## 2022-09-12 DIAGNOSIS — K25.9 GASTRIC ULCER: ICD-10-CM

## 2022-09-12 DIAGNOSIS — K74.60 UNSPECIFIED CIRRHOSIS OF LIVER: ICD-10-CM

## 2022-09-12 DIAGNOSIS — K74.60 CIRRHOSIS: ICD-10-CM

## 2022-09-12 PROCEDURE — 88342 IMHCHEM/IMCYTCHM 1ST ANTB: CPT | Performed by: INTERNAL MEDICINE

## 2022-09-12 PROCEDURE — 25010000002 PROPOFOL 200 MG/20ML EMULSION: Performed by: ANESTHESIOLOGY

## 2022-09-12 PROCEDURE — 43251 EGD REMOVE LESION SNARE: CPT | Performed by: INTERNAL MEDICINE

## 2022-09-12 PROCEDURE — 43244 EGD VARICES LIGATION: CPT | Performed by: INTERNAL MEDICINE

## 2022-09-12 PROCEDURE — 88305 TISSUE EXAM BY PATHOLOGIST: CPT | Performed by: INTERNAL MEDICINE

## 2022-09-12 RX ORDER — MEPERIDINE HYDROCHLORIDE 25 MG/ML
12.5 INJECTION INTRAMUSCULAR; INTRAVENOUS; SUBCUTANEOUS
Status: DISCONTINUED | OUTPATIENT
Start: 2022-09-12 | End: 2022-09-12 | Stop reason: HOSPADM

## 2022-09-12 RX ORDER — ONDANSETRON 2 MG/ML
4 INJECTION INTRAMUSCULAR; INTRAVENOUS ONCE AS NEEDED
Status: DISCONTINUED | OUTPATIENT
Start: 2022-09-12 | End: 2022-09-12 | Stop reason: HOSPADM

## 2022-09-12 RX ORDER — SODIUM CHLORIDE 9 MG/ML
9 INJECTION, SOLUTION INTRAVENOUS ONCE
Status: COMPLETED | OUTPATIENT
Start: 2022-09-12 | End: 2022-09-12

## 2022-09-12 RX ORDER — LABETALOL HYDROCHLORIDE 5 MG/ML
5 INJECTION, SOLUTION INTRAVENOUS
Status: DISCONTINUED | OUTPATIENT
Start: 2022-09-12 | End: 2022-09-12 | Stop reason: HOSPADM

## 2022-09-12 RX ORDER — DIPHENHYDRAMINE HYDROCHLORIDE 50 MG/ML
12.5 INJECTION INTRAMUSCULAR; INTRAVENOUS
Status: DISCONTINUED | OUTPATIENT
Start: 2022-09-12 | End: 2022-09-12 | Stop reason: HOSPADM

## 2022-09-12 RX ORDER — SODIUM CHLORIDE 9 MG/ML
INJECTION, SOLUTION INTRAVENOUS CONTINUOUS PRN
Status: DISCONTINUED | OUTPATIENT
Start: 2022-09-12 | End: 2022-09-12 | Stop reason: SURG

## 2022-09-12 RX ORDER — IPRATROPIUM BROMIDE AND ALBUTEROL SULFATE 2.5; .5 MG/3ML; MG/3ML
3 SOLUTION RESPIRATORY (INHALATION) ONCE AS NEEDED
Status: DISCONTINUED | OUTPATIENT
Start: 2022-09-12 | End: 2022-09-12 | Stop reason: HOSPADM

## 2022-09-12 RX ORDER — PROPOFOL 10 MG/ML
INJECTION, EMULSION INTRAVENOUS AS NEEDED
Status: DISCONTINUED | OUTPATIENT
Start: 2022-09-12 | End: 2022-09-12 | Stop reason: SURG

## 2022-09-12 RX ORDER — EPHEDRINE SULFATE 5 MG/ML
5 INJECTION INTRAVENOUS ONCE AS NEEDED
Status: DISCONTINUED | OUTPATIENT
Start: 2022-09-12 | End: 2022-09-12 | Stop reason: HOSPADM

## 2022-09-12 RX ADMIN — SODIUM CHLORIDE 9 ML/HR: 9 INJECTION, SOLUTION INTRAVENOUS at 08:45

## 2022-09-12 RX ADMIN — PROPOFOL 300 MG: 10 INJECTION, EMULSION INTRAVENOUS at 09:36

## 2022-09-12 RX ADMIN — SODIUM CHLORIDE: 0.9 INJECTION, SOLUTION INTRAVENOUS at 09:32

## 2022-09-12 NOTE — OP NOTE
ESOPHAGOGASTRODUODENOSCOPY Procedure Report    Patient Name:  Erik Fuentes  YOB: 1960    Date of Surgery:  9/12/2022     Preoperative diagnosis:  Gastric ulcer  Cirrhosis    Postoperative diagnosis:  3 cords of large F3 esophageal varices  Gastric polyps with bleeding        Procedure(s):  ESOPHAGOGASTRODUODENOSCOPY with polypectomy x8 and esophageal variceal band ligation x3    Staff:  Surgeon(s):  Rito Ruth MD      Anesthesia: Monitored Anesthesia Care    Implants:    Nothing was implanted during the procedure    Specimen:        See Below    Estimated blood loss: Minimal     Complications:  None    Description of Procedure:  Informed consent was obtained for the procedure, including sedation.  Risks of perforation, hemorrhage, adverse drug reaction and aspiration were discussed.  The patient was brought into the endoscopy suite. Continuous cardiopulmonary monitoring was performed. The patient was placed in the left lateral decubitus position.  The bite block was inserted into the patient's mouth. After adequate sedation was attained, the Olympus gastroscope was inserted into the patient's mouth and advanced to the second portion of the duodenum without difficulty.  Circumferential examination was performed. A retroflex exam was performed in the patient's stomach.  On completion of the exam, the bowel was decompressed, the scope was removed from the patient, the patient tolerated the procedure well, there were no immediate post-operative complications.     Examination of the esophagus: 3 cords of large F3 esophageal varices were noted in the distal esophagus with red luz markings.  Varices were not bleeding.  3 bands were placed in the distal esophagus with decompression of the varices and no immediate complications.  Examination of the stomach: In the antrum there were 4 polypoid lesions near the pylorus measuring 8 to 15 mm. 1 with stigmata for bleeding.  Hot snare  polypectomy was performed and the polyps were removed.  Excellent hemostasis was achieved.  4 polyps were seen in the stomach body measuring 5 to 15 mm.  The largest polyp was pedunculated and had stigmata for bleeding.  Hot snare single piece polypectomies were performed with excellent hemostasis.  No gastric varices were noted  Examination of the duodenum: Normal to second portion of duodenum    Impression:  62-year-old alcoholic male with iron overload and prostate cancer undergoing radiation with history of gastric ulcers.  EGD showed esophageal varices for the first time which were banded x3.  He also had bleeding gastric polyps in the stomach body and antrum which were removed with hot snare polypectomies.    Recommendations:  Follow-up on pathology  Repeat EGD in 3 months  Avoid alcohol completely  Continue pantoprazole 40 mg daily  Monitor hemoglobin and liver function periodically  Consider nonselective beta-blocker  Follow-up with GI nurse practitioner in 2 months    We appreciate the referral    Electronically signed by Rito Ruth MD, 09/12/22, 9:59 AM EDT.

## 2022-09-12 NOTE — ANESTHESIA POSTPROCEDURE EVALUATION
Patient: Erik Fuentes    Procedure Summary     Date: 09/12/22 Room / Location: [facility] ENDOSCOPY 1 / [facility] ENDOSCOPY    Anesthesia Start: 0932 Anesthesia Stop: 0958    Procedure: ESOPHAGOGASTRODUODENOSCOPY with polypectomy x8 and banding of esophageal varices. (N/A ) Diagnosis:       Gastric ulcer      Cirrhosis (HCC)      (Gastric ulcer [K25.9])      (Cirrhosis (HCC) [K74.60])    Surgeons: Rito Ruth MD Provider: Sridhar Stringer MD    Anesthesia Type: MAC ASA Status: 3          Anesthesia Type: MAC    Vitals  Vitals Value Taken Time   /92 09/12/22 1002   Temp     Pulse 71 09/12/22 1029   Resp 19 09/12/22 1000   SpO2 94 % 09/12/22 1029   Vitals shown include unvalidated device data.        Post Anesthesia Care and Evaluation    Patient location during evaluation: PACU  Patient participation: complete - patient participated  Level of consciousness: awake  Pain scale: See nurse's notes for pain score.  Pain management: adequate    Airway patency: patent  Anesthetic complications: No anesthetic complications  PONV Status: none  Cardiovascular status: acceptable  Respiratory status: acceptable  Hydration status: acceptable    Comments: Patient seen and examined postoperatively; vital signs stable; SpO2 greater than or equal to 90%; cardiopulmonary status stable; nausea/vomiting adequately controlled; pain adequately controlled; no apparent anesthesia complications; patient discharged from anesthesia care when discharge criteria were met

## 2022-09-12 NOTE — ANESTHESIA PREPROCEDURE EVALUATION
Anesthesia Evaluation     Patient summary reviewed and Nursing notes reviewed   no history of anesthetic complications:  NPO Solid Status: > 8 hours  NPO Liquid Status: > 8 hours           Airway   Mallampati: II  TM distance: >3 FB  Neck ROM: full  No difficulty expected  Dental - normal exam     Pulmonary - normal exam   Cardiovascular - normal exam    (+) hypertension, hyperlipidemia,       Neuro/Psych  (+) psychiatric history,    GI/Hepatic/Renal/Endo    (+)  PUD, GI bleeding , liver disease,     Musculoskeletal     Abdominal  - normal exam    Bowel sounds: normal.   Substance History   (+) alcohol use,      OB/GYN          Other      history of cancer                      Anesthesia Plan    ASA 3     MAC     intravenous induction     Anesthetic plan, risks, benefits, and alternatives have been provided, discussed and informed consent has been obtained with: patient.

## 2022-09-12 NOTE — DISCHARGE INSTRUCTIONS
A responsible adult should stay with you and you should rest quietly for the rest of the day.    Do not drink alcohol, drive, operate any heavy machinery or power tools or make any legal/important decisions for the next 24 hours.     Progress your diet as tolerated.  If you begin to experience severe pain, increased shortness of breath, racing heartbeat or a fever above 101 F, seek immediate medical attention.     Follow up with MD as instructed. Call office for results in 3 to 5 days if needed. [phone]    Examination of the esophagus: 3 cords of large F3 esophageal varices were noted in the distal esophagus with red luz markings.  Varices were not bleeding.  3 bands were placed in the distal esophagus with decompression of the varices and no immediate complications.  Examination of the stomach: In the antrum there were 4 polypoid lesions near the pylorus measuring 8 to 15 mm. 1 with stigmata for bleeding.  Hot snare polypectomy was performed and the polyps were removed.  Excellent hemostasis was achieved.  4 polyps were seen in the stomach body measuring 5 to 15 mm.  The largest polyp was pedunculated and had stigmata for bleeding.  Hot snare single piece polypectomies were performed with excellent hemostasis.  No gastric varices were noted  Examination of the duodenum: Normal to second portion of duodenum     Impression:  62-year-old alcoholic male with iron overload and prostate cancer undergoing radiation with history of gastric ulcers.  EGD showed esophageal varices for the first time which were banded x3.  He also had bleeding gastric polyps in the stomach body and antrum which were removed with hot snare polypectomies.     Recommendations:  Follow-up on pathology  Repeat EGD in 3 months  Avoid alcohol completely  Continue pantoprazole 40 mg daily  Monitor hemoglobin and liver function periodically  Consider nonselective beta-blocker  Follow-up with GI nurse practitioner in 2 months

## 2022-09-13 LAB
LAB AP CASE REPORT: NORMAL
PATH REPORT.FINAL DX SPEC: NORMAL
PATH REPORT.GROSS SPEC: NORMAL

## 2022-11-14 ENCOUNTER — OFFICE (AMBULATORY)
Dept: URBAN - METROPOLITAN AREA CLINIC 64 | Facility: CLINIC | Age: 62
End: 2022-11-14

## 2022-11-14 VITALS
SYSTOLIC BLOOD PRESSURE: 126 MMHG | HEIGHT: 72 IN | WEIGHT: 201 LBS | DIASTOLIC BLOOD PRESSURE: 73 MMHG | HEART RATE: 93 BPM

## 2022-11-14 DIAGNOSIS — K74.69 OTHER CIRRHOSIS OF LIVER: ICD-10-CM

## 2022-11-14 DIAGNOSIS — I85.00 ESOPHAGEAL VARICES WITHOUT BLEEDING: ICD-10-CM

## 2022-11-14 PROCEDURE — 99214 OFFICE O/P EST MOD 30 MIN: CPT

## 2022-12-18 PROBLEM — I85.00 ESOPHAGEAL VARICES WITHOUT BLEEDING: Status: ACTIVE | Noted: 2022-12-18
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
## 2022-12-19 ENCOUNTER — HOSPITAL ENCOUNTER (OUTPATIENT)
Facility: HOSPITAL | Age: 62
Setting detail: HOSPITAL OUTPATIENT SURGERY
Discharge: HOME | End: 2022-12-19
Attending: INTERNAL MEDICINE | Admitting: INTERNAL MEDICINE

## 2022-12-19 ENCOUNTER — ON CAMPUS - OUTPATIENT (AMBULATORY)
Dept: URBAN - METROPOLITAN AREA HOSPITAL 85 | Facility: HOSPITAL | Age: 62
End: 2022-12-19

## 2022-12-19 ENCOUNTER — ANESTHESIA EVENT (OUTPATIENT)
Dept: GASTROENTEROLOGY | Facility: HOSPITAL | Age: 62
End: 2022-12-19

## 2022-12-19 ENCOUNTER — ANESTHESIA (OUTPATIENT)
Dept: GASTROENTEROLOGY | Facility: HOSPITAL | Age: 62
End: 2022-12-19

## 2022-12-19 VITALS
HEART RATE: 79 BPM | BODY MASS INDEX: 26.78 KG/M2 | WEIGHT: 197.75 LBS | OXYGEN SATURATION: 99 % | TEMPERATURE: 97.9 F | SYSTOLIC BLOOD PRESSURE: 128 MMHG | HEIGHT: 72 IN | RESPIRATION RATE: 18 BRPM | DIASTOLIC BLOOD PRESSURE: 66 MMHG

## 2022-12-19 DIAGNOSIS — I85.00 ESOPHAGEAL VARICES WITHOUT BLEEDING: ICD-10-CM

## 2022-12-19 DIAGNOSIS — I85.00 ESOPHAGEAL VARICES: ICD-10-CM

## 2022-12-19 DIAGNOSIS — K74.60 CIRRHOSIS: ICD-10-CM

## 2022-12-19 DIAGNOSIS — K31.7 GASTRIC POLYPS: ICD-10-CM

## 2022-12-19 DIAGNOSIS — K31.89 OTHER DISEASES OF STOMACH AND DUODENUM: ICD-10-CM

## 2022-12-19 DIAGNOSIS — K76.6 PORTAL HYPERTENSION: ICD-10-CM

## 2022-12-19 PROCEDURE — 25010000002 PROPOFOL 200 MG/20ML EMULSION: Performed by: ANESTHESIOLOGY

## 2022-12-19 PROCEDURE — 88342 IMHCHEM/IMCYTCHM 1ST ANTB: CPT | Performed by: INTERNAL MEDICINE

## 2022-12-19 PROCEDURE — 43239 EGD BIOPSY SINGLE/MULTIPLE: CPT | Performed by: INTERNAL MEDICINE

## 2022-12-19 PROCEDURE — 88305 TISSUE EXAM BY PATHOLOGIST: CPT | Performed by: INTERNAL MEDICINE

## 2022-12-19 PROCEDURE — 43244 EGD VARICES LIGATION: CPT | Performed by: INTERNAL MEDICINE

## 2022-12-19 RX ORDER — PROPOFOL 10 MG/ML
INJECTION, EMULSION INTRAVENOUS AS NEEDED
Status: DISCONTINUED | OUTPATIENT
Start: 2022-12-19 | End: 2022-12-19 | Stop reason: SURG

## 2022-12-19 RX ORDER — SODIUM CHLORIDE 9 MG/ML
INJECTION, SOLUTION INTRAVENOUS CONTINUOUS PRN
Status: DISCONTINUED | OUTPATIENT
Start: 2022-12-19 | End: 2022-12-19 | Stop reason: SURG

## 2022-12-19 RX ORDER — LIDOCAINE HYDROCHLORIDE 20 MG/ML
INJECTION, SOLUTION INFILTRATION; PERINEURAL AS NEEDED
Status: DISCONTINUED | OUTPATIENT
Start: 2022-12-19 | End: 2022-12-19 | Stop reason: SURG

## 2022-12-19 RX ORDER — ONDANSETRON 2 MG/ML
4 INJECTION INTRAMUSCULAR; INTRAVENOUS ONCE AS NEEDED
Status: DISCONTINUED | OUTPATIENT
Start: 2022-12-19 | End: 2022-12-19 | Stop reason: HOSPADM

## 2022-12-19 RX ADMIN — PROPOFOL 25 MG: 10 INJECTION, EMULSION INTRAVENOUS at 10:39

## 2022-12-19 RX ADMIN — SODIUM CHLORIDE: 0.9 INJECTION, SOLUTION INTRAVENOUS at 10:28

## 2022-12-19 RX ADMIN — PROPOFOL 25 MG: 10 INJECTION, EMULSION INTRAVENOUS at 10:50

## 2022-12-19 RX ADMIN — LIDOCAINE HYDROCHLORIDE 50 MG: 20 INJECTION, SOLUTION INFILTRATION; PERINEURAL at 10:38

## 2022-12-19 RX ADMIN — PROPOFOL 50 MG: 10 INJECTION, EMULSION INTRAVENOUS at 10:40

## 2022-12-19 RX ADMIN — PROPOFOL 50 MG: 10 INJECTION, EMULSION INTRAVENOUS at 10:43

## 2022-12-19 RX ADMIN — PROPOFOL 50 MG: 10 INJECTION, EMULSION INTRAVENOUS at 10:41

## 2022-12-19 RX ADMIN — PROPOFOL 25 MG: 10 INJECTION, EMULSION INTRAVENOUS at 10:45

## 2022-12-19 RX ADMIN — PROPOFOL 50 MG: 10 INJECTION, EMULSION INTRAVENOUS at 10:42

## 2022-12-19 RX ADMIN — PROPOFOL 75 MG: 10 INJECTION, EMULSION INTRAVENOUS at 10:38

## 2022-12-19 RX ADMIN — PROPOFOL 25 MG: 10 INJECTION, EMULSION INTRAVENOUS at 10:48

## 2022-12-19 NOTE — ANESTHESIA POSTPROCEDURE EVALUATION
Patient: Erik Fuentes    Procedure Summary     Date: 12/19/22 Room / Location: [facility] ENDOSCOPY 1 / [facility] ENDOSCOPY    Anesthesia Start: 1028 Anesthesia Stop: 1054    Procedure: ESOPHAGOGASTRODUODENOSCOPY with biopsy, and esophageal varices banding x2 Diagnosis:       Gastric polyps      Esophageal varices (HCC)      Cirrhosis (HCC)      (GASTRIC POLYPS WITH BLEEDING,ESOPHAGEAL VARICES, CIRRHOSIS)    Surgeons: Rito Ruth MD Provider: Darren Mishra MD    Anesthesia Type: general ASA Status: 3          Anesthesia Type: general    Vitals  No vitals data found for the desired time range.          Post Anesthesia Care and Evaluation    Patient location during evaluation: PACU  Patient participation: complete - patient cannot participate  Level of consciousness: responsive to light touch and responsive to physical stimuli  Pain score: 0  Pain management: adequate    Airway patency: patent  Anesthetic complications: No anesthetic complications  PONV Status: controlled  Cardiovascular status: acceptable and hemodynamically stable  Respiratory status: acceptable and face mask  Hydration status: acceptable    Comments: Satisfactory progress.Patient seen and examined postoperatively; vital signs stable; SpO2 greater than or equal to 90%; cardiopulmonary status stable; nausea/vomiting adequately controlled; pain adequately controlled; no apparent anesthesia complications; patient discharged from anesthesia care when discharge criteria were met

## 2022-12-19 NOTE — DISCHARGE INSTRUCTIONS
A responsible adult should stay with you and you should rest quietly for the rest of the day.    Do not drink alcohol, drive, operate any heavy machinery or power tools or make any legal/important decisions for the next 24 hours.     Progress your diet as tolerated.  If you begin to experience severe pain, increased shortness of breath, racing heartbeat or a fever above 101 F, seek immediate medical attention.     Follow up with MD as instructed. Call office for results in 3 to 5 days if needed.       [phone]    62-year-old alcoholic male with cirrhosis presenting for repeat EGD for esophageal varices to assess for banding.  He was previously intolerant to blood pressure medicine due to dizziness and lightheadedness.     Recommendations:  Monitor for postoperative complications  Follow-up on pathology  Continue alcohol cessation  Repeat EGD in 3 months

## 2022-12-19 NOTE — ANESTHESIA PREPROCEDURE EVALUATION
Anesthesia Evaluation     Patient summary reviewed and Nursing notes reviewed   no history of anesthetic complications:  NPO Solid Status: > 8 hours  NPO Liquid Status: > 8 hours           Airway   Mallampati: II  TM distance: >3 FB  Neck ROM: full  No difficulty expected  Dental      Pulmonary - normal exam    breath sounds clear to auscultation  Cardiovascular - normal exam    ECG reviewed  Rhythm: regular  Rate: normal    (+) hypertension, hyperlipidemia,       Neuro/Psych  GI/Hepatic/Renal/Endo    (+)  PUD,  liver disease,     Musculoskeletal     Abdominal  - normal exam   Substance History   (+) alcohol use,      OB/GYN          Other   blood dyscrasia anemia,     ROS/Med Hx Other: N/V, hyponatremia, hypomagnesemia, hypokalemia, allergies, hemachromotosis    Stress  No evidence for reversible myocardial ischemia noted.  Normal left ventricular ejection fraction of  52%.                    Anesthesia Plan    ASA 3     general   total IV anesthesia  (Patient identified; pre-operative vital signs, all relevant labs/studies, complete medical/surgical/anesthetic history, full medication list, full allergy list, and NPO status obtained/reviewed; physical assessment performed; anesthetic options, side effects, potential complications, risks, and benefits discussed; questions answered; written anesthesia consent obtained; patient cleared for procedure; anesthesia machine and equipment checked and functioning)  intravenous induction     Anesthetic plan, risks, benefits, and alternatives have been provided, discussed and informed consent has been obtained with: patient.

## 2022-12-19 NOTE — OP NOTE
ESOPHAGOGASTRODUODENOSCOPY Procedure Report    Patient Name:  Erik Fuentes  YOB: 1960    Date of Surgery:  12/19/2022     Preoperative diagnosis:   Esophageal varices without bleeding    Postoperative diagnosis:  Esophageal varices without bleeding  Inflammatory nodules in prepyloric gastric antrum  Portal hypertensive gastropathy        Procedure(s):  ESOPHAGOGASTRODUODENOSCOPY with biopsy, and esophageal varice band ligation x2    Staff:  Surgeon(s):  Rito Ruth MD      Anesthesia: Monitored Anesthesia Care    Implants:    Nothing was implanted during the procedure    Specimen:        See Below    Estimated blood loss: Minimal     Complications:  None    Description of Procedure:  Informed consent was obtained for the procedure, including sedation.  Risks of perforation, hemorrhage, adverse drug reaction and aspiration were discussed.  The patient was brought into the endoscopy suite. Continuous cardiopulmonary monitoring was performed. The patient was placed in the left lateral decubitus position.  The bite block was inserted into the patient's mouth. After adequate sedation was attained, the Olympus gastroscope was inserted into the patient's mouth and advanced to the second portion of the duodenum without difficulty.  Circumferential examination was performed. A retroflex exam was performed in the patient's stomach.  On completion of the exam, the bowel was decompressed, the scope was removed from the patient, the patient tolerated the procedure well, there were no immediate post-operative complications.     Examination of the esophagus: 2 cords of F3 esophageal varices with band ligation x2 in the distal 5 cm of the esophagus  Examination of the stomach: Portal hypertensive gastropathy with 2 inflammatory nodules in the prepyloric area biopsied for histology.  No gastric varices  Examination of the duodenum: Normal to second portion of duodenum    Impression:  62-year-old  alcoholic male with cirrhosis presenting for repeat EGD for esophageal varices to assess for banding.  He was previously intolerant to blood pressure medicine due to dizziness and lightheadedness.    Recommendations:  Monitor for postoperative complications  Follow-up on pathology  Continue alcohol cessation  Repeat EGD in 3 months    We appreciate the referral    Electronically signed by Rito Ruth MD, 12/19/22, 10:56 AM EST.

## 2022-12-20 LAB
LAB AP CASE REPORT: NORMAL
PATH REPORT.FINAL DX SPEC: NORMAL
PATH REPORT.GROSS SPEC: NORMAL

## 2023-02-02 ENCOUNTER — OFFICE VISIT (OUTPATIENT)
Dept: FAMILY MEDICINE CLINIC | Facility: CLINIC | Age: 63
End: 2023-02-02
Payer: COMMERCIAL

## 2023-02-02 VITALS
TEMPERATURE: 98.6 F | OXYGEN SATURATION: 100 % | BODY MASS INDEX: 26.28 KG/M2 | HEIGHT: 72 IN | WEIGHT: 194 LBS | HEART RATE: 79 BPM | DIASTOLIC BLOOD PRESSURE: 77 MMHG | SYSTOLIC BLOOD PRESSURE: 169 MMHG

## 2023-02-02 DIAGNOSIS — I10 ESSENTIAL HYPERTENSION: Primary | ICD-10-CM

## 2023-02-02 DIAGNOSIS — K70.30 ALCOHOLIC CIRRHOSIS OF LIVER WITHOUT ASCITES: ICD-10-CM

## 2023-02-02 DIAGNOSIS — I85.00 ESOPHAGEAL VARICES WITHOUT BLEEDING, UNSPECIFIED ESOPHAGEAL VARICES TYPE: ICD-10-CM

## 2023-02-02 PROBLEM — K74.69 OTHER CIRRHOSIS OF LIVER: Status: ACTIVE | Noted: 2023-02-02

## 2023-02-02 PROBLEM — K57.30 DVRTCLOS OF LG INT W/O PERFORATION OR ABSCESS W/O BLEEDING: Status: ACTIVE | Noted: 2018-07-02

## 2023-02-02 PROBLEM — D12.2 BENIGN NEOPLASM OF ASCENDING COLON: Status: ACTIVE | Noted: 2018-07-02

## 2023-02-02 PROBLEM — R94.5 ABNORMAL RESULTS OF LIVER FUNCTION STUDIES: Status: ACTIVE | Noted: 2023-02-02

## 2023-02-02 PROBLEM — K64.1 SECOND DEGREE HEMORRHOIDS: Status: ACTIVE | Noted: 2018-07-02

## 2023-02-02 PROBLEM — H52.4 PRESBYOPIA: Status: ACTIVE | Noted: 2022-09-20

## 2023-02-02 PROBLEM — E83.110 HEREDITARY HEMOCHROMATOSIS (HCC): Status: ACTIVE | Noted: 2023-02-02

## 2023-02-02 PROBLEM — D12.3 BENIGN NEOPLASM OF TRANSVERSE COLON: Status: ACTIVE | Noted: 2018-07-02

## 2023-02-02 PROBLEM — Z12.11 ENCOUNTER FOR SCREENING FOR MALIGNANT NEOPLASM OF COLON: Status: ACTIVE | Noted: 2023-02-02

## 2023-02-02 PROBLEM — Z86.0100 HISTORY OF COLONIC POLYPS: Status: ACTIVE | Noted: 2023-02-02

## 2023-02-02 PROBLEM — K72.90 HEPATIC FAILURE, UNSPECIFIED WITHOUT COMA: Status: ACTIVE | Noted: 2023-02-02

## 2023-02-02 PROBLEM — R93.2 ABNORMAL FINDINGS ON DIAGNOSTIC IMAGING OF LIVER AND BILIARY TRACT: Status: ACTIVE | Noted: 2023-02-02

## 2023-02-02 PROBLEM — Z72.89 OTHER PROBLEMS RELATED TO LIFESTYLE: Status: ACTIVE | Noted: 2023-02-02

## 2023-02-02 PROBLEM — K76.9 LIVER DISEASE: Status: ACTIVE | Noted: 2023-02-02

## 2023-02-02 PROBLEM — Z86.010 HISTORY OF COLONIC POLYPS: Status: ACTIVE | Noted: 2023-02-02

## 2023-02-02 PROCEDURE — 99214 OFFICE O/P EST MOD 30 MIN: CPT

## 2023-02-02 RX ORDER — PROPRANOLOL HYDROCHLORIDE 40 MG/1
40 TABLET ORAL 2 TIMES DAILY
Qty: 60 TABLET | Refills: 0 | Status: SHIPPED | OUTPATIENT
Start: 2023-02-02 | End: 2023-02-02

## 2023-02-02 RX ORDER — PROPRANOLOL HYDROCHLORIDE 80 MG/1
80 CAPSULE, EXTENDED RELEASE ORAL DAILY
Qty: 30 CAPSULE | Refills: 0 | Status: SHIPPED | OUTPATIENT
Start: 2023-02-02 | End: 2023-02-27

## 2023-02-27 ENCOUNTER — OFFICE VISIT (OUTPATIENT)
Dept: FAMILY MEDICINE CLINIC | Facility: CLINIC | Age: 63
End: 2023-02-27
Payer: COMMERCIAL

## 2023-02-27 VITALS
SYSTOLIC BLOOD PRESSURE: 161 MMHG | BODY MASS INDEX: 25.73 KG/M2 | HEIGHT: 72 IN | WEIGHT: 190 LBS | DIASTOLIC BLOOD PRESSURE: 76 MMHG | HEART RATE: 82 BPM | OXYGEN SATURATION: 99 %

## 2023-02-27 DIAGNOSIS — N52.9 ERECTILE DYSFUNCTION, UNSPECIFIED ERECTILE DYSFUNCTION TYPE: ICD-10-CM

## 2023-02-27 DIAGNOSIS — Z00.00 HEALTH MAINTENANCE EXAMINATION: Primary | ICD-10-CM

## 2023-02-27 DIAGNOSIS — I10 ESSENTIAL HYPERTENSION: ICD-10-CM

## 2023-02-27 DIAGNOSIS — K21.9 GASTROESOPHAGEAL REFLUX DISEASE, UNSPECIFIED WHETHER ESOPHAGITIS PRESENT: ICD-10-CM

## 2023-02-27 PROCEDURE — 99213 OFFICE O/P EST LOW 20 MIN: CPT | Performed by: NURSE PRACTITIONER

## 2023-02-27 PROCEDURE — 99396 PREV VISIT EST AGE 40-64: CPT | Performed by: NURSE PRACTITIONER

## 2023-02-27 RX ORDER — BENAZEPRIL HYDROCHLORIDE 10 MG/1
10 TABLET ORAL DAILY
Qty: 30 TABLET | Refills: 3 | Status: SHIPPED | OUTPATIENT
Start: 2023-02-27

## 2023-02-27 RX ORDER — SILDENAFIL CITRATE 20 MG/1
TABLET ORAL
Qty: 50 TABLET | Refills: 1 | Status: SHIPPED | OUTPATIENT
Start: 2023-02-27

## 2023-02-27 NOTE — PROGRESS NOTES
Subjective   Erik Fuentes is a 62 y.o. male.       HPI   Pt is here today for routine physical exam.   Medical/social/family hx reviewed.   Colonoscopy - due July 2023.  Seeing GI at end of March.    Immunizations reviewed.   No alcohol since Sept 2022.      1) HTN - currently on propranolol LA 80 mg daily. Took last one today but hasn't noticed any change in BP.  BP running 160's/70's.  Denies any CP; palpitations; SOA; dizziness; headache; trouble with vision.   2) GERD - currently on pantoprazole 40 mg daily. Symptoms stable.   3) FYI- Prostate cancer - has completed radiation; PSA back in normal range.  Followed by urology.   4) ED - needs refill on sildenafil.       The following portions of the patient's history were reviewed and updated as appropriate: allergies, current medications, past family history, past medical history, past social history, past surgical history and problem list.    Review of Systems   Constitutional: Negative for chills, fatigue and fever.   Eyes: Negative for visual disturbance.   Respiratory: Negative for cough, chest tightness, shortness of breath and wheezing.    Cardiovascular: Negative for chest pain and palpitations.   Gastrointestinal: Negative for abdominal pain, blood in stool, constipation, diarrhea, nausea, vomiting and GERD.   Genitourinary: Negative for dysuria, flank pain, frequency, hematuria and urgency.   Musculoskeletal: Negative for arthralgias and myalgias.   Neurological: Negative for dizziness, weakness, light-headedness and headache.   Psychiatric/Behavioral: Negative for depressed mood. The patient is not nervous/anxious.        Objective   Physical Exam  Vitals reviewed.   Constitutional:       General: He is not in acute distress.     Appearance: Normal appearance.   Cardiovascular:      Rate and Rhythm: Normal rate and regular rhythm.      Pulses: Normal pulses.      Heart sounds: Normal heart sounds. No murmur heard.  Pulmonary:      Effort: Pulmonary  effort is normal. No respiratory distress.      Breath sounds: Normal breath sounds. No wheezing or rhonchi.   Chest:      Chest wall: No tenderness.   Abdominal:      Tenderness: There is no right CVA tenderness or left CVA tenderness.   Musculoskeletal:      Cervical back: Normal range of motion and neck supple.   Skin:     General: Skin is warm and dry.      Findings: No erythema.   Neurological:      General: No focal deficit present.      Mental Status: He is alert and oriented to person, place, and time.   Psychiatric:         Mood and Affect: Mood normal.           Assessment & Plan   Diagnoses and all orders for this visit:    1. Health maintenance examination (Primary)  Comments:  Medical/social/family hx reviewed.   Colonoscopy UTD.   Immunizations reviewed.   Labs ordered.   Orders:  -     Cancel: Comprehensive metabolic panel; Future  -     Cancel: Lipid panel; Future    2. Essential hypertension  Comments:  Stop propranolol; will start benazepril 10 mg daily.    Monitor BP at home; call in readings in one week.    Labs recently with GI; will get copies.   Orders:  -     Cancel: Comprehensive metabolic panel; Future  -     Cancel: Lipid panel; Future  -     benazepril (LOTENSIN) 10 MG tablet; Take 1 tablet by mouth Daily.  Dispense: 30 tablet; Refill: 3    3. Gastroesophageal reflux disease, unspecified whether esophagitis present  Comments:  Stable.   Cont. current medication.   Labs recently with GI; copies requested.   Orders:  -     Cancel: Comprehensive metabolic panel; Future  -     Cancel: Lipid panel; Future    4. Erectile dysfunction, unspecified erectile dysfunction type  Comments:  Stable.   Cont. current medication.   Refills sent.   Orders:  -     sildenafil (REVATIO) 20 MG tablet; Use 2-5 tabs prior to intercourse  Dispense: 50 tablet; Refill: 1

## 2023-03-28 ENCOUNTER — HOSPITAL ENCOUNTER (OUTPATIENT)
Facility: HOSPITAL | Age: 63
Setting detail: HOSPITAL OUTPATIENT SURGERY
Discharge: HOME | End: 2023-03-28
Attending: INTERNAL MEDICINE | Admitting: INTERNAL MEDICINE
Payer: MEDICARE

## 2023-03-28 ENCOUNTER — ON CAMPUS - OUTPATIENT (AMBULATORY)
Dept: URBAN - METROPOLITAN AREA HOSPITAL 85 | Facility: HOSPITAL | Age: 63
End: 2023-03-28

## 2023-03-28 ENCOUNTER — ANESTHESIA (OUTPATIENT)
Dept: GASTROENTEROLOGY | Facility: HOSPITAL | Age: 63
End: 2023-03-28
Payer: MEDICARE

## 2023-03-28 ENCOUNTER — ANESTHESIA EVENT (OUTPATIENT)
Dept: GASTROENTEROLOGY | Facility: HOSPITAL | Age: 63
End: 2023-03-28
Payer: MEDICARE

## 2023-03-28 VITALS
BODY MASS INDEX: 25.26 KG/M2 | RESPIRATION RATE: 25 BRPM | DIASTOLIC BLOOD PRESSURE: 62 MMHG | OXYGEN SATURATION: 99 % | HEIGHT: 72 IN | HEART RATE: 78 BPM | WEIGHT: 186.51 LBS | TEMPERATURE: 98 F | SYSTOLIC BLOOD PRESSURE: 117 MMHG

## 2023-03-28 VITALS
DIASTOLIC BLOOD PRESSURE: 68 MMHG | OXYGEN SATURATION: 96 % | RESPIRATION RATE: 18 BRPM | HEART RATE: 113 BPM | SYSTOLIC BLOOD PRESSURE: 125 MMHG

## 2023-03-28 DIAGNOSIS — K31.89 OTHER DISEASES OF STOMACH AND DUODENUM: ICD-10-CM

## 2023-03-28 DIAGNOSIS — I85.00 ESOPHAGEAL VARICES WITHOUT BLEEDING: ICD-10-CM

## 2023-03-28 PROCEDURE — 43244 EGD VARICES LIGATION: CPT | Performed by: INTERNAL MEDICINE

## 2023-03-28 PROCEDURE — 43251 EGD REMOVE LESION SNARE: CPT | Performed by: INTERNAL MEDICINE

## 2023-03-28 PROCEDURE — 88342 IMHCHEM/IMCYTCHM 1ST ANTB: CPT | Performed by: INTERNAL MEDICINE

## 2023-03-28 PROCEDURE — 88305 TISSUE EXAM BY PATHOLOGIST: CPT | Performed by: INTERNAL MEDICINE

## 2023-03-28 PROCEDURE — C1769 GUIDE WIRE: HCPCS | Performed by: INTERNAL MEDICINE

## 2023-03-28 PROCEDURE — 25010000002 PROPOFOL 200 MG/20ML EMULSION: Performed by: NURSE ANESTHETIST, CERTIFIED REGISTERED

## 2023-03-28 RX ORDER — PROPOFOL 10 MG/ML
INJECTION, EMULSION INTRAVENOUS AS NEEDED
Status: DISCONTINUED | OUTPATIENT
Start: 2023-03-28 | End: 2023-03-28 | Stop reason: SURG

## 2023-03-28 RX ORDER — SODIUM CHLORIDE 9 MG/ML
INJECTION, SOLUTION INTRAVENOUS CONTINUOUS PRN
Status: DISCONTINUED | OUTPATIENT
Start: 2023-03-28 | End: 2023-03-28 | Stop reason: SURG

## 2023-03-28 RX ORDER — ONDANSETRON 2 MG/ML
4 INJECTION INTRAMUSCULAR; INTRAVENOUS ONCE AS NEEDED
Status: DISCONTINUED | OUTPATIENT
Start: 2023-03-28 | End: 2023-03-28 | Stop reason: HOSPADM

## 2023-03-28 RX ORDER — LIDOCAINE HYDROCHLORIDE 20 MG/ML
INJECTION, SOLUTION EPIDURAL; INFILTRATION; INTRACAUDAL; PERINEURAL AS NEEDED
Status: DISCONTINUED | OUTPATIENT
Start: 2023-03-28 | End: 2023-03-28 | Stop reason: SURG

## 2023-03-28 RX ADMIN — LIDOCAINE HYDROCHLORIDE 100 MG: 20 INJECTION, SOLUTION EPIDURAL; INFILTRATION; INTRACAUDAL; PERINEURAL at 14:51

## 2023-03-28 RX ADMIN — PROPOFOL 50 MG: 10 INJECTION, EMULSION INTRAVENOUS at 14:52

## 2023-03-28 RX ADMIN — PROPOFOL 50 MG: 10 INJECTION, EMULSION INTRAVENOUS at 14:53

## 2023-03-28 RX ADMIN — PROPOFOL 50 MG: 10 INJECTION, EMULSION INTRAVENOUS at 14:55

## 2023-03-28 RX ADMIN — PROPOFOL 100 MG: 10 INJECTION, EMULSION INTRAVENOUS at 14:51

## 2023-03-28 RX ADMIN — SODIUM CHLORIDE: 0.9 INJECTION, SOLUTION INTRAVENOUS at 14:42

## 2023-03-28 RX ADMIN — PROPOFOL 50 MG: 10 INJECTION, EMULSION INTRAVENOUS at 15:03

## 2023-03-28 RX ADMIN — PROPOFOL 50 MG: 10 INJECTION, EMULSION INTRAVENOUS at 15:00

## 2023-03-28 RX ADMIN — PROPOFOL 50 MG: 10 INJECTION, EMULSION INTRAVENOUS at 14:58

## 2023-03-28 RX ADMIN — PROPOFOL 50 MG: 10 INJECTION, EMULSION INTRAVENOUS at 14:56

## 2023-03-28 RX ADMIN — PROPOFOL 50 MG: 10 INJECTION, EMULSION INTRAVENOUS at 15:01

## 2023-03-28 NOTE — OP NOTE
ESOPHAGOGASTRODUODENOSCOPY Procedure Report    Patient Name:  Erik Fuentes  YOB: 1960    Date of Surgery:  3/28/2023     Preoperative diagnosis:   Esophageal varices without bleeding  Intolerance to beta-blocker    Postoperative diagnosis:  1 cord of F2 esophageal varix with band ligation x2  Bilobed polypoid lesion in gastric antrum removed with snare        Procedure(s):  ESOPHAGOGASTRODUODENOSCOPY with hot snare polypectomy x1 and band ligation x2    Staff:  Surgeon(s):  Rito Ruth MD      Anesthesia: Monitored Anesthesia Care    Implants:    Nothing was implanted during the procedure    Specimen:        See Below    Estimated blood loss: Minimal     Complications:  None    Description of Procedure:  Informed consent was obtained for the procedure, including sedation.  Risks of perforation, hemorrhage, adverse drug reaction and aspiration were discussed.  The patient was brought into the endoscopy suite. Continuous cardiopulmonary monitoring was performed. The patient was placed in the left lateral decubitus position.  The bite block was inserted into the patient's mouth. After adequate sedation was attained, the Olympus gastroscope was inserted into the patient's mouth and advanced to the second portion of the duodenum without difficulty.  Circumferential examination was performed. A retroflex exam was performed in the patient's stomach.  On completion of the exam, the bowel was decompressed, the scope was removed from the patient, the patient tolerated the procedure well, there were no immediate post-operative complications.     Examination of the esophagus: 1 cord of F2 medium sized esophageal varices without bleeding was noted in the distal 5 cm of the esophagus.  Scarring was also noted from previous banding.  Band ligation was performed x2 in the usual fashion without any immediate complications.  Examination of the stomach: A bilobed inflammatory polypoid lesion with  stigmata for bleeding measuring about 12 mm was seen in the antrum near the pylorus.  Hot snare polypectomy was performed in 2 pieces with complete removal.  Examination of the duodenum: Normal mucosa second portion of duodenum    Impression:  62-year-old alcoholic male with iron overload and cirrhosis with esophageal varices intolerant to beta-blocker.  Esophageal band ligation x2 was performed and a polyp was removed from the patient's gastric antrum    Recommendations:  Follow-up in pathology  PPI daily  Avoid alcohol  Continue hepatoma surveillance with ultrasound every 6 months with labs  Repeat EGD in 1 year  Avoid blood thinners x7 days and avoid NSAIDs    We appreciate the referral    Electronically signed by Rito Ruth MD, 03/28/23, 3:11 PM EDT.

## 2023-03-28 NOTE — DISCHARGE INSTRUCTIONS
A responsible adult should stay with you and you should rest quietly for the rest of the day.    Do not drink alcohol, drive, operate any heavy machinery or power tools or make any legal/important decisions for the next 24 hours.     Progress your diet as tolerated.  If you begin to experience severe pain, increased shortness of breath, racing heartbeat or a fever above 101 F, seek immediate medical attention.     Follow up with MD as instructed. Call office for results in 3 to 5 days if needed. [phone]    Examination of the esophagus: 1 cord of F2 medium sized esophageal varices without bleeding was noted in the distal 5 cm of the esophagus.  Scarring was also noted from previous banding.  Band ligation was performed x2 in the usual fashion without any immediate complications.  Examination of the stomach: A bilobed inflammatory polypoid lesion with stigmata for bleeding measuring about 12 mm was seen in the antrum near the pylorus.  Hot snare polypectomy was performed in 2 pieces with complete removal.  Examination of the duodenum: Normal mucosa second portion of duodenum    Impression:  62-year-old alcoholic male with iron overload and cirrhosis with esophageal varices intolerant to beta-blocker.  Esophageal band ligation x2 was performed and a polyp was removed from the patient's gastric antrum     Recommendations:  Follow-up in pathology  PPI daily  Avoid alcohol  Continue hepatoma surveillance with ultrasound every 6 months with labs  Repeat EGD in 1 year  Avoid blood thinners x7 days and avoid NSAIDs

## 2023-03-28 NOTE — ANESTHESIA POSTPROCEDURE EVALUATION
Patient: Erik Fuentes    Procedure Summary     Date: 03/28/23 Room / Location: [facility] ENDOSCOPY 4 / [facility] ENDOSCOPY    Anesthesia Start: 1442 Anesthesia Stop: 1512    Procedure: ESOPHAGOGASTRODUODENOSCOPY with hot snare polypectomy x1 and band ligation x2 Diagnosis:       Esophageal varices without bleeding (HCC)      (Esophageal varices without bleeding (HCC) [I85.00])    Surgeons: Rito Ruth MD Provider: Esthela Esparza CRNA    Anesthesia Type: general ASA Status: 3          Anesthesia Type: general    Vitals  Vitals Value Taken Time   /76 03/28/23 1526   Temp     Pulse 72 03/28/23 1528   Resp 25 03/28/23 1515   SpO2 95 % 03/28/23 1528   Vitals shown include unvalidated device data.        Post Anesthesia Care and Evaluation    Patient location during evaluation: PACU  Patient participation: complete - patient participated  Level of consciousness: awake  Pain scale: See nurse's notes for pain score.  Pain management: adequate    Airway patency: patent  Anesthetic complications: No anesthetic complications  PONV Status: none  Cardiovascular status: acceptable  Respiratory status: acceptable and spontaneous ventilation  Hydration status: acceptable    Comments: Patient seen and examined postoperatively; vital signs stable; SpO2 greater than or equal to 90%; cardiopulmonary status stable; nausea/vomiting adequately controlled; pain adequately controlled; no apparent anesthesia complications; patient discharged from anesthesia care when discharge criteria were met
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
## 2023-03-28 NOTE — ANESTHESIA PREPROCEDURE EVALUATION
Anesthesia Evaluation     Patient summary reviewed and Nursing notes reviewed   NPO Solid Status: > 8 hours             Airway   Mallampati: II  TM distance: >3 FB  Neck ROM: full  No difficulty expected  Dental - normal exam     Pulmonary - negative pulmonary ROS and normal exam   Cardiovascular - normal exam    ECG reviewed    (+) hypertension,   (-) ARORA      Neuro/Psych- negative ROS  GI/Hepatic/Renal/Endo    (+)  GI bleeding , liver disease cirrhosis,     ROS Comment: Esophageal varices    Musculoskeletal (-) negative ROS    Abdominal  - normal exam    Bowel sounds: normal.   Substance History   (+) alcohol use,      OB/GYN negative ob/gyn ROS         Other                        Anesthesia Plan    ASA 3     general       Anesthetic plan, risks, benefits, and alternatives have been provided, discussed and informed consent has been obtained with: patient.        CODE STATUS:

## 2023-03-30 LAB
LAB AP CASE REPORT: NORMAL
PATH REPORT.FINAL DX SPEC: NORMAL
PATH REPORT.GROSS SPEC: NORMAL

## 2023-04-12 ENCOUNTER — OFFICE VISIT (OUTPATIENT)
Dept: FAMILY MEDICINE CLINIC | Facility: CLINIC | Age: 63
End: 2023-04-12
Payer: MEDICARE

## 2023-04-12 VITALS
DIASTOLIC BLOOD PRESSURE: 79 MMHG | OXYGEN SATURATION: 99 % | SYSTOLIC BLOOD PRESSURE: 161 MMHG | HEART RATE: 72 BPM | HEIGHT: 72 IN | BODY MASS INDEX: 25.19 KG/M2 | WEIGHT: 186 LBS

## 2023-04-12 DIAGNOSIS — N63.25 UNSPECIFIED LUMP IN THE LEFT BREAST, OVERLAPPING QUADRANTS: Primary | ICD-10-CM

## 2023-04-12 DIAGNOSIS — I10 ESSENTIAL HYPERTENSION: ICD-10-CM

## 2023-04-12 PROCEDURE — 3077F SYST BP >= 140 MM HG: CPT | Performed by: NURSE PRACTITIONER

## 2023-04-12 PROCEDURE — 99214 OFFICE O/P EST MOD 30 MIN: CPT | Performed by: NURSE PRACTITIONER

## 2023-04-12 PROCEDURE — 1160F RVW MEDS BY RX/DR IN RCRD: CPT | Performed by: NURSE PRACTITIONER

## 2023-04-12 PROCEDURE — 3078F DIAST BP <80 MM HG: CPT | Performed by: NURSE PRACTITIONER

## 2023-04-12 PROCEDURE — 1159F MED LIST DOCD IN RCRD: CPT | Performed by: NURSE PRACTITIONER

## 2023-04-12 NOTE — PROGRESS NOTES
Subjective   Erik Fuentes is a 63 y.o. male.       HPI   Pt is here today with concern of a knot on the left breast. He noticed it about one month ago.  He says it seems like it gets smaller at times and larger at other times.  He does report tenderness.  He denies any redness or warmth over the skin.  No nipple discharge.  No pain or swelling under his arm.  No fevers.      BP at home running 120-160's/80's.  Benazepril 10 mg added at last visit.  He is monitoring BP daily.  Denies any CP; palpitations; SOA; dizziness; headache; trouble with vision.       The following portions of the patient's history were reviewed and updated as appropriate: allergies, current medications, past family history, past medical history, past social history, past surgical history and problem list.    Review of Systems   Constitutional: Negative for chills, fatigue and fever.   Respiratory: Negative for cough, shortness of breath and wheezing.    Cardiovascular: Negative for chest pain and palpitations.   Neurological: Negative for dizziness and headache.   Psychiatric/Behavioral: Negative for depressed mood. The patient is not nervous/anxious.        Objective   Physical Exam  Vitals reviewed.   Constitutional:       General: He is not in acute distress.     Appearance: Normal appearance.   Cardiovascular:      Rate and Rhythm: Normal rate and regular rhythm.      Pulses: Normal pulses.      Heart sounds: Normal heart sounds. No murmur heard.  Pulmonary:      Effort: Pulmonary effort is normal. No respiratory distress.      Breath sounds: Normal breath sounds. No wheezing.   Chest:      Chest wall: No tenderness.   Breasts:     Right: No swelling, bleeding, inverted nipple, mass, nipple discharge, skin change or tenderness.      Left: Swelling (under nipple area with mild tenderness; no erythema or warmth. ), mass and tenderness present. No bleeding, inverted nipple, nipple discharge or skin change.   Lymphadenopathy:      Upper  Body:      Right upper body: No supraclavicular or axillary adenopathy.      Left upper body: No supraclavicular or axillary adenopathy.   Neurological:      General: No focal deficit present.      Mental Status: He is alert and oriented to person, place, and time.   Psychiatric:         Mood and Affect: Mood normal.           Assessment & Plan   Diagnoses and all orders for this visit:    1. Unspecified lump in the left breast, overlapping quadrants (Primary)  Comments:  Mammogram and US ordered.   Orders:  -     US Breast Left Limited; Future  -     Mammo Diagnostic Digital Tomosynthesis Left With CAD; Future    2. Essential hypertension  Comments:  Cont. current medication.   Send in daily readings over the next week for review.

## 2023-04-13 ENCOUNTER — TELEPHONE (OUTPATIENT)
Dept: FAMILY MEDICINE CLINIC | Facility: CLINIC | Age: 63
End: 2023-04-13
Payer: MEDICARE

## 2023-04-13 DIAGNOSIS — N63.25 UNSPECIFIED LUMP IN THE LEFT BREAST, OVERLAPPING QUADRANTS: ICD-10-CM

## 2023-04-13 DIAGNOSIS — N63.25 UNSPECIFIED LUMP IN THE LEFT BREAST, OVERLAPPING QUADRANTS: Primary | ICD-10-CM

## 2023-04-13 DIAGNOSIS — I10 HYPERTENSION, UNSPECIFIED TYPE: Primary | ICD-10-CM

## 2023-04-13 RX ORDER — BENAZEPRIL HYDROCHLORIDE 20 MG/1
20 TABLET ORAL DAILY
Qty: 90 TABLET | Refills: 3 | Status: SHIPPED | OUTPATIENT
Start: 2023-04-13

## 2023-04-13 NOTE — TELEPHONE ENCOUNTER
Caller: [facility]    Best call back number: [phone]    What orders are you requesting (i.e. lab or imaging):     [id number] - MAMMO DIAGNOSTIC DIGITAL TOMOSYNTHESIS LEFT W CAD LT       THIS HAS TO BE RESENT AS A BILATERAL    In what timeframe would the patient need to come in: TODAY AT 1:15 IS HIS APPOINTMENT    Where will you receive your lab/imaging services: [facility]    Additional notes:     THIS IS SCHEDULED FOR TODAY AND SHE DOES NOT WANT TO RESCHEDULE THIS.    HIS APPOINTMENT IS AT 1:15

## 2023-05-30 ENCOUNTER — HOSPITAL ENCOUNTER (OUTPATIENT)
Dept: INFUSION THERAPY | Facility: HOSPITAL | Age: 63
Discharge: HOME | End: 2023-05-30
Admitting: INTERNAL MEDICINE

## 2023-05-30 DIAGNOSIS — K74.60 CIRRHOSIS OF LIVER WITHOUT ASCITES, UNSPECIFIED HEPATIC CIRRHOSIS TYPE: ICD-10-CM

## 2023-05-30 DIAGNOSIS — E83.110 HEMOCHROMATOSIS, HEREDITARY: ICD-10-CM

## 2023-05-30 DIAGNOSIS — E83.110 HEMOCHROMATOSIS, HEREDITARY: Primary | ICD-10-CM

## 2023-05-30 LAB
ALBUMIN SERPL-MCNC: 3.5 G/DL (ref 3.5–5.2)
ALBUMIN/GLOB SERPL: 1.2 G/DL
ALP SERPL-CCNC: 137 U/L (ref 39–117)
ALPHA-FETOPROTEIN: 3.15 NG/ML (ref 0–8.3)
ALT SERPL W P-5'-P-CCNC: 17 U/L (ref 1–41)
ANION GAP SERPL CALCULATED.3IONS-SCNC: 10 MMOL/L (ref 5–15)
AST SERPL-CCNC: 36 U/L (ref 1–40)
BASOPHILS # BLD AUTO: 0.1 10*3/MM3 (ref 0–0.2)
BASOPHILS NFR BLD AUTO: 1.2 % (ref 0–1.5)
BILIRUB SERPL-MCNC: 1.4 MG/DL (ref 0–1.2)
BUN SERPL-MCNC: 10 MG/DL (ref 8–23)
BUN/CREAT SERPL: 11.1 (ref 7–25)
CALCIUM SPEC-SCNC: 8.6 MG/DL (ref 8.6–10.5)
CHLORIDE SERPL-SCNC: 103 MMOL/L (ref 98–107)
CO2 SERPL-SCNC: 26 MMOL/L (ref 22–29)
CREAT SERPL-MCNC: 0.9 MG/DL (ref 0.76–1.27)
DEPRECATED RDW RBC AUTO: 56 FL (ref 37–54)
EGFRCR SERPLBLD CKD-EPI 2021: 94.8 ML/MIN/1.73
EOSINOPHIL # BLD AUTO: 0.1 10*3/MM3 (ref 0–0.4)
EOSINOPHIL NFR BLD AUTO: 2.5 % (ref 0.3–6.2)
ERYTHROCYTE [DISTWIDTH] IN BLOOD BY AUTOMATED COUNT: 17.2 % (ref 12.3–15.4)
FERRITIN SERPL-MCNC: 26.19 NG/ML (ref 30–400)
GLOBULIN UR ELPH-MCNC: 2.9 GM/DL
GLUCOSE SERPL-MCNC: 137 MG/DL (ref 65–99)
HCT VFR BLD AUTO: 30.8 % (ref 37.5–51)
HGB BLD-MCNC: 10.2 G/DL (ref 13–17.7)
INR PPP: 1.37 (ref 0.93–1.1)
IRON 24H UR-MRATE: 41 MCG/DL (ref 59–158)
IRON SATN MFR SERPL: 11 % (ref 20–50)
LYMPHOCYTES # BLD AUTO: 1.1 10*3/MM3 (ref 0.7–3.1)
LYMPHOCYTES NFR BLD AUTO: 25.2 % (ref 19.6–45.3)
MCH RBC QN AUTO: 32 PG (ref 26.6–33)
MCHC RBC AUTO-ENTMCNC: 33.2 G/DL (ref 31.5–35.7)
MCV RBC AUTO: 96.4 FL (ref 79–97)
MONOCYTES # BLD AUTO: 0.6 10*3/MM3 (ref 0.1–0.9)
MONOCYTES NFR BLD AUTO: 13.7 % (ref 5–12)
NEUTROPHILS NFR BLD AUTO: 2.4 10*3/MM3 (ref 1.7–7)
NEUTROPHILS NFR BLD AUTO: 57.4 % (ref 42.7–76)
NRBC BLD AUTO-RTO: 0.1 /100 WBC (ref 0–0.2)
PLATELET # BLD AUTO: 185 10*3/MM3 (ref 140–450)
PMV BLD AUTO: 9.4 FL (ref 6–12)
POTASSIUM SERPL-SCNC: 3.5 MMOL/L (ref 3.5–5.2)
PROT SERPL-MCNC: 6.4 G/DL (ref 6–8.5)
PROTHROMBIN TIME: 14.4 SECONDS (ref 9.6–11.7)
RBC # BLD AUTO: 3.2 10*6/MM3 (ref 4.14–5.8)
SODIUM SERPL-SCNC: 139 MMOL/L (ref 136–145)
TIBC SERPL-MCNC: 367 MCG/DL (ref 298–536)
TRANSFERRIN SERPL-MCNC: 246 MG/DL (ref 200–360)
WBC NRBC COR # BLD: 4.2 10*3/MM3 (ref 3.4–10.8)

## 2023-05-30 PROCEDURE — 82728 ASSAY OF FERRITIN: CPT | Performed by: INTERNAL MEDICINE

## 2023-05-30 PROCEDURE — 84466 ASSAY OF TRANSFERRIN: CPT | Performed by: INTERNAL MEDICINE

## 2023-05-30 PROCEDURE — 85610 PROTHROMBIN TIME: CPT | Performed by: INTERNAL MEDICINE

## 2023-05-30 PROCEDURE — 80053 COMPREHEN METABOLIC PANEL: CPT | Performed by: INTERNAL MEDICINE

## 2023-05-30 PROCEDURE — 82105 ALPHA-FETOPROTEIN SERUM: CPT | Performed by: INTERNAL MEDICINE

## 2023-05-30 PROCEDURE — 85025 COMPLETE CBC W/AUTO DIFF WBC: CPT | Performed by: INTERNAL MEDICINE

## 2023-05-30 PROCEDURE — 83540 ASSAY OF IRON: CPT | Performed by: INTERNAL MEDICINE

## 2023-06-13 PROBLEM — K57.30 DVRTCLOS OF LG INT W/O PERFORATION OR ABSCESS W/O BLEEDING: Status: ACTIVE | Noted: 2018-07-02

## 2023-08-28 ENCOUNTER — OFFICE VISIT (OUTPATIENT)
Dept: FAMILY MEDICINE CLINIC | Facility: CLINIC | Age: 63
End: 2023-08-28
Payer: MEDICARE

## 2023-08-28 ENCOUNTER — TELEPHONE (OUTPATIENT)
Dept: FAMILY MEDICINE CLINIC | Facility: CLINIC | Age: 63
End: 2023-08-28

## 2023-08-28 ENCOUNTER — LAB (OUTPATIENT)
Dept: FAMILY MEDICINE CLINIC | Facility: CLINIC | Age: 63
End: 2023-08-28
Payer: MEDICARE

## 2023-08-28 VITALS
HEART RATE: 71 BPM | BODY MASS INDEX: 26.01 KG/M2 | OXYGEN SATURATION: 97 % | WEIGHT: 192 LBS | DIASTOLIC BLOOD PRESSURE: 81 MMHG | HEIGHT: 72 IN | SYSTOLIC BLOOD PRESSURE: 165 MMHG

## 2023-08-28 DIAGNOSIS — I10 HYPERTENSION, UNSPECIFIED TYPE: Primary | ICD-10-CM

## 2023-08-28 DIAGNOSIS — N52.9 ERECTILE DYSFUNCTION, UNSPECIFIED ERECTILE DYSFUNCTION TYPE: ICD-10-CM

## 2023-08-28 DIAGNOSIS — D50.9 IRON DEFICIENCY ANEMIA, UNSPECIFIED IRON DEFICIENCY ANEMIA TYPE: ICD-10-CM

## 2023-08-28 DIAGNOSIS — I10 HYPERTENSION, UNSPECIFIED TYPE: ICD-10-CM

## 2023-08-28 DIAGNOSIS — K21.9 GASTROESOPHAGEAL REFLUX DISEASE, UNSPECIFIED WHETHER ESOPHAGITIS PRESENT: ICD-10-CM

## 2023-08-28 LAB
ALBUMIN SERPL-MCNC: 3.5 G/DL (ref 3.5–5.2)
ALBUMIN/GLOB SERPL: 1.2 G/DL
ALP SERPL-CCNC: 112 U/L (ref 39–117)
ALT SERPL W P-5'-P-CCNC: 22 U/L (ref 1–41)
ANION GAP SERPL CALCULATED.3IONS-SCNC: 10.5 MMOL/L (ref 5–15)
AST SERPL-CCNC: 39 U/L (ref 1–40)
BASOPHILS # BLD AUTO: 0.04 10*3/MM3 (ref 0–0.2)
BASOPHILS NFR BLD AUTO: 0.8 % (ref 0–1.5)
BILIRUB SERPL-MCNC: 1.9 MG/DL (ref 0–1.2)
BUN SERPL-MCNC: 7 MG/DL (ref 8–23)
BUN/CREAT SERPL: 10 (ref 7–25)
CALCIUM SPEC-SCNC: 8.8 MG/DL (ref 8.6–10.5)
CHLORIDE SERPL-SCNC: 102 MMOL/L (ref 98–107)
CHOLEST SERPL-MCNC: 161 MG/DL (ref 0–200)
CO2 SERPL-SCNC: 27.5 MMOL/L (ref 22–29)
CREAT SERPL-MCNC: 0.7 MG/DL (ref 0.76–1.27)
DEPRECATED RDW RBC AUTO: 53.4 FL (ref 37–54)
EGFRCR SERPLBLD CKD-EPI 2021: 102.3 ML/MIN/1.73
EOSINOPHIL # BLD AUTO: 0.19 10*3/MM3 (ref 0–0.4)
EOSINOPHIL NFR BLD AUTO: 3.9 % (ref 0.3–6.2)
ERYTHROCYTE [DISTWIDTH] IN BLOOD BY AUTOMATED COUNT: 14.8 % (ref 12.3–15.4)
FERRITIN SERPL-MCNC: 49.6 NG/ML (ref 30–400)
GLOBULIN UR ELPH-MCNC: 3 GM/DL
GLUCOSE SERPL-MCNC: 95 MG/DL (ref 65–99)
HCT VFR BLD AUTO: 35.5 % (ref 37.5–51)
HDLC SERPL-MCNC: 77 MG/DL (ref 40–60)
HGB BLD-MCNC: 12.5 G/DL (ref 13–17.7)
IMM GRANULOCYTES # BLD AUTO: 0 10*3/MM3 (ref 0–0.05)
IMM GRANULOCYTES NFR BLD AUTO: 0 % (ref 0–0.5)
IRON 24H UR-MRATE: 268 MCG/DL (ref 59–158)
IRON SATN MFR SERPL: 79 % (ref 20–50)
LDLC SERPL CALC-MCNC: 72 MG/DL (ref 0–100)
LDLC/HDLC SERPL: 0.94 {RATIO}
LYMPHOCYTES # BLD AUTO: 1.47 10*3/MM3 (ref 0.7–3.1)
LYMPHOCYTES NFR BLD AUTO: 30.1 % (ref 19.6–45.3)
MCH RBC QN AUTO: 34.8 PG (ref 26.6–33)
MCHC RBC AUTO-ENTMCNC: 35.2 G/DL (ref 31.5–35.7)
MCV RBC AUTO: 98.9 FL (ref 79–97)
MONOCYTES # BLD AUTO: 0.67 10*3/MM3 (ref 0.1–0.9)
MONOCYTES NFR BLD AUTO: 13.7 % (ref 5–12)
NEUTROPHILS NFR BLD AUTO: 2.52 10*3/MM3 (ref 1.7–7)
NEUTROPHILS NFR BLD AUTO: 51.5 % (ref 42.7–76)
NRBC BLD AUTO-RTO: 0.2 /100 WBC (ref 0–0.2)
PLATELET # BLD AUTO: 176 10*3/MM3 (ref 140–450)
PMV BLD AUTO: 11.8 FL (ref 6–12)
POTASSIUM SERPL-SCNC: 3.7 MMOL/L (ref 3.5–5.2)
PROT SERPL-MCNC: 6.5 G/DL (ref 6–8.5)
RBC # BLD AUTO: 3.59 10*6/MM3 (ref 4.14–5.8)
SODIUM SERPL-SCNC: 140 MMOL/L (ref 136–145)
TIBC SERPL-MCNC: 340 MCG/DL (ref 298–536)
TRANSFERRIN SERPL-MCNC: 228 MG/DL (ref 200–360)
TRIGL SERPL-MCNC: 58 MG/DL (ref 0–150)
VLDLC SERPL-MCNC: 12 MG/DL (ref 5–40)
WBC NRBC COR # BLD: 4.89 10*3/MM3 (ref 3.4–10.8)

## 2023-08-28 PROCEDURE — 99214 OFFICE O/P EST MOD 30 MIN: CPT | Performed by: NURSE PRACTITIONER

## 2023-08-28 PROCEDURE — 84466 ASSAY OF TRANSFERRIN: CPT | Performed by: NURSE PRACTITIONER

## 2023-08-28 PROCEDURE — 83540 ASSAY OF IRON: CPT | Performed by: NURSE PRACTITIONER

## 2023-08-28 PROCEDURE — 3079F DIAST BP 80-89 MM HG: CPT | Performed by: NURSE PRACTITIONER

## 2023-08-28 PROCEDURE — 82728 ASSAY OF FERRITIN: CPT | Performed by: NURSE PRACTITIONER

## 2023-08-28 PROCEDURE — 36415 COLL VENOUS BLD VENIPUNCTURE: CPT

## 2023-08-28 PROCEDURE — 80061 LIPID PANEL: CPT | Performed by: NURSE PRACTITIONER

## 2023-08-28 PROCEDURE — 85025 COMPLETE CBC W/AUTO DIFF WBC: CPT | Performed by: NURSE PRACTITIONER

## 2023-08-28 PROCEDURE — 1159F MED LIST DOCD IN RCRD: CPT | Performed by: NURSE PRACTITIONER

## 2023-08-28 PROCEDURE — 3077F SYST BP >= 140 MM HG: CPT | Performed by: NURSE PRACTITIONER

## 2023-08-28 PROCEDURE — 80053 COMPREHEN METABOLIC PANEL: CPT | Performed by: NURSE PRACTITIONER

## 2023-08-28 PROCEDURE — 1160F RVW MEDS BY RX/DR IN RCRD: CPT | Performed by: NURSE PRACTITIONER

## 2023-08-28 RX ORDER — SILDENAFIL CITRATE 20 MG/1
TABLET ORAL
Qty: 50 TABLET | Refills: 1 | Status: SHIPPED | OUTPATIENT
Start: 2023-08-28

## 2023-08-28 NOTE — PROGRESS NOTES
Subjective   Erik Fuentes is a 63 y.o. male.       HPI   Pt is here today for 6 month follow up.   1) HTN - currently on benazepril 20 mg daily.  Denies any CP; palpitations; SOA; dizziness; headache; trouble with vision.  BP at home running 130-150/60-80 at home.    2) GERD - currently on pantoprazole 40 mg daily. Symptoms stable.   3) ED - currently on sildenafil 20 mg PRN.   4) ABAD - currently on iron supplement; started by Dr. Arias in May.  Hasn't had repeat labs yet.  Scheduled for colonoscopy in Oct.     The following portions of the patient's history were reviewed and updated as appropriate: allergies, current medications, past family history, past medical history, past social history, past surgical history, and problem list.    Review of Systems   Constitutional:  Negative for chills, fatigue and fever.   Respiratory:  Negative for cough, shortness of breath and wheezing.    Cardiovascular:  Negative for chest pain and palpitations.   Gastrointestinal:  Negative for abdominal pain, blood in stool, constipation, diarrhea, nausea, vomiting and indigestion.   Genitourinary:  Negative for dysuria, frequency, hematuria and urgency.   Neurological:  Negative for dizziness, weakness, light-headedness and headache.   Psychiatric/Behavioral:  Negative for depressed mood. The patient is not nervous/anxious.      Objective   Physical Exam  Vitals reviewed.   Constitutional:       General: He is not in acute distress.     Appearance: Normal appearance.   Cardiovascular:      Rate and Rhythm: Normal rate and regular rhythm.      Pulses: Normal pulses.      Heart sounds: Normal heart sounds. No murmur heard.  Pulmonary:      Effort: Pulmonary effort is normal. No respiratory distress.      Breath sounds: Normal breath sounds. No wheezing or rhonchi.   Chest:      Chest wall: No tenderness.   Abdominal:      Tenderness: There is no right CVA tenderness or left CVA tenderness.   Skin:     General: Skin is warm and  dry.      Findings: No erythema.   Neurological:      General: No focal deficit present.      Mental Status: He is alert and oriented to person, place, and time.   Psychiatric:         Mood and Affect: Mood normal.         Assessment & Plan   Diagnoses and all orders for this visit:    1. Hypertension, unspecified type (Primary)  Comments:  Stable.   Cont. current medication and home monitoring.   Labs ordered.   RTO in 6 mo.  Orders:  -     Comprehensive metabolic panel; Future  -     Lipid panel; Future    2. Gastroesophageal reflux disease, unspecified whether esophagitis present  Comments:  Stable.   Cont. current medication.   Labs ordered.   RTO in 6 mo.  Orders:  -     Comprehensive metabolic panel; Future  -     Lipid panel; Future    3. Erectile dysfunction, unspecified erectile dysfunction type  Comments:  Stable.   Cont. current medication.   RTO in 6 mo.  Orders:  -     sildenafil (REVATIO) 20 MG tablet; Use 2-5 tabs prior to intercourse  Dispense: 50 tablet; Refill: 1    4. Iron deficiency anemia, unspecified iron deficiency anemia type  Comments:  Labs ordered.  Orders:  -     CBC w AUTO Differential; Future  -     Ferritin; Future  -     Iron and TIBC; Future

## 2023-10-16 ENCOUNTER — OFFICE (AMBULATORY)
Dept: URBAN - METROPOLITAN AREA PATHOLOGY 4 | Facility: PATHOLOGY | Age: 63
End: 2023-10-16
Payer: COMMERCIAL

## 2023-10-16 ENCOUNTER — OFFICE (AMBULATORY)
Dept: URBAN - METROPOLITAN AREA PATHOLOGY 4 | Facility: PATHOLOGY | Age: 63
End: 2023-10-16

## 2023-10-16 ENCOUNTER — ON CAMPUS - OUTPATIENT (AMBULATORY)
Dept: URBAN - METROPOLITAN AREA HOSPITAL 2 | Facility: HOSPITAL | Age: 63
End: 2023-10-16

## 2023-10-16 VITALS
HEIGHT: 72 IN | TEMPERATURE: 98.6 F | OXYGEN SATURATION: 97 % | SYSTOLIC BLOOD PRESSURE: 148 MMHG | SYSTOLIC BLOOD PRESSURE: 121 MMHG | HEART RATE: 88 BPM | HEART RATE: 71 BPM | DIASTOLIC BLOOD PRESSURE: 87 MMHG | OXYGEN SATURATION: 99 % | HEART RATE: 78 BPM | DIASTOLIC BLOOD PRESSURE: 75 MMHG | OXYGEN SATURATION: 98 % | HEART RATE: 96 BPM | HEART RATE: 84 BPM | DIASTOLIC BLOOD PRESSURE: 62 MMHG | DIASTOLIC BLOOD PRESSURE: 73 MMHG | SYSTOLIC BLOOD PRESSURE: 146 MMHG | OXYGEN SATURATION: 96 % | SYSTOLIC BLOOD PRESSURE: 113 MMHG | SYSTOLIC BLOOD PRESSURE: 167 MMHG | RESPIRATION RATE: 15 BRPM | DIASTOLIC BLOOD PRESSURE: 83 MMHG | DIASTOLIC BLOOD PRESSURE: 72 MMHG | HEART RATE: 93 BPM | RESPIRATION RATE: 16 BRPM | DIASTOLIC BLOOD PRESSURE: 69 MMHG | SYSTOLIC BLOOD PRESSURE: 135 MMHG | SYSTOLIC BLOOD PRESSURE: 111 MMHG | SYSTOLIC BLOOD PRESSURE: 107 MMHG | HEART RATE: 90 BPM | RESPIRATION RATE: 18 BRPM | HEART RATE: 76 BPM | WEIGHT: 194 LBS | HEART RATE: 66 BPM | DIASTOLIC BLOOD PRESSURE: 77 MMHG | RESPIRATION RATE: 20 BRPM | OXYGEN SATURATION: 95 % | SYSTOLIC BLOOD PRESSURE: 132 MMHG

## 2023-10-16 DIAGNOSIS — Z86.010 PERSONAL HISTORY OF COLONIC POLYPS: ICD-10-CM

## 2023-10-16 DIAGNOSIS — D12.0 BENIGN NEOPLASM OF CECUM: ICD-10-CM

## 2023-10-16 DIAGNOSIS — D12.2 BENIGN NEOPLASM OF ASCENDING COLON: ICD-10-CM

## 2023-10-16 DIAGNOSIS — K63.5 POLYP OF COLON: ICD-10-CM

## 2023-10-16 DIAGNOSIS — K64.1 SECOND DEGREE HEMORRHOIDS: ICD-10-CM

## 2023-10-16 DIAGNOSIS — K57.30 DIVERTICULOSIS OF LARGE INTESTINE WITHOUT PERFORATION OR ABS: ICD-10-CM

## 2023-10-16 DIAGNOSIS — Z09 ENCOUNTER FOR FOLLOW-UP EXAMINATION AFTER COMPLETED TREATMEN: ICD-10-CM

## 2023-10-16 LAB
GI HISTOLOGY: A. UNSPECIFIED: (no result)
GI HISTOLOGY: B. UNSPECIFIED: (no result)
GI HISTOLOGY: C. UNSPECIFIED: (no result)
GI HISTOLOGY: D. UNSPECIFIED: (no result)
GI HISTOLOGY: PDF REPORT: (no result)

## 2023-10-16 PROCEDURE — 88305 TISSUE EXAM BY PATHOLOGIST: CPT | Mod: 26 | Performed by: INTERNAL MEDICINE

## 2023-10-16 PROCEDURE — 45385 COLONOSCOPY W/LESION REMOVAL: CPT | Mod: PT | Performed by: INTERNAL MEDICINE

## 2024-01-18 DIAGNOSIS — I10 HYPERTENSION, UNSPECIFIED TYPE: ICD-10-CM

## 2024-01-18 RX ORDER — BENAZEPRIL HYDROCHLORIDE 20 MG/1
20 TABLET ORAL DAILY
Qty: 90 TABLET | Refills: 3 | Status: SHIPPED | OUTPATIENT
Start: 2024-01-18

## 2024-02-27 ENCOUNTER — HOSPITAL ENCOUNTER (OUTPATIENT)
Dept: INFUSION THERAPY | Facility: HOSPITAL | Age: 64
Discharge: HOME | End: 2024-02-27
Admitting: INTERNAL MEDICINE
Payer: MEDICARE

## 2024-02-27 DIAGNOSIS — K74.69 OTHER CIRRHOSIS OF LIVER: Primary | ICD-10-CM

## 2024-02-27 DIAGNOSIS — K74.69 OTHER CIRRHOSIS OF LIVER: ICD-10-CM

## 2024-02-27 LAB
ALBUMIN SERPL-MCNC: 3.7 G/DL (ref 3.5–5.2)
ALBUMIN/GLOB SERPL: 1.2 G/DL
ALP SERPL-CCNC: 144 U/L (ref 39–117)
ALPHA-FETOPROTEIN: 4.11 NG/ML (ref 0–8.3)
ALT SERPL W P-5'-P-CCNC: 21 U/L (ref 1–41)
ANION GAP SERPL CALCULATED.3IONS-SCNC: 10 MMOL/L (ref 5–15)
AST SERPL-CCNC: 58 U/L (ref 1–40)
BASOPHILS # BLD AUTO: 0 10*3/MM3 (ref 0–0.2)
BASOPHILS NFR BLD AUTO: 0.9 % (ref 0–1.5)
BILIRUB SERPL-MCNC: 2 MG/DL (ref 0–1.2)
BUN SERPL-MCNC: 7 MG/DL (ref 8–23)
BUN/CREAT SERPL: 10 (ref 7–25)
CALCIUM SPEC-SCNC: 8.7 MG/DL (ref 8.6–10.5)
CHLORIDE SERPL-SCNC: 104 MMOL/L (ref 98–107)
CO2 SERPL-SCNC: 28 MMOL/L (ref 22–29)
CREAT SERPL-MCNC: 0.7 MG/DL (ref 0.76–1.27)
DEPRECATED RDW RBC AUTO: 52.9 FL (ref 37–54)
EGFRCR SERPLBLD CKD-EPI 2021: 102.3 ML/MIN/1.73
EOSINOPHIL # BLD AUTO: 0.1 10*3/MM3 (ref 0–0.4)
EOSINOPHIL NFR BLD AUTO: 1.7 % (ref 0.3–6.2)
ERYTHROCYTE [DISTWIDTH] IN BLOOD BY AUTOMATED COUNT: 15 % (ref 12.3–15.4)
GLOBULIN UR ELPH-MCNC: 3 GM/DL
GLUCOSE SERPL-MCNC: 127 MG/DL (ref 65–99)
HCT VFR BLD AUTO: 39.2 % (ref 37.5–51)
HGB BLD-MCNC: 13.1 G/DL (ref 13–17.7)
INR PPP: 1.36 (ref 0.93–1.1)
LYMPHOCYTES # BLD AUTO: 1.2 10*3/MM3 (ref 0.7–3.1)
LYMPHOCYTES NFR BLD AUTO: 23.4 % (ref 19.6–45.3)
MCH RBC QN AUTO: 35.1 PG (ref 26.6–33)
MCHC RBC AUTO-ENTMCNC: 33.5 G/DL (ref 31.5–35.7)
MCV RBC AUTO: 104.8 FL (ref 79–97)
MONOCYTES # BLD AUTO: 0.6 10*3/MM3 (ref 0.1–0.9)
MONOCYTES NFR BLD AUTO: 10.5 % (ref 5–12)
NEUTROPHILS NFR BLD AUTO: 3.4 10*3/MM3 (ref 1.7–7)
NEUTROPHILS NFR BLD AUTO: 63.5 % (ref 42.7–76)
NRBC BLD AUTO-RTO: 0.1 /100 WBC (ref 0–0.2)
PLATELET # BLD AUTO: 162 10*3/MM3 (ref 140–450)
PMV BLD AUTO: 9.2 FL (ref 6–12)
POTASSIUM SERPL-SCNC: 3.4 MMOL/L (ref 3.5–5.2)
PROT SERPL-MCNC: 6.7 G/DL (ref 6–8.5)
PROTHROMBIN TIME: 14.5 SECONDS (ref 9.6–11.7)
RBC # BLD AUTO: 3.74 10*6/MM3 (ref 4.14–5.8)
SODIUM SERPL-SCNC: 142 MMOL/L (ref 136–145)
WBC NRBC COR # BLD AUTO: 5.3 10*3/MM3 (ref 3.4–10.8)

## 2024-02-27 PROCEDURE — 36415 COLL VENOUS BLD VENIPUNCTURE: CPT

## 2024-02-27 PROCEDURE — 80053 COMPREHEN METABOLIC PANEL: CPT | Performed by: INTERNAL MEDICINE

## 2024-02-27 PROCEDURE — 85610 PROTHROMBIN TIME: CPT | Performed by: INTERNAL MEDICINE

## 2024-02-27 PROCEDURE — 82105 ALPHA-FETOPROTEIN SERUM: CPT | Performed by: INTERNAL MEDICINE

## 2024-02-27 PROCEDURE — 85025 COMPLETE CBC W/AUTO DIFF WBC: CPT | Performed by: INTERNAL MEDICINE

## 2024-03-08 NOTE — PROGRESS NOTES
The ABCs of the Annual Wellness Visit  Subsequent Medicare Wellness Visit    Chief Complaint   Patient presents with    Medicare Wellness-subsequent      Subjective    History of Present Illness:  Erik Fuentes is a 63 y.o. male who presents for a Subsequent Medicare Wellness Visit.  MMSE score today is 29/30.    Immunizations reviewed.   Colon screening - Oct. 2023. EGD scheduled for June.     Pt is also here today for 6 month follow up on chronic medical conditions.    1) HTN - currently on benazepril 20 mg daily.  Denies any CP; palpitations; SOA; dizziness; headache; trouble with vision.  BP running 160's-180's/70-80's at home.      2) GERD - currently on pantoprazole 40 mg daily. Symptoms stable.   3) ED - currently on sildenafil 20 mg PRN.   4) ABAD - currently on iron supplement    The following portions of the patient's history were reviewed and   updated as appropriate: allergies, current medications, past family history, past medical history, past social history, past surgical history, and problem list.    Compared to one year ago, the patient feels his physical   health is the same.    Compared to one year ago, the patient feels his mental   health is the same.    Recent Hospitalizations:  He was not admitted to the hospital during the last year.       Current Medical Providers:  Patient Care Team:  Tamar Eugene APRN as PCP - General (Nurse Practitioner)    Outpatient Medications Prior to Visit   Medication Sig Dispense Refill    B Complex-C (SUPER B COMPLEX/VITAMIN C PO) Take 1 tablet by mouth Daily. Not dos      Cholecalciferol (Vitamin D3) 25 MCG (1000 UT) capsule Take 1 capsule by mouth Daily.      FERROUS SULFATE PO Take  by mouth.      fexofenadine (ALLEGRA) 180 MG tablet Take 1 tablet by mouth Daily.      Glucosamine-Chondroitin (OSTEO BI-FLEX REGULAR STRENGTH PO) Take 1 tablet by mouth Daily. Not dos      Olopatadine HCl (PATADAY OP) Apply 1 drop to eye(s) as directed by provider 2 (two)  times a day.      pantoprazole (PROTONIX) 40 MG EC tablet Take 1 tablet by mouth Every Morning Before Breakfast. 90 tablet 1    sildenafil (REVATIO) 20 MG tablet Use 2-5 tabs prior to intercourse 50 tablet 1    Soolantra 1 % cream       triamcinolone (KENALOG) 0.1 % cream Apply  topically to the appropriate area as directed Daily As Needed.      Triamcinolone Acetonide (NASACORT) 55 MCG/ACT nasal inhaler 2 sprays into the nostril(s) as directed by provider Daily.      benazepril (LOTENSIN) 20 MG tablet Take 1 tablet by mouth Daily. 90 tablet 3     No facility-administered medications prior to visit.       No opioid medication identified on active medication list. I have reviewed chart for other potential  high risk medication/s and harmful drug interactions in the elderly.        Aspirin is not on active medication list.  Aspirin use is not indicated based on review of current medical condition/s. Risk of harm outweighs potential benefits.  .    Patient Active Problem List   Diagnosis    Hemochromatosis, hereditary    Alcohol abuse    Cirrhosis of liver without ascites    Acute gastric ulcer without hemorrhage or perforation    Hypomagnesemia    Peripheral edema    Essential hypertension    Gastric ulcer    Age-related nuclear cataract of both eyes    Excess skin of eyelid    Nevus of choroid of right eye    Presbyopia    Alcoholic cirrhosis of liver without ascites    Esophageal varices without bleeding    Abnormal findings on diagnostic imaging of liver and biliary tract    Abnormal results of liver function studies    Benign neoplasm of ascending colon    Benign neoplasm of transverse colon    Dvrtclos of lg int w/o perforation or abscess w/o bleeding    Encounter for screening for malignant neoplasm of colon    Hepatic failure, unspecified without coma    History of colonic polyps    Other problems related to lifestyle    Second degree hemorrhoids    Other cirrhosis of liver    Liver disease    High blood  "pressure    Hereditary hemochromatosis    Presbyopia     Advance Care Planning  Advance Directive is on file.  ACP discussion was held with the patient during this visit. Patient has an advance directive in EMR which is still valid.     Review of Systems   Constitutional:  Negative for chills, fatigue and fever.   Respiratory:  Negative for cough, shortness of breath and wheezing.    Cardiovascular:  Negative for chest pain and palpitations.   Gastrointestinal:  Negative for abdominal pain, blood in stool, constipation, diarrhea, nausea and vomiting.   Endocrine: Negative for polydipsia, polyphagia and polyuria.   Genitourinary:  Negative for frequency, hematuria and urgency.   Neurological:  Negative for dizziness and weakness.   Psychiatric/Behavioral:  Negative for dysphoric mood. The patient is not nervous/anxious.         Objective    Vitals:    03/11/24 1252   BP: 160/79   BP Location: Left arm   Patient Position: Sitting   Cuff Size: Large Adult   Pulse: 75   SpO2: 98%   Weight: 91.6 kg (202 lb)   Height: 182.9 cm (72\")     Estimated body mass index is 27.4 kg/m² as calculated from the following:    Height as of this encounter: 182.9 cm (72\").    Weight as of this encounter: 91.6 kg (202 lb).    BMI is >= 25 and <30. (Overweight) The following options were offered after discussion;: exercise counseling/recommendations and nutrition counseling/recommendations      Does the patient have evidence of cognitive impairment? No    Physical Exam  Vitals reviewed.   Constitutional:       General: He is not in acute distress.     Appearance: Normal appearance.   Cardiovascular:      Rate and Rhythm: Normal rate and regular rhythm.      Pulses: Normal pulses.      Heart sounds: Normal heart sounds. No murmur heard.  Pulmonary:      Effort: Pulmonary effort is normal. No respiratory distress.      Breath sounds: Normal breath sounds. No wheezing or rhonchi.   Chest:      Chest wall: No tenderness.   Abdominal:      " General: Bowel sounds are normal. There is no distension.      Palpations: Abdomen is soft.      Tenderness: There is no abdominal tenderness. There is no right CVA tenderness or left CVA tenderness.   Skin:     General: Skin is warm and dry.      Findings: No erythema.   Neurological:      General: No focal deficit present.      Mental Status: He is alert and oriented to person, place, and time.   Psychiatric:         Mood and Affect: Mood normal.                 HEALTH RISK ASSESSMENT    Smoking Status:  Social History     Tobacco Use   Smoking Status Never   Smokeless Tobacco Never     Alcohol Consumption:  Social History     Substance and Sexual Activity   Alcohol Use Not Currently    Comment: hx: daily; 0.5-1 pint hard liquor - beer and bourbon     Fall Risk Screen:    [facility] Fall Risk Assessment was completed, and patient is at LOW risk for falls.Assessment completed on:3/11/2024    Depression Screening:      3/11/2024     1:02 PM   PHQ-2/PHQ-9 Depression Screening   Little Interest or Pleasure in Doing Things 0-->not at all   Feeling Down, Depressed or Hopeless 0-->not at all   PHQ-9: Brief Depression Severity Measure Score 0       Health Habits and Functional and Cognitive Screening:      3/11/2024     1:01 PM   Functional & Cognitive Status   Do you have difficulty preparing food and eating? No   Do you have difficulty bathing yourself, getting dressed or grooming yourself? No   Do you have difficulty using the toilet? No   Do you have difficulty moving around from place to place? No   Do you have trouble with steps or getting out of a bed or a chair? Yes   Current Diet Well Balanced Diet   Dental Exam Up to date   Eye Exam Up to date   Exercise (times per week) 7 times per week   Do you need help using the phone?  No   Are you deaf or do you have serious difficulty hearing?  No   Do you need help to go to places out of walking distance? No   Do you need help shopping? No   Do you need help preparing meals?   No   Do you need help with housework?  No   Do you need help with laundry? No   Do you need help taking your medications? No   Do you need help managing money? No   Do you ever drive or ride in a car without wearing a seat belt? No   Have you felt unusual stress, anger or loneliness in the last month? No   Who do you live with? Spouse   If you need help, do you have trouble finding someone available to you? No   Have you been bothered in the last four weeks by sexual problems? No   Do you have difficulty concentrating, remembering or making decisions? No       Age-appropriate Screening Schedule:  Refer to the list below for future screening recommendations based on patient's age, sex and/or medical conditions. Orders for these recommended tests are listed in the plan section. The patient has been provided with a written plan.    Health Maintenance   Topic Date Due    Hepatitis B (1 of 3 - Risk 3-dose series) Never done    RSV Vaccine - Adults (1 - 1-dose 60+ series) Never done    LIPID PANEL  08/28/2024    ANNUAL WELLNESS VISIT  03/11/2025    BMI FOLLOWUP  03/11/2025    Pneumococcal Vaccine 65+ (3 of 3 - PPSV23 or PCV20) 12/30/2025    COLORECTAL CANCER SCREENING  10/16/2028    TDAP/TD VACCINES (3 - Td or Tdap) 07/12/2031    HEPATITIS C SCREENING  Completed    COVID-19 Vaccine  Completed    INFLUENZA VACCINE  Completed    ZOSTER VACCINE  Completed              Assessment & Plan   CMS Preventative Services Quick Reference  Risk Factors Identified During Encounter  Fall Risk-High or Moderate: Discussed Fall Prevention in the home  Immunizations Discussed/Encouraged: RSV (Respiratory Syncytial Virus)  Inactivity/Sedentary: Patient was advised to exercise at least 150 minutes a week per CDC recommendations. and Patient was advised to do at least 150 minutes a week of moderate intensity activity such as brisk walking and do at least 2 days a week of activities that strengthen muscles such as resistance training and do  activities to improve balance such as standing on one foot about 3 days a week.  The above risks/problems have been discussed with the patient.  Follow up actions/plans if indicated are seen below in the Assessment/Plan Section.  Pertinent information has been shared with the patient in the After Visit Summary.    Diagnoses and all orders for this visit:    1. Medicare annual wellness visit, subsequent (Primary)  Comments:  Medical/social/family hx reviewed.   Colonoscopy UTD.   Labs reviewed; lipid panel ordered.    2. Essential hypertension  Comments:  Increasing benazepril to 40 mg daily.   Monitor BP at home; call in readings in one week for review.   Lipid panel ordered.   RTO in 6 mo.  Orders:  -     Lipid panel; Future  -     Cancel: Comprehensive metabolic panel; Future  -     benazepril (LOTENSIN) 40 MG tablet; Take 1 tablet by mouth Daily.  Dispense: 90 tablet; Refill: 1    3. Gastroesophageal reflux disease, unspecified whether esophagitis present  Comments:  Stable.   Cont. current medication.   Keep f/u with GI.    4. Erectile dysfunction, unspecified erectile dysfunction type  Comments:  Stable.   Cont. current medication.   Keep f/u with urology    5. Iron deficiency anemia, unspecified iron deficiency anemia type  Comments:  Labs ordered.  Orders:  -     Cancel: CBC w AUTO Differential; Future  -     Ferritin; Future        Follow Up:   Return in about 6 months (around 9/11/2024).     An After Visit Summary and PPPS were made available to the patient.      I spent 20 minutes caring for Erik on this date of service. This time includes time spent by me in the following activities:preparing for the visit, reviewing tests, obtaining and/or reviewing a separately obtained history, performing a medically appropriate examination and/or evaluation , counseling and educating the patient/family/caregiver, ordering medications, tests, or procedures, referring and communicating with other health care  professionals , documenting information in the medical record, and independently interpreting results and communicating that information with the patient/family/caregiver

## 2024-03-11 ENCOUNTER — OFFICE VISIT (OUTPATIENT)
Dept: FAMILY MEDICINE CLINIC | Facility: CLINIC | Age: 64
End: 2024-03-11
Payer: MEDICARE

## 2024-03-11 ENCOUNTER — LAB (OUTPATIENT)
Dept: FAMILY MEDICINE CLINIC | Facility: CLINIC | Age: 64
End: 2024-03-11
Payer: MEDICARE

## 2024-03-11 VITALS
OXYGEN SATURATION: 98 % | WEIGHT: 202 LBS | SYSTOLIC BLOOD PRESSURE: 160 MMHG | HEIGHT: 72 IN | DIASTOLIC BLOOD PRESSURE: 79 MMHG | HEART RATE: 75 BPM | BODY MASS INDEX: 27.36 KG/M2

## 2024-03-11 DIAGNOSIS — D50.9 IRON DEFICIENCY ANEMIA, UNSPECIFIED IRON DEFICIENCY ANEMIA TYPE: ICD-10-CM

## 2024-03-11 DIAGNOSIS — I10 ESSENTIAL HYPERTENSION: ICD-10-CM

## 2024-03-11 DIAGNOSIS — Z00.00 MEDICARE ANNUAL WELLNESS VISIT, SUBSEQUENT: Primary | ICD-10-CM

## 2024-03-11 DIAGNOSIS — K21.9 GASTROESOPHAGEAL REFLUX DISEASE, UNSPECIFIED WHETHER ESOPHAGITIS PRESENT: ICD-10-CM

## 2024-03-11 DIAGNOSIS — N52.9 ERECTILE DYSFUNCTION, UNSPECIFIED ERECTILE DYSFUNCTION TYPE: ICD-10-CM

## 2024-03-11 LAB
CHOLEST SERPL-MCNC: 168 MG/DL (ref 0–200)
FERRITIN SERPL-MCNC: 67.2 NG/ML (ref 30–400)
HDLC SERPL-MCNC: 68 MG/DL (ref 40–60)
LDLC SERPL CALC-MCNC: 86 MG/DL (ref 0–100)
LDLC/HDLC SERPL: 1.25 {RATIO}
TRIGL SERPL-MCNC: 76 MG/DL (ref 0–150)
VLDLC SERPL-MCNC: 14 MG/DL (ref 5–40)

## 2024-03-11 PROCEDURE — 80061 LIPID PANEL: CPT | Performed by: NURSE PRACTITIONER

## 2024-03-11 PROCEDURE — 36415 COLL VENOUS BLD VENIPUNCTURE: CPT

## 2024-03-11 PROCEDURE — 82728 ASSAY OF FERRITIN: CPT | Performed by: NURSE PRACTITIONER

## 2024-03-11 RX ORDER — BENAZEPRIL HYDROCHLORIDE 40 MG/1
40 TABLET ORAL DAILY
Qty: 90 TABLET | Refills: 1 | Status: SHIPPED | OUTPATIENT
Start: 2024-03-11
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
## 2024-06-17 ENCOUNTER — ANESTHESIA (OUTPATIENT)
Dept: GASTROENTEROLOGY | Facility: HOSPITAL | Age: 64
End: 2024-06-17
Payer: MEDICARE

## 2024-06-17 ENCOUNTER — ON CAMPUS - OUTPATIENT (AMBULATORY)
Dept: URBAN - METROPOLITAN AREA HOSPITAL 85 | Facility: HOSPITAL | Age: 64
End: 2024-06-17

## 2024-06-17 ENCOUNTER — HOSPITAL ENCOUNTER (OUTPATIENT)
Facility: HOSPITAL | Age: 64
Setting detail: HOSPITAL OUTPATIENT SURGERY
Discharge: HOME | End: 2024-06-17
Attending: INTERNAL MEDICINE | Admitting: INTERNAL MEDICINE
Payer: MEDICARE

## 2024-06-17 ENCOUNTER — ANESTHESIA EVENT (OUTPATIENT)
Dept: GASTROENTEROLOGY | Facility: HOSPITAL | Age: 64
End: 2024-06-17
Payer: MEDICARE

## 2024-06-17 VITALS
BODY MASS INDEX: 27.28 KG/M2 | HEIGHT: 72 IN | TEMPERATURE: 98.2 F | RESPIRATION RATE: 18 BRPM | HEART RATE: 112 BPM | OXYGEN SATURATION: 95 % | WEIGHT: 201.4 LBS | SYSTOLIC BLOOD PRESSURE: 145 MMHG | DIASTOLIC BLOOD PRESSURE: 83 MMHG

## 2024-06-17 DIAGNOSIS — I85.00 ESOPHAGEAL VARICES WITHOUT BLEEDING: ICD-10-CM

## 2024-06-17 DIAGNOSIS — K29.00 ACUTE GASTRITIS WITHOUT BLEEDING: ICD-10-CM

## 2024-06-17 DIAGNOSIS — K76.6 PORTAL HYPERTENSION: ICD-10-CM

## 2024-06-17 DIAGNOSIS — K31.89 OTHER DISEASES OF STOMACH AND DUODENUM: ICD-10-CM

## 2024-06-17 PROCEDURE — 25810000003 SODIUM CHLORIDE 0.9 % SOLUTION: Performed by: INTERNAL MEDICINE

## 2024-06-17 PROCEDURE — 88342 IMHCHEM/IMCYTCHM 1ST ANTB: CPT | Performed by: INTERNAL MEDICINE

## 2024-06-17 PROCEDURE — 43239 EGD BIOPSY SINGLE/MULTIPLE: CPT | Performed by: INTERNAL MEDICINE

## 2024-06-17 PROCEDURE — 25010000002 PROPOFOL 200 MG/20ML EMULSION: Performed by: NURSE ANESTHETIST, CERTIFIED REGISTERED

## 2024-06-17 PROCEDURE — 93005 ELECTROCARDIOGRAM TRACING: CPT | Performed by: INTERNAL MEDICINE

## 2024-06-17 PROCEDURE — 88305 TISSUE EXAM BY PATHOLOGIST: CPT | Performed by: INTERNAL MEDICINE

## 2024-06-17 RX ORDER — PROPOFOL 10 MG/ML
INJECTION, EMULSION INTRAVENOUS AS NEEDED
Status: DISCONTINUED | OUTPATIENT
Start: 2024-06-17 | End: 2024-06-17 | Stop reason: SURG

## 2024-06-17 RX ORDER — ONDANSETRON 2 MG/ML
4 INJECTION INTRAMUSCULAR; INTRAVENOUS ONCE AS NEEDED
Status: DISCONTINUED | OUTPATIENT
Start: 2024-06-17 | End: 2024-06-17 | Stop reason: HOSPADM

## 2024-06-17 RX ORDER — SODIUM CHLORIDE 9 MG/ML
50 INJECTION, SOLUTION INTRAVENOUS CONTINUOUS
Status: DISCONTINUED | OUTPATIENT
Start: 2024-06-17 | End: 2024-06-17 | Stop reason: HOSPADM

## 2024-06-17 RX ADMIN — SODIUM CHLORIDE: 9 INJECTION, SOLUTION INTRAVENOUS at 10:48

## 2024-06-17 RX ADMIN — PROPOFOL 50 MG: 10 INJECTION, EMULSION INTRAVENOUS at 10:58

## 2024-06-17 RX ADMIN — PROPOFOL 30 MG: 10 INJECTION, EMULSION INTRAVENOUS at 10:56

## 2024-06-17 RX ADMIN — PROPOFOL 70 MG: 10 INJECTION, EMULSION INTRAVENOUS at 10:54

## 2024-06-17 NOTE — OP NOTE
ESOPHAGOGASTRODUODENOSCOPY Procedure Report    Patient Name:  Erik Fuentes  YOB: 1960    Date of Surgery:  6/17/2024     Preoperative diagnosis:  Esophageal varices    Postoperative diagnosis:  No esophageal varices  Mild portal hypertensive gastropathy  Erosive gastritis        Procedure(s):  ESOPHAGOGASTRODUODENOSCOPY WITH BIOPSIES    Staff:  Surgeon(s):  Rito Ruth MD      Anesthesia: Monitored Anesthesia Care    Implants:    Nothing was implanted during the procedure    Specimen:        See Below    Estimated blood loss: Minimal     Complications:  None    Description of Procedure:  Informed consent was obtained for the procedure, including sedation.  Risks of perforation, hemorrhage, adverse drug reaction and aspiration were discussed.  The patient was brought into the endoscopy suite. Continuous cardiopulmonary monitoring was performed. The patient was placed in the left lateral decubitus position.  The bite block was inserted into the patient's mouth. After adequate sedation was attained, the Olympus gastroscope was inserted into the patient's mouth and advanced to the second portion of the duodenum without difficulty.  Circumferential examination was performed. A retroflex exam was performed in the patient's stomach.  On completion of the exam, the bowel was decompressed, the scope was removed from the patient, the patient tolerated the procedure well, there were no immediate post-operative complications.     Examination of the esophagus: Normal esophagus with no evidence of esophageal varices  Examination of the stomach: Mild portal hypertensive gastropathy.  No gastric varices.  Nodularity with erosion in the stomach antrum consistent with erosive gastritis.  Cold forceps biopsies taken from antrum body for histology to rule out H. pylori.  Examination of the duodenum: Normal to second duodenum    Impression:  EGD is negative for varices but does shows mild portal  hypertensive gastropathy and erosive gastritis    Recommendations:  Follow-up on pathology  If H. pylori is positive treat with twice daily PPI and Pylera x 14 days  No recall on EGD  Avoid NSAIDs  Continue PPI daily    We appreciate the referral    Electronically signed by Rito Ruth MD, 06/17/24, 11:12 AM EDT.

## 2024-06-17 NOTE — DISCHARGE INSTRUCTIONS
A responsible adult should stay with you and you should rest quietly for the rest of the day.    Do not drink alcohol, drive, operate any heavy machinery or power tools or make any legal/important decisions for the next 24 hours.     Progress your diet as tolerated.  If you begin to experience severe pain, increased shortness of breath, racing heartbeat or a fever above 101 F, seek immediate medical attention.     Follow up with MD as instructed. Call office for results in 3 to 5 days.    For further questions please call Dr. Ruth's office at [phone]    Recommendations:  Follow-up on pathology  If H. pylori is positive treat with twice daily PPI and Pylera x 14 days  No recall on EGD  Avoid NSAIDs  Continue PPI daily

## 2024-06-17 NOTE — ANESTHESIA PREPROCEDURE EVALUATION
Anesthesia Evaluation     Patient summary reviewed and Nursing notes reviewed   no history of anesthetic complications:   NPO Solid Status: > 8 hours  NPO Liquid Status: > 8 hours           Airway   Mallampati: II  TM distance: >3 FB  Neck ROM: full  Dental - normal exam     Pulmonary - negative pulmonary ROS and normal exam   (-) not a smoker  Cardiovascular - normal exam    ECG reviewed    (+) hypertension  (-) angina, ARORA      Neuro/Psych- negative ROS  GI/Hepatic/Renal/Endo    (+) GERD, PUD, GI bleeding , liver disease cirrhosis    ROS Comment: Esophageal varices    Musculoskeletal (-) negative ROS    Abdominal  - normal exam    Bowel sounds: normal.   Substance History   (+) alcohol use     OB/GYN negative ob/gyn ROS         Other                      Anesthesia Plan    ASA 3     general   total IV anesthesia  intravenous induction     Anesthetic plan, risks, benefits, and alternatives have been provided, discussed and informed consent has been obtained with: patient.    Plan discussed with CRNA.      CODE STATUS:

## 2024-06-19 LAB
LAB AP CASE REPORT: NORMAL
LAB AP CLINICAL INFORMATION: NORMAL
PATH REPORT.FINAL DX SPEC: NORMAL
PATH REPORT.GROSS SPEC: NORMAL
QT INTERVAL: 430 MS
QTC INTERVAL: 488 MS

## 2024-06-25 PROBLEM — E83.19 IRON OVERLOAD: Status: ACTIVE | Noted: 2024-06-25

## 2024-06-25 RX ORDER — SODIUM CHLORIDE 9 MG/ML
250 INJECTION, SOLUTION INTRAVENOUS ONCE
OUTPATIENT
Start: 2024-06-25

## 2024-07-09 ENCOUNTER — HOSPITAL ENCOUNTER (OUTPATIENT)
Dept: INFUSION THERAPY | Facility: HOSPITAL | Age: 64
Discharge: HOME | End: 2024-07-09
Admitting: INTERNAL MEDICINE
Payer: MEDICARE

## 2024-07-09 VITALS
HEART RATE: 68 BPM | TEMPERATURE: 98 F | DIASTOLIC BLOOD PRESSURE: 64 MMHG | RESPIRATION RATE: 18 BRPM | OXYGEN SATURATION: 99 % | SYSTOLIC BLOOD PRESSURE: 122 MMHG

## 2024-07-09 DIAGNOSIS — E83.110 HEMOCHROMATOSIS, HEREDITARY: ICD-10-CM

## 2024-07-09 DIAGNOSIS — E83.19 IRON OVERLOAD: Primary | ICD-10-CM

## 2024-07-09 LAB
FERRITIN SERPL-MCNC: 292 NG/ML (ref 30–400)
HGB BLD-MCNC: 12.6 G/DL (ref 13–17.7)

## 2024-07-09 PROCEDURE — 85018 HEMOGLOBIN: CPT | Performed by: INTERNAL MEDICINE

## 2024-07-09 PROCEDURE — G0463 HOSPITAL OUTPT CLINIC VISIT: HCPCS

## 2024-07-09 PROCEDURE — 36415 COLL VENOUS BLD VENIPUNCTURE: CPT

## 2024-07-09 PROCEDURE — 82728 ASSAY OF FERRITIN: CPT | Performed by: INTERNAL MEDICINE

## 2024-07-09 RX ORDER — SODIUM CHLORIDE 9 MG/ML
250 INJECTION, SOLUTION INTRAVENOUS ONCE
Status: CANCELLED | OUTPATIENT
Start: 2024-07-09

## 2024-07-10 ENCOUNTER — HOSPITAL ENCOUNTER (OUTPATIENT)
Dept: INFUSION THERAPY | Facility: HOSPITAL | Age: 64
Discharge: HOME | End: 2024-07-10
Admitting: INTERNAL MEDICINE
Payer: MEDICARE

## 2024-07-10 VITALS
HEART RATE: 82 BPM | DIASTOLIC BLOOD PRESSURE: 68 MMHG | SYSTOLIC BLOOD PRESSURE: 147 MMHG | TEMPERATURE: 98.1 F | OXYGEN SATURATION: 97 % | RESPIRATION RATE: 18 BRPM

## 2024-07-10 DIAGNOSIS — E83.110 HEMOCHROMATOSIS, HEREDITARY: ICD-10-CM

## 2024-07-10 DIAGNOSIS — E83.19 IRON OVERLOAD: Primary | ICD-10-CM

## 2024-07-10 PROCEDURE — 99195 PHLEBOTOMY: CPT

## 2024-07-10 RX ORDER — SODIUM CHLORIDE 9 MG/ML
250 INJECTION, SOLUTION INTRAVENOUS ONCE
OUTPATIENT
Start: 2024-07-10

## 2024-07-10 NOTE — PROGRESS NOTES
PHLEBOTOMY    Start Time: 0947    End Time: 0956    Hemaglobin Value: 12.6    Ferritin Value: 292      Permit Signed: Self Signed    Prep with Chlorhexidine: yes    Needle Size: 16 Gauge    Number of Attempts: 2      Scale Calibration: yes    Amount Drawn: 450ml = 476grams    Phlebotomy Completed: yes    Phlebotomy Site: Right Antecubital    Dressing: Applied and Pressure Dressing Applied    Anchored: Tape    Blood Discarded Per Protocol: yes    200+/- 5 grams: yes  500+/- 5 grams: yes    Self Care Assistance: Unassisted

## 2024-07-12 ENCOUNTER — HOSPITAL ENCOUNTER (INPATIENT)
Facility: HOSPITAL | Age: 64
LOS: 9 days | Discharge: NURSING, REHAB OR LONG-TERM CARE | DRG: 466 | End: 2024-07-21
Attending: INTERNAL MEDICINE | Admitting: INTERNAL MEDICINE
Payer: MEDICARE

## 2024-07-12 ENCOUNTER — APPOINTMENT (OUTPATIENT)
Dept: GENERAL RADIOLOGY | Facility: HOSPITAL | Age: 64
DRG: 466 | End: 2024-07-12
Payer: MEDICARE

## 2024-07-12 DIAGNOSIS — Z96.651 HISTORY OF RIGHT KNEE JOINT REPLACEMENT: ICD-10-CM

## 2024-07-12 DIAGNOSIS — M00.9 PYOGENIC ARTHRITIS OF RIGHT KNEE JOINT, DUE TO UNSPECIFIED ORGANISM: Primary | ICD-10-CM

## 2024-07-12 LAB
ALBUMIN SERPL-MCNC: 3.4 G/DL (ref 3.5–5.2)
ALBUMIN/GLOB SERPL: 1.1 G/DL
ALP SERPL-CCNC: 123 U/L (ref 39–117)
ALT SERPL W P-5'-P-CCNC: 41 U/L (ref 1–41)
ANION GAP SERPL CALCULATED.3IONS-SCNC: 8.2 MMOL/L (ref 5–15)
ANION GAP SERPL CALCULATED.3IONS-SCNC: 8.5 MMOL/L (ref 5–15)
ANISOCYTOSIS BLD QL: ABNORMAL
APPEARANCE FLD: ABNORMAL
AST SERPL-CCNC: 90 U/L (ref 1–40)
BASOPHILS # BLD AUTO: 0.05 10*3/MM3 (ref 0–0.2)
BASOPHILS NFR BLD AUTO: 0.4 % (ref 0–1.5)
BILIRUB SERPL-MCNC: 3.5 MG/DL (ref 0–1.2)
BUN SERPL-MCNC: 10 MG/DL (ref 8–23)
BUN SERPL-MCNC: 9 MG/DL (ref 8–23)
BUN/CREAT SERPL: 11.4 (ref 7–25)
BUN/CREAT SERPL: 12.7 (ref 7–25)
CALCIUM SPEC-SCNC: 7.9 MG/DL (ref 8.6–10.5)
CALCIUM SPEC-SCNC: 8.9 MG/DL (ref 8.6–10.5)
CHLORIDE SERPL-SCNC: 100 MMOL/L (ref 98–107)
CHLORIDE SERPL-SCNC: 102 MMOL/L (ref 98–107)
CO2 SERPL-SCNC: 24.8 MMOL/L (ref 22–29)
CO2 SERPL-SCNC: 26.5 MMOL/L (ref 22–29)
COLOR FLD: ABNORMAL
CREAT SERPL-MCNC: 0.79 MG/DL (ref 0.76–1.27)
CREAT SERPL-MCNC: 0.79 MG/DL (ref 0.76–1.27)
CRP SERPL-MCNC: 2.31 MG/DL (ref 0–0.5)
CRYSTALS FLD MICRO: NORMAL
D-LACTATE SERPL-SCNC: 0.7 MMOL/L (ref 0.3–2)
DEPRECATED RDW RBC AUTO: 57.3 FL (ref 37–54)
DEPRECATED RDW RBC AUTO: 58 FL (ref 37–54)
EGFRCR SERPLBLD CKD-EPI 2021: 98 ML/MIN/1.73
EGFRCR SERPLBLD CKD-EPI 2021: 98 ML/MIN/1.73
EOSINOPHIL # BLD AUTO: 0.01 10*3/MM3 (ref 0–0.4)
EOSINOPHIL NFR BLD AUTO: 0.1 % (ref 0.3–6.2)
ERYTHROCYTE [DISTWIDTH] IN BLOOD BY AUTOMATED COUNT: 15 % (ref 12.3–15.4)
ERYTHROCYTE [DISTWIDTH] IN BLOOD BY AUTOMATED COUNT: 15.2 % (ref 12.3–15.4)
ERYTHROCYTE [SEDIMENTATION RATE] IN BLOOD: 14 MM/HR (ref 0–20)
GIANT PLATELETS: ABNORMAL
GLOBULIN UR ELPH-MCNC: 3 GM/DL
GLUCOSE SERPL-MCNC: 111 MG/DL (ref 65–99)
GLUCOSE SERPL-MCNC: 121 MG/DL (ref 65–99)
HCT VFR BLD AUTO: 34 % (ref 37.5–51)
HCT VFR BLD AUTO: 39 % (ref 37.5–51)
HGB BLD-MCNC: 12.2 G/DL (ref 13–17.7)
HGB BLD-MCNC: 13.8 G/DL (ref 13–17.7)
IMM GRANULOCYTES # BLD AUTO: 0.05 10*3/MM3 (ref 0–0.05)
IMM GRANULOCYTES NFR BLD AUTO: 0.4 % (ref 0–0.5)
LYMPHOCYTES # BLD AUTO: 0.92 10*3/MM3 (ref 0.7–3.1)
LYMPHOCYTES # BLD MANUAL: 1.1 10*3/MM3 (ref 0.7–3.1)
LYMPHOCYTES NFR BLD AUTO: 6.6 % (ref 19.6–45.3)
LYMPHOCYTES NFR BLD MANUAL: 7 % (ref 5–12)
LYMPHOCYTES NFR FLD MANUAL: 7 %
MAGNESIUM SERPL-MCNC: 1.2 MG/DL (ref 1.6–2.4)
MCH RBC QN AUTO: 36.4 PG (ref 26.6–33)
MCH RBC QN AUTO: 37.1 PG (ref 26.6–33)
MCHC RBC AUTO-ENTMCNC: 35.4 G/DL (ref 31.5–35.7)
MCHC RBC AUTO-ENTMCNC: 35.9 G/DL (ref 31.5–35.7)
MCV RBC AUTO: 102.9 FL (ref 79–97)
MCV RBC AUTO: 103.3 FL (ref 79–97)
MONOCYTES # BLD AUTO: 1.33 10*3/MM3 (ref 0.1–0.9)
MONOCYTES # BLD: 1.28 10*3/MM3 (ref 0.1–0.9)
MONOCYTES NFR BLD AUTO: 9.6 % (ref 5–12)
MONOCYTES NFR FLD: 11 %
NEUTROPHILS # BLD AUTO: 15.94 10*3/MM3 (ref 1.7–7)
NEUTROPHILS NFR BLD AUTO: 11.54 10*3/MM3 (ref 1.7–7)
NEUTROPHILS NFR BLD AUTO: 82.9 % (ref 42.7–76)
NEUTROPHILS NFR BLD MANUAL: 82 % (ref 42.7–76)
NEUTROPHILS NFR FLD MANUAL: 82 %
NEUTS BAND NFR BLD MANUAL: 5 % (ref 0–5)
NRBC BLD AUTO-RTO: 0 /100 WBC (ref 0–0.2)
NUC CELL # FLD: ABNORMAL /MM3
PLATELET # BLD AUTO: 138 10*3/MM3 (ref 140–450)
PLATELET # BLD AUTO: 171 10*3/MM3 (ref 140–450)
PMV BLD AUTO: 12.1 FL (ref 6–12)
PMV BLD AUTO: 12.3 FL (ref 6–12)
POTASSIUM SERPL-SCNC: 3.6 MMOL/L (ref 3.5–5.2)
POTASSIUM SERPL-SCNC: 3.7 MMOL/L (ref 3.5–5.2)
PROT SERPL-MCNC: 6.4 G/DL (ref 6–8.5)
RBC # BLD AUTO: 3.29 10*6/MM3 (ref 4.14–5.8)
RBC # BLD AUTO: 3.79 10*6/MM3 (ref 4.14–5.8)
RBC # FLD AUTO: 0 /MM3
SCAN SLIDE: NORMAL
SODIUM SERPL-SCNC: 135 MMOL/L (ref 136–145)
SODIUM SERPL-SCNC: 135 MMOL/L (ref 136–145)
TOXIC GRANULATION: ABNORMAL
URATE SERPL-MCNC: 4.2 MG/DL (ref 3.4–7)
VARIANT LYMPHS NFR BLD MANUAL: 6 % (ref 19.6–45.3)
WBC NRBC COR # BLD AUTO: 13.9 10*3/MM3 (ref 3.4–10.8)
WBC NRBC COR # BLD AUTO: 18.32 10*3/MM3 (ref 3.4–10.8)

## 2024-07-12 PROCEDURE — 89060 EXAM SYNOVIAL FLUID CRYSTALS: CPT

## 2024-07-12 PROCEDURE — 87040 BLOOD CULTURE FOR BACTERIA: CPT

## 2024-07-12 PROCEDURE — 85025 COMPLETE CBC W/AUTO DIFF WBC: CPT | Performed by: NURSE PRACTITIONER

## 2024-07-12 PROCEDURE — 87205 SMEAR GRAM STAIN: CPT

## 2024-07-12 PROCEDURE — 87147 CULTURE TYPE IMMUNOLOGIC: CPT

## 2024-07-12 PROCEDURE — 87077 CULTURE AEROBIC IDENTIFY: CPT

## 2024-07-12 PROCEDURE — 36415 COLL VENOUS BLD VENIPUNCTURE: CPT

## 2024-07-12 PROCEDURE — 25010000002 PIPERACILLIN SOD-TAZOBACTAM PER 1 G

## 2024-07-12 PROCEDURE — 87015 SPECIMEN INFECT AGNT CONCNTJ: CPT

## 2024-07-12 PROCEDURE — 84550 ASSAY OF BLOOD/URIC ACID: CPT

## 2024-07-12 PROCEDURE — 99285 EMERGENCY DEPT VISIT HI MDM: CPT

## 2024-07-12 PROCEDURE — 25010000002 VANCOMYCIN HCL IN NACL 1.75-0.9 GM/500ML-% SOLUTION

## 2024-07-12 PROCEDURE — 87154 CUL TYP ID BLD PTHGN 6+ TRGT: CPT

## 2024-07-12 PROCEDURE — 25010000002 HYDROMORPHONE 1 MG/ML SOLUTION

## 2024-07-12 PROCEDURE — 0S9C3ZX DRAINAGE OF RIGHT KNEE JOINT, PERCUTANEOUS APPROACH, DIAGNOSTIC: ICD-10-PCS | Performed by: EMERGENCY MEDICINE

## 2024-07-12 PROCEDURE — 85652 RBC SED RATE AUTOMATED: CPT

## 2024-07-12 PROCEDURE — 25810000003 SODIUM CHLORIDE 0.9 % SOLUTION

## 2024-07-12 PROCEDURE — 73562 X-RAY EXAM OF KNEE 3: CPT

## 2024-07-12 PROCEDURE — 80053 COMPREHEN METABOLIC PANEL: CPT

## 2024-07-12 PROCEDURE — 87070 CULTURE OTHR SPECIMN AEROBIC: CPT

## 2024-07-12 PROCEDURE — 83735 ASSAY OF MAGNESIUM: CPT | Performed by: NURSE PRACTITIONER

## 2024-07-12 PROCEDURE — 89051 BODY FLUID CELL COUNT: CPT

## 2024-07-12 PROCEDURE — 85007 BL SMEAR W/DIFF WBC COUNT: CPT | Performed by: NURSE PRACTITIONER

## 2024-07-12 PROCEDURE — 86140 C-REACTIVE PROTEIN: CPT

## 2024-07-12 PROCEDURE — 87186 SC STD MICRODIL/AGAR DIL: CPT

## 2024-07-12 PROCEDURE — 25810000003 SODIUM CHLORIDE 0.9 % SOLUTION: Performed by: NURSE PRACTITIONER

## 2024-07-12 PROCEDURE — 83605 ASSAY OF LACTIC ACID: CPT

## 2024-07-12 PROCEDURE — 85025 COMPLETE CBC W/AUTO DIFF WBC: CPT

## 2024-07-12 RX ORDER — SODIUM CHLORIDE 0.9 % (FLUSH) 0.9 %
10 SYRINGE (ML) INJECTION EVERY 12 HOURS SCHEDULED
Status: DISCONTINUED | OUTPATIENT
Start: 2024-07-12 | End: 2024-07-21 | Stop reason: HOSPADM

## 2024-07-12 RX ORDER — HYDROCODONE BITARTRATE AND ACETAMINOPHEN 7.5; 325 MG/1; MG/1
1 TABLET ORAL EVERY 6 HOURS PRN
Status: DISPENSED | OUTPATIENT
Start: 2024-07-12 | End: 2024-07-17

## 2024-07-12 RX ORDER — BISACODYL 10 MG
10 SUPPOSITORY, RECTAL RECTAL DAILY PRN
Status: DISCONTINUED | OUTPATIENT
Start: 2024-07-12 | End: 2024-07-21 | Stop reason: HOSPADM

## 2024-07-12 RX ORDER — PANTOPRAZOLE SODIUM 40 MG/10ML
40 INJECTION, POWDER, LYOPHILIZED, FOR SOLUTION INTRAVENOUS
Status: DISCONTINUED | OUTPATIENT
Start: 2024-07-12 | End: 2024-07-13

## 2024-07-12 RX ORDER — VANCOMYCIN 1.75 GRAM/500 ML IN 0.9 % SODIUM CHLORIDE INTRAVENOUS
20 ONCE
Status: COMPLETED | OUTPATIENT
Start: 2024-07-12 | End: 2024-07-12

## 2024-07-12 RX ORDER — ACETAMINOPHEN 325 MG/1
650 TABLET ORAL EVERY 4 HOURS PRN
Status: DISCONTINUED | OUTPATIENT
Start: 2024-07-12 | End: 2024-07-21 | Stop reason: HOSPADM

## 2024-07-12 RX ORDER — IBUPROFEN 600 MG/1
600 TABLET ORAL ONCE
Status: DISCONTINUED | OUTPATIENT
Start: 2024-07-12 | End: 2024-07-12

## 2024-07-12 RX ORDER — SODIUM CHLORIDE 9 MG/ML
40 INJECTION, SOLUTION INTRAVENOUS AS NEEDED
Status: DISCONTINUED | OUTPATIENT
Start: 2024-07-12 | End: 2024-07-21 | Stop reason: HOSPADM

## 2024-07-12 RX ORDER — BISACODYL 5 MG/1
5 TABLET, DELAYED RELEASE ORAL DAILY PRN
Status: DISCONTINUED | OUTPATIENT
Start: 2024-07-12 | End: 2024-07-21 | Stop reason: HOSPADM

## 2024-07-12 RX ORDER — SODIUM CHLORIDE 0.9 % (FLUSH) 0.9 %
10 SYRINGE (ML) INJECTION AS NEEDED
Status: DISCONTINUED | OUTPATIENT
Start: 2024-07-12 | End: 2024-07-21 | Stop reason: HOSPADM

## 2024-07-12 RX ORDER — AMOXICILLIN 250 MG
2 CAPSULE ORAL 2 TIMES DAILY PRN
Status: DISCONTINUED | OUTPATIENT
Start: 2024-07-12 | End: 2024-07-21 | Stop reason: HOSPADM

## 2024-07-12 RX ORDER — ACETAMINOPHEN 325 MG/1
650 TABLET ORAL ONCE
Status: COMPLETED | OUTPATIENT
Start: 2024-07-12 | End: 2024-07-12

## 2024-07-12 RX ORDER — SODIUM CHLORIDE 9 MG/ML
75 INJECTION, SOLUTION INTRAVENOUS CONTINUOUS
Status: DISCONTINUED | OUTPATIENT
Start: 2024-07-12 | End: 2024-07-16

## 2024-07-12 RX ORDER — NALOXONE HCL 0.4 MG/ML
0.4 VIAL (ML) INJECTION
Status: DISCONTINUED | OUTPATIENT
Start: 2024-07-12 | End: 2024-07-21 | Stop reason: HOSPADM

## 2024-07-12 RX ORDER — POLYETHYLENE GLYCOL 3350 17 G/17G
17 POWDER, FOR SOLUTION ORAL DAILY PRN
Status: DISCONTINUED | OUTPATIENT
Start: 2024-07-12 | End: 2024-07-21 | Stop reason: HOSPADM

## 2024-07-12 RX ORDER — ONDANSETRON 2 MG/ML
4 INJECTION INTRAMUSCULAR; INTRAVENOUS EVERY 6 HOURS PRN
Status: DISCONTINUED | OUTPATIENT
Start: 2024-07-12 | End: 2024-07-21 | Stop reason: HOSPADM

## 2024-07-12 RX ADMIN — HYDROMORPHONE HYDROCHLORIDE 1 MG: 1 INJECTION, SOLUTION INTRAMUSCULAR; INTRAVENOUS; SUBCUTANEOUS at 16:48

## 2024-07-12 RX ADMIN — HYDROCODONE BITARTRATE AND ACETAMINOPHEN 1 TABLET: 7.5; 325 TABLET ORAL at 20:51

## 2024-07-12 RX ADMIN — Medication 1750 MG: at 18:30

## 2024-07-12 RX ADMIN — ACETAMINOPHEN 650 MG: 325 TABLET, FILM COATED ORAL at 14:52

## 2024-07-12 RX ADMIN — SODIUM CHLORIDE 500 ML: 9 INJECTION, SOLUTION INTRAVENOUS at 14:50

## 2024-07-12 RX ADMIN — PIPERACILLIN AND TAZOBACTAM 3.38 G: 3; .375 INJECTION, POWDER, FOR SOLUTION INTRAVENOUS at 17:29

## 2024-07-12 RX ADMIN — SODIUM CHLORIDE 75 ML/HR: 9 INJECTION, SOLUTION INTRAVENOUS at 20:55
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
## 2024-07-12 NOTE — ED NOTES
Nursing report ED to floor  Erik Fuentes  64 y.o.  male    HPI:   Chief Complaint   Patient presents with    Knee Pain     Right knee pain, warmth, pt denies any trauma to knee, reports pain is unbearable. Pt unable to bear weight on knee for EMS.        Admitting doctor:   Emilia Carlson MD    Admitting diagnosis:   The primary encounter diagnosis was Pyogenic arthritis of right knee joint, due to unspecified organism. A diagnosis of History of right knee joint replacement was also pertinent to this visit.    Code status:   Current Code Status       Date Active Code Status Order ID Comments User Context       7/12/2024 1746 CPR (Attempt to Resuscitate) [id number]  Briana Bess, APRN ED        Question Answer    Code Status (Patient has no pulse and is not breathing) CPR (Attempt to Resuscitate)    Medical Interventions (Patient has pulse or is breathing) Full Support    Level Of Support Discussed With Patient                    Allergies:   Morphine    Isolation:  No active isolations     Fall Risk:  Fall Risk Assessment was completed, and patient is at moderate risk for falls.   Predictive Model Details         15 (Low) Factor Value    Calculated 7/12/2024 17:48 Age 64    Risk of Fall Model Active Peripheral IV Present     Imaging order in this encounter Present     Respiratory Rate 18     Magnesium not on file     Number of Distinct Medication Classes administered 4     Total Bilirubin 3.5 mg/dL     Albumin 3.4 g/dL     Guru Scale not on file     Calcium 8.9 mg/dL     Chloride 100 mmol/L     Clinically Relevant Sex Not Female     Diastolic BP 84     ALT 41 U/L     Number of administrations of Analgesic Narcotics 1     Creatinine 0.79 mg/dL     Days after Admission 0.144     Potassium 3.6 mmol/L         Weight:       07/12/24  1432   Weight: 86.2 kg (190 lb)       Intake and Output  No intake or output data in the 24 hours ending 07/12/24 1748    Diet:        Most recent vitals:   Vitals:    07/12/24 1632  07/12/24 1638 07/12/24 1645 07/12/24 1649   BP: 165/85   156/84   BP Location: Left arm      Patient Position: Lying      Pulse: 84 81 90 96   Resp: 18      Temp:       TempSrc:       SpO2: 96% 95% 96% 96%   Weight:       Height:           Active LDAs/IV Access:   Lines, Drains & Airways       Active LDAs       Name Placement date Placement time Site Days    Peripheral IV 07/12/24 1450 Anterior;Left Antecubital 07/12/24  1450  Antecubital  less than 1                    Skin Condition:   Skin Assessments (last day)       None             Labs (abnormal labs have a star):   Labs Reviewed   COMPREHENSIVE METABOLIC PANEL - Abnormal; Notable for the following components:       Result Value    Glucose 111 (*)     Sodium 135 (*)     Albumin 3.4 (*)     AST (SGOT) 90 (*)     Alkaline Phosphatase 123 (*)     Total Bilirubin 3.5 (*)     All other components within normal limits    Narrative:     GFR Normal >60  Chronic Kidney Disease <60  Kidney Failure <15     C-REACTIVE PROTEIN - Abnormal; Notable for the following components:    C-Reactive Protein 2.31 (*)     All other components within normal limits   CBC WITH AUTO DIFFERENTIAL - Abnormal; Notable for the following components:    WBC 13.90 (*)     RBC 3.79 (*)     .9 (*)     MCH 36.4 (*)     RDW-SD 58.0 (*)     MPV 12.1 (*)     Neutrophil % 82.9 (*)     Lymphocyte % 6.6 (*)     Eosinophil % 0.1 (*)     Neutrophils, Absolute 11.54 (*)     Monocytes, Absolute 1.33 (*)     All other components within normal limits   SEDIMENTATION RATE - Normal   URIC ACID - Normal   POC LACTATE - Normal   BLOOD CULTURE   BLOOD CULTURE   BODY FLUID CULTURE   CRYSTAL EXAM   BODY FLUID CELL COUNT WITH DIFFERENTIAL    Narrative:     The following orders were created for panel order Body Fluid Cell Count With Differential - Body Fluid, Knee, Right.  Procedure                               Abnormality         Status                     ---------                               -----------          ------                     Body fluid cell count - ...[[id number]]                      In process                   Please view results for these tests on the individual orders.   BODY FLUID CELL COUNT   POC LACTATE   CBC AND DIFFERENTIAL    Narrative:     The following orders were created for panel order CBC & Differential.  Procedure                               Abnormality         Status                     ---------                               -----------         ------                     CBC Auto Differential[[id number]]        Abnormal            Final result                 Please view results for these tests on the individual orders.       LOC: Person, Place, Time, and Situation    Telemetry:  Telemetry    Cardiac Monitoring Ordered: no    EKG:   No orders to display       Medications Given in the ED:   Medications   sodium chloride 0.9 % flush 10 mL (has no administration in time range)   piperacillin-tazobactam (ZOSYN) 3.375 g IVPB in 100 mL NS MBP (CD) (3.375 g Intravenous New Bag 7/12/24 1729)   vancomycin IVPB 1750 mg in 0.9% Sodium Chloride (premix) 500 mL (has no administration in time range)   acetaminophen (TYLENOL) tablet 650 mg (650 mg Oral Given 7/12/24 1452)   sodium chloride 0.9 % bolus 500 mL (0 mL Intravenous Stopped 7/12/24 1522)   HYDROmorphone (DILAUDID) injection 1 mg (1 mg Intravenous Given 7/12/24 1648)       Imaging results:  XR Knee 3 View Right    Result Date: 7/12/2024  Impression: 1. Large suprapatellar joint effusion and soft tissue swelling. Findings could reflect cellulitis with infection possible given the clinical symptoms. 2. Right total knee prosthesis without evidence of periprosthetic fracture. Electronically Signed: Cesar Alejandra  7/12/2024 3:00 PM EDT  Workstation ID: [id number]     Social issues:   Social History     Socioeconomic History    Marital status:    Tobacco Use    Smoking status: Never    Smokeless tobacco: Never   Vaping Use    Vaping status:  Never Used   Substance and Sexual Activity    Alcohol use: Yes     Alcohol/week: 7.0 standard drinks of alcohol     Types: 7 Cans of beer per week    Drug use: Never    Sexual activity: Defer       NIH Stroke Scale:  Interval: (not recorded)  1a. Level of Consciousness: (not recorded)  1b. LOC Questions: (not recorded)  1c. LOC Commands: (not recorded)  2. Best Gaze: (not recorded)  3. Visual: (not recorded)  4. Facial Palsy: (not recorded)  5a. Motor Arm, Left: (not recorded)  5b. Motor Arm, Right: (not recorded)  6a. Motor Leg, Left: (not recorded)  6b. Motor Leg, Right: (not recorded)  7. Limb Ataxia: (not recorded)  8. Sensory: (not recorded)  9. Best Language: (not recorded)  10. Dysarthria: (not recorded)  11. Extinction and Inattention (formerly Neglect): (not recorded)    Total (NIH Stroke Scale): (not recorded)     Additional notable assessment information:    Nursing report ED to floor:  Diamond Vargas RN   07/12/24 17:48 EDT

## 2024-07-12 NOTE — Clinical Note
Level of Care: Med/Surg [1]   Diagnosis: Septic arthritis of knee, right [714225]   Admitting Physician: ANTONINA SUTHERLAND [[id number]]   Attending Physician: ANTONINA SUTHERLAND [[id number]]   Certification: I Certify That Inpatient Hospital Services Are Medically Necessary For Greater Than 2 Midnights

## 2024-07-13 LAB
ABO GROUP BLD: NORMAL
BACTERIA BLD CULT: ABNORMAL
BLD GP AB SCN SERPL QL: NEGATIVE
BOTTLE TYPE: ABNORMAL
RH BLD: POSITIVE
T&S EXPIRATION DATE: NORMAL

## 2024-07-13 PROCEDURE — 25010000002 MAGNESIUM SULFATE 2 GM/50ML SOLUTION: Performed by: INTERNAL MEDICINE

## 2024-07-13 PROCEDURE — 25810000003 SODIUM CHLORIDE 0.9 % SOLUTION 250 ML FLEX CONT: Performed by: NURSE PRACTITIONER

## 2024-07-13 PROCEDURE — 80202 ASSAY OF VANCOMYCIN: CPT | Performed by: NURSE PRACTITIONER

## 2024-07-13 PROCEDURE — 25010000002 HYDROMORPHONE 1 MG/ML SOLUTION: Performed by: NURSE PRACTITIONER

## 2024-07-13 PROCEDURE — 25010000002 ENOXAPARIN PER 10 MG: Performed by: STUDENT IN AN ORGANIZED HEALTH CARE EDUCATION/TRAINING PROGRAM

## 2024-07-13 PROCEDURE — 86901 BLOOD TYPING SEROLOGIC RH(D): CPT | Performed by: INTERNAL MEDICINE

## 2024-07-13 PROCEDURE — 25010000002 VANCOMYCIN HCL 1.25 G RECONSTITUTED SOLUTION 1 EACH VIAL: Performed by: NURSE PRACTITIONER

## 2024-07-13 PROCEDURE — 86900 BLOOD TYPING SEROLOGIC ABO: CPT | Performed by: INTERNAL MEDICINE

## 2024-07-13 PROCEDURE — 86900 BLOOD TYPING SEROLOGIC ABO: CPT

## 2024-07-13 PROCEDURE — 86901 BLOOD TYPING SEROLOGIC RH(D): CPT

## 2024-07-13 PROCEDURE — 86850 RBC ANTIBODY SCREEN: CPT | Performed by: INTERNAL MEDICINE

## 2024-07-13 PROCEDURE — 25010000002 PIPERACILLIN SOD-TAZOBACTAM PER 1 G: Performed by: NURSE PRACTITIONER

## 2024-07-13 PROCEDURE — 25010000002 CEFTRIAXONE PER 250 MG: Performed by: INTERNAL MEDICINE

## 2024-07-13 PROCEDURE — 25810000003 SODIUM CHLORIDE 0.9 % SOLUTION: Performed by: NURSE PRACTITIONER

## 2024-07-13 RX ORDER — LISINOPRIL 5 MG/1
5 TABLET ORAL
Status: DISCONTINUED | OUTPATIENT
Start: 2024-07-13 | End: 2024-07-14

## 2024-07-13 RX ORDER — TRIAMCINOLONE ACETONIDE 1 MG/G
1 CREAM TOPICAL DAILY PRN
Status: DISCONTINUED | OUTPATIENT
Start: 2024-07-13 | End: 2024-07-21 | Stop reason: HOSPADM

## 2024-07-13 RX ORDER — ENOXAPARIN SODIUM 100 MG/ML
40 INJECTION SUBCUTANEOUS EVERY 24 HOURS
Status: DISCONTINUED | OUTPATIENT
Start: 2024-07-13 | End: 2024-07-21 | Stop reason: HOSPADM

## 2024-07-13 RX ORDER — CETIRIZINE HYDROCHLORIDE 10 MG/1
10 TABLET ORAL DAILY
Status: DISCONTINUED | OUTPATIENT
Start: 2024-07-13 | End: 2024-07-21 | Stop reason: HOSPADM

## 2024-07-13 RX ORDER — MAGNESIUM SULFATE HEPTAHYDRATE 40 MG/ML
2 INJECTION, SOLUTION INTRAVENOUS
Status: COMPLETED | OUTPATIENT
Start: 2024-07-13 | End: 2024-07-13

## 2024-07-13 RX ORDER — PANTOPRAZOLE SODIUM 40 MG/1
40 TABLET, DELAYED RELEASE ORAL
Status: DISCONTINUED | OUTPATIENT
Start: 2024-07-14 | End: 2024-07-21 | Stop reason: HOSPADM

## 2024-07-13 RX ADMIN — MAGNESIUM SULFATE HEPTAHYDRATE 2 G: 2 INJECTION, SOLUTION INTRAVENOUS at 00:20

## 2024-07-13 RX ADMIN — ACETAMINOPHEN 325 MG: 325 TABLET, FILM COATED ORAL at 16:54

## 2024-07-13 RX ADMIN — VANCOMYCIN HYDROCHLORIDE 1250 MG: 1.25 INJECTION, POWDER, LYOPHILIZED, FOR SOLUTION INTRAVENOUS at 18:12

## 2024-07-13 RX ADMIN — MAGNESIUM SULFATE HEPTAHYDRATE 2 G: 2 INJECTION, SOLUTION INTRAVENOUS at 02:55

## 2024-07-13 RX ADMIN — HYDROMORPHONE HYDROCHLORIDE 1 MG: 1 INJECTION, SOLUTION INTRAMUSCULAR; INTRAVENOUS; SUBCUTANEOUS at 12:13

## 2024-07-13 RX ADMIN — CEFTRIAXONE SODIUM 2000 MG: 2 INJECTION, POWDER, FOR SOLUTION INTRAMUSCULAR; INTRAVENOUS at 10:38

## 2024-07-13 RX ADMIN — HYDROMORPHONE HYDROCHLORIDE 1 MG: 1 INJECTION, SOLUTION INTRAMUSCULAR; INTRAVENOUS; SUBCUTANEOUS at 04:52

## 2024-07-13 RX ADMIN — MAGNESIUM SULFATE HEPTAHYDRATE 2 G: 2 INJECTION, SOLUTION INTRAVENOUS at 04:52

## 2024-07-13 RX ADMIN — VANCOMYCIN HYDROCHLORIDE 1250 MG: 1.25 INJECTION, POWDER, LYOPHILIZED, FOR SOLUTION INTRAVENOUS at 06:02

## 2024-07-13 RX ADMIN — PANTOPRAZOLE SODIUM 40 MG: 40 INJECTION, POWDER, FOR SOLUTION INTRAVENOUS at 05:58

## 2024-07-13 RX ADMIN — PIPERACILLIN AND TAZOBACTAM 3.38 G: 3; .375 INJECTION, POWDER, FOR SOLUTION INTRAVENOUS at 08:46

## 2024-07-13 RX ADMIN — Medication 5000 UNITS: at 11:56

## 2024-07-13 RX ADMIN — CETIRIZINE HYDROCHLORIDE 10 MG: 10 TABLET, FILM COATED ORAL at 11:56

## 2024-07-13 RX ADMIN — HYDROCODONE BITARTRATE AND ACETAMINOPHEN 1 TABLET: 7.5; 325 TABLET ORAL at 22:29

## 2024-07-13 RX ADMIN — HYDROCODONE BITARTRATE AND ACETAMINOPHEN 1 TABLET: 7.5; 325 TABLET ORAL at 10:43

## 2024-07-13 RX ADMIN — ACETAMINOPHEN 650 MG: 325 TABLET, FILM COATED ORAL at 00:25

## 2024-07-13 RX ADMIN — Medication 10 ML: at 21:39

## 2024-07-13 RX ADMIN — ENOXAPARIN SODIUM 40 MG: 100 INJECTION SUBCUTANEOUS at 16:59

## 2024-07-13 RX ADMIN — SODIUM CHLORIDE 75 ML/HR: 9 INJECTION, SOLUTION INTRAVENOUS at 16:53

## 2024-07-13 RX ADMIN — PIPERACILLIN AND TAZOBACTAM 3.38 G: 3; .375 INJECTION, POWDER, FOR SOLUTION INTRAVENOUS at 02:10

## 2024-07-13 RX ADMIN — HYDROCODONE BITARTRATE AND ACETAMINOPHEN 1 TABLET: 7.5; 325 TABLET ORAL at 16:54

## 2024-07-13 RX ADMIN — Medication 10 ML: at 09:43

## 2024-07-13 NOTE — PROGRESS NOTES
"Pharmacy Antimicrobial Dosing Service    Subjective:  Erik Fuentes is a 64 y.o.male admitted with right knee pain. Pharmacy has been consulted to dose Vancomycin and Piperacillin/Tazobactam for possible bone/joint infection.    PMH: hx of rt knee joint replacement      Assessment/Plan    1. Day #2 Vancomycin: Goal -600 mcg*h/mL. 1750mg (~20mg/kg ABW) IV x1 dose followed by 1250mg (~15mg/kg ABW) IV q12h.  Pk - 7/13 at 2200, Tr - 7/14 at 0500 prior to fourth total dose.    2. Day #1 Ceftriaxone: 2gm IV Q24H    Will continue to monitor drug levels, renal function, culture and sensitivities, and patient clinical status.       Objective:  Relevant clinical data and objective history reviewed:  182.9 cm (72\")   86.2 kg (190 lb)   Ideal body weight: 77.6 kg (171 lb 1.2 oz)  Adjusted ideal body weight: 81 kg (178 lb 10.3 oz)  Body mass index is 25.77 kg/m².        Results from last 7 days   Lab Units 07/12/24 2259 07/12/24  1441   CREATININE mg/dL 0.79 0.79     Estimated Creatinine Clearance: 112.1 mL/min (by C-G formula based on SCr of 0.79 mg/dL).  I/O last 3 completed shifts:  In: 1368.7 [P.O.:240; I.V.:1128.7]  Out: 675 [Urine:675]    Results from last 7 days   Lab Units 07/12/24  2259 07/12/24  1441   WBC 10*3/mm3 18.32* 13.90*     Temperature    07/13/24 0038 07/13/24 0515 07/13/24 0700   Temp: 99.6 °F (37.6 °C) 98.7 °F (37.1 °C) 98.4 °F (36.9 °C)     Baseline culture/source/susceptibility:  Microbiology Results (last 10 days)       Procedure Component Value - Date/Time    Body Fluid Culture - Body Fluid, Knee, Right [[id number]] Collected: 07/12/24 1711    Lab Status: Preliminary result Specimen: Body Fluid from Knee, Right Updated: 07/13/24 0826     Body Fluid Culture Culture in progress     Gram Stain Many (4+) WBCs per low power field      No organisms seen            Edith Starr [facility]  07/13/24 09:18 EDT      "

## 2024-07-13 NOTE — PROGRESS NOTES
[facility] MEDICINE SERVICE  DAILY PROGRESS NOTE    NAME: Erik Fuentes  : 1960  MRN: [id number]      LOS: 1 day     PROVIDER OF SERVICE: Rio Griffin MD    Chief Complaint: Septic arthritis of knee, right    Subjective:     Interval History:  History taken from: patient    Subjective       Chief Complaint: right knee pain     History of Present Illness: Erik Fuentes is a 64 y.o. male with a CMH of BPH, hemochromatosis, prostate cancer, liver cirrhosis, esophageal varices, GERD, HTN who presented to [facility] on 2024 with right knee pain. Lives at home with wife, independent with ADLs.   Presents to ED with right knee pains. States intermittent right knee pain over past 2 years. Today, after returning home from chiropractor with worsening right knee pain and swelling. Denies recent illness or injury. States was in [place] 2 weeks ago with abrasion to left hand. On arrival to ED VS: 100.6-87-/89-96% room air.      Upon evaluation, awake, alert, resting in bed, wife at bedside. Current VS: 96-/84-96% room air. Right knee with swelling and warmth. No injury to RLE or right foot noted. Endorses unable to bear weight RLE.   XR right knee reveals large suprapatellar joint effusion with swelling. Right total knee prosthesis without periprosthetic fracture  Blood work reveals WBC 13.9, Hgb 13.8, Hct 39, platelets 171, sodium 135, AST 90. Alk phos 123. T. Bilirubin 3.5. CRP 2.31        24 seen and examined medical distress, vital signs stable, no complaints, still having right knee pain and swelling.  Discussed with RN.  Consulted ID.  Tmax 100.6.     Review of Systems  constitutional:  Positive for fever. Negative for chills.   Respiratory:  Negative for shortness of breath.    Cardiovascular:  Negative for chest pain.   Gastrointestinal:  Negative for abdominal pain, diarrhea, nausea and vomiting.   Genitourinary:  Negative for dysuria.   Musculoskeletal:   Positive for gait problem.        Right knee pain   Objective:     Vital Signs  Temp:  [98.4 °F (36.9 °C)-100.6 °F (38.1 °C)] 98.4 °F (36.9 °C)  Heart Rate:  [65-96] 72  Resp:  [16-18] 18  BP: (124-172)/(62-89) 135/62   Body mass index is 25.77 kg/m².    Physical Exam       General: He is not in acute distress.     Appearance: Normal appearance. He is not ill-appearing or toxic-appearing.   HENT:      Head: Normocephalic and atraumatic.      Mouth/Throat:      Mouth: Mucous membranes are moist.   Eyes:      Extraocular Movements: Extraocular movements intact.      Pupils: Pupils are equal, round, and reactive to light.   Cardiovascular:      Rate and Rhythm: Normal rate. Rhythm irregular.      Pulses: Normal pulses.      Heart sounds: Normal heart sounds.   Pulmonary:      Effort: Pulmonary effort is normal. No respiratory distress.      Breath sounds: Normal breath sounds.   Abdominal:      General: Bowel sounds are normal. There is no distension.      Palpations: Abdomen is soft.      Tenderness: There is no abdominal tenderness.   Musculoskeletal:      Comments: Right knee swelling with warmth.    Skin:     General: Skin is warm and dry.   Neurological:      Mental Status: He is alert and oriented to person, place, and time. Mental status is at baseline.      Scheduled Meds   cefTRIAXone, 2,000 mg, Intravenous, Q24H  cetirizine, 10 mg, Oral, Daily  enoxaparin, 40 mg, Subcutaneous, Q24H  lisinopril, 5 mg, Oral, Q24H  pantoprazole, 40 mg, Oral, QAM AC  sodium chloride, 10 mL, Intravenous, Q12H  vancomycin, 1,250 mg, Intravenous, Q12H  vitamin D3, 5,000 Units, Oral, Daily       PRN Meds     acetaminophen    senna-docusate sodium **AND** polyethylene glycol **AND** bisacodyl **AND** bisacodyl    Calcium Replacement - Follow Nurse / BPA Driven Protocol    HYDROcodone-acetaminophen    HYDROmorphone **AND** naloxone    Magnesium Standard Dose Replacement - Follow Nurse / BPA Driven Protocol    ondansetron    Pharmacy to  dose vancomycin    Phosphorus Replacement - Follow Nurse / BPA Driven Protocol    Potassium Replacement - Follow Nurse / BPA Driven Protocol    [COMPLETED] Insert Peripheral IV **AND** sodium chloride    sodium chloride    sodium chloride    triamcinolone   Infusions  Pharmacy to dose vancomycin,   sodium chloride, 75 mL/hr, Last Rate: 75 mL/hr (07/12/24 2055)          Diagnostic Data    Results from last 7 days   Lab Units 07/12/24  2259 07/12/24  1441   WBC 10*3/mm3 18.32* 13.90*   HEMOGLOBIN g/dL 12.2* 13.8   HEMATOCRIT % 34.0* 39.0   PLATELETS 10*3/mm3 138* 171   GLUCOSE mg/dL 121* 111*   CREATININE mg/dL 0.79 0.79   BUN mg/dL 9 10   SODIUM mmol/L 135* 135*   POTASSIUM mmol/L 3.7 3.6   AST (SGOT) U/L  --  90*   ALT (SGPT) U/L  --  41   ALK PHOS U/L  --  123*   BILIRUBIN mg/dL  --  3.5*   ANION GAP mmol/L 8.2 8.5       XR Knee 3 View Right    Result Date: 7/12/2024  Impression: 1. Large suprapatellar joint effusion and soft tissue swelling. Findings could reflect cellulitis with infection possible given the clinical symptoms. 2. Right total knee prosthesis without evidence of periprosthetic fracture. Electronically Signed: Cesar Alejandra  7/12/2024 3:00 PM EDT  Workstation ID: [id number]       I reviewed the patient's new clinical results.    Assessment/Plan:     Active and Resolved Problems  Active Hospital Problems    Diagnosis  POA    **Septic arthritis of knee, right [M00.9]  Yes      Resolved Hospital Problems   No resolved problems to display.     Septic arthritis of right knee:  -presents with right knee pain, swelling, warmth.   -XR: large suprapatellar joint effusion with swelling.  -s/p drainage 50ml from right knee in ED--fluid analysis pending  -blood cultures pending  -empirically treat with zosyn and vanco. Pharmacy to dose  -ortho consult. Appreciate recommendations.   -meets sepsis criteria on admission with fever, leukocytosis, source of infection. LA normal. CRP 2.31.   -IVFs.   -pain control.    -uric acid negative. Denies hx of RA.    -ID consult       Hypertension:  -BP may be labile with pain response  -trend BP  -continue home medications once dosing verified with pharmacy.         Liver cirrhosis:  -follows with OP GI  -admission: AST 90. Alk phos 123. T. Bilirubin 3.5        GERD:  Protonix for GI Ppx.      VTE Prophylaxis:  Mechanical VTE prophylaxis orders are signed & held.            VTE Prophylaxis:  Pharmacologic & mechanical VTE prophylaxis orders are present.         Code status is   Code Status and Medical Interventions:   Ordered at: 07/12/24 2060     Level Of Support Discussed With:    Patient     Code Status (Patient has no pulse and is not breathing):    CPR (Attempt to Resuscitate)     Medical Interventions (Patient has pulse or is breathing):    Full Support       Plan for disposition:home in 4 days    Time: 30 minutes    Signature: Electronically signed by Rio Griffin MD, 07/13/24, 10:59 EDT.  [facility] Hospitalist Team
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
## 2024-07-13 NOTE — PLAN OF CARE
Goal Outcome Evaluation:               Pt triggered sepsis on IV abx see the MAR. Pt taking PO and IV pains meds. Pt spiked fever of 100.7. Surgery scheduled for 0740 with doctor Ria. Pt consent is signed and in chart .Pt will be NPO at [facility]. Care plan ongoing , call light within reach , room near nurses station.

## 2024-07-13 NOTE — PROGRESS NOTES
"Pharmacy Antimicrobial Dosing Service    Subjective:  Erik Fuentes is a 64 y.o.male admitted with right knee pain. Pharmacy has been consulted to dose Vancomycin and Piperacillin/Tazobactam for possible bone/joint infection.    PMH: hx of rt knee joint replacement      Assessment/Plan    1. Day #2 Vancomycin: Goal -600 mcg*h/mL. 1750mg (~20mg/kg ABW) IV x1 dose followed by 1250mg (~15mg/kg ABW) IV q12h.  Pk - 7/13 at 2200, Tr - 7/14 at 0500 prior to fourth total dose.    2. Day #2 Piperacillin/Tazobactam: 3.375gm IV q8h for estCrCl > 20 mL/min.    Will continue to monitor drug levels, renal function, culture and sensitivities, and patient clinical status.       Objective:  Relevant clinical data and objective history reviewed:  182.9 cm (72\")   86.2 kg (190 lb)   Ideal body weight: 77.6 kg (171 lb 1.2 oz)  Adjusted ideal body weight: 81 kg (178 lb 10.3 oz)  Body mass index is 25.77 kg/m².        Results from last 7 days   Lab Units 07/12/24  2259 07/12/24  1441   CREATININE mg/dL 0.79 0.79     Estimated Creatinine Clearance: 112.1 mL/min (by C-G formula based on SCr of 0.79 mg/dL).  No intake/output data recorded.    Results from last 7 days   Lab Units 07/12/24  2259 07/12/24  1441   WBC 10*3/mm3 18.32* 13.90*     Temperature    07/12/24 1417 07/12/24 2036 07/13/24 0038   Temp: (!) 100.6 °F (38.1 °C) 98.9 °F (37.2 °C) 99.6 °F (37.6 °C)     Baseline culture/source/susceptibility:  Microbiology Results (last 10 days)       ** No results found for the last 240 hours. **            Nick Vallejo  07/13/24 02:12 EDT    "

## 2024-07-13 NOTE — PLAN OF CARE
Goal Outcome Evaluation:Pt remains painful and pain managed per MAR. RLE remains elevated on pillow. Remains NPO at MN. Remains on IV abx. Magnesium replaced this shift. Plan of care ongoing

## 2024-07-14 ENCOUNTER — ANESTHESIA EVENT (OUTPATIENT)
Dept: PERIOP | Facility: HOSPITAL | Age: 64
End: 2024-07-14
Payer: MEDICARE

## 2024-07-14 ENCOUNTER — APPOINTMENT (OUTPATIENT)
Dept: GENERAL RADIOLOGY | Facility: HOSPITAL | Age: 64
DRG: 466 | End: 2024-07-14
Payer: MEDICARE

## 2024-07-14 ENCOUNTER — ANESTHESIA (OUTPATIENT)
Dept: PERIOP | Facility: HOSPITAL | Age: 64
End: 2024-07-14
Payer: MEDICARE

## 2024-07-14 LAB
ANION GAP SERPL CALCULATED.3IONS-SCNC: 9.3 MMOL/L (ref 5–15)
BASOPHILS # BLD AUTO: 0.08 10*3/MM3 (ref 0–0.2)
BASOPHILS NFR BLD AUTO: 0.5 % (ref 0–1.5)
BUN SERPL-MCNC: 10 MG/DL (ref 8–23)
BUN/CREAT SERPL: 14.5 (ref 7–25)
CALCIUM SPEC-SCNC: 8.1 MG/DL (ref 8.6–10.5)
CHLORIDE SERPL-SCNC: 98 MMOL/L (ref 98–107)
CO2 SERPL-SCNC: 23.7 MMOL/L (ref 22–29)
CREAT SERPL-MCNC: 0.69 MG/DL (ref 0.76–1.27)
DEPRECATED RDW RBC AUTO: 55.8 FL (ref 37–54)
EGFRCR SERPLBLD CKD-EPI 2021: 102.1 ML/MIN/1.73
EOSINOPHIL # BLD AUTO: 0.41 10*3/MM3 (ref 0–0.4)
EOSINOPHIL NFR BLD AUTO: 2.8 % (ref 0.3–6.2)
ERYTHROCYTE [DISTWIDTH] IN BLOOD BY AUTOMATED COUNT: 14.6 % (ref 12.3–15.4)
GLUCOSE SERPL-MCNC: 88 MG/DL (ref 65–99)
HCT VFR BLD AUTO: 36.9 % (ref 37.5–51)
HGB BLD-MCNC: 13.5 G/DL (ref 13–17.7)
IMM GRANULOCYTES # BLD AUTO: 0.06 10*3/MM3 (ref 0–0.05)
IMM GRANULOCYTES NFR BLD AUTO: 0.4 % (ref 0–0.5)
LYMPHOCYTES # BLD AUTO: 1.57 10*3/MM3 (ref 0.7–3.1)
LYMPHOCYTES NFR BLD AUTO: 10.6 % (ref 19.6–45.3)
MAGNESIUM SERPL-MCNC: 1.5 MG/DL (ref 1.6–2.4)
MCH RBC QN AUTO: 37.9 PG (ref 26.6–33)
MCHC RBC AUTO-ENTMCNC: 36.6 G/DL (ref 31.5–35.7)
MCV RBC AUTO: 103.7 FL (ref 79–97)
MONOCYTES # BLD AUTO: 1.43 10*3/MM3 (ref 0.1–0.9)
MONOCYTES NFR BLD AUTO: 9.6 % (ref 5–12)
NEUTROPHILS NFR BLD AUTO: 11.32 10*3/MM3 (ref 1.7–7)
NEUTROPHILS NFR BLD AUTO: 76.1 % (ref 42.7–76)
NRBC BLD AUTO-RTO: 0 /100 WBC (ref 0–0.2)
PLATELET # BLD AUTO: 154 10*3/MM3 (ref 140–450)
PMV BLD AUTO: 12.3 FL (ref 6–12)
POTASSIUM SERPL-SCNC: 3.6 MMOL/L (ref 3.5–5.2)
RBC # BLD AUTO: 3.56 10*6/MM3 (ref 4.14–5.8)
SODIUM SERPL-SCNC: 131 MMOL/L (ref 136–145)
VANCOMYCIN PEAK SERPL-MCNC: 11.6 MCG/ML (ref 20–40)
VANCOMYCIN TROUGH SERPL-MCNC: 7.47 MCG/ML (ref 5–20)
WBC NRBC COR # BLD AUTO: 14.87 10*3/MM3 (ref 3.4–10.8)

## 2024-07-14 PROCEDURE — 25810000003 SODIUM CHLORIDE 0.9 % SOLUTION: Performed by: INTERNAL MEDICINE

## 2024-07-14 PROCEDURE — 80048 BASIC METABOLIC PNL TOTAL CA: CPT | Performed by: NURSE PRACTITIONER

## 2024-07-14 PROCEDURE — C1713 ANCHOR/SCREW BN/BN,TIS/BN: HCPCS | Performed by: ORTHOPAEDIC SURGERY

## 2024-07-14 PROCEDURE — 0SRC0J9 REPLACEMENT OF RIGHT KNEE JOINT WITH SYNTHETIC SUBSTITUTE, CEMENTED, OPEN APPROACH: ICD-10-PCS | Performed by: ORTHOPAEDIC SURGERY

## 2024-07-14 PROCEDURE — 25010000002 MORPHINE PER 10 MG: Performed by: INTERNAL MEDICINE

## 2024-07-14 PROCEDURE — C1776 JOINT DEVICE (IMPLANTABLE): HCPCS | Performed by: ORTHOPAEDIC SURGERY

## 2024-07-14 PROCEDURE — 25010000002 ONDANSETRON PER 1 MG: Performed by: ANESTHESIOLOGY

## 2024-07-14 PROCEDURE — 25010000002 FENTANYL CITRATE (PF) 100 MCG/2ML SOLUTION: Performed by: ANESTHESIOLOGY

## 2024-07-14 PROCEDURE — 80202 ASSAY OF VANCOMYCIN: CPT | Performed by: NURSE PRACTITIONER

## 2024-07-14 PROCEDURE — 73560 X-RAY EXAM OF KNEE 1 OR 2: CPT

## 2024-07-14 PROCEDURE — 25810000003 SODIUM CHLORIDE 0.9 % SOLUTION 250 ML FLEX CONT: Performed by: NURSE PRACTITIONER

## 2024-07-14 PROCEDURE — 25010000002 VANCOMYCIN 1 G RECONSTITUTED SOLUTION: Performed by: ORTHOPAEDIC SURGERY

## 2024-07-14 PROCEDURE — 25010000002 HYDROMORPHONE 1 MG/ML SOLUTION: Performed by: ANESTHESIOLOGY

## 2024-07-14 PROCEDURE — 25010000002 VANCOMYCIN HCL IN NACL 1.5-0.9 GM/500ML-% SOLUTION: Performed by: INTERNAL MEDICINE

## 2024-07-14 PROCEDURE — 25010000002 VANCOMYCIN HCL 1.25 G RECONSTITUTED SOLUTION 1 EACH VIAL: Performed by: NURSE PRACTITIONER

## 2024-07-14 PROCEDURE — 0SPC0JZ REMOVAL OF SYNTHETIC SUBSTITUTE FROM RIGHT KNEE JOINT, OPEN APPROACH: ICD-10-PCS | Performed by: ORTHOPAEDIC SURGERY

## 2024-07-14 PROCEDURE — 85025 COMPLETE CBC W/AUTO DIFF WBC: CPT | Performed by: NURSE PRACTITIONER

## 2024-07-14 PROCEDURE — 25010000002 PROPOFOL 200 MG/20ML EMULSION: Performed by: ANESTHESIOLOGY

## 2024-07-14 PROCEDURE — 25010000002 ENOXAPARIN PER 10 MG: Performed by: ORTHOPAEDIC SURGERY

## 2024-07-14 PROCEDURE — 25810000003 LACTATED RINGERS PER 1000 ML: Performed by: ANESTHESIOLOGY

## 2024-07-14 PROCEDURE — 25810000003 SODIUM CHLORIDE 0.9 % SOLUTION: Performed by: ORTHOPAEDIC SURGERY

## 2024-07-14 PROCEDURE — 25010000002 DEXAMETHASONE PER 1 MG: Performed by: ANESTHESIOLOGY

## 2024-07-14 PROCEDURE — 83735 ASSAY OF MAGNESIUM: CPT | Performed by: NURSE PRACTITIONER

## 2024-07-14 PROCEDURE — 25010000002 TOBRAMYCIN PER 80 MG: Performed by: ORTHOPAEDIC SURGERY

## 2024-07-14 DEVICE — DEV CONTRL TISS STRATAFIX SPIRAL PLS PDS SH 2/0 30CM: Type: IMPLANTABLE DEVICE | Site: KNEE | Status: FUNCTIONAL

## 2024-07-14 DEVICE — JOURNEY II BCS FEMORAL OXINIUM                                    RIGHT SIZE 7
Type: IMPLANTABLE DEVICE | Site: KNEE | Status: FUNCTIONAL
Brand: JOURNEY

## 2024-07-14 DEVICE — CMT BONE PALACOS R HI/VISC 1X40: Type: IMPLANTABLE DEVICE | Site: KNEE | Status: FUNCTIONAL

## 2024-07-14 DEVICE — JOURNEY II BCS CONSTRAINED                                    ARTICULAR INSERT SIZE 5-6 RIGHT 25MM
Type: IMPLANTABLE DEVICE | Site: KNEE | Status: FUNCTIONAL
Brand: JOURNEY

## 2024-07-14 RX ORDER — PROPOFOL 10 MG/ML
INJECTION, EMULSION INTRAVENOUS AS NEEDED
Status: DISCONTINUED | OUTPATIENT
Start: 2024-07-14 | End: 2024-07-14 | Stop reason: SURG

## 2024-07-14 RX ORDER — ONDANSETRON 2 MG/ML
INJECTION INTRAMUSCULAR; INTRAVENOUS AS NEEDED
Status: DISCONTINUED | OUTPATIENT
Start: 2024-07-14 | End: 2024-07-14 | Stop reason: SURG

## 2024-07-14 RX ORDER — VANCOMYCIN/0.9 % SOD CHLORIDE 1.5G/250ML
1500 PLASTIC BAG, INJECTION (ML) INTRAVENOUS EVERY 12 HOURS
Status: DISCONTINUED | OUTPATIENT
Start: 2024-07-14 | End: 2024-07-15

## 2024-07-14 RX ORDER — VANCOMYCIN HYDROCHLORIDE 1 G/20ML
INJECTION, POWDER, LYOPHILIZED, FOR SOLUTION INTRAVENOUS AS NEEDED
Status: DISCONTINUED | OUTPATIENT
Start: 2024-07-14 | End: 2024-07-14 | Stop reason: HOSPADM

## 2024-07-14 RX ORDER — MORPHINE SULFATE 2 MG/ML
2 INJECTION, SOLUTION INTRAMUSCULAR; INTRAVENOUS EVERY 4 HOURS PRN
Status: DISPENSED | OUTPATIENT
Start: 2024-07-14 | End: 2024-07-21

## 2024-07-14 RX ORDER — ASPIRIN 81 MG/1
81 TABLET, CHEWABLE ORAL 2 TIMES DAILY
Status: DISCONTINUED | OUTPATIENT
Start: 2024-07-14 | End: 2024-07-21 | Stop reason: HOSPADM

## 2024-07-14 RX ORDER — OXYCODONE HYDROCHLORIDE 5 MG/1
10 TABLET ORAL EVERY 4 HOURS PRN
Status: DISCONTINUED | OUTPATIENT
Start: 2024-07-14 | End: 2024-07-14 | Stop reason: HOSPADM

## 2024-07-14 RX ORDER — SODIUM CHLORIDE, SODIUM LACTATE, POTASSIUM CHLORIDE, CALCIUM CHLORIDE 600; 310; 30; 20 MG/100ML; MG/100ML; MG/100ML; MG/100ML
INJECTION, SOLUTION INTRAVENOUS CONTINUOUS PRN
Status: DISCONTINUED | OUTPATIENT
Start: 2024-07-14 | End: 2024-07-14 | Stop reason: SURG

## 2024-07-14 RX ORDER — MAGNESIUM HYDROXIDE 1200 MG/15ML
LIQUID ORAL AS NEEDED
Status: DISCONTINUED | OUTPATIENT
Start: 2024-07-14 | End: 2024-07-14 | Stop reason: HOSPADM

## 2024-07-14 RX ORDER — OXYCODONE HYDROCHLORIDE 5 MG/1
5 TABLET ORAL EVERY 4 HOURS PRN
Status: DISPENSED | OUTPATIENT
Start: 2024-07-14 | End: 2024-07-21

## 2024-07-14 RX ORDER — PHENYLEPHRINE HCL IN 0.9% NACL 1 MG/10 ML
SYRINGE (ML) INTRAVENOUS AS NEEDED
Status: DISCONTINUED | OUTPATIENT
Start: 2024-07-14 | End: 2024-07-14 | Stop reason: SURG

## 2024-07-14 RX ORDER — TRANEXAMIC ACID 10 MG/ML
INJECTION, SOLUTION INTRAVENOUS AS NEEDED
Status: DISCONTINUED | OUTPATIENT
Start: 2024-07-14 | End: 2024-07-14 | Stop reason: SURG

## 2024-07-14 RX ORDER — DEXAMETHASONE SODIUM PHOSPHATE 4 MG/ML
INJECTION, SOLUTION INTRA-ARTICULAR; INTRALESIONAL; INTRAMUSCULAR; INTRAVENOUS; SOFT TISSUE AS NEEDED
Status: DISCONTINUED | OUTPATIENT
Start: 2024-07-14 | End: 2024-07-14 | Stop reason: SURG

## 2024-07-14 RX ORDER — MIDODRINE HYDROCHLORIDE 5 MG/1
5 TABLET ORAL
Status: DISCONTINUED | OUTPATIENT
Start: 2024-07-14 | End: 2024-07-17

## 2024-07-14 RX ORDER — ONDANSETRON 2 MG/ML
4 INJECTION INTRAMUSCULAR; INTRAVENOUS ONCE AS NEEDED
Status: DISCONTINUED | OUTPATIENT
Start: 2024-07-14 | End: 2024-07-14 | Stop reason: HOSPADM

## 2024-07-14 RX ORDER — TOBRAMYCIN 1.2 G/30ML
INJECTION, POWDER, LYOPHILIZED, FOR SOLUTION INTRAVENOUS AS NEEDED
Status: DISCONTINUED | OUTPATIENT
Start: 2024-07-14 | End: 2024-07-14 | Stop reason: HOSPADM

## 2024-07-14 RX ORDER — FENTANYL CITRATE 50 UG/ML
50 INJECTION, SOLUTION INTRAMUSCULAR; INTRAVENOUS
Status: DISCONTINUED | OUTPATIENT
Start: 2024-07-14 | End: 2024-07-14 | Stop reason: HOSPADM

## 2024-07-14 RX ORDER — FENTANYL CITRATE 50 UG/ML
INJECTION, SOLUTION INTRAMUSCULAR; INTRAVENOUS AS NEEDED
Status: DISCONTINUED | OUTPATIENT
Start: 2024-07-14 | End: 2024-07-14 | Stop reason: SURG

## 2024-07-14 RX ADMIN — ASPIRIN 81 MG CHEWABLE TABLET 81 MG: 81 TABLET CHEWABLE at 20:51

## 2024-07-14 RX ADMIN — HYDROMORPHONE HYDROCHLORIDE 0.5 MG: 1 INJECTION, SOLUTION INTRAMUSCULAR; INTRAVENOUS; SUBCUTANEOUS at 10:25

## 2024-07-14 RX ADMIN — SODIUM CHLORIDE 1000 ML: 9 INJECTION, SOLUTION INTRAVENOUS at 14:58

## 2024-07-14 RX ADMIN — ENOXAPARIN SODIUM 40 MG: 100 INJECTION SUBCUTANEOUS at 17:30

## 2024-07-14 RX ADMIN — ASPIRIN 81 MG CHEWABLE TABLET 81 MG: 81 TABLET CHEWABLE at 14:29

## 2024-07-14 RX ADMIN — CEFTRIAXONE 2000 MG: 2 INJECTION, POWDER, FOR SOLUTION INTRAMUSCULAR; INTRAVENOUS at 08:57

## 2024-07-14 RX ADMIN — Medication 100 MCG: at 09:37

## 2024-07-14 RX ADMIN — ONDANSETRON 4 MG: 2 INJECTION INTRAMUSCULAR; INTRAVENOUS at 09:34

## 2024-07-14 RX ADMIN — Medication 100 MCG: at 09:34

## 2024-07-14 RX ADMIN — FENTANYL CITRATE 50 MCG: 50 INJECTION, SOLUTION INTRAMUSCULAR; INTRAVENOUS at 08:48

## 2024-07-14 RX ADMIN — OXYCODONE 5 MG: 5 TABLET ORAL at 20:51

## 2024-07-14 RX ADMIN — Medication 10 ML: at 20:51

## 2024-07-14 RX ADMIN — Medication 100 MCG: at 09:32

## 2024-07-14 RX ADMIN — VANCOMYCIN HYDROCHLORIDE 1250 MG: 1.25 INJECTION, POWDER, LYOPHILIZED, FOR SOLUTION INTRAVENOUS at 05:51

## 2024-07-14 RX ADMIN — DEXAMETHASONE SODIUM PHOSPHATE 4 MG: 4 INJECTION, SOLUTION INTRAMUSCULAR; INTRAVENOUS at 09:21

## 2024-07-14 RX ADMIN — PROPOFOL 200 MG: 10 INJECTION, EMULSION INTRAVENOUS at 08:50

## 2024-07-14 RX ADMIN — Medication 1500 MG: at 17:31

## 2024-07-14 RX ADMIN — MORPHINE SULFATE 2 MG: 2 INJECTION, SOLUTION INTRAMUSCULAR; INTRAVENOUS at 15:14

## 2024-07-14 RX ADMIN — SODIUM CHLORIDE 75 ML/HR: 9 INJECTION, SOLUTION INTRAVENOUS at 17:37

## 2024-07-14 RX ADMIN — SENNOSIDES AND DOCUSATE SODIUM 2 TABLET: 50; 8.6 TABLET ORAL at 20:51

## 2024-07-14 RX ADMIN — MIDODRINE HYDROCHLORIDE 5 MG: 5 TABLET ORAL at 15:17

## 2024-07-14 RX ADMIN — SODIUM CHLORIDE, SODIUM LACTATE, POTASSIUM CHLORIDE, AND CALCIUM CHLORIDE: .6; .31; .03; .02 INJECTION, SOLUTION INTRAVENOUS at 08:40

## 2024-07-14 RX ADMIN — HYDROCODONE BITARTRATE AND ACETAMINOPHEN 1 TABLET: 7.5; 325 TABLET ORAL at 17:31

## 2024-07-14 RX ADMIN — POLYETHYLENE GLYCOL 3350 17 G: 17 POWDER, FOR SOLUTION ORAL at 20:51

## 2024-07-14 RX ADMIN — HYDROCODONE BITARTRATE AND ACETAMINOPHEN 1 TABLET: 7.5; 325 TABLET ORAL at 04:14

## 2024-07-14 RX ADMIN — HYDROMORPHONE HYDROCHLORIDE 1 MG: 1 INJECTION, SOLUTION INTRAMUSCULAR; INTRAVENOUS; SUBCUTANEOUS at 08:58

## 2024-07-14 NOTE — PROGRESS NOTES
Infectious Diseases Progress Note      LOS: 2 days   Patient Care Team:  Tamar Eugene APRN as PCP - General (Nurse Practitioner)    Chief Complaint: Right knee pain and swelling at admission    Subjective       The patient has been afebrile for the last 24 hours.  The patient is on room air, hemodynamically stable, and is tolerating antimicrobial therapy.  Patient is status post surgery      Review of Systems:   Review of Systems   Constitutional: Negative.    HENT: Negative.     Eyes: Negative.    Respiratory: Negative.     Cardiovascular: Negative.    Gastrointestinal: Negative.    Endocrine: Negative.    Genitourinary: Negative.    Musculoskeletal:  Positive for joint swelling.   Skin: Negative.    Neurological: Negative.    Psychiatric/Behavioral: Negative.     All other systems reviewed and are negative.       Objective     Vital Signs  Temp:  [97.6 °F (36.4 °C)-100.7 °F (38.2 °C)] 97.6 °F (36.4 °C)  Heart Rate:  [] 83  Resp:  [9-18] 10  BP: (104-161)/(57-90) 104/69    Physical Exam:  Physical Exam  Vitals and nursing note reviewed.   Constitutional:       General: He is not in acute distress.     Appearance: Normal appearance. He is well-developed and normal weight. He is not diaphoretic.   HENT:      Head: Normocephalic and atraumatic.   Eyes:      Conjunctiva/sclera: Conjunctivae normal.      Pupils: Pupils are equal, round, and reactive to light.   Cardiovascular:      Rate and Rhythm: Normal rate and regular rhythm.      Heart sounds: Normal heart sounds, S1 normal and S2 normal.   Pulmonary:      Effort: Pulmonary effort is normal. No respiratory distress.      Breath sounds: Normal breath sounds. No stridor. No wheezing or rales.   Abdominal:      General: Bowel sounds are normal. There is no distension.      Palpations: Abdomen is soft. There is no mass.      Tenderness: There is no abdominal tenderness. There is no guarding.   Musculoskeletal:         General: No deformity.      Cervical  back: Neck supple.      Comments: Surgical dressings in place on right knee along with Hemovac with bloody drainage   Skin:     General: Skin is warm and dry.      Coloration: Skin is not pale.      Findings: No erythema or rash.   Neurological:      Mental Status: He is alert and oriented to person, place, and time.      Cranial Nerves: No cranial nerve deficit.   Psychiatric:         Mood and Affect: Mood normal.          Results Review:    I have reviewed all clinical data, test, lab, and imaging results.     Radiology  XR Knee 1 or 2 View Right    Result Date: 7/14/2024  XR KNEE 1 OR 2 VW RIGHT Date of Exam: 7/14/2024 10:25 AM EDT Indication: postop postop Comparison: 7/12/2024 Findings: Postsurgical changes of revision of right knee prosthesis. The tibial component was removed with antibiotic cement placement. Full details in the operative note. Midline surgical staples noted. Patella intact. No periprosthetic fracture.     Impression: Revision of right knee prosthesis as above. Electronically Signed: Emanuel Humphrey MD  7/14/2024 11:12 AM EDT  Workstation ID: [id number]     Cardiology    Laboratory    Results from last 7 days   Lab Units 07/14/24  0432 07/12/24 2259 07/12/24  1441 07/09/24  1514   WBC 10*3/mm3 14.87* 18.32* 13.90*  --    HEMOGLOBIN g/dL 13.5 12.2* 13.8 12.6*   HEMATOCRIT % 36.9* 34.0* 39.0  --    PLATELETS 10*3/mm3 154 138* 171  --      Results from last 7 days   Lab Units 07/14/24  0432 07/12/24 2259 07/12/24  1441   SODIUM mmol/L 131* 135* 135*   POTASSIUM mmol/L 3.6 3.7 3.6   CHLORIDE mmol/L 98 102 100   CO2 mmol/L 23.7 24.8 26.5   BUN mg/dL 10 9 10   CREATININE mg/dL 0.69* 0.79 0.79   GLUCOSE mg/dL 88 121* 111*   ALBUMIN g/dL  --   --  3.4*   BILIRUBIN mg/dL  --   --  3.5*   ALK PHOS U/L  --   --  123*   AST (SGOT) U/L  --   --  90*   ALT (SGPT) U/L  --   --  41   CALCIUM mg/dL 8.1* 7.9* 8.9         Results from last 7 days   Lab Units 07/12/24  1441   SED RATE mm/hr 14          Microbiology   Microbiology Results (last 10 days)       Procedure Component Value - Date/Time    Body Fluid Culture - Body Fluid, Knee, Right [[id number]]  (Abnormal) Collected: 07/12/24 1711    Lab Status: Preliminary result Specimen: Body Fluid from Knee, Right Updated: 07/14/24 0817     Body Fluid Culture Gram Positive Cocci     Gram Stain Many (4+) WBCs per low power field      No organisms seen    Narrative:      In broth only.    Blood Culture - Blood, Arm, Left [[id number]]  (Normal) Collected: 07/12/24 1516    Lab Status: Preliminary result Specimen: Blood from Arm, Left Updated: 07/13/24 1532     Blood Culture No growth at 24 hours    Blood Culture ID, PCR - Blood, Arm, Left [[id number]]  (Abnormal) Collected: 07/12/24 1516    Lab Status: Final result Specimen: Blood from Arm, Left Updated: 07/13/24 2147     BCID, PCR Staph spp, not aureus or lugdunensis. Identification by BCID2 PCR.     BOTTLE TYPE Aerobic Bottle    Blood Culture - Blood, Arm, Left [[id number]]  (Normal) Collected: 07/12/24 1441    Lab Status: Preliminary result Specimen: Blood from Arm, Left Updated: 07/13/24 1446     Blood Culture No growth at 24 hours            Medication Review:       Schedule Meds  aspirin, 81 mg, Oral, BID  cefTRIAXone, 2,000 mg, Intravenous, Q24H  cetirizine, 10 mg, Oral, Daily  enoxaparin, 40 mg, Subcutaneous, Q24H  lisinopril, 5 mg, Oral, Q24H  pantoprazole, 40 mg, Oral, QAM AC  sodium chloride, 10 mL, Intravenous, Q12H  vancomycin, 1,500 mg, Intravenous, Q12H  vitamin D3, 5,000 Units, Oral, Daily        Infusion Meds  Pharmacy to dose vancomycin,   sodium chloride, 75 mL/hr, Last Rate: 75 mL/hr (07/13/24 1653)        PRN Meds    acetaminophen    senna-docusate sodium **AND** polyethylene glycol **AND** bisacodyl **AND** bisacodyl    Calcium Replacement - Follow Nurse / BPA Driven Protocol    HYDROcodone-acetaminophen    HYDROmorphone **AND** naloxone    Magnesium Standard Dose Replacement - Follow Nurse /  BPA Driven Protocol    ondansetron    Pharmacy to dose vancomycin    Phosphorus Replacement - Follow Nurse / BPA Driven Protocol    Potassium Replacement - Follow Nurse / BPA Driven Protocol    [COMPLETED] Insert Peripheral IV **AND** sodium chloride    sodium chloride    sodium chloride    triamcinolone        Assessment & Plan       Antimicrobial Therapy   1.  IV Rocephin        2.  IV vancomycin        3.        4.        5.            Right total knee infection.  Patient originally had the right total knee replaced for arthritis 6 years ago.  States he has been having pain for several weeks but this was exacerbated after he went to the chiropractor on 7/11/2024.  Patient had a aspiration of the right knee with fluid analysis indicating infection.  Cultures are growing gram-positive cocci.  Patient is status post hardware removal and antibiotic spacer placement to the right knee on 7/14/2024.    Positive blood culture and 1 out of 2 blood cultures from admission growing a coagulase-negative Staphylococcus.  Patient did not come with a line, port and has no known history of cardiac valve history or device placement.  This is likely contamination     Liver cirrhosis-patient has been sober for approximately 2 months.     Prostate cancer     Plan     Continue IV Rocephin 2 g every 24 hours  Continue IV vancomycin asked pharmacy monitor dose  Waiting on aspiration cultures finalize  Patient will need to be on 6 weeks of IV antibiotics from surgery, then show no signs of infection for 2 weeks before new hardware should be considered for reimplantation  Continue supportive care  A.m. labs  Discussed with patient and family member at bedside    CARMEN Nicole  07/14/24  13:57 EDT    Note is dictated utilizing voice recognition software/Dragon

## 2024-07-14 NOTE — OP NOTE
Total Knee Revision Operative Note  Dr. ELO Rollins II  [phone]    PATIENT NAME: Erik Fuentes  MRN: [id number]  : 1960 AGE: 64 y.o. GENDER: male  DATE OF OPERATION: 2024  PREOPERATIVE DIAGNOSIS:  Right knee periprosthetic infection  POSTOPERATIVE DIAGNOSIS: Same  OPERATION PERFORMED: Right Total Knee Arthroplasty Revision  SURGEON: Elan Rollins MD  Circulator: Yumiko Mccann RN; Rito Dai RN  Scrub Person: Jayne Conteh  Vendor Representative: Darren oMreno  ANESTHESIA: General  ASSISTANT: None   ESTIMATED BLOOD LOSS: 100cc  SPONGE AND NEEDLE COUNT: Correct  INDICATIONS:  This patient had a previous knee replacement with a diagnosis of periprosthetic knee infection with unknown amount of time.  We discussed both polyethylene exchange versus revision and the patient elected for revision.  A discussion of operative versus nonoperative treatment was had with the patient and they failed conservative management. They elected to undergo total knee arthroplasty revision. The risks of surgery were discussed and included the risk of anesthesia, infection, damage to neurovascular structures, implant loosening/failure, fracture, hardware prominence, continued pain, early failure, the need for further procedures, medical complications, and others. No guarantees were made. The patient wished to proceed with surgery and a surgical consent was signed.    PERTINENT FINDINGS: Chronic appearing inflammatory tissue with gross purulence    DETAILS OF PROCEDURE:  The patient was met in the preoperative area. The site was marked. The consent and H&P were reviewed. The patient was then wheeled back to the operative suite and transferred to the operative table. The patient underwent anesthesia. A tourniquet was placed on the upper thigh.  A bump was placed under the operative hip. The Luther baseplate was secured to the table. Surgical alcohol was used to thoroughly clean the entire operative  extremity.     The leg was then prepped in the normal sterile fashion, which included Chloroprep, multiple layers of sterile drapes, and surgical space suits for the entire operative team. The Luther boot was applied to the foot after adequate padding. An Ioban dressing was applied to the knee after the surgical incision was marked.  New outer gloves were used by all sterile surgical team members after final draping. After a surgical timeout in which administration of preoperative antibiotics as well as 1g of tranexamic acid (if not contraindicated) and the surgical site were confirmed, the tourniquet was put up.     A midline knee incision was utilized followed by a medial parapatellar approach.  Gross purulent tissue and fluid was encountered and this was evacuated.  The tissues themselves looked necrotic and not healthy.  This did not appear to be an acute infection.  There is way too much soft tissue damage for this just to have been a few days.    I began by resurfacing the patella as it had not been resurfaced with the previous surgery.  All cartilage had to be removed and I used a saw to make a fresh cut.  I then remove the polyethylene and then turned my attention to the femur.  The femoral component was loosened using Howell osteotomes and a mallet.  It was removed without any undue bony damage.  The PCL was resected as the previous knee had been a CR implant.  I then turned my attention to the tibia.  I was able to use some osteotomes to loosen the cement mantle and remove the tibial component without much damage.  The tibial component was grossly loose likely from a chronic infection.  The cement along the proximal tibia came off with the implant but the cement down the canal remained and I then cleaned this out using an osteotome and mallet along with a rongeur.  Afterwards, a thorough synovectomy was performed using excisional sharp debridement with a rongeur and knife.  I then sized the knee and a real  "primary PS Legion component was opened along with a polyethylene.  I did not open a tibial component as to minimize the amount of hardware in the knee and the risk of further contamination and infection.  A threaded pin was inserted into the polyethylene and antibiotic cement was mixed.  There were 2 packs of Palacos with 10 g of vancomycin and 2.4 g tobramycin.  This was mixed with 2-1/2 liquid monomers.  The knee was thoroughly irrigated while this was happening using multiple liters of saline.  I then inserted the femoral component and removed excess cement and then inserted the tibial component.  The knee was brought into extension.  It had reasonable balance and motion.  A deep drain was left.  The capsule was then closed with a running monofilament suture.  The skin was then closed in layers.  A sterile dressing was applied.    The patient was awoken from anesthesia, moved to the [facility] and taken to the recovery room in stable condition. Sponge and needle count were correct. There were no complications. Patient tolerated the procedure well.    R \"Harvinder\" Ria ACUNA MD  Orthopaedic Surgery  [facility]  [phone]                "
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
## 2024-07-14 NOTE — PLAN OF CARE
Goal Outcome Evaluation:               Pt has knee surgery today with MD Rollins, Pain is controlled with PO, IV pain meds see the MAR. Pt blood pressure was running low, MD notified IV pain meds held see the charting , Pt given IV bolus see the MAR pressure returned to normal limits. Pt is resting in bed with ice packs on right knee, wife at the bedside. Call light within reach care plan ongoing.

## 2024-07-14 NOTE — ANESTHESIA PREPROCEDURE EVALUATION
Anesthesia Evaluation     Patient summary reviewed and Nursing notes reviewed   NPO Solid Status: > 8 hours  NPO Liquid Status: > 8 hours           Airway   Mallampati: II  TM distance: >3 FB  Neck ROM: full  No difficulty expected  Dental - normal exam     Pulmonary    Cardiovascular     (+) hypertension      Neuro/Psych  GI/Hepatic/Renal/Endo    (+) GERD, PUD, liver disease cirrhosis    Musculoskeletal     Abdominal    Substance History   (+) alcohol use     OB/GYN          Other   arthritis,   history of cancer    ROS/Med Hx Other: Stress  No evidence for reversible myocardial ischemia noted.  Normal left ventricular ejection fraction of 52%.                 Anesthesia Plan    ASA 3     general     intravenous induction     Anesthetic plan, risks, benefits, and alternatives have been provided, discussed and informed consent has been obtained with: patient.    CODE STATUS:    Level Of Support Discussed With: Patient  Code Status (Patient has no pulse and is not breathing): CPR (Attempt to Resuscitate)  Medical Interventions (Patient has pulse or is breathing): Full Support

## 2024-07-14 NOTE — ANESTHESIA POSTPROCEDURE EVALUATION
Patient: Erik Fuentes    Procedure Summary       Date: 07/14/24 Room / Location: [facility] OR  / [facility] MAIN OR    Anesthesia Start: 0843 Anesthesia Stop: 0957    Procedure: KNEE ARTHROPLASTY REVISION (Right: Knee) Diagnosis:       Pyogenic arthritis of right knee joint, due to unspecified organism      (Pyogenic arthritis of right knee joint, due to unspecified organism [M00.9])    Surgeons: Elan Rollins II, MD Provider: Praful Bolton MD    Anesthesia Type: general ASA Status: 3            Anesthesia Type: general    Vitals  Vitals Value Taken Time   /60 07/14/24 1042   Temp 98.1 °F (36.7 °C) 07/14/24 0955   Pulse 101 07/14/24 1045   Resp 10 07/14/24 1045   SpO2 92 % 07/14/24 1045           Post Anesthesia Care and Evaluation    Patient location during evaluation: PACU  Patient participation: complete - patient participated  Level of consciousness: awake  Pain scale: See nurse's notes for pain score.  Pain management: adequate    Airway patency: patent  Anesthetic complications: No anesthetic complications  PONV Status: none  Cardiovascular status: acceptable  Respiratory status: acceptable and spontaneous ventilation  Hydration status: acceptable    Comments: Patient seen and examined postoperatively; vital signs stable; SpO2 greater than or equal to 90%; cardiopulmonary status stable; nausea/vomiting adequately controlled; pain adequately controlled; no apparent anesthesia complications; patient discharged from anesthesia care when discharge criteria were met

## 2024-07-14 NOTE — PROGRESS NOTES
"Pharmacy Antimicrobial Dosing Service    Subjective:  Erik Fuentes is a 64 y.o.male admitted with right knee pain. Pharmacy has been consulted to dose Vancomycin and Ceftriaxone for possible bone/joint infection.    PMH: hx of rt knee joint replacement      Assessment/Plan    1. Day #3 Vancomycin: Goal -600 mcg*h/mL. Vancomycin peak 11.6 mcg/mL, trough 7.47. Predicted AUC is subtherapeutic. Will increase regimen to vancomycin 1500 mg (17.4 mg/kg ABW) IV q12h. Will plan to check random level 7/16 0500, prior to fourth dose.    2. Day #2 Ceftriaxone: 2gm IV Q24H    Will continue to monitor drug levels, renal function, culture and sensitivities, and patient clinical status.       Objective:  Relevant clinical data and objective history reviewed:  182.9 cm (72\")   86.2 kg (190 lb)   Ideal body weight: 77.6 kg (171 lb 1.2 oz)  Adjusted ideal body weight: 81 kg (178 lb 10.3 oz)  Body mass index is 25.77 kg/m².    Results from last 7 days   Lab Units 07/14/24  0432 07/13/24  2334   VANCOMYCIN PK mcg/mL  --  11.60*   VANCOMYCIN TR mcg/mL 7.47  --      Results from last 7 days   Lab Units 07/14/24 0432 07/12/24 2259 07/12/24  1441   CREATININE mg/dL 0.69* 0.79 0.79     Estimated Creatinine Clearance: 128.4 mL/min (A) (by C-G formula based on SCr of 0.69 mg/dL (L)).  I/O last 3 completed shifts:  In: 1848.7 [P.O.:720; I.V.:1128.7]  Out: 1975 [Urine:1975]    Results from last 7 days   Lab Units 07/14/24  0432 07/12/24 2259 07/12/24  1441   WBC 10*3/mm3 14.87* 18.32* 13.90*     Temperature    07/14/24 0804 07/14/24 0955 07/14/24 1105   Temp: 98.8 °F (37.1 °C) 98.1 °F (36.7 °C) 97.6 °F (36.4 °C)     Baseline culture/source/susceptibility:  Microbiology Results (last 10 days)       Procedure Component Value - Date/Time    Body Fluid Culture - Body Fluid, Knee, Right [[id number]]  (Abnormal) Collected: 07/12/24 1711    Lab Status: Preliminary result Specimen: Body Fluid from Knee, Right Updated: 07/14/24 0817     Body " Fluid Culture Gram Positive Cocci     Gram Stain Many (4+) WBCs per low power field      No organisms seen    Narrative:      In broth only.    Blood Culture - Blood, Arm, Left [[id number]]  (Normal) Collected: 07/12/24 1516    Lab Status: Preliminary result Specimen: Blood from Arm, Left Updated: 07/13/24 1532     Blood Culture No growth at 24 hours    Blood Culture ID, PCR - Blood, Arm, Left [[id number]]  (Abnormal) Collected: 07/12/24 1516    Lab Status: Final result Specimen: Blood from Arm, Left Updated: 07/13/24 2147     BCID, PCR Staph spp, not aureus or lugdunensis. Identification by BCID2 PCR.     BOTTLE TYPE Aerobic Bottle    Blood Culture - Blood, Arm, Left [[id number]]  (Normal) Collected: 07/12/24 1441    Lab Status: Preliminary result Specimen: Blood from Arm, Left Updated: 07/13/24 1446     Blood Culture No growth at 24 hours            Edith Starr RPH  07/14/24 11:16 EDT

## 2024-07-14 NOTE — ANESTHESIA PROCEDURE NOTES
Airway  Urgency: elective    Date/Time: 7/14/2024 8:52 AM  Airway not difficult    General Information and Staff    Patient location during procedure: OR    Indications and Patient Condition  Indications for airway management: airway protection    Preoxygenated: yes  Mask difficulty assessment: 0 - not attempted    Final Airway Details  Final airway type: supraglottic airway      Successful airway: LMA  Size 4     Number of attempts at approach: 1  Assessment: lips, teeth, and gum same as pre-op and atraumatic intubation

## 2024-07-14 NOTE — PLAN OF CARE
Goal Outcome Evaluation:              Outcome Evaluation: Patient pleasant and cooperative. NPO at midnight. Procedure later this afternoon. Currently resting.

## 2024-07-14 NOTE — PROGRESS NOTES
[facility] MEDICINE SERVICE  DAILY PROGRESS NOTE    NAME: Erik Fuentes  : 1960  MRN: [id number]      LOS: 2 days     PROVIDER OF SERVICE: Rio Griffin MD    Chief Complaint: Septic arthritis of knee, right    Subjective:     Interval History:  History taken from: patient    Subjective       Chief Complaint: right knee pain     History of Present Illness: Erik Fuentes is a 64 y.o. male with a CMH of BPH, hemochromatosis, prostate cancer, liver cirrhosis, esophageal varices, GERD, HTN who presented to [facility] on 2024 with right knee pain. Lives at home with wife, independent with ADLs.   Presents to ED with right knee pains. States intermittent right knee pain over past 2 years. Today, after returning home from chiropractor with worsening right knee pain and swelling. Denies recent illness or injury. States was in [place] 2 weeks ago with abrasion to left hand. On arrival to ED VS: 100.6-87-/89-96% room air.      Upon evaluation, awake, alert, resting in bed, wife at bedside. Current VS: 96-/84-96% room air. Right knee with swelling and warmth. No injury to RLE or right foot noted. Endorses unable to bear weight RLE.   XR right knee reveals large suprapatellar joint effusion with swelling. Right total knee prosthesis without periprosthetic fracture  Blood work reveals WBC 13.9, Hgb 13.8, Hct 39, platelets 171, sodium 135, AST 90. Alk phos 123. T. Bilirubin 3.5. CRP 2.31        24 seen and examined medical distress, vital signs stable, no complaints, still having right knee pain and swelling.  Discussed with RN.  Consulted ID.  Tmax 100.6.  24 patient seen in bed no acute distress, vital signs stable, underwent right Total Knee Arthroplasty Revision discussed with RN.  Tolerating antibiotics.     Review of Systems  constitutional:  Positive for fever. Negative for chills.   Respiratory:  Negative for shortness of breath.    Cardiovascular:   Negative for chest pain.   Gastrointestinal:  Negative for abdominal pain, diarrhea, nausea and vomiting.   Genitourinary:  Negative for dysuria.   Musculoskeletal:  Positive for gait problem.        Right knee pain   Objective:     Vital Signs  Temp:  [97.6 °F (36.4 °C)-100.7 °F (38.2 °C)] 97.6 °F (36.4 °C)  Heart Rate:  [] 83  Resp:  [9-18] 10  BP: (104-161)/(57-90) 104/69  Flow (L/min):  [2-4] 2   Body mass index is 25.77 kg/m².    Physical Exam       General: He is not in acute distress.     Appearance: Normal appearance. He is not ill-appearing or toxic-appearing.   HENT:      Head: Normocephalic and atraumatic.      Mouth/Throat:      Mouth: Mucous membranes are moist.   Eyes:      Extraocular Movements: Extraocular movements intact.      Pupils: Pupils are equal, round, and reactive to light.   Cardiovascular:      Rate and Rhythm: Normal rate. Rhythm irregular.      Pulses: Normal pulses.      Heart sounds: Normal heart sounds.   Pulmonary:      Effort: Pulmonary effort is normal. No respiratory distress.      Breath sounds: Normal breath sounds.   Abdominal:      General: Bowel sounds are normal. There is no distension.      Palpations: Abdomen is soft.      Tenderness: There is no abdominal tenderness.   Musculoskeletal:      Comments: Right knee swelling with warmth.    Skin:     General: Skin is warm and dry.   Neurological:      Mental Status: He is alert and oriented to person, place, and time. Mental status is at baseline.      Scheduled Meds   aspirin, 81 mg, Oral, BID  cefTRIAXone, 2,000 mg, Intravenous, Q24H  cetirizine, 10 mg, Oral, Daily  enoxaparin, 40 mg, Subcutaneous, Q24H  lisinopril, 5 mg, Oral, Q24H  pantoprazole, 40 mg, Oral, QAM AC  sodium chloride, 10 mL, Intravenous, Q12H  vancomycin, 1,500 mg, Intravenous, Q12H  vitamin D3, 5,000 Units, Oral, Daily       PRN Meds     acetaminophen    senna-docusate sodium **AND** polyethylene glycol **AND** bisacodyl **AND** bisacodyl    Calcium  Replacement - Follow Nurse / BPA Driven Protocol    HYDROcodone-acetaminophen    HYDROmorphone **AND** naloxone    Magnesium Standard Dose Replacement - Follow Nurse / BPA Driven Protocol    ondansetron    Pharmacy to dose vancomycin    Phosphorus Replacement - Follow Nurse / BPA Driven Protocol    Potassium Replacement - Follow Nurse / BPA Driven Protocol    [COMPLETED] Insert Peripheral IV **AND** sodium chloride    sodium chloride    sodium chloride    triamcinolone   Infusions  Pharmacy to dose vancomycin,   sodium chloride, 75 mL/hr, Last Rate: 75 mL/hr (07/13/24 1653)          Diagnostic Data    Results from last 7 days   Lab Units 07/14/24  0432 07/12/24  2259 07/12/24  1441   WBC 10*3/mm3 14.87*   < > 13.90*   HEMOGLOBIN g/dL 13.5   < > 13.8   HEMATOCRIT % 36.9*   < > 39.0   PLATELETS 10*3/mm3 154   < > 171   GLUCOSE mg/dL 88   < > 111*   CREATININE mg/dL 0.69*   < > 0.79   BUN mg/dL 10   < > 10   SODIUM mmol/L 131*   < > 135*   POTASSIUM mmol/L 3.6   < > 3.6   AST (SGOT) U/L  --   --  90*   ALT (SGPT) U/L  --   --  41   ALK PHOS U/L  --   --  123*   BILIRUBIN mg/dL  --   --  3.5*   ANION GAP mmol/L 9.3   < > 8.5    < > = values in this interval not displayed.       XR Knee 1 or 2 View Right    Result Date: 7/14/2024  Impression: Revision of right knee prosthesis as above. Electronically Signed: Emanuel Humphrey MD  7/14/2024 11:12 AM EDT  Workstation ID: [id number]    XR Knee 3 View Right    Result Date: 7/12/2024  Impression: 1. Large suprapatellar joint effusion and soft tissue swelling. Findings could reflect cellulitis with infection possible given the clinical symptoms. 2. Right total knee prosthesis without evidence of periprosthetic fracture. Electronically Signed: Cesar Alejandra  7/12/2024 3:00 PM EDT  Workstation ID: [id number]       I reviewed the patient's new clinical results.    Assessment/Plan:     Active and Resolved Problems  Active Hospital Problems    Diagnosis  POA    **Septic arthritis of knee,  right [M00.9]  Yes      Resolved Hospital Problems   No resolved problems to display.     Septic arthritis of right knee:  -presents with right knee pain, swelling, warmth.   -XR: large suprapatellar joint effusion with swelling.  -s/p drainage 50ml from right knee in ED--fluid analysis pending  -blood cultures pending  -empirically treat with zosyn and vanco. Pharmacy to dose  -ortho consult. Appreciate recommendations. S/p Right Total Knee Arthroplasty Revision 7/14/24  -meets sepsis criteria on admission with fever, leukocytosis, source of infection. LA normal. CRP 2.31.   -IVFs.   -pain control.   -uric acid negative. Denies hx of RA.    -ID consulted       Hypertension:  -BP may be labile with pain response  -trend BP  -continue home medications once dosing verified with pharmacy.         Liver cirrhosis:  -follows with OP GI  -admission: AST 90. Alk phos 123. T. Bilirubin 3.5        GERD:  Protonix for GI Ppx.      VTE Prophylaxis:  Mechanical VTE prophylaxis orders are signed & held.            VTE Prophylaxis:  Pharmacologic & mechanical VTE prophylaxis orders are present.         Code status is   Code Status and Medical Interventions:   Ordered at: 07/12/24 9398     Level Of Support Discussed With:    Patient     Code Status (Patient has no pulse and is not breathing):    CPR (Attempt to Resuscitate)     Medical Interventions (Patient has pulse or is breathing):    Full Support       Plan for disposition:home in 4 days    Time: 30 minutes    Signature: Electronically signed by Rio Griffin MD, 07/14/24, 13:54 EDT.  [facility] Hospitalist Team

## 2024-07-14 NOTE — SIGNIFICANT NOTE
07/14/24 0747   OTHER   Discipline physical therapist   Rehab Time/Intention   Session Not Performed other (see comments)  (sx this date. PT to f/u as appropriate)   Recommendation   PT - Next Appointment 07/15/24

## 2024-07-15 LAB
ANION GAP SERPL CALCULATED.3IONS-SCNC: 6.7 MMOL/L (ref 5–15)
BACTERIA FLD CULT: ABNORMAL
BACTERIA SPEC AEROBE CULT: ABNORMAL
BASOPHILS # BLD AUTO: 0.01 10*3/MM3 (ref 0–0.2)
BASOPHILS NFR BLD AUTO: 0.1 % (ref 0–1.5)
BUN SERPL-MCNC: 22 MG/DL (ref 8–23)
BUN/CREAT SERPL: 17.1 (ref 7–25)
CALCIUM SPEC-SCNC: 7.3 MG/DL (ref 8.6–10.5)
CHLORIDE SERPL-SCNC: 101 MMOL/L (ref 98–107)
CO2 SERPL-SCNC: 22.3 MMOL/L (ref 22–29)
CREAT SERPL-MCNC: 1.29 MG/DL (ref 0.76–1.27)
DEPRECATED RDW RBC AUTO: 53.6 FL (ref 37–54)
EGFRCR SERPLBLD CKD-EPI 2021: 61.2 ML/MIN/1.73
EOSINOPHIL # BLD AUTO: 0 10*3/MM3 (ref 0–0.4)
EOSINOPHIL NFR BLD AUTO: 0 % (ref 0.3–6.2)
ERYTHROCYTE [DISTWIDTH] IN BLOOD BY AUTOMATED COUNT: 14 % (ref 12.3–15.4)
GLUCOSE SERPL-MCNC: 140 MG/DL (ref 65–99)
GRAM STN SPEC: ABNORMAL
HCT VFR BLD AUTO: 25.3 % (ref 37.5–51)
HGB BLD-MCNC: 9.2 G/DL (ref 13–17.7)
IMM GRANULOCYTES # BLD AUTO: 0.07 10*3/MM3 (ref 0–0.05)
IMM GRANULOCYTES NFR BLD AUTO: 0.5 % (ref 0–0.5)
ISOLATED FROM: ABNORMAL
LYMPHOCYTES # BLD AUTO: 0.93 10*3/MM3 (ref 0.7–3.1)
LYMPHOCYTES NFR BLD AUTO: 7.1 % (ref 19.6–45.3)
MAGNESIUM SERPL-MCNC: 1.4 MG/DL (ref 1.6–2.4)
MCH RBC QN AUTO: 37.9 PG (ref 26.6–33)
MCHC RBC AUTO-ENTMCNC: 36.4 G/DL (ref 31.5–35.7)
MCV RBC AUTO: 104.1 FL (ref 79–97)
MONOCYTES # BLD AUTO: 1.73 10*3/MM3 (ref 0.1–0.9)
MONOCYTES NFR BLD AUTO: 13.3 % (ref 5–12)
NEUTROPHILS NFR BLD AUTO: 10.31 10*3/MM3 (ref 1.7–7)
NEUTROPHILS NFR BLD AUTO: 79 % (ref 42.7–76)
NRBC BLD AUTO-RTO: 0 /100 WBC (ref 0–0.2)
PLATELET # BLD AUTO: 144 10*3/MM3 (ref 140–450)
PMV BLD AUTO: 12.2 FL (ref 6–12)
POTASSIUM SERPL-SCNC: 4.8 MMOL/L (ref 3.5–5.2)
RBC # BLD AUTO: 2.43 10*6/MM3 (ref 4.14–5.8)
SODIUM SERPL-SCNC: 130 MMOL/L (ref 136–145)
WBC NRBC COR # BLD AUTO: 13.05 10*3/MM3 (ref 3.4–10.8)

## 2024-07-15 PROCEDURE — 97162 PT EVAL MOD COMPLEX 30 MIN: CPT

## 2024-07-15 PROCEDURE — 25010000002 CEFTRIAXONE PER 250 MG: Performed by: ORTHOPAEDIC SURGERY

## 2024-07-15 PROCEDURE — 80048 BASIC METABOLIC PNL TOTAL CA: CPT | Performed by: ORTHOPAEDIC SURGERY

## 2024-07-15 PROCEDURE — 25010000002 ENOXAPARIN PER 10 MG: Performed by: ORTHOPAEDIC SURGERY

## 2024-07-15 PROCEDURE — 25010000002 HYDROMORPHONE 1 MG/ML SOLUTION: Performed by: ORTHOPAEDIC SURGERY

## 2024-07-15 PROCEDURE — 25010000002 VANCOMYCIN HCL IN NACL 1.5-0.9 GM/500ML-% SOLUTION: Performed by: INTERNAL MEDICINE

## 2024-07-15 PROCEDURE — 85025 COMPLETE CBC W/AUTO DIFF WBC: CPT | Performed by: ORTHOPAEDIC SURGERY

## 2024-07-15 PROCEDURE — 83735 ASSAY OF MAGNESIUM: CPT | Performed by: ORTHOPAEDIC SURGERY

## 2024-07-15 PROCEDURE — 97166 OT EVAL MOD COMPLEX 45 MIN: CPT

## 2024-07-15 PROCEDURE — 25010000002 MAGNESIUM SULFATE 2 GM/50ML SOLUTION: Performed by: INTERNAL MEDICINE

## 2024-07-15 RX ORDER — SACCHAROMYCES BOULARDII 250 MG
250 CAPSULE ORAL 2 TIMES DAILY
Status: DISCONTINUED | OUTPATIENT
Start: 2024-07-15 | End: 2024-07-21 | Stop reason: HOSPADM

## 2024-07-15 RX ORDER — MAGNESIUM SULFATE HEPTAHYDRATE 40 MG/ML
2 INJECTION, SOLUTION INTRAVENOUS
Status: DISPENSED | OUTPATIENT
Start: 2024-07-15 | End: 2024-07-15

## 2024-07-15 RX ADMIN — CETIRIZINE HYDROCHLORIDE 10 MG: 10 TABLET, FILM COATED ORAL at 08:42

## 2024-07-15 RX ADMIN — Medication 1500 MG: at 08:45

## 2024-07-15 RX ADMIN — MIDODRINE HYDROCHLORIDE 5 MG: 5 TABLET ORAL at 18:12

## 2024-07-15 RX ADMIN — CEFTRIAXONE SODIUM 2000 MG: 2 INJECTION, POWDER, FOR SOLUTION INTRAMUSCULAR; INTRAVENOUS at 11:48

## 2024-07-15 RX ADMIN — MIDODRINE HYDROCHLORIDE 5 MG: 5 TABLET ORAL at 08:42

## 2024-07-15 RX ADMIN — MIDODRINE HYDROCHLORIDE 5 MG: 5 TABLET ORAL at 11:47

## 2024-07-15 RX ADMIN — OXYCODONE 5 MG: 5 TABLET ORAL at 11:47

## 2024-07-15 RX ADMIN — SENNOSIDES AND DOCUSATE SODIUM 2 TABLET: 50; 8.6 TABLET ORAL at 21:46

## 2024-07-15 RX ADMIN — MAGNESIUM SULFATE HEPTAHYDRATE 2 G: 2 INJECTION, SOLUTION INTRAVENOUS at 04:47

## 2024-07-15 RX ADMIN — Medication 250 MG: at 21:46

## 2024-07-15 RX ADMIN — MAGNESIUM SULFATE HEPTAHYDRATE 2 G: 2 INJECTION, SOLUTION INTRAVENOUS at 07:14

## 2024-07-15 RX ADMIN — ENOXAPARIN SODIUM 40 MG: 100 INJECTION SUBCUTANEOUS at 18:12

## 2024-07-15 RX ADMIN — OXYCODONE 5 MG: 5 TABLET ORAL at 07:15

## 2024-07-15 RX ADMIN — Medication 10 ML: at 21:47

## 2024-07-15 RX ADMIN — OXYCODONE 5 MG: 5 TABLET ORAL at 18:12

## 2024-07-15 RX ADMIN — ASPIRIN 81 MG CHEWABLE TABLET 81 MG: 81 TABLET CHEWABLE at 08:42

## 2024-07-15 RX ADMIN — Medication 5000 UNITS: at 08:42

## 2024-07-15 RX ADMIN — HYDROMORPHONE HYDROCHLORIDE 1 MG: 1 INJECTION, SOLUTION INTRAMUSCULAR; INTRAVENOUS; SUBCUTANEOUS at 03:58

## 2024-07-15 RX ADMIN — ASPIRIN 81 MG CHEWABLE TABLET 81 MG: 81 TABLET CHEWABLE at 21:46

## 2024-07-15 RX ADMIN — PANTOPRAZOLE SODIUM 40 MG: 40 TABLET, DELAYED RELEASE ORAL at 08:42

## 2024-07-15 NOTE — PROGRESS NOTES
Patient is 1 day status post right total knee arthroplasty revision due to infection.  Patient states he is doing okay postoperatively.  dressing is clean dry and intact.  Patient may weight-bear as tolerated.  Plan is to discharge to skilled nursing facility when approved.  Patient will follow-up in office in 3 weeks with x-ray and further evaluation of the right knee postoperatively.

## 2024-07-15 NOTE — PLAN OF CARE
Goal Outcome Evaluation:  Plan of Care Reviewed With: patient, spouse        Progress: no change  Outcome Evaluation: Pt is a 65 y/o M admitted to [facility] 7/12/24 with c/o R knee pain, hospital dx septic arthritis of R knee. Pt knee aspirated in ER, found to have septic prosthetic of R knee (R TKA 2019). POD#1 R TKA revision by Dr. Rollins, WBAT. At baseline pt resides with spouse in 2 story home, navigating 14 steps within home. Pt typically (I) with ADLs, (I) with mobility without AD, and is an active . Pt spouse works full time, unable to provide 24/7 care. This date pt A&Ox4 on RA sitting up in chair upon arrival, c/o 7/10 pain in R knee. Pt demo difficulty with RLE ROM, demo global weakness of RLE, educated on WBAT for mobility. Pt requires max A x2 to come to standing and requires max A x2 with RW to ambulate within room <10 feet. Pt demo difficulty weight bearing with RLE d/t significant pain, however likely to progress well when pain is managed. Pt is functioning far below baseline, will require significant increase in ADLs and functional mobility therefore OT recommending SNF when medically appropriate for dc. OT will follow within acute setting.      Anticipated Discharge Disposition (OT): skilled nursing facility
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
## 2024-07-15 NOTE — DISCHARGE PLACEMENT REQUEST
"Erik Chacon (64 y.o. Male)       Date of Birth   1960    Social Security Number       Address   [address] [place] [facility]    Home Phone   [phone]    MRN   [id number]       Latter day   None    Marital Status                               Admission Date   7/12/24    Admission Type   Emergency    Admitting Provider   Emilia Carlson MD    Attending Provider   Rio Griffin MD    Department, Room/Bed   [facility] SURGICAL INPATIENT, 4106/1       Discharge Date       Discharge Disposition       Discharge Destination                                 Attending Provider: Rio Griffin MD    Allergies: Morphine    Isolation: None   Infection: None   Code Status: CPR    Ht: 182.9 cm (72\")   Wt: 86.2 kg (190 lb)    Admission Cmt: None   Principal Problem: Septic arthritis of knee, right [M00.9]                   Active Insurance as of 7/12/2024       Primary Coverage       Payor Plan Insurance Group Employer/Plan Group    MEDICARE MEDICARE A & B        Payor Plan Address Payor Plan Phone Number Payor Plan Fax Number Effective Dates     [address] [phone]  3/1/2023 - None Entered    [facility] [id number]         Subscriber Name Subscriber Birth Date Member ID       ERIK CHACON 1960 [id number]               Secondary Coverage       Payor Plan Insurance Group Employer/Plan Group    [employer] [place] [employer] [place]        Payor Plan Address Payor Plan Phone Number Payor Plan Fax Number Effective Dates    [address] [place] [address] [phone]  3/1/2023 - None Entered    [facility] [id number]         Subscriber Name Subscriber Birth Date Member ID       ERIK CHACON 1960 [id number]                     Emergency Contacts        (Rel.) Home Phone Work Phone Mobile Phone    JYOTHI CHACON (Spouse) [phone] -- --                "
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
## 2024-07-15 NOTE — PLAN OF CARE
Goal Outcome Evaluation:         Dressing intact. Pending PT eval today. IV ATB continued.

## 2024-07-15 NOTE — PROGRESS NOTES
[facility] MEDICINE SERVICE  DAILY PROGRESS NOTE    NAME: Erik Fuentes  : 1960  MRN: [id number]      LOS: 3 days     PROVIDER OF SERVICE: Rio Griffin MD    Chief Complaint: Septic arthritis of knee, right    Subjective:     Interval History:  History taken from: patient    Subjective       Chief Complaint: right knee pain     History of Present Illness: Erik Fuentes is a 64 y.o. male with a CMH of BPH, hemochromatosis, prostate cancer, liver cirrhosis, esophageal varices, GERD, HTN who presented to [facility] on 2024 with right knee pain. Lives at home with wife, independent with ADLs.   Presents to ED with right knee pains. States intermittent right knee pain over past 2 years. Today, after returning home from chiropractor with worsening right knee pain and swelling. Denies recent illness or injury. States was in [place] 2 weeks ago with abrasion to left hand. On arrival to ED VS: 100.6-87-/89-96% room air.      Upon evaluation, awake, alert, resting in bed, wife at bedside. Current VS: 96-/84-96% room air. Right knee with swelling and warmth. No injury to RLE or right foot noted. Endorses unable to bear weight RLE.   XR right knee reveals large suprapatellar joint effusion with swelling. Right total knee prosthesis without periprosthetic fracture  Blood work reveals WBC 13.9, Hgb 13.8, Hct 39, platelets 171, sodium 135, AST 90. Alk phos 123. T. Bilirubin 3.5. CRP 2.31     24 seen and examined medical distress, vital signs stable, no complaints, still having right knee pain and swelling.  Discussed with RN.  Consulted ID.  Tmax 100.6.  24 patient seen in bed no acute distress, vital signs stable, underwent right Total Knee Arthroplasty Revision discussed with RN.  Tolerating antibiotics.  7/15/2024 patient seen and examined in medical distress, no new complaints, pain well-controlled.  Discussed with RN, tolerating antibiotics.  Awaiting  placement     Review of Systems  constitutional:  Positive for fever. Negative for chills.   Respiratory:  Negative for shortness of breath.    Cardiovascular:  Negative for chest pain.   Gastrointestinal:  Negative for abdominal pain, diarrhea, nausea and vomiting.   Genitourinary:  Negative for dysuria.   Musculoskeletal:  Positive for gait problem.        Right knee pain   Objective:     Vital Signs  Temp:  [97.9 °F (36.6 °C)-98.3 °F (36.8 °C)] 98.1 °F (36.7 °C)  Heart Rate:  [] 89  Resp:  [16-19] 17  BP: ()/(59-81) 112/73   Body mass index is 25.77 kg/m².    Physical Exam       General: He is not in acute distress.     Appearance: Normal appearance. He is not ill-appearing or toxic-appearing.   HENT:      Head: Normocephalic and atraumatic.      Mouth/Throat:      Mouth: Mucous membranes are moist.   Eyes:      Extraocular Movements: Extraocular movements intact.      Pupils: Pupils are equal, round, and reactive to light.   Cardiovascular:      Rate and Rhythm: Normal rate. Rhythm irregular.      Pulses: Normal pulses.      Heart sounds: Normal heart sounds.   Pulmonary:      Effort: Pulmonary effort is normal. No respiratory distress.      Breath sounds: Normal breath sounds.   Abdominal:      General: Bowel sounds are normal. There is no distension.      Palpations: Abdomen is soft.      Tenderness: There is no abdominal tenderness.   Musculoskeletal:      Comments: Right knee swelling with warmth.    Skin:     General: Skin is warm and dry.   Neurological:      Mental Status: He is alert and oriented to person, place, and time. Mental status is at baseline.      Scheduled Meds   aspirin, 81 mg, Oral, BID  cefTRIAXone, 2,000 mg, Intravenous, Q24H  cetirizine, 10 mg, Oral, Daily  enoxaparin, 40 mg, Subcutaneous, Q24H  midodrine, 5 mg, Oral, TID AC  pantoprazole, 40 mg, Oral, QAM AC  sodium chloride, 10 mL, Intravenous, Q12H  vitamin D3, 5,000 Units, Oral, Daily       PRN Meds     acetaminophen     senna-docusate sodium **AND** polyethylene glycol **AND** bisacodyl **AND** bisacodyl    Calcium Replacement - Follow Nurse / BPA Driven Protocol    HYDROcodone-acetaminophen    HYDROmorphone **AND** naloxone    Magnesium Standard Dose Replacement - Follow Nurse / BPA Driven Protocol    Morphine    ondansetron    oxyCODONE    Phosphorus Replacement - Follow Nurse / BPA Driven Protocol    Potassium Replacement - Follow Nurse / BPA Driven Protocol    [COMPLETED] Insert Peripheral IV **AND** sodium chloride    sodium chloride    sodium chloride    triamcinolone   Infusions  sodium chloride, 75 mL/hr, Last Rate: 75 mL/hr (07/14/24 1737)          Diagnostic Data    Results from last 7 days   Lab Units 07/15/24  0141 07/12/24  2259 07/12/24  1441   WBC 10*3/mm3 13.05*   < > 13.90*   HEMOGLOBIN g/dL 9.2*   < > 13.8   HEMATOCRIT % 25.3*   < > 39.0   PLATELETS 10*3/mm3 144   < > 171   GLUCOSE mg/dL 140*   < > 111*   CREATININE mg/dL 1.29*   < > 0.79   BUN mg/dL 22   < > 10   SODIUM mmol/L 130*   < > 135*   POTASSIUM mmol/L 4.8   < > 3.6   AST (SGOT) U/L  --   --  90*   ALT (SGPT) U/L  --   --  41   ALK PHOS U/L  --   --  123*   BILIRUBIN mg/dL  --   --  3.5*   ANION GAP mmol/L 6.7   < > 8.5    < > = values in this interval not displayed.       XR Knee 1 or 2 View Right    Result Date: 7/14/2024  Impression: Revision of right knee prosthesis as above. Electronically Signed: Emanuel Humphrey MD  7/14/2024 11:12 AM EDT  Workstation ID: [id number]       I reviewed the patient's new clinical results.    Assessment/Plan:     Active and Resolved Problems  Active Hospital Problems    Diagnosis  POA    **Septic arthritis of knee, right [M00.9]  Yes      Resolved Hospital Problems   No resolved problems to display.     Septic arthritis of right knee:  -presents with right knee pain, swelling, warmth.   -XR: large suprapatellar joint effusion with swelling.  -s/p drainage 50ml from right knee in ED--fluid analysis pending  -blood cultures  pending  -empirically treat with zosyn and vanco. Pharmacy to dose  -ortho consult. Appreciate recommendations. S/p Right Total Knee Arthroplasty Revision 7/14/24  -meets sepsis criteria on admission with fever, leukocytosis, source of infection. LA normal. CRP 2.31.   -IVFs.   -pain control.   -uric acid negative. Denies hx of RA.    -ID consulted       Hypertension:  -BP may be labile with pain response  -trend BP  -continue home medications once dosing verified with pharmacy.         Liver cirrhosis:  -follows with OP GI  -admission: AST 90. Alk phos 123. T. Bilirubin 3.5        GERD:  Protonix for GI Ppx.      VTE Prophylaxis:  Mechanical VTE prophylaxis orders are signed & held.            VTE Prophylaxis:  Pharmacologic & mechanical VTE prophylaxis orders are present.         Code status is   Code Status and Medical Interventions:   Ordered at: 07/12/24 7336     Level Of Support Discussed With:    Patient     Code Status (Patient has no pulse and is not breathing):    CPR (Attempt to Resuscitate)     Medical Interventions (Patient has pulse or is breathing):    Full Support       Plan for disposition:home in 4 days    Time: 30 minutes    Signature: Electronically signed by Rio Griffin MD, 07/15/24, 12:51 EDT.  [facility] Hospitalist Team

## 2024-07-15 NOTE — PROGRESS NOTES
Infectious Diseases Progress Note      LOS: 3 days   Patient Care Team:  Tamar Eugene APRN as PCP - General (Nurse Practitioner)    Chief Complaint: Right knee pain and swelling at admission    Subjective       The patient has been afebrile for the last 24 hours.  The patient is on room air, hemodynamically stable, and is tolerating antimicrobial therapy.  No new complaints-patient is in the chair    Review of Systems:   Review of Systems   Constitutional: Negative.    HENT: Negative.     Eyes: Negative.    Respiratory: Negative.     Cardiovascular: Negative.    Gastrointestinal: Negative.    Endocrine: Negative.    Genitourinary: Negative.    Musculoskeletal:  Positive for joint swelling.   Skin: Negative.    Neurological: Negative.    Psychiatric/Behavioral: Negative.     All other systems reviewed and are negative.       Objective     Vital Signs  Temp:  [97.9 °F (36.6 °C)-98.3 °F (36.8 °C)] 98.1 °F (36.7 °C)  Heart Rate:  [] 89  Resp:  [16-19] 17  BP: (112-142)/(59-81) 112/73    Physical Exam:  Physical Exam  Vitals and nursing note reviewed.   Constitutional:       General: He is not in acute distress.     Appearance: Normal appearance. He is well-developed and normal weight. He is not diaphoretic.   HENT:      Head: Normocephalic and atraumatic.   Eyes:      Conjunctiva/sclera: Conjunctivae normal.      Pupils: Pupils are equal, round, and reactive to light.   Cardiovascular:      Rate and Rhythm: Normal rate and regular rhythm.      Heart sounds: Normal heart sounds, S1 normal and S2 normal.   Pulmonary:      Effort: Pulmonary effort is normal. No respiratory distress.      Breath sounds: Normal breath sounds. No stridor. No wheezing or rales.   Abdominal:      General: Bowel sounds are normal. There is no distension.      Palpations: Abdomen is soft. There is no mass.      Tenderness: There is no abdominal tenderness. There is no guarding.   Musculoskeletal:         General: No deformity.       Cervical back: Neck supple.      Comments: Surgical dressings in place on right knee along with Hemovac with bloody drainage   Skin:     General: Skin is warm and dry.      Coloration: Skin is not pale.      Findings: No erythema or rash.   Neurological:      Mental Status: He is alert and oriented to person, place, and time.      Cranial Nerves: No cranial nerve deficit.   Psychiatric:         Mood and Affect: Mood normal.          Results Review:    I have reviewed all clinical data, test, lab, and imaging results.     Radiology  No Radiology Exams Resulted Within Past 24 Hours    Cardiology    Laboratory    Results from last 7 days   Lab Units 07/15/24  0141 07/14/24  0432 07/12/24 2259 07/12/24  1441 07/09/24  1514   WBC 10*3/mm3 13.05* 14.87* 18.32* 13.90*  --    HEMOGLOBIN g/dL 9.2* 13.5 12.2* 13.8 12.6*   HEMATOCRIT % 25.3* 36.9* 34.0* 39.0  --    PLATELETS 10*3/mm3 144 154 138* 171  --      Results from last 7 days   Lab Units 07/15/24  0141 07/14/24 0432 07/12/24 2259 07/12/24  1441   SODIUM mmol/L 130* 131* 135* 135*   POTASSIUM mmol/L 4.8 3.6 3.7 3.6   CHLORIDE mmol/L 101 98 102 100   CO2 mmol/L 22.3 23.7 24.8 26.5   BUN mg/dL 22 10 9 10   CREATININE mg/dL 1.29* 0.69* 0.79 0.79   GLUCOSE mg/dL 140* 88 121* 111*   ALBUMIN g/dL  --   --   --  3.4*   BILIRUBIN mg/dL  --   --   --  3.5*   ALK PHOS U/L  --   --   --  123*   AST (SGOT) U/L  --   --   --  90*   ALT (SGPT) U/L  --   --   --  41   CALCIUM mg/dL 7.3* 8.1* 7.9* 8.9         Results from last 7 days   Lab Units 07/12/24  1441   SED RATE mm/hr 14         Microbiology   Microbiology Results (last 10 days)       Procedure Component Value - Date/Time    Body Fluid Culture - Body Fluid, Knee, Right [[id number]]  (Abnormal)  (Susceptibility) Collected: 07/12/24 1711    Lab Status: Final result Specimen: Body Fluid from Knee, Right Updated: 07/15/24 0840     Body Fluid Culture Streptococcus mitis / oralis     Gram Stain Many (4+) WBCs per low power field       No organisms seen    Narrative:      In broth only.    Susceptibility        Streptococcus mitis / oralis      VAIBHAV      Ceftriaxone Susceptible      Penicillin G Susceptible      Vancomycin Susceptible                           Blood Culture - Blood, Arm, Left [[id number]]  (Abnormal) Collected: 07/12/24 1516    Lab Status: Final result Specimen: Blood from Arm, Left Updated: 07/15/24 0642     Blood Culture Staphylococcus, coagulase negative     Isolated from Aerobic Bottle     Gram Stain Aerobic Bottle Gram positive cocci in clusters    Narrative:      Probable contaminant requires clinical correlation, susceptibility not performed unless requested by physician.      Blood Culture ID, PCR - Blood, Arm, Left [[id number]]  (Abnormal) Collected: 07/12/24 1516    Lab Status: Final result Specimen: Blood from Arm, Left Updated: 07/13/24 2147     BCID, PCR Staph spp, not aureus or lugdunensis. Identification by BCID2 PCR.     BOTTLE TYPE Aerobic Bottle    Blood Culture - Blood, Arm, Left [[id number]]  (Normal) Collected: 07/12/24 1441    Lab Status: Preliminary result Specimen: Blood from Arm, Left Updated: 07/15/24 1445     Blood Culture No growth at 3 days            Medication Review:       Schedule Meds  aspirin, 81 mg, Oral, BID  cefTRIAXone, 2,000 mg, Intravenous, Q24H  cetirizine, 10 mg, Oral, Daily  enoxaparin, 40 mg, Subcutaneous, Q24H  midodrine, 5 mg, Oral, TID AC  pantoprazole, 40 mg, Oral, QAM AC  sodium chloride, 10 mL, Intravenous, Q12H  vitamin D3, 5,000 Units, Oral, Daily        Infusion Meds  sodium chloride, 75 mL/hr, Last Rate: 75 mL/hr (07/14/24 1577)        PRN Meds    acetaminophen    senna-docusate sodium **AND** polyethylene glycol **AND** bisacodyl **AND** bisacodyl    Calcium Replacement - Follow Nurse / BPA Driven Protocol    HYDROcodone-acetaminophen    HYDROmorphone **AND** naloxone    Magnesium Standard Dose Replacement - Follow Nurse / BPA Driven Protocol    Morphine    ondansetron     oxyCODONE    Phosphorus Replacement - Follow Nurse / BPA Driven Protocol    Potassium Replacement - Follow Nurse / BPA Driven Protocol    [COMPLETED] Insert Peripheral IV **AND** sodium chloride    sodium chloride    sodium chloride    triamcinolone        Assessment & Plan       Antimicrobial Therapy   1.  IV Rocephin        2.  IV vancomycin        3.        4.        5.            Right total knee infection.  Patient originally had the right total knee replaced for arthritis 6 years ago.  States he has been having pain for several weeks but this was exacerbated after he went to the chiropractor on 7/11/2024.  Patient had a aspiration of the right knee with fluid analysis indicating infection.  Cultures are growing Streptococcus mitis/oralis..  Patient is status post hardware removal and antibiotic spacer placement to the right knee on 7/14/2024.    Positive blood culture and 1 out of 2 blood cultures from admission growing a coagulase-negative Staphylococcus.  Patient did not come with a line, port and has no known history of cardiac valve history or device placement.  This is likely contamination     Liver cirrhosis-patient has been sober for approximately 2 months.     Prostate cancer     Plan     Continue IV Rocephin 2 g every 24 hours-patient will need 6 weeks of treatment from surgery-last day on 8/24/2024  Discontinue IV vancomycin   Patient will need a PICC line before discharge-please remove when IV antibiotics are completed.  Standard weekly PICC line care  Weekly labs CBC, creatinine, sed rate x 6 weeks  Patient will need to be on 6 weeks of IV antibiotics from surgery, then show no signs of infection for 2 weeks before new hardware should be considered for reimplantation  Continue supportive care  A.m. labs  Discussed with patient and family member at bedside  Patient being worked up for rehab  Case discussed with pharmacy    Please fax all post discharge lab results, imaging studies and correspondence  to this fax number [phone]  For any question or concern please contact our service number [phone]    CARMEN Nicole  07/15/24  15:21 EDT    Note is dictated utilizing voice recognition software/Dragon

## 2024-07-15 NOTE — CASE MANAGEMENT/SOCIAL WORK
Discharge Planning Assessment   Giuseppe     Patient Name: Erik Fuentes  MRN: [id number]  Today's Date: 7/15/2024    Admit Date: 7/12/2024    Plan: From home with wife.  [facility] to [facility].  No precert needed.  PASRR  approved. Watch for IV antibiotics   Discharge Needs Assessment       Row Name 07/15/24 1442       Living Environment    People in Home spouse    Name(s) of People in Home Valencia    Current Living Arrangements home    Potentially Unsafe Housing Conditions none    In the past 12 months has the electric, gas, oil, or water company threatened to shut off services in your home? No    Primary Care Provided by self    Provides Primary Care For no one    Family Caregiver if Needed spouse    Family Caregiver Names Valencia    Quality of Family Relationships helpful;involved;supportive    Able to Return to Prior Arrangements yes       Resource/Environmental Concerns    Resource/Environmental Concerns none    Transportation Concerns none       Transportation Needs    In the past 12 months, has lack of transportation kept you from medical appointments or from getting medications? no    In the past 12 months, has lack of transportation kept you from meetings, work, or from getting things needed for daily living? No       Food Insecurity    Within the past 12 months, you worried that your food would run out before you got the money to buy more. Never true    Within the past 12 months, the food you bought just didn't last and you didn't have money to get more. Never true       Transition Planning    Patient/Family Anticipates Transition to inpatient rehabilitation facility    Patient/Family Anticipated Services at Transition skilled nursing    Transportation Anticipated car, drives self;family or friend will provide       Discharge Needs Assessment    Readmission Within the Last 30 Days no previous admission in last 30 days    Equipment Currently Used at Home none    Concerns to be  Addressed care coordination/care conferences;discharge planning    Anticipated Changes Related to Illness none    Equipment Needed After Discharge none    Outpatient/Agency/Support Group Needs skilled nursing facility    Discharge Facility/Level of Care Needs nursing facility, skilled    Provided Post Acute Provider List? Yes    Post Acute Provider List Nursing Home    Delivered To Patient    Method of Delivery In person    Patient's Choice of Community Agency(s) [facility]                   Discharge Plan       Row Name 07/15/24 1443       Plan    Plan From home with wife.  [facility].  No precert needed.  PASRR  approved. Watch for IV antibiotics    Patient/Family in Agreement with Plan yes    Plan Comments Patient lives at home with wife.  Patient reports his wife still works outside the home.  Patient reports he drives, Family  will transport at discharge. Patient performs ADL. PCP and pharmacy confirmed. Denies financial assistance needs for medication and/or food. PT OT recommend snf.  Spoke with patient about this and [facility]  Patient will likely need 6 weeks of antibiotics.  Culture pending                  Continued Care and Services - Admitted Since 7/12/2024    No active coordination exists for this encounter.       Expected Discharge Date and Time       Expected Discharge Date Expected Discharge Time    Jul 17, 2024            Demographic Summary       Row Name 07/15/24 1441       General Information    Admission Type inpatient    Arrived From emergency department    Required Notices Provided Important Message from Medicare    Referral Source admission list    Reason for Consult discharge planning    Preferred Language English       Contact Information    Permission Granted to Share Info With                    Functional Status       Row Name 07/15/24 1441       Functional Status    Usual Activity  Tolerance good    Current Activity Tolerance good       Functional Status, IADL    Medications independent    Meal Preparation independent    Housekeeping independent    Laundry independent    Shopping independent       Mental Status    General Appearance WDL WDL       Mental Status Summary    Recent Changes in Mental Status/Cognitive Functioning no changes                Lavinia Cleaning RN    [facility]  [email or web address]  Office [phone]  Cell [phone]

## 2024-07-15 NOTE — PLAN OF CARE
Goal Outcome Evaluation:  Plan of Care Reviewed With: patient, spouse           Outcome Evaluation: 63 y/o M with h/o hemochromatosis, prostate cancer, liver cirrhosis, esophageal varices, R  TKA presented to hospital 7/12/24 with R knee pain and swelling. XR right knee reveals large suprapatellar joint effusion with swelling s/p drainage 50ml from right knee in ED.  Cultures are growing gram-positive cocci. Pt s/p right Total Knee Arthroplasty Revision. Patient will need to be on 6 weeks of IV antibiotics from surgery, then show no signs of infection for 2 weeks before new hardware should be considered for reimplantation. WBAT RLE per ortho MD. At baseline, pt lives at home with wife (works during day) and is I with functional mobility/ ADLs at [facility] without AD. This date, pt requires Max A x 2 for STS transfer and Max A x 2 for gait training x 8 ft with FWW with functional performance hindered by pain of R knee. PT d/c recommendation of continued skilled PT services at SNF d/t inability to safely return home at this time.      Anticipated Discharge Disposition (PT): skilled nursing facility

## 2024-07-16 ENCOUNTER — APPOINTMENT (OUTPATIENT)
Dept: ULTRASOUND IMAGING | Facility: HOSPITAL | Age: 64
DRG: 466 | End: 2024-07-16
Payer: MEDICARE

## 2024-07-16 LAB
ANION GAP SERPL CALCULATED.3IONS-SCNC: 6 MMOL/L (ref 5–15)
BASOPHILS # BLD AUTO: 0.02 10*3/MM3 (ref 0–0.2)
BASOPHILS NFR BLD AUTO: 0.1 % (ref 0–1.5)
BILIRUB UR QL STRIP: NEGATIVE
BUN SERPL-MCNC: 33 MG/DL (ref 8–23)
BUN/CREAT SERPL: 22.6 (ref 7–25)
CALCIUM SPEC-SCNC: 6.9 MG/DL (ref 8.6–10.5)
CHLORIDE SERPL-SCNC: 99 MMOL/L (ref 98–107)
CHLORIDE UR-SCNC: <20 MMOL/L
CLARITY UR: CLEAR
CO2 SERPL-SCNC: 22 MMOL/L (ref 22–29)
COLOR UR: ABNORMAL
CREAT SERPL-MCNC: 1.46 MG/DL (ref 0.76–1.27)
DEPRECATED RDW RBC AUTO: 51.8 FL (ref 37–54)
EGFRCR SERPLBLD CKD-EPI 2021: 52.7 ML/MIN/1.73
EOSINOPHIL # BLD AUTO: 0.02 10*3/MM3 (ref 0–0.4)
EOSINOPHIL NFR BLD AUTO: 0.1 % (ref 0.3–6.2)
ERYTHROCYTE [DISTWIDTH] IN BLOOD BY AUTOMATED COUNT: 13.9 % (ref 12.3–15.4)
GLUCOSE SERPL-MCNC: 118 MG/DL (ref 65–99)
GLUCOSE UR STRIP-MCNC: NEGATIVE MG/DL
HCT VFR BLD AUTO: 21.4 % (ref 37.5–51)
HGB BLD-MCNC: 7.8 G/DL (ref 13–17.7)
HGB UR QL STRIP.AUTO: NEGATIVE
IMM GRANULOCYTES # BLD AUTO: 0.19 10*3/MM3 (ref 0–0.05)
IMM GRANULOCYTES NFR BLD AUTO: 1.2 % (ref 0–0.5)
KETONES UR QL STRIP: ABNORMAL
LEUKOCYTE ESTERASE UR QL STRIP.AUTO: NEGATIVE
LYMPHOCYTES # BLD AUTO: 0.92 10*3/MM3 (ref 0.7–3.1)
LYMPHOCYTES NFR BLD AUTO: 5.7 % (ref 19.6–45.3)
MAGNESIUM SERPL-MCNC: 2.1 MG/DL (ref 1.6–2.4)
MCH RBC QN AUTO: 37.3 PG (ref 26.6–33)
MCHC RBC AUTO-ENTMCNC: 36.4 G/DL (ref 31.5–35.7)
MCV RBC AUTO: 102.4 FL (ref 79–97)
MONOCYTES # BLD AUTO: 1.76 10*3/MM3 (ref 0.1–0.9)
MONOCYTES NFR BLD AUTO: 10.8 % (ref 5–12)
NEUTROPHILS NFR BLD AUTO: 13.37 10*3/MM3 (ref 1.7–7)
NEUTROPHILS NFR BLD AUTO: 82.1 % (ref 42.7–76)
NITRITE UR QL STRIP: NEGATIVE
NRBC BLD AUTO-RTO: 0 /100 WBC (ref 0–0.2)
PH UR STRIP.AUTO: 6 [PH] (ref 5–8)
PLATELET # BLD AUTO: 150 10*3/MM3 (ref 140–450)
PMV BLD AUTO: 12.3 FL (ref 6–12)
POTASSIUM SERPL-SCNC: 4.2 MMOL/L (ref 3.5–5.2)
PROT UR QL STRIP: NEGATIVE
RBC # BLD AUTO: 2.09 10*6/MM3 (ref 4.14–5.8)
SODIUM SERPL-SCNC: 127 MMOL/L (ref 136–145)
SODIUM UR-SCNC: <20 MMOL/L
SP GR UR STRIP: 1.02 (ref 1–1.03)
UROBILINOGEN UR QL STRIP: ABNORMAL
WBC NRBC COR # BLD AUTO: 16.28 10*3/MM3 (ref 3.4–10.8)

## 2024-07-16 PROCEDURE — 81003 URINALYSIS AUTO W/O SCOPE: CPT | Performed by: INTERNAL MEDICINE

## 2024-07-16 PROCEDURE — 25010000002 CEFTRIAXONE PER 250 MG: Performed by: ORTHOPAEDIC SURGERY

## 2024-07-16 PROCEDURE — 97530 THERAPEUTIC ACTIVITIES: CPT

## 2024-07-16 PROCEDURE — 76775 US EXAM ABDO BACK WALL LIM: CPT

## 2024-07-16 PROCEDURE — 97112 NEUROMUSCULAR REEDUCATION: CPT

## 2024-07-16 PROCEDURE — 25010000002 ENOXAPARIN PER 10 MG: Performed by: ORTHOPAEDIC SURGERY

## 2024-07-16 PROCEDURE — 25010000002 CALCIUM GLUCONATE 2-0.675 GM/100ML-% SOLUTION: Performed by: INTERNAL MEDICINE

## 2024-07-16 PROCEDURE — 80048 BASIC METABOLIC PNL TOTAL CA: CPT | Performed by: NURSE PRACTITIONER

## 2024-07-16 PROCEDURE — 25010000002 HYDROMORPHONE 1 MG/ML SOLUTION: Performed by: ORTHOPAEDIC SURGERY

## 2024-07-16 PROCEDURE — 84300 ASSAY OF URINE SODIUM: CPT | Performed by: INTERNAL MEDICINE

## 2024-07-16 PROCEDURE — 85025 COMPLETE CBC W/AUTO DIFF WBC: CPT | Performed by: NURSE PRACTITIONER

## 2024-07-16 PROCEDURE — 97116 GAIT TRAINING THERAPY: CPT

## 2024-07-16 PROCEDURE — 82436 ASSAY OF URINE CHLORIDE: CPT | Performed by: INTERNAL MEDICINE

## 2024-07-16 PROCEDURE — 97535 SELF CARE MNGMENT TRAINING: CPT

## 2024-07-16 PROCEDURE — 83735 ASSAY OF MAGNESIUM: CPT | Performed by: INTERNAL MEDICINE

## 2024-07-16 RX ORDER — SODIUM CHLORIDE 9 MG/ML
100 INJECTION, SOLUTION INTRAVENOUS CONTINUOUS
Status: DISCONTINUED | OUTPATIENT
Start: 2024-07-16 | End: 2024-07-18

## 2024-07-16 RX ORDER — CALCIUM GLUCONATE 20 MG/ML
2000 INJECTION, SOLUTION INTRAVENOUS ONCE
Status: COMPLETED | OUTPATIENT
Start: 2024-07-16 | End: 2024-07-16

## 2024-07-16 RX ADMIN — OXYCODONE 5 MG: 5 TABLET ORAL at 14:58

## 2024-07-16 RX ADMIN — OXYCODONE 5 MG: 5 TABLET ORAL at 21:45

## 2024-07-16 RX ADMIN — ASPIRIN 81 MG CHEWABLE TABLET 81 MG: 81 TABLET CHEWABLE at 09:58

## 2024-07-16 RX ADMIN — Medication 5000 UNITS: at 09:58

## 2024-07-16 RX ADMIN — ENOXAPARIN SODIUM 40 MG: 100 INJECTION SUBCUTANEOUS at 18:27

## 2024-07-16 RX ADMIN — Medication 250 MG: at 09:58

## 2024-07-16 RX ADMIN — CEFTRIAXONE SODIUM 2000 MG: 2 INJECTION, POWDER, FOR SOLUTION INTRAMUSCULAR; INTRAVENOUS at 09:58

## 2024-07-16 RX ADMIN — CETIRIZINE HYDROCHLORIDE 10 MG: 10 TABLET, FILM COATED ORAL at 09:58

## 2024-07-16 RX ADMIN — HYDROMORPHONE HYDROCHLORIDE 1 MG: 1 INJECTION, SOLUTION INTRAMUSCULAR; INTRAVENOUS; SUBCUTANEOUS at 18:27

## 2024-07-16 RX ADMIN — Medication 250 MG: at 21:45

## 2024-07-16 RX ADMIN — Medication 10 ML: at 21:46

## 2024-07-16 RX ADMIN — PANTOPRAZOLE SODIUM 40 MG: 40 TABLET, DELAYED RELEASE ORAL at 09:58

## 2024-07-16 RX ADMIN — ASPIRIN 81 MG CHEWABLE TABLET 81 MG: 81 TABLET CHEWABLE at 21:45

## 2024-07-16 RX ADMIN — Medication 10 ML: at 09:59

## 2024-07-16 RX ADMIN — MIDODRINE HYDROCHLORIDE 5 MG: 5 TABLET ORAL at 18:27

## 2024-07-16 RX ADMIN — MIDODRINE HYDROCHLORIDE 5 MG: 5 TABLET ORAL at 09:58

## 2024-07-16 RX ADMIN — CALCIUM GLUCONATE 2000 MG: 20 INJECTION, SOLUTION INTRAVENOUS at 14:54

## 2024-07-16 RX ADMIN — OXYCODONE 5 MG: 5 TABLET ORAL at 09:58

## 2024-07-16 NOTE — PROGRESS NOTES
Infectious Diseases Progress Note      LOS: 4 days   Patient Care Team:  Tamar Eugene APRN as PCP - General (Nurse Practitioner)    Chief Complaint: Right knee pain and swelling at admission    Subjective       The patient has been afebrile for the last 24 hours.  The patient is on room air, hemodynamically stable, and is tolerating antimicrobial therapy.  No new complaints    Review of Systems:   Review of Systems   Constitutional: Negative.    HENT: Negative.     Eyes: Negative.    Respiratory: Negative.     Cardiovascular: Negative.    Gastrointestinal: Negative.    Endocrine: Negative.    Genitourinary: Negative.    Musculoskeletal:  Positive for joint swelling.   Skin: Negative.    Neurological: Negative.    Psychiatric/Behavioral: Negative.     All other systems reviewed and are negative.       Objective     Vital Signs  Temp:  [98.1 °F (36.7 °C)-98.2 °F (36.8 °C)] 98.2 °F (36.8 °C)  Heart Rate:  [] 78  Resp:  [16-20] 18  BP: (108-144)/(60-76) 144/65    Physical Exam:  Physical Exam  Vitals and nursing note reviewed.   Constitutional:       General: He is not in acute distress.     Appearance: Normal appearance. He is well-developed and normal weight. He is not diaphoretic.   HENT:      Head: Normocephalic and atraumatic.   Eyes:      Conjunctiva/sclera: Conjunctivae normal.      Pupils: Pupils are equal, round, and reactive to light.   Cardiovascular:      Rate and Rhythm: Normal rate and regular rhythm.      Heart sounds: Normal heart sounds, S1 normal and S2 normal.   Pulmonary:      Effort: Pulmonary effort is normal. No respiratory distress.      Breath sounds: Normal breath sounds. No stridor. No wheezing or rales.   Abdominal:      General: Bowel sounds are normal. There is no distension.      Palpations: Abdomen is soft. There is no mass.      Tenderness: There is no abdominal tenderness. There is no guarding.   Musculoskeletal:         General: No deformity.      Cervical back: Neck supple.       Comments: Surgical dressings in place on right knee along with Hemovac with bloody drainage   Skin:     General: Skin is warm and dry.      Coloration: Skin is not pale.      Findings: No erythema or rash.   Neurological:      Mental Status: He is alert and oriented to person, place, and time.      Cranial Nerves: No cranial nerve deficit.   Psychiatric:         Mood and Affect: Mood normal.          Results Review:    I have reviewed all clinical data, test, lab, and imaging results.     Radiology  No Radiology Exams Resulted Within Past 24 Hours    Cardiology    Laboratory    Results from last 7 days   Lab Units 07/16/24  0229 07/15/24  0141 07/14/24  0432 07/12/24  2259 07/12/24  1441 07/09/24  1514   WBC 10*3/mm3 16.28* 13.05* 14.87* 18.32* 13.90*  --    HEMOGLOBIN g/dL 7.8* 9.2* 13.5 12.2* 13.8 12.6*   HEMATOCRIT % 21.4* 25.3* 36.9* 34.0* 39.0  --    PLATELETS 10*3/mm3 150 144 154 138* 171  --      Results from last 7 days   Lab Units 07/16/24  0229 07/15/24  0141 07/14/24  0432 07/12/24  2259 07/12/24  1441   SODIUM mmol/L 127* 130* 131* 135* 135*   POTASSIUM mmol/L 4.2 4.8 3.6 3.7 3.6   CHLORIDE mmol/L 99 101 98 102 100   CO2 mmol/L 22.0 22.3 23.7 24.8 26.5   BUN mg/dL 33* 22 10 9 10   CREATININE mg/dL 1.46* 1.29* 0.69* 0.79 0.79   GLUCOSE mg/dL 118* 140* 88 121* 111*   ALBUMIN g/dL  --   --   --   --  3.4*   BILIRUBIN mg/dL  --   --   --   --  3.5*   ALK PHOS U/L  --   --   --   --  123*   AST (SGOT) U/L  --   --   --   --  90*   ALT (SGPT) U/L  --   --   --   --  41   CALCIUM mg/dL 6.9* 7.3* 8.1* 7.9* 8.9         Results from last 7 days   Lab Units 07/12/24  1441   SED RATE mm/hr 14         Microbiology   Microbiology Results (last 10 days)       Procedure Component Value - Date/Time    Body Fluid Culture - Body Fluid, Knee, Right [[id number]]  (Abnormal)  (Susceptibility) Collected: 07/12/24 1711    Lab Status: Final result Specimen: Body Fluid from Knee, Right Updated: 07/15/24 0840     Body Fluid  Culture Streptococcus mitis / oralis     Gram Stain Many (4+) WBCs per low power field      No organisms seen    Narrative:      In broth only.    Susceptibility        Streptococcus mitis / oralis      VAIBHAV      Ceftriaxone Susceptible      Penicillin G Susceptible      Vancomycin Susceptible                           Blood Culture - Blood, Arm, Left [[id number]]  (Abnormal) Collected: 07/12/24 1516    Lab Status: Final result Specimen: Blood from Arm, Left Updated: 07/15/24 0642     Blood Culture Staphylococcus, coagulase negative     Isolated from Aerobic Bottle     Gram Stain Aerobic Bottle Gram positive cocci in clusters    Narrative:      Probable contaminant requires clinical correlation, susceptibility not performed unless requested by physician.      Blood Culture ID, PCR - Blood, Arm, Left [[id number]]  (Abnormal) Collected: 07/12/24 1516    Lab Status: Final result Specimen: Blood from Arm, Left Updated: 07/13/24 2147     BCID, PCR Staph spp, not aureus or lugdunensis. Identification by BCID2 PCR.     BOTTLE TYPE Aerobic Bottle    Blood Culture - Blood, Arm, Left [[id number]]  (Normal) Collected: 07/12/24 1441    Lab Status: Preliminary result Specimen: Blood from Arm, Left Updated: 07/15/24 1445     Blood Culture No growth at 3 days            Medication Review:       Schedule Meds  aspirin, 81 mg, Oral, BID  calcium gluconate, 2,000 mg, Intravenous, Once  cefTRIAXone, 2,000 mg, Intravenous, Q24H  cetirizine, 10 mg, Oral, Daily  enoxaparin, 40 mg, Subcutaneous, Q24H  midodrine, 5 mg, Oral, TID AC  pantoprazole, 40 mg, Oral, QAM AC  saccharomyces boulardii, 250 mg, Oral, BID  sodium chloride, 10 mL, Intravenous, Q12H  vitamin D3, 5,000 Units, Oral, Daily        Infusion Meds  sodium chloride, 100 mL/hr, Last Rate: 100 mL/hr (07/16/24 1018)        PRN Meds    acetaminophen    senna-docusate sodium **AND** polyethylene glycol **AND** bisacodyl **AND** bisacodyl    Calcium Replacement - Follow Nurse / BPA Driven  Protocol    HYDROcodone-acetaminophen    HYDROmorphone **AND** naloxone    Magnesium Standard Dose Replacement - Follow Nurse / BPA Driven Protocol    Morphine    ondansetron    oxyCODONE    Phosphorus Replacement - Follow Nurse / BPA Driven Protocol    Potassium Replacement - Follow Nurse / BPA Driven Protocol    [COMPLETED] Insert Peripheral IV **AND** sodium chloride    sodium chloride    sodium chloride    triamcinolone        Assessment & Plan       Antimicrobial Therapy   1.  IV Rocephin        2.        3.        4.        5.          Assessment    Right total knee infection.  Patient had a aspiration of the right knee with fluid analysis indicating infection.  Cultures are growing Streptococcus mitis/oralis.  Patient is status post hardware removal and antibiotic spacer placement to the right knee on 7/14/2024.    Positive blood culture and 1 out of 2 blood cultures from admission growing a coagulase-negative Staphylococcus.  Patient did not come with a line, port and has no known history of cardiac valve history or device placement.  This is likely contamination     Liver cirrhosis-patient has been sober for approximately 2 months.     History of prostate cancer    Acute kidney injury probably secondary to volume contraction     Plan     Continue IV Rocephin 2 g every 24 hours-patient will need 6 weeks of treatment from surgery-last day on August 24, 2024    Patient will need a PICC line before discharge-please remove when IV antibiotics are completed.  Standard weekly PICC line care  Weekly labs CBC, creatinine, sed rate x 6 weeks  Patient will need to be on 6 weeks of IV antibiotics from surgery, then show no signs of infection for 2 weeks before new hardware should be considered for reimplantation  Continue supportive care  A.m. labs  Consult nephrology service    Brian García MD  07/16/24  13:03 EDT    Note is dictated utilizing voice recognition software/Dragon

## 2024-07-16 NOTE — PROGRESS NOTES
RENAL/[facility]:    Consult received, chart reviewed, orders given, full note to follow.    Linus Tristan M.D.  [facility]

## 2024-07-16 NOTE — PLAN OF CARE
"    Assessment: Erik Fuentes presents with functional mobility impairments which indicate the need for skilled intervention. Tolerating session today without incident. Functional performance hindered by pain of R knee. Improved functional performance observed this date with pt motivated/eager to participate in skilled interventions performed. Will continue to follow and progress as tolerated.     Plan/Recommendations:   If medically appropriate, Moderate Intensity Therapy recommended post-acute care. This is recommended as therapy feels the patient would require 3-4 days per week and wouldn't tolerate \"3 hour daily\" rehab intensity. SNF would be the preferred choice. If the patient does not agree to SNF, arrange HH or OP depending on home bound status. If patient is medically complex, consider LTACH. Pt requires no DME at discharge.     Pt desires Skilled Rehab placement at discharge. Pt cooperative; agreeable to therapeutic recommendations and plan of care.                 "
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
## 2024-07-16 NOTE — THERAPY TREATMENT NOTE
"Subjective: Pt agreeable to therapeutic plan of care.  Cognition: oriented to Person, Place, Time, and Situation and safety/judgement: fair    Objective:     Bed Mobility: Min-A for sit to supine   Functional Transfers: Min-A and Mod-A STS from bed 2 trials      Balance: supported CGA, Min-A, and with rolling walker  Functional Ambulation: CGA, Min-A, and with rolling walker short household distance     Vitals: WNL    Pain: 4 VAS  Location: R knee  Interventions for pain: Repositioned and Increased Activity  Education: Provided education on the importance of mobility in the acute care setting, Verbal/Tactile Cues, and Transfer Training      Assessment: Erik Fuentes presents with ADL impairments affecting function including balance and endurance / activity tolerance. Pt with fair carryover for education and will benefit from SNF at d/c. Demonstrated functioning below baseline abilities indicate the need for continued skilled intervention while inpatient. Tolerating session today without incident. Will continue to follow and progress as tolerated.     Plan/Recommendations:   Moderate Intensity Therapy recommended post-acute care. This is recommended as therapy feels the patient would require 3-4 days per week and wouldn't tolerate \"3 hour daily\" rehab intensity. SNF would be the preferred choice. If the patient does not agree to SNF, arrange HH or OP depending on home bound status. If patient is medically complex, consider LTACH.. Pt requires no DME at discharge.     Pt desires Skilled Rehab placement at discharge. Pt cooperative; agreeable to therapeutic recommendations and plan of care.     [place]: N/A = No pre-op stroke/TIA    Post-Tx Position: Supine with HOB Elevated, Alarms activated, and Call light and personal items within reach  PPE: gloves   "

## 2024-07-16 NOTE — PLAN OF CARE
Goal Outcome Evaluation:                           VSS on room air. Pain treated per PRN PO orders. Pt able to make needs known. Nephrology consulted today. IV antibiotics continuing. Plan of care ongoing.

## 2024-07-16 NOTE — PLAN OF CARE
"Assessment: Erik Fuentes presents with ADL impairments affecting function including balance and endurance / activity tolerance. Pt with fair carryover for education and will benefit from SNF at d/c. Demonstrated functioning below baseline abilities indicate the need for continued skilled intervention while inpatient. Tolerating session today without incident. Will continue to follow and progress as tolerated.     Plan/Recommendations:   Moderate Intensity Therapy recommended post-acute care. This is recommended as therapy feels the patient would require 3-4 days per week and wouldn't tolerate \"3 hour daily\" rehab intensity. SNF would be the preferred choice. If the patient does not agree to SNF, arrange HH or OP depending on home bound status. If patient is medically complex, consider LTACH.. Pt requires no DME at discharge.                    "

## 2024-07-16 NOTE — THERAPY TREATMENT NOTE
"Subjective: Pt/nursing agreeable to therapeutic plan of care.    Objective:     Bed mobility - Min-A for management of RLE for sit to supine   Transfers - Min A/ Mod A for STS transfers dependent upon attempt; increased time/effort needed; cuing provided for proper technique/safety  Ambulation - 25 ft with FWW and CGA/Min A; slow haroldo with increased time/effort needed; step to gait pattern      Vitals: WNL    Pain:  Location: R knee  Intervention for pain: Repositioned    Education: Verbal/Tactile Cues    Assessment: Erik Fuentes presents with functional mobility impairments which indicate the need for skilled intervention. Tolerating session today without incident. Functional performance hindered by pain of R knee. Improved functional performance observed this date with pt motivated/eager to participate in skilled interventions performed. Will continue to follow and progress as tolerated.     Plan/Recommendations:   If medically appropriate, Moderate Intensity Therapy recommended post-acute care. This is recommended as therapy feels the patient would require 3-4 days per week and wouldn't tolerate \"3 hour daily\" rehab intensity. SNF would be the preferred choice. If the patient does not agree to SNF, arrange HH or OP depending on home bound status. If patient is medically complex, consider LTACH. Pt requires no DME at discharge.     Pt desires Skilled Rehab placement at discharge. Pt cooperative; agreeable to therapeutic recommendations and plan of care.         Basic Mobility 6-click:  Rollin = Total, A lot = 2, A little = 3; 4 = None  Supine>Sit:   1 = Total, A lot = 2, A little = 3; 4 = None   Sit>Stand with arms:  1 = Total, A lot = 2, A little = 3; 4 = None  Bed>Chair:   1 = Total, A lot = 2, A little = 3; 4 = None  Ambulate in room:  1 = Total, A lot = 2, A little = 3; 4 = None  3-5 Steps with railin = Total, A lot = 2, A little = 3; 4 = None  Score: 14      Post-Tx Position: Request " to return to bed at end of session d/t sitting up in recliner this AM; Supine with HOB Elevated, Alarms activated, and Call light and personal items within reach  PPE: gloves

## 2024-07-16 NOTE — CASE MANAGEMENT/SOCIAL WORK
Social Work Assessment  [facility]     Patient Name: Erik Fuentes  MRN: [id number]  Today's Date: 7/16/2024    Admit Date: 7/12/2024     Psychosocial       Row Name 07/16/24 1527       Values/Beliefs    Spiritual, Cultural Beliefs, Mandaeism Practices, Values that Affect Care no       Behavior WDL    Behavior WDL WDL       Mental Health    Little Interest or Pleasure in Doing Things 0-->not at all    Feeling Down, Depressed or Hopeless 0-->not at all       Speech WDL    Speech WDL WDL       Thought Process WDL    Thought Process WDL WDL       Intellectual Performance WDL    Intellectual Performance WDL WDL    Level of Consciousness Alert       Stress    Do you feel stress - tense, restless, nervous, or anxious, or unable to sleep at night because your mind is troubled all the time - these days? Not at all       Coping/Stress    Major Change/Loss/Stressor medical condition/diagnosis    Patient Personal Strengths expressive of emotions;expressive of needs;humor    Sources of Support friend(s);community support;adult child(josefina);other family members;spouse    Techniques to [place] with Loss/Stress/Change diversional activities    Reaction to Health Status accepting    Understanding of Condition and Treatment adequate understanding of medical condition       Developmental Stage (Eriksson's)    Developmental Stage Stage 8 (63 years-death/Late Adulthood) Integrity vs. Despair       C-SSRS (Recent)    Q1 Wished to be Dead (Past Month) no    Q2 Suicidal Thoughts (Past Month) no    Q6 Suicide Behavior (Lifetime) no       Violence Risk    Feels Like Hurting Others no    Previous Attempt to Harm Others no                   Abuse/Neglect       Row Name 07/16/24 1530       Personal Safety    Feels Unsafe at Home or Work/School no    Feels Threatened by Someone no    Does Anyone Try to Keep You From Having Contact with Others or Doing Things Outside Your Home? no    Physical Signs of Abuse Present no    Personal Safety Comments Pt  lives at home with spouse.                   Legal       Row Name 07/16/24 1531       Financial Resource Strain    How hard is it for you to pay for the very basics like food, housing, medical care, and heating? Not hard       Financial/Legal    Source of Income social security;salary/wages    Who Manages Finances if Patient Unable Spouse    Finance Comments Pt denies financial issues and reports all bills are up to date.       Legal    Criminal Activity/Legal Involvement none                   Substance Abuse       Row Name 07/16/24 1531       Substance Use    Substance Use Comment Pt denies tob, etoh, or illicit substance use. Pt reports he quit drinking over the past 2 years.       AUDIT-C (Alcohol Use Disorders ID Test)    Q1: How often do you have a drink containing alcohol? Never    Q2: How many drinks containing alcohol do you have on a typical day when you are drinking? None    Q3: How often do you have six or more drinks on one occasion? Never    Audit-C Score 0       Family Member Substance Use (#4)    Family History of Substance Use none             FATMATA Cosme, LSW  Medical Social Worker  Ph [phone]  Fax [phone]  [email or web address]

## 2024-07-16 NOTE — PLAN OF CARE
Goal Outcome Evaluation:      Pt had small BM. Pt's labs monitored. MD notified about lab results. Pending orders. Plan of care ongoing. Hemovac with minimal output.

## 2024-07-16 NOTE — PROGRESS NOTES
[facility] MEDICINE SERVICE  DAILY PROGRESS NOTE    NAME: Erik Fuentes  : 1960  MRN: [id number]      LOS: 4 days     PROVIDER OF SERVICE: Rio Griffin MD    Chief Complaint: Septic arthritis of knee, right    Subjective:     Interval History:  History taken from: patient    Subjective       Chief Complaint: right knee pain     History of Present Illness: Erik Fuentes is a 64 y.o. male with a CMH of BPH, hemochromatosis, prostate cancer, liver cirrhosis, esophageal varices, GERD, HTN who presented to [facility] on 2024 with right knee pain. Lives at home with wife, independent with ADLs.   Presents to ED with right knee pains. States intermittent right knee pain over past 2 years. Today, after returning home from chiropractor with worsening right knee pain and swelling. Denies recent illness or injury. States was in [place] 2 weeks ago with abrasion to left hand. On arrival to ED VS: 100.6-87-/89-96% room air.      Upon evaluation, awake, alert, resting in bed, wife at bedside. Current VS: 96-/84-96% room air. Right knee with swelling and warmth. No injury to RLE or right foot noted. Endorses unable to bear weight RLE.   XR right knee reveals large suprapatellar joint effusion with swelling. Right total knee prosthesis without periprosthetic fracture  Blood work reveals WBC 13.9, Hgb 13.8, Hct 39, platelets 171, sodium 135, AST 90. Alk phos 123. T. Bilirubin 3.5. CRP 2.31     24 seen and examined medical distress, vital signs stable, no complaints, still having right knee pain and swelling.  Discussed with RN.  Consulted ID.  Tmax 100.6.  24 patient seen in bed no acute distress, vital signs stable, underwent right Total Knee Arthroplasty Revision discussed with RN.  Tolerating antibiotics.  7/15/2024 patient seen and examined in medical distress, no new complaints, pain well-controlled.  Discussed with RN, tolerating antibiotics.  Awaiting  placement  7/16/24 patient seen and examined in bed no acute distress, complaining of knee pain.  Vital signs stable, discussed with RN, discussed with case , awaiting placement     Review of Systems  constitutional:  Positive for fever. Negative for chills.   Respiratory:  Negative for shortness of breath.    Cardiovascular:  Negative for chest pain.   Gastrointestinal:  Negative for abdominal pain, diarrhea, nausea and vomiting.   Genitourinary:  Negative for dysuria.   Musculoskeletal:  Positive for gait problem.        Right knee pain   Objective:     Vital Signs  Temp:  [98.1 °F (36.7 °C)-98.2 °F (36.8 °C)] 98.1 °F (36.7 °C)  Heart Rate:  [] 101  Resp:  [16-20] 20  BP: (108-131)/(60-76) 108/60   Body mass index is 25.77 kg/m².    Physical Exam       General: He is not in acute distress.     Appearance: Normal appearance. He is not ill-appearing or toxic-appearing.   HENT:      Head: Normocephalic and atraumatic.      Mouth/Throat:      Mouth: Mucous membranes are moist.   Eyes:      Extraocular Movements: Extraocular movements intact.      Pupils: Pupils are equal, round, and reactive to light.   Cardiovascular:      Rate and Rhythm: Normal rate. Rhythm irregular.      Pulses: Normal pulses.      Heart sounds: Normal heart sounds.   Pulmonary:      Effort: Pulmonary effort is normal. No respiratory distress.      Breath sounds: Normal breath sounds.   Abdominal:      General: Bowel sounds are normal. There is no distension.      Palpations: Abdomen is soft.      Tenderness: There is no abdominal tenderness.   Musculoskeletal:      Comments: Right knee swelling with warmth.    Skin:     General: Skin is warm and dry.   Neurological:      Mental Status: He is alert and oriented to person, place, and time. Mental status is at baseline.      Scheduled Meds   aspirin, 81 mg, Oral, BID  cefTRIAXone, 2,000 mg, Intravenous, Q24H  cetirizine, 10 mg, Oral, Daily  enoxaparin, 40 mg, Subcutaneous,  Q24H  midodrine, 5 mg, Oral, TID AC  pantoprazole, 40 mg, Oral, QAM AC  saccharomyces boulardii, 250 mg, Oral, BID  sodium chloride, 10 mL, Intravenous, Q12H  vitamin D3, 5,000 Units, Oral, Daily       PRN Meds     acetaminophen    senna-docusate sodium **AND** polyethylene glycol **AND** bisacodyl **AND** bisacodyl    Calcium Replacement - Follow Nurse / BPA Driven Protocol    HYDROcodone-acetaminophen    HYDROmorphone **AND** naloxone    Magnesium Standard Dose Replacement - Follow Nurse / BPA Driven Protocol    Morphine    ondansetron    oxyCODONE    Phosphorus Replacement - Follow Nurse / BPA Driven Protocol    Potassium Replacement - Follow Nurse / BPA Driven Protocol    [COMPLETED] Insert Peripheral IV **AND** sodium chloride    sodium chloride    sodium chloride    triamcinolone   Infusions  sodium chloride, 75 mL/hr, Last Rate: 75 mL/hr (07/14/24 1737)  sodium chloride, 100 mL/hr, Last Rate: 100 mL/hr (07/16/24 1018)          Diagnostic Data    Results from last 7 days   Lab Units 07/16/24  0229 07/12/24  2259 07/12/24  1441   WBC 10*3/mm3 16.28*   < > 13.90*   HEMOGLOBIN g/dL 7.8*   < > 13.8   HEMATOCRIT % 21.4*   < > 39.0   PLATELETS 10*3/mm3 150   < > 171   GLUCOSE mg/dL 118*   < > 111*   CREATININE mg/dL 1.46*   < > 0.79   BUN mg/dL 33*   < > 10   SODIUM mmol/L 127*   < > 135*   POTASSIUM mmol/L 4.2   < > 3.6   AST (SGOT) U/L  --   --  90*   ALT (SGPT) U/L  --   --  41   ALK PHOS U/L  --   --  123*   BILIRUBIN mg/dL  --   --  3.5*   ANION GAP mmol/L 6.0   < > 8.5    < > = values in this interval not displayed.       No radiology results for the last day      I reviewed the patient's new clinical results.    Assessment/Plan:     Active and Resolved Problems  Active Hospital Problems    Diagnosis  POA    **Septic arthritis of knee, right [M00.9]  Yes      Resolved Hospital Problems   No resolved problems to display.     Septic arthritis of right knee:  -presents with right knee pain, swelling, warmth.    -XR: large suprapatellar joint effusion with swelling.  -s/p drainage 50ml from right knee in ED--fluid analysis pending  -blood cultures pending  -empirically treat with zosyn and vanco. Pharmacy to dose  -ortho consult. Appreciate recommendations. S/p Right Total Knee Arthroplasty Revision 7/14/24  -meets sepsis criteria on admission with fever, leukocytosis, source of infection. LA normal. CRP 2.31.   -IVFs.   -pain control.   -uric acid negative. Denies hx of RA.    -ID consulted       Hypertension:  -BP may be labile with pain response  -trend BP  -continue home medications once dosing verified with pharmacy.       Liver cirrhosis:  -follows with OP GI  -admission: AST 90. Alk phos 123. T. Bilirubin 3.5        GERD:  Protonix for GI Ppx.     VTE Prophylaxis:  Pharmacologic & mechanical VTE prophylaxis orders are present.    Code status is   Code Status and Medical Interventions:   Ordered at: 07/12/24 6982     Level Of Support Discussed With:    Patient     Code Status (Patient has no pulse and is not breathing):    CPR (Attempt to Resuscitate)     Medical Interventions (Patient has pulse or is breathing):    Full Support       Plan for disposition:home in 4 days    Time: 30 minutes    Signature: Electronically signed by Rio Griffin MD, 07/16/24, 11:29 EDT.  [facility] Hospitalist Team

## 2024-07-16 NOTE — CASE MANAGEMENT/SOCIAL WORK
Continued Stay Note  [facility]IN[facility]     Patient Name: Erik Fuentes  MRN: [id number]  Today's Date: 7/16/2024    Admit Date: 7/12/2024    Plan: Accepted to [facility].  No precert needed.  PASRR  approved,  Will go on IV antibiotics.   Discharge Plan       Row Name 07/16/24 1527       Plan    Plan Accepted to [facility].  No precert needed.  PASRR  approved,  Will go on IV antibiotics.    Plan Comments DC barriers: pod#2 right knee hardware removal and antibiotic spacer placed.  monitoring hgb, wbc and renal number.  Renal consult               Lavinia Cleaning RN    SIPS 1  [email or web address]  Office [phone]  Cell [phone]

## 2024-07-17 LAB
ALBUMIN SERPL-MCNC: 2.5 G/DL (ref 3.5–5.2)
ALBUMIN/GLOB SERPL: 1 G/DL
ALP SERPL-CCNC: 89 U/L (ref 39–117)
ALT SERPL W P-5'-P-CCNC: 30 U/L (ref 1–41)
ANION GAP SERPL CALCULATED.3IONS-SCNC: 11.2 MMOL/L (ref 5–15)
ANION GAP SERPL CALCULATED.3IONS-SCNC: 8.2 MMOL/L (ref 5–15)
AST SERPL-CCNC: 61 U/L (ref 1–40)
BACTERIA SPEC AEROBE CULT: NORMAL
BASOPHILS # BLD AUTO: 0.03 10*3/MM3 (ref 0–0.2)
BASOPHILS NFR BLD AUTO: 0.1 % (ref 0–1.5)
BILIRUB SERPL-MCNC: 1.6 MG/DL (ref 0–1.2)
BUN SERPL-MCNC: 35 MG/DL (ref 8–23)
BUN SERPL-MCNC: 36 MG/DL (ref 8–23)
BUN/CREAT SERPL: 21.6 (ref 7–25)
BUN/CREAT SERPL: 23.7 (ref 7–25)
CA-I SERPL ISE-MCNC: 1.1 MMOL/L (ref 1.2–1.3)
CALCIUM SPEC-SCNC: 7.5 MG/DL (ref 8.6–10.5)
CALCIUM SPEC-SCNC: 7.6 MG/DL (ref 8.6–10.5)
CHLORIDE SERPL-SCNC: 97 MMOL/L (ref 98–107)
CHLORIDE SERPL-SCNC: 98 MMOL/L (ref 98–107)
CO2 SERPL-SCNC: 20.8 MMOL/L (ref 22–29)
CO2 SERPL-SCNC: 21.8 MMOL/L (ref 22–29)
CREAT SERPL-MCNC: 1.52 MG/DL (ref 0.76–1.27)
CREAT SERPL-MCNC: 1.62 MG/DL (ref 0.76–1.27)
DEPRECATED RDW RBC AUTO: 53 FL (ref 37–54)
EGFRCR SERPLBLD CKD-EPI 2021: 46.5 ML/MIN/1.73
EGFRCR SERPLBLD CKD-EPI 2021: 50.2 ML/MIN/1.73
EOSINOPHIL # BLD AUTO: 0.16 10*3/MM3 (ref 0–0.4)
EOSINOPHIL NFR BLD AUTO: 0.7 % (ref 0.3–6.2)
ERYTHROCYTE [DISTWIDTH] IN BLOOD BY AUTOMATED COUNT: 14 % (ref 12.3–15.4)
GLOBULIN UR ELPH-MCNC: 2.4 GM/DL
GLUCOSE SERPL-MCNC: 111 MG/DL (ref 65–99)
GLUCOSE SERPL-MCNC: 124 MG/DL (ref 65–99)
HCT VFR BLD AUTO: 22.6 % (ref 37.5–51)
HGB BLD-MCNC: 8.2 G/DL (ref 13–17.7)
IMM GRANULOCYTES # BLD AUTO: 0.56 10*3/MM3 (ref 0–0.05)
IMM GRANULOCYTES NFR BLD AUTO: 2.4 % (ref 0–0.5)
LYMPHOCYTES # BLD AUTO: 1.75 10*3/MM3 (ref 0.7–3.1)
LYMPHOCYTES NFR BLD AUTO: 7.6 % (ref 19.6–45.3)
MAGNESIUM SERPL-MCNC: 1.9 MG/DL (ref 1.6–2.4)
MCH RBC QN AUTO: 37.6 PG (ref 26.6–33)
MCHC RBC AUTO-ENTMCNC: 36.3 G/DL (ref 31.5–35.7)
MCV RBC AUTO: 103.7 FL (ref 79–97)
MONOCYTES # BLD AUTO: 2.35 10*3/MM3 (ref 0.1–0.9)
MONOCYTES NFR BLD AUTO: 10.2 % (ref 5–12)
NEUTROPHILS NFR BLD AUTO: 18.12 10*3/MM3 (ref 1.7–7)
NEUTROPHILS NFR BLD AUTO: 79 % (ref 42.7–76)
NRBC BLD AUTO-RTO: 0 /100 WBC (ref 0–0.2)
NT-PROBNP SERPL-MCNC: 269 PG/ML (ref 0–900)
PHOSPHATE SERPL-MCNC: 2.7 MG/DL (ref 2.5–4.5)
PLATELET # BLD AUTO: 220 10*3/MM3 (ref 140–450)
PMV BLD AUTO: 11.7 FL (ref 6–12)
POTASSIUM SERPL-SCNC: 3.8 MMOL/L (ref 3.5–5.2)
POTASSIUM SERPL-SCNC: 3.9 MMOL/L (ref 3.5–5.2)
PROT SERPL-MCNC: 4.9 G/DL (ref 6–8.5)
RBC # BLD AUTO: 2.18 10*6/MM3 (ref 4.14–5.8)
SODIUM SERPL-SCNC: 128 MMOL/L (ref 136–145)
SODIUM SERPL-SCNC: 129 MMOL/L (ref 136–145)
WBC NRBC COR # BLD AUTO: 22.97 10*3/MM3 (ref 3.4–10.8)

## 2024-07-17 PROCEDURE — 80053 COMPREHEN METABOLIC PANEL: CPT | Performed by: INTERNAL MEDICINE

## 2024-07-17 PROCEDURE — 25010000002 CEFTRIAXONE PER 250 MG: Performed by: ORTHOPAEDIC SURGERY

## 2024-07-17 PROCEDURE — 84100 ASSAY OF PHOSPHORUS: CPT | Performed by: INTERNAL MEDICINE

## 2024-07-17 PROCEDURE — 97530 THERAPEUTIC ACTIVITIES: CPT

## 2024-07-17 PROCEDURE — 83735 ASSAY OF MAGNESIUM: CPT | Performed by: INTERNAL MEDICINE

## 2024-07-17 PROCEDURE — 25010000002 CALCIUM GLUCONATE 2-0.675 GM/100ML-% SOLUTION: Performed by: INTERNAL MEDICINE

## 2024-07-17 PROCEDURE — 25010000002 HYDROMORPHONE 1 MG/ML SOLUTION: Performed by: ORTHOPAEDIC SURGERY

## 2024-07-17 PROCEDURE — 83880 ASSAY OF NATRIURETIC PEPTIDE: CPT | Performed by: INTERNAL MEDICINE

## 2024-07-17 PROCEDURE — 97116 GAIT TRAINING THERAPY: CPT

## 2024-07-17 PROCEDURE — 82330 ASSAY OF CALCIUM: CPT | Performed by: INTERNAL MEDICINE

## 2024-07-17 PROCEDURE — 85025 COMPLETE CBC W/AUTO DIFF WBC: CPT | Performed by: INTERNAL MEDICINE

## 2024-07-17 PROCEDURE — 97110 THERAPEUTIC EXERCISES: CPT

## 2024-07-17 PROCEDURE — 25010000002 ENOXAPARIN PER 10 MG: Performed by: ORTHOPAEDIC SURGERY

## 2024-07-17 PROCEDURE — 80074 ACUTE HEPATITIS PANEL: CPT | Performed by: NURSE PRACTITIONER

## 2024-07-17 RX ORDER — CALCIUM GLUCONATE 20 MG/ML
2000 INJECTION, SOLUTION INTRAVENOUS ONCE
Status: COMPLETED | OUTPATIENT
Start: 2024-07-17 | End: 2024-07-17

## 2024-07-17 RX ADMIN — HYDROMORPHONE HYDROCHLORIDE 1 MG: 1 INJECTION, SOLUTION INTRAMUSCULAR; INTRAVENOUS; SUBCUTANEOUS at 00:54

## 2024-07-17 RX ADMIN — Medication 250 MG: at 10:05

## 2024-07-17 RX ADMIN — Medication 250 MG: at 20:48

## 2024-07-17 RX ADMIN — ASPIRIN 81 MG CHEWABLE TABLET 81 MG: 81 TABLET CHEWABLE at 10:05

## 2024-07-17 RX ADMIN — PANTOPRAZOLE SODIUM 40 MG: 40 TABLET, DELAYED RELEASE ORAL at 08:58

## 2024-07-17 RX ADMIN — Medication 10 ML: at 10:05

## 2024-07-17 RX ADMIN — CETIRIZINE HYDROCHLORIDE 10 MG: 10 TABLET, FILM COATED ORAL at 10:05

## 2024-07-17 RX ADMIN — Medication 10 ML: at 20:48

## 2024-07-17 RX ADMIN — Medication 5000 UNITS: at 10:05

## 2024-07-17 RX ADMIN — CALCIUM GLUCONATE 2000 MG: 20 INJECTION, SOLUTION INTRAVENOUS at 11:45

## 2024-07-17 RX ADMIN — ENOXAPARIN SODIUM 40 MG: 100 INJECTION SUBCUTANEOUS at 17:58

## 2024-07-17 RX ADMIN — CEFTRIAXONE SODIUM 2000 MG: 2 INJECTION, POWDER, FOR SOLUTION INTRAMUSCULAR; INTRAVENOUS at 10:05

## 2024-07-17 RX ADMIN — MIDODRINE HYDROCHLORIDE 5 MG: 5 TABLET ORAL at 08:58

## 2024-07-17 RX ADMIN — ASPIRIN 81 MG CHEWABLE TABLET 81 MG: 81 TABLET CHEWABLE at 20:48

## 2024-07-17 NOTE — THERAPY TREATMENT NOTE
"Subjective: Pt agreeable to therapeutic plan of care.    Objective:     Bed mobility - Supine to sitting Mod-A. Cueing for task segmentation. Increased time and effort required to complete task.     Transfers - STS from EOB Min-A and with rolling walker    Ambulation - 5 feet Min-A and with rolling walker. Antalgic steps with limited weight RLE weight bearing tolerance.     WB'ing status: R Lower Extremity Weight Bearing As Tolerated    Therapeutic Exercise: 15 Reps R Lower Extremity AAROM Total Knee: Ankle Pumps, Quad Sets, Heel slides, Hip Abduction, LAQ    Precautions: High falls risk    Vitals: WNL    Pain: 5 VAS   Location: R knee  Intervention for pain: Repositioned, Increased Activity, and Therapeutic Presence    Education: Provided education on the importance of mobility in the acute care setting, Verbal/Tactile Cues, ADL training, Transfer Training, and Gait Training    Assessment: Erik Fuentes presents with functional mobility impairments which indicate the need for skilled intervention. Tolerating session today without incident. Pt requires mod A for bed mobility, min A for functional transfers, and min A for short distance ambulation using RW, with limited RLE weight bearing tolerance. Gait distance and upright mobility limited by nursing needs, as pt's IV stopped working. RN present to address at end of session. Will continue to follow and progress as tolerated.     Plan/Recommendations:   If medically appropriate, Moderate Intensity Therapy recommended post-acute care. This is recommended as therapy feels the patient would require 3-4 days per week and wouldn't tolerate \"3 hour daily\" rehab intensity. SNF would be the preferred choice. If the patient does not agree to SNF, arrange HH or OP depending on home bound status. If patient is medically complex, consider LTACH. Pt requires no DME at discharge.     Pt desires Skilled Rehab placement at discharge. Pt cooperative; agreeable to therapeutic " recommendations and plan of care.         Basic Mobility 6-click:  Rollin = Total, A lot = 2, A little = 3; 4 = None  Supine>Sit:   1 = Total, A lot = 2, A little = 3; 4 = None   Sit>Stand with arms:  1 = Total, A lot = 2, A little = 3; 4 = None  Bed>Chair:   1 = Total, A lot = 2, A little = 3; 4 = None  Ambulate in room:  1 = Total, A lot = 2, A little = 3; 4 = None  3-5 Steps with railin = Total, A lot = 2, A little = 3; 4 = None  Score: 12    Modified [place]: N/A = No pre-op stroke/TIA    Post-Tx Position: Up in Chair, Staff Present, Alarms activated, and Call light and personal items within reach  PPE: gloves

## 2024-07-17 NOTE — PLAN OF CARE
Goal Outcome Evaluation:  Plan of Care Reviewed With: patient        Progress: no change  Outcome Evaluation: Pt VSS, Pain treated with prn medication, Right knee dressing renforced this shift, 1 hemovac, , Call light in reach, Plan on going

## 2024-07-17 NOTE — CASE MANAGEMENT/SOCIAL WORK
Continued Stay Note  DOMINICK Chin     Patient Name: Erik Fuentes  MRN: [id number]  Today's Date: 7/17/2024    Admit Date: 7/12/2024    Plan: Accepted to [facility]. No precert needed. PASRR approved, Will go on IV antibiotics.   Discharge Plan       Row Name 07/17/24 1604       Plan    Plan Accepted to [facility]. No precert needed. PASRR approved, Will go on IV antibiotics.    Plan Comments DC barriers: pod#3 right knee hardware removal and antibiotic spacer placed. monitoring hgb, wbc and renal number, electrolytes .                  Lavinia Cleaning RN    SIPS 1  [email or web address]  Office [phone]  Cell [phone]

## 2024-07-17 NOTE — PLAN OF CARE
"Assessment: Erik Fuentes presents with functional mobility impairments which indicate the need for skilled intervention. Tolerating session today without incident. Pt requires mod A for bed mobility, min A for functional transfers, and min A for short distance ambulation using RW, with limited RLE weight bearing tolerance. Gait distance and upright mobility limited by nursing needs, as pt's IV stopped working. RN present to address at end of session. Will continue to follow and progress as tolerated.     Plan/Recommendations:   If medically appropriate, Moderate Intensity Therapy recommended post-acute care. This is recommended as therapy feels the patient would require 3-4 days per week and wouldn't tolerate \"3 hour daily\" rehab intensity. SNF would be the preferred choice. If the patient does not agree to SNF, arrange HH or OP depending on home bound status. If patient is medically complex, consider LTACH. Pt requires no DME at discharge.     Pt desires Skilled Rehab placement at discharge. Pt cooperative; agreeable to therapeutic recommendations and plan of care.     "

## 2024-07-17 NOTE — PROGRESS NOTES
[facility] MEDICINE SERVICE  DAILY PROGRESS NOTE    NAME: Erik Fuentes  : 1960  MRN: [id number]      LOS: 5 days     PROVIDER OF SERVICE: Rio Griffin MD    Chief Complaint: Septic arthritis of knee, right    Subjective:     Interval History:  History taken from: patient    Subjective       Chief Complaint: right knee pain     History of Present Illness: Erik Fuentes is a 64 y.o. male with a CMH of BPH, hemochromatosis, prostate cancer, liver cirrhosis, esophageal varices, GERD, HTN who presented to [facility] on 2024 with right knee pain. Lives at home with wife, independent with ADLs.   Presents to ED with right knee pains. States intermittent right knee pain over past 2 years. Today, after returning home from chiropractor with worsening right knee pain and swelling. Denies recent illness or injury. States was in [place] 2 weeks ago with abrasion to left hand. On arrival to ED VS: 100.6-87-/89-96% room air.      Upon evaluation, awake, alert, resting in bed, wife at bedside. Current VS: 96-/84-96% room air. Right knee with swelling and warmth. No injury to RLE or right foot noted. Endorses unable to bear weight RLE.   XR right knee reveals large suprapatellar joint effusion with swelling. Right total knee prosthesis without periprosthetic fracture  Blood work reveals WBC 13.9, Hgb 13.8, Hct 39, platelets 171, sodium 135, AST 90. Alk phos 123. T. Bilirubin 3.5. CRP 2.31     24 seen and examined medical distress, vital signs stable, no complaints, still having right knee pain and swelling.  Discussed with RN.  Consulted ID.  Tmax 100.6.  24 patient seen in bed no acute distress, vital signs stable, underwent right Total Knee Arthroplasty Revision discussed with RN.  Tolerating antibiotics.  7/15/2024 patient seen and examined in medical distress, no new complaints, pain well-controlled.  Discussed with RN, tolerating antibiotics.  Awaiting  placement  7/16/24 patient seen and examined in bed no acute distress, complaining of knee pain.  Vital signs stable, discussed with RN, discussed with case , awaiting placement  7/17/2024 patient seen and examined in bed no acute distress, vital signs stable, tolerating antibiotics, his white count went up to 22.  Creatinine went up to 1.62 today 1.5.     Review of Systems  constitutional:  Positive for fever. Negative for chills.   Respiratory:  Negative for shortness of breath.    Cardiovascular:  Negative for chest pain.   Gastrointestinal:  Negative for abdominal pain, diarrhea, nausea and vomiting.   Genitourinary:  Negative for dysuria.   Musculoskeletal:  Positive for gait problem.        Right knee pain   Objective:     Vital Signs  Temp:  [98.1 °F (36.7 °C)-98.2 °F (36.8 °C)] 98.2 °F (36.8 °C)  Heart Rate:  [77-78] 77  Resp:  [16-18] 16  BP: (135-144)/(65-67) 138/67   Body mass index is 25.77 kg/m².    Physical Exam     General: He is not in acute distress.     Appearance: Normal appearance. He is not ill-appearing or toxic-appearing.   HENT:      Head: Normocephalic and atraumatic.      Mouth/Throat:      Mouth: Mucous membranes are moist.   Eyes:      Extraocular Movements: Extraocular movements intact.      Pupils: Pupils are equal, round, and reactive to light.   Cardiovascular:      Rate and Rhythm: Normal rate. Rhythm irregular.      Pulses: Normal pulses.      Heart sounds: Normal heart sounds.   Pulmonary:      Effort: Pulmonary effort is normal. No respiratory distress.      Breath sounds: Normal breath sounds.   Abdominal:      General: Bowel sounds are normal. There is no distension.      Palpations: Abdomen is soft.      Tenderness: There is no abdominal tenderness.   Musculoskeletal:      Comments: Right knee swelling with warmth.    Skin:     General: Skin is warm and dry.   Neurological:      Mental Status: He is alert and oriented to person, place, and time. Mental status is at  baseline.      Scheduled Meds   aspirin, 81 mg, Oral, BID  calcium gluconate, 2,000 mg, Intravenous, Once  cefTRIAXone, 2,000 mg, Intravenous, Q24H  cetirizine, 10 mg, Oral, Daily  enoxaparin, 40 mg, Subcutaneous, Q24H  midodrine, 5 mg, Oral, TID AC  pantoprazole, 40 mg, Oral, QAM AC  saccharomyces boulardii, 250 mg, Oral, BID  sodium chloride, 10 mL, Intravenous, Q12H  vitamin D3, 5,000 Units, Oral, Daily       PRN Meds     acetaminophen    senna-docusate sodium **AND** polyethylene glycol **AND** bisacodyl **AND** bisacodyl    Calcium Replacement - Follow Nurse / BPA Driven Protocol    HYDROcodone-acetaminophen    HYDROmorphone **AND** naloxone    Magnesium Standard Dose Replacement - Follow Nurse / BPA Driven Protocol    Morphine    ondansetron    oxyCODONE    Phosphorus Replacement - Follow Nurse / BPA Driven Protocol    Potassium Replacement - Follow Nurse / BPA Driven Protocol    [COMPLETED] Insert Peripheral IV **AND** sodium chloride    sodium chloride    sodium chloride    triamcinolone   Infusions  sodium chloride, 100 mL/hr, Last Rate: 100 mL/hr (07/16/24 1018)        Diagnostic Data    Results from last 7 days   Lab Units 07/17/24  0932 07/17/24  0032   WBC 10*3/mm3  --  22.97*   HEMOGLOBIN g/dL  --  8.2*   HEMATOCRIT %  --  22.6*   PLATELETS 10*3/mm3  --  220   GLUCOSE mg/dL 124* 111*   CREATININE mg/dL 1.52* 1.62*   BUN mg/dL 36* 35*   SODIUM mmol/L 128* 129*   POTASSIUM mmol/L 3.9 3.8   AST (SGOT) U/L  --  61*   ALT (SGPT) U/L  --  30   ALK PHOS U/L  --  89   BILIRUBIN mg/dL  --  1.6*   ANION GAP mmol/L 8.2 11.2       US Renal Bilateral    Result Date: 7/16/2024  Impression: No evidence of hydronephrosis or cortical thinning. Electronically Signed: Phoenix Love MD  7/16/2024 6:31 PM EDT  Workstation ID: [id number]       I reviewed the patient's new clinical results.    Assessment/Plan:     Active and Resolved Problems  Active Hospital Problems    Diagnosis  POA    **Septic arthritis of knee, right  [M00.9]  Yes      Resolved Hospital Problems   No resolved problems to display.     Septic arthritis of right knee:  -presents with right knee pain, swelling, warmth.   -XR: large suprapatellar joint effusion with swelling.  -s/p drainage 50ml from right knee in ED--fluid analysis pending  -blood cultures pending  -empirically treat with zosyn and vanco. Pharmacy to dose  -ortho consult. Appreciate recommendations. S/p Right Total Knee Arthroplasty Revision 7/14/24  -meets sepsis criteria on admission with fever, leukocytosis, source of infection. LA normal. CRP 2.31.   -IVFs.   -pain control.   -uric acid negative. Denies hx of RA.    -ID consulted       Hypertension:  -BP may be labile with pain response  -trend BP  -continue home medications once dosing verified with pharmacy.       Liver cirrhosis:  -follows with OP GI  -admission: AST 90. Alk phos 123. T. Bilirubin 3.5     GERD:  Protonix for GI Ppx.     VTE Prophylaxis:  Pharmacologic & mechanical VTE prophylaxis orders are present.    Code status is   Code Status and Medical Interventions:   Ordered at: 07/12/24 6036     Level Of Support Discussed With:    Patient     Code Status (Patient has no pulse and is not breathing):    CPR (Attempt to Resuscitate)     Medical Interventions (Patient has pulse or is breathing):    Full Support       Plan for disposition:home in 4 days    Time: 30 minutes    Signature: Electronically signed by Rio Griffin MD, 07/17/24, 10:34 EDT.  [facility] Hospitalist Team

## 2024-07-18 LAB
ALBUMIN SERPL-MCNC: 2.2 G/DL (ref 3.5–5.2)
ALBUMIN/GLOB SERPL: 1 G/DL
ALP SERPL-CCNC: 87 U/L (ref 39–117)
ALT SERPL W P-5'-P-CCNC: 27 U/L (ref 1–41)
ANION GAP SERPL CALCULATED.3IONS-SCNC: 6.8 MMOL/L (ref 5–15)
ANION GAP SERPL CALCULATED.3IONS-SCNC: 7.1 MMOL/L (ref 5–15)
AST SERPL-CCNC: 63 U/L (ref 1–40)
BASOPHILS # BLD AUTO: 0.04 10*3/MM3 (ref 0–0.2)
BASOPHILS # BLD AUTO: 0.05 10*3/MM3 (ref 0–0.2)
BASOPHILS NFR BLD AUTO: 0.2 % (ref 0–1.5)
BASOPHILS NFR BLD AUTO: 0.2 % (ref 0–1.5)
BILIRUB SERPL-MCNC: 1.8 MG/DL (ref 0–1.2)
BUN SERPL-MCNC: 32 MG/DL (ref 8–23)
BUN SERPL-MCNC: 34 MG/DL (ref 8–23)
BUN/CREAT SERPL: 21.3 (ref 7–25)
BUN/CREAT SERPL: 22.8 (ref 7–25)
CALCIUM SPEC-SCNC: 7.4 MG/DL (ref 8.6–10.5)
CALCIUM SPEC-SCNC: 7.6 MG/DL (ref 8.6–10.5)
CHLORIDE SERPL-SCNC: 100 MMOL/L (ref 98–107)
CHLORIDE SERPL-SCNC: 100 MMOL/L (ref 98–107)
CO2 SERPL-SCNC: 20.2 MMOL/L (ref 22–29)
CO2 SERPL-SCNC: 21.9 MMOL/L (ref 22–29)
CREAT SERPL-MCNC: 1.49 MG/DL (ref 0.76–1.27)
CREAT SERPL-MCNC: 1.5 MG/DL (ref 0.76–1.27)
DEPRECATED RDW RBC AUTO: 51.5 FL (ref 37–54)
DEPRECATED RDW RBC AUTO: 53.6 FL (ref 37–54)
EGFRCR SERPLBLD CKD-EPI 2021: 51 ML/MIN/1.73
EGFRCR SERPLBLD CKD-EPI 2021: 51.4 ML/MIN/1.73
EOSINOPHIL # BLD AUTO: 0.52 10*3/MM3 (ref 0–0.4)
EOSINOPHIL # BLD AUTO: 0.71 10*3/MM3 (ref 0–0.4)
EOSINOPHIL NFR BLD AUTO: 2.5 % (ref 0.3–6.2)
EOSINOPHIL NFR BLD AUTO: 3.2 % (ref 0.3–6.2)
ERYTHROCYTE [DISTWIDTH] IN BLOOD BY AUTOMATED COUNT: 14 % (ref 12.3–15.4)
ERYTHROCYTE [DISTWIDTH] IN BLOOD BY AUTOMATED COUNT: 14.3 % (ref 12.3–15.4)
GLOBULIN UR ELPH-MCNC: 2.2 GM/DL
GLUCOSE SERPL-MCNC: 102 MG/DL (ref 65–99)
GLUCOSE SERPL-MCNC: 122 MG/DL (ref 65–99)
HAV IGM SERPL QL IA: NORMAL
HBV CORE IGM SERPL QL IA: NORMAL
HBV SURFACE AG SERPL QL IA: NORMAL
HCT VFR BLD AUTO: 20 % (ref 37.5–51)
HCT VFR BLD AUTO: 20.2 % (ref 37.5–51)
HCV AB SER QL: NORMAL
HGB BLD-MCNC: 7.2 G/DL (ref 13–17.7)
HGB BLD-MCNC: 7.4 G/DL (ref 13–17.7)
IMM GRANULOCYTES # BLD AUTO: 0.59 10*3/MM3 (ref 0–0.05)
IMM GRANULOCYTES # BLD AUTO: 1.4 10*3/MM3 (ref 0–0.05)
IMM GRANULOCYTES NFR BLD AUTO: 2.8 % (ref 0–0.5)
IMM GRANULOCYTES NFR BLD AUTO: 6.3 % (ref 0–0.5)
LYMPHOCYTES # BLD AUTO: 1.34 10*3/MM3 (ref 0.7–3.1)
LYMPHOCYTES # BLD AUTO: 1.89 10*3/MM3 (ref 0.7–3.1)
LYMPHOCYTES NFR BLD AUTO: 6 % (ref 19.6–45.3)
LYMPHOCYTES NFR BLD AUTO: 8.9 % (ref 19.6–45.3)
MAGNESIUM SERPL-MCNC: 1.5 MG/DL (ref 1.6–2.4)
MAGNESIUM SERPL-MCNC: 1.6 MG/DL (ref 1.6–2.4)
MCH RBC QN AUTO: 37.3 PG (ref 26.6–33)
MCH RBC QN AUTO: 37.6 PG (ref 26.6–33)
MCHC RBC AUTO-ENTMCNC: 36 G/DL (ref 31.5–35.7)
MCHC RBC AUTO-ENTMCNC: 36.6 G/DL (ref 31.5–35.7)
MCV RBC AUTO: 102.5 FL (ref 79–97)
MCV RBC AUTO: 103.6 FL (ref 79–97)
MONOCYTES # BLD AUTO: 2.08 10*3/MM3 (ref 0.1–0.9)
MONOCYTES # BLD AUTO: 2.13 10*3/MM3 (ref 0.1–0.9)
MONOCYTES NFR BLD AUTO: 9.5 % (ref 5–12)
MONOCYTES NFR BLD AUTO: 9.8 % (ref 5–12)
NEUTROPHILS NFR BLD AUTO: 16.08 10*3/MM3 (ref 1.7–7)
NEUTROPHILS NFR BLD AUTO: 16.72 10*3/MM3 (ref 1.7–7)
NEUTROPHILS NFR BLD AUTO: 74.8 % (ref 42.7–76)
NEUTROPHILS NFR BLD AUTO: 75.8 % (ref 42.7–76)
NRBC BLD AUTO-RTO: 0.1 /100 WBC (ref 0–0.2)
NRBC BLD AUTO-RTO: 0.1 /100 WBC (ref 0–0.2)
PHOSPHATE SERPL-MCNC: 3 MG/DL (ref 2.5–4.5)
PLATELET # BLD AUTO: 201 10*3/MM3 (ref 140–450)
PLATELET # BLD AUTO: 234 10*3/MM3 (ref 140–450)
PMV BLD AUTO: 11 FL (ref 6–12)
PMV BLD AUTO: 11.4 FL (ref 6–12)
POTASSIUM SERPL-SCNC: 3.6 MMOL/L (ref 3.5–5.2)
POTASSIUM SERPL-SCNC: 3.8 MMOL/L (ref 3.5–5.2)
PROT SERPL-MCNC: 4.4 G/DL (ref 6–8.5)
RBC # BLD AUTO: 1.93 10*6/MM3 (ref 4.14–5.8)
RBC # BLD AUTO: 1.97 10*6/MM3 (ref 4.14–5.8)
SODIUM SERPL-SCNC: 127 MMOL/L (ref 136–145)
SODIUM SERPL-SCNC: 129 MMOL/L (ref 136–145)
WBC NRBC COR # BLD AUTO: 21.2 10*3/MM3 (ref 3.4–10.8)
WBC NRBC COR # BLD AUTO: 22.35 10*3/MM3 (ref 3.4–10.8)

## 2024-07-18 PROCEDURE — 97116 GAIT TRAINING THERAPY: CPT

## 2024-07-18 PROCEDURE — 25010000002 ENOXAPARIN PER 10 MG: Performed by: ORTHOPAEDIC SURGERY

## 2024-07-18 PROCEDURE — 25010000002 FUROSEMIDE PER 20 MG: Performed by: INTERNAL MEDICINE

## 2024-07-18 PROCEDURE — 83735 ASSAY OF MAGNESIUM: CPT | Performed by: INTERNAL MEDICINE

## 2024-07-18 PROCEDURE — 25810000003 SODIUM CHLORIDE 0.9 % SOLUTION: Performed by: INTERNAL MEDICINE

## 2024-07-18 PROCEDURE — 97110 THERAPEUTIC EXERCISES: CPT

## 2024-07-18 PROCEDURE — 80048 BASIC METABOLIC PNL TOTAL CA: CPT | Performed by: INTERNAL MEDICINE

## 2024-07-18 PROCEDURE — 25010000002 CEFTRIAXONE PER 250 MG: Performed by: ORTHOPAEDIC SURGERY

## 2024-07-18 PROCEDURE — 85025 COMPLETE CBC W/AUTO DIFF WBC: CPT | Performed by: NURSE PRACTITIONER

## 2024-07-18 PROCEDURE — 97530 THERAPEUTIC ACTIVITIES: CPT

## 2024-07-18 RX ORDER — FUROSEMIDE 10 MG/ML
40 INJECTION INTRAMUSCULAR; INTRAVENOUS ONCE
Status: COMPLETED | OUTPATIENT
Start: 2024-07-18 | End: 2024-07-18

## 2024-07-18 RX ORDER — MIDODRINE HYDROCHLORIDE 5 MG/1
5 TABLET ORAL
Status: DISCONTINUED | OUTPATIENT
Start: 2024-07-18 | End: 2024-07-19

## 2024-07-18 RX ADMIN — CEFTRIAXONE SODIUM 2000 MG: 2 INJECTION, POWDER, FOR SOLUTION INTRAMUSCULAR; INTRAVENOUS at 10:23

## 2024-07-18 RX ADMIN — ENOXAPARIN SODIUM 40 MG: 100 INJECTION SUBCUTANEOUS at 17:36

## 2024-07-18 RX ADMIN — Medication 10 ML: at 08:14

## 2024-07-18 RX ADMIN — Medication 5000 UNITS: at 08:14

## 2024-07-18 RX ADMIN — MIDODRINE HYDROCHLORIDE 5 MG: 5 TABLET ORAL at 17:33

## 2024-07-18 RX ADMIN — SODIUM CHLORIDE 100 ML/HR: 9 INJECTION, SOLUTION INTRAVENOUS at 00:01

## 2024-07-18 RX ADMIN — CETIRIZINE HYDROCHLORIDE 10 MG: 10 TABLET, FILM COATED ORAL at 08:14

## 2024-07-18 RX ADMIN — MIDODRINE HYDROCHLORIDE 5 MG: 5 TABLET ORAL at 14:22

## 2024-07-18 RX ADMIN — ASPIRIN 81 MG CHEWABLE TABLET 81 MG: 81 TABLET CHEWABLE at 20:45

## 2024-07-18 RX ADMIN — FUROSEMIDE 40 MG: 10 INJECTION, SOLUTION INTRAMUSCULAR; INTRAVENOUS at 14:23

## 2024-07-18 RX ADMIN — ASPIRIN 81 MG CHEWABLE TABLET 81 MG: 81 TABLET CHEWABLE at 08:13

## 2024-07-18 RX ADMIN — PANTOPRAZOLE SODIUM 40 MG: 40 TABLET, DELAYED RELEASE ORAL at 08:14

## 2024-07-18 RX ADMIN — SODIUM CHLORIDE 100 ML/HR: 9 INJECTION, SOLUTION INTRAVENOUS at 10:25

## 2024-07-18 RX ADMIN — Medication 250 MG: at 08:14

## 2024-07-18 RX ADMIN — Medication 10 ML: at 20:45

## 2024-07-18 RX ADMIN — Medication 250 MG: at 20:45

## 2024-07-18 NOTE — PLAN OF CARE
"Goal Outcome Evaluation:  Assessment: Erik Fuentes presents with functional mobility impairments which indicate the need for skilled intervention. Pt reported having wet underwear when PT arrived in room. PT assisted pt with removing soiled underwear and assisted pt with gown change. Disposable brief was donned on pt. Upon sitting at edge of bed, pt c/o dizziness. BP was checked and BP was 90/50. Pt stood with walker and min assist of 2 long enough for soiled linens to be changed. Pt was returned to supine ; BP was 140/68.Pt also desaturating on room air at rest and O2 placed on pt at 2 L with sats improving to the 90's.  Ns reports that pt had a recent change in BP meds. Pt was with nursing when PT session ended. Tolerating session today without incident. Will continue to follow and progress as tolerated.     Plan/Recommendations:   If medically appropriate, Moderate Intensity Therapy recommended post-acute care. This is recommended as therapy feels the patient would require 3-4 days per week and wouldn't tolerate \"3 hour daily\" rehab intensity. SNF would be the preferred choice. If the patient does not agree to SNF, arrange HH or OP depending on home bound status. If patient is medically complex, consider LTACH. Pt requires no DME at discharge.     Pt desires Skilled Rehab placement at discharge. Pt cooperative; agreeable to therapeutic recommendations and plan of care.                                                 "

## 2024-07-18 NOTE — PROGRESS NOTES
Infectious Diseases Progress Note      LOS: 6 days   Patient Care Team:  Tamar Eugene APRN as PCP - General (Nurse Practitioner)    Chief Complaint: Right knee pain and swelling at admission    Subjective       The patient has been afebrile for the last 24 hours.  The patient is on room air, hemodynamically stable, and is tolerating antimicrobial therapy.  No new symptoms other than his nausea when he stands up    Review of Systems:   Review of Systems   Constitutional: Negative.    HENT: Negative.     Eyes: Negative.    Respiratory: Negative.     Cardiovascular: Negative.    Gastrointestinal: Negative.    Endocrine: Negative.    Genitourinary: Negative.    Musculoskeletal:  Positive for joint swelling.   Skin: Negative.    Neurological: Negative.    Psychiatric/Behavioral: Negative.     All other systems reviewed and are negative.       Objective     Vital Signs  Temp:  [98.3 °F (36.8 °C)-99.2 °F (37.3 °C)] 98.3 °F (36.8 °C)  Heart Rate:  [71-91] 71  Resp:  [14-16] 14  BP: ()/(50-91) 139/67    Physical Exam:  Physical Exam  Vitals and nursing note reviewed.   Constitutional:       General: He is not in acute distress.     Appearance: Normal appearance. He is well-developed and normal weight. He is not diaphoretic.   HENT:      Head: Normocephalic and atraumatic.   Eyes:      Conjunctiva/sclera: Conjunctivae normal.      Pupils: Pupils are equal, round, and reactive to light.   Cardiovascular:      Rate and Rhythm: Normal rate and regular rhythm.      Heart sounds: Normal heart sounds, S1 normal and S2 normal.   Pulmonary:      Effort: Pulmonary effort is normal. No respiratory distress.      Breath sounds: Normal breath sounds. No stridor. No wheezing or rales.   Abdominal:      General: Bowel sounds are normal. There is no distension.      Palpations: Abdomen is soft. There is no mass.      Tenderness: There is no abdominal tenderness. There is no guarding.   Musculoskeletal:         General: No  deformity.      Cervical back: Neck supple.      Comments: Surgical dressings in place on right knee along with Hemovac with bloody drainage   Skin:     General: Skin is warm and dry.      Coloration: Skin is not pale.      Findings: No erythema or rash.   Neurological:      Mental Status: He is alert and oriented to person, place, and time.      Cranial Nerves: No cranial nerve deficit.   Psychiatric:         Mood and Affect: Mood normal.          Results Review:    I have reviewed all clinical data, test, lab, and imaging results.     Radiology  No Radiology Exams Resulted Within Past 24 Hours    Cardiology    Laboratory    Results from last 7 days   Lab Units 07/17/24  2348 07/17/24  0032 07/16/24 0229 07/15/24  0141 07/14/24  0432 07/12/24 2259 07/12/24  1441   WBC 10*3/mm3 21.20* 22.97* 16.28* 13.05* 14.87* 18.32* 13.90*   HEMOGLOBIN g/dL 7.4* 8.2* 7.8* 9.2* 13.5 12.2* 13.8   HEMATOCRIT % 20.2* 22.6* 21.4* 25.3* 36.9* 34.0* 39.0   PLATELETS 10*3/mm3 201 220 150 144 154 138* 171     Results from last 7 days   Lab Units 07/17/24 2348 07/17/24  0932 07/17/24  0032 07/16/24 0229 07/15/24  0141 07/14/24  0432 07/12/24 2259 07/12/24  1441   SODIUM mmol/L 127* 128* 129* 127* 130* 131* 135* 135*   POTASSIUM mmol/L 3.8 3.9 3.8 4.2 4.8 3.6 3.7 3.6   CHLORIDE mmol/L 100 98 97* 99 101 98 102 100   CO2 mmol/L 20.2* 21.8* 20.8* 22.0 22.3 23.7 24.8 26.5   BUN mg/dL 34* 36* 35* 33* 22 10 9 10   CREATININE mg/dL 1.49* 1.52* 1.62* 1.46* 1.29* 0.69* 0.79 0.79   GLUCOSE mg/dL 102* 124* 111* 118* 140* 88 121* 111*   ALBUMIN g/dL 2.2*  --  2.5*  --   --   --   --  3.4*   BILIRUBIN mg/dL 1.8*  --  1.6*  --   --   --   --  3.5*   ALK PHOS U/L 87  --  89  --   --   --   --  123*   AST (SGOT) U/L 63*  --  61*  --   --   --   --  90*   ALT (SGPT) U/L 27  --  30  --   --   --   --  41   CALCIUM mg/dL 7.6* 7.6* 7.5* 6.9* 7.3* 8.1* 7.9* 8.9         Results from last 7 days   Lab Units 07/12/24  1441   SED RATE mm/hr 14          Microbiology   Microbiology Results (last 10 days)       Procedure Component Value - Date/Time    Body Fluid Culture - Body Fluid, Knee, Right [[id number]]  (Abnormal)  (Susceptibility) Collected: 07/12/24 1711    Lab Status: Final result Specimen: Body Fluid from Knee, Right Updated: 07/15/24 0840     Body Fluid Culture Streptococcus mitis / oralis     Gram Stain Many (4+) WBCs per low power field      No organisms seen    Narrative:      In broth only.    Susceptibility        Streptococcus mitis / oralis      VAIBHAV      Ceftriaxone Susceptible      Penicillin G Susceptible      Vancomycin Susceptible                           Blood Culture - Blood, Arm, Left [[id number]]  (Abnormal) Collected: 07/12/24 1516    Lab Status: Final result Specimen: Blood from Arm, Left Updated: 07/15/24 0642     Blood Culture Staphylococcus, coagulase negative     Isolated from Aerobic Bottle     Gram Stain Aerobic Bottle Gram positive cocci in clusters    Narrative:      Probable contaminant requires clinical correlation, susceptibility not performed unless requested by physician.      Blood Culture ID, PCR - Blood, Arm, Left [[id number]]  (Abnormal) Collected: 07/12/24 1516    Lab Status: Final result Specimen: Blood from Arm, Left Updated: 07/13/24 2147     BCID, PCR Staph spp, not aureus or lugdunensis. Identification by BCID2 PCR.     BOTTLE TYPE Aerobic Bottle    Blood Culture - Blood, Arm, Left [[id number]]  (Normal) Collected: 07/12/24 1441    Lab Status: Final result Specimen: Blood from Arm, Left Updated: 07/17/24 1445     Blood Culture No growth at 5 days            Medication Review:       Schedule Meds  aspirin, 81 mg, Oral, BID  cefTRIAXone, 2,000 mg, Intravenous, Q24H  cetirizine, 10 mg, Oral, Daily  enoxaparin, 40 mg, Subcutaneous, Q24H  midodrine, 5 mg, Oral, TID AC  pantoprazole, 40 mg, Oral, QAM AC  saccharomyces boulardii, 250 mg, Oral, BID  sodium chloride, 500 mL, Intravenous, Once  sodium chloride, 10 mL,  Intravenous, Q12H  vitamin D3, 5,000 Units, Oral, Daily        Infusion Meds         PRN Meds    acetaminophen    senna-docusate sodium **AND** polyethylene glycol **AND** bisacodyl **AND** bisacodyl    Calcium Replacement - Follow Nurse / BPA Driven Protocol    Magnesium Standard Dose Replacement - Follow Nurse / BPA Driven Protocol    Morphine    [] HYDROmorphone **AND** naloxone    ondansetron    oxyCODONE    Phosphorus Replacement - Follow Nurse / BPA Driven Protocol    Potassium Replacement - Follow Nurse / BPA Driven Protocol    [COMPLETED] Insert Peripheral IV **AND** sodium chloride    sodium chloride    sodium chloride    triamcinolone        Assessment & Plan       Antimicrobial Therapy   1.  IV Rocephin        2.        3.        4.        5.          Assessment    Right total knee infection.  Patient had a aspiration of the right knee with fluid analysis indicating infection.  Cultures are growing Streptococcus mitis/oralis.  Patient is status post hardware removal and antibiotic spacer placement to the right knee on 2024.    Positive blood culture and 1 out of 2 blood cultures from admission growing a coagulase-negative Staphylococcus.  Patient did not come with a line, port and has no known history of cardiac valve history or device placement.  This is likely contamination     Liver cirrhosis-patient has been sober for approximately 2 months.  Elevated liver transaminase and hyperbilirubinemia are improving since admission     History of prostate cancer    Acute kidney injury probably secondary to volume contraction.  Nephrology now following    Worsening leukocytosis-could be related to surgery/postsurgical hematoma.  Patient denies any diarrhea shortness of breath or urinary symptoms     Plan     Continue IV Rocephin 2 g every 24 hours-patient will need 6 weeks of treatment from surgery-last day on 2024    Patient will need a PICC line before discharge-please remove when IV  antibiotics are completed.  Standard weekly PICC line care  Weekly labs CBC, creatinine, sed rate x 6 weeks  Patient will need to be on 6 weeks of IV antibiotics from surgery, then show no signs of infection for 2 weeks before new hardware should be considered for reimplantation  Continue supportive care  Okay to discharge to rehab from ID standpoint    Please fax all post discharge lab results, imaging studies and correspondence to this fax number [phone]  For any question or concern please contact our service number [phone]      CARMEN Nicole  07/18/24  17:17 EDT    Note is dictated utilizing voice recognition software/Dragon

## 2024-07-18 NOTE — PLAN OF CARE
Goal Outcome Evaluation:                           Pt a&o x4. Pt dressing clean, dry, and intact. Pt able to make needs known. Plan of care of ongoing.

## 2024-07-18 NOTE — PLAN OF CARE
Goal Outcome Evaluation:                                            Pt A&O x4. Pt able to make needs known. Pt transferred to chair assist x2. Safety precautions in place. Plan of care ongoing.

## 2024-07-18 NOTE — CASE MANAGEMENT/SOCIAL WORK
Continued Stay Note  DOMINICK Chin     Patient Name: Erik Fuentes  MRN: [id number]  Today's Date: 7/18/2024    Admit Date: 7/12/2024    Plan: Accepted to [facility]. No precert needed. PASRR approved, Will go on IV antibiotics.   Discharge Plan       Row Name 07/18/24 1311       Plan    Plan Accepted to [facility]. No precert needed. PASRR approved, Will go on IV antibiotics.    Plan Comments DC barriers: pod#4 right knee hardware removal and antibiotic spacer placed. monitoring hgb, wbc and renal number, electrolytes .                 Expected Discharge Date and Time       Expected Discharge Date Expected Discharge Time    Jul 19, 2024         Lavinia Cleaning RN    SIPS 1  [email or web address]  Office [phone]  Cell [phone]

## 2024-07-18 NOTE — PROGRESS NOTES
[facility] MEDICINE SERVICE  DAILY PROGRESS NOTE    NAME: Erik Fuentes  : 1960  MRN: [id number]      LOS: 6 days     PROVIDER OF SERVICE: Rio Griffin MD    Chief Complaint: Septic arthritis of knee, right    Subjective:     Interval History:  History taken from: patient    Subjective       Chief Complaint: right knee pain     History of Present Illness: Erik Fuentes is a 64 y.o. male with a CMH of BPH, hemochromatosis, prostate cancer, liver cirrhosis, esophageal varices, GERD, HTN who presented to [facility] on 2024 with right knee pain. Lives at home with wife, independent with ADLs.   Presents to ED with right knee pains. States intermittent right knee pain over past 2 years. Today, after returning home from chiropractor with worsening right knee pain and swelling. Denies recent illness or injury. States was in [place] 2 weeks ago with abrasion to left hand. On arrival to ED VS: 100.6-87-/89-96% room air.      Upon evaluation, awake, alert, resting in bed, wife at bedside. Current VS: 96-/84-96% room air. Right knee with swelling and warmth. No injury to RLE or right foot noted. Endorses unable to bear weight RLE.   XR right knee reveals large suprapatellar joint effusion with swelling. Right total knee prosthesis without periprosthetic fracture  Blood work reveals WBC 13.9, Hgb 13.8, Hct 39, platelets 171, sodium 135, AST 90. Alk phos 123. T. Bilirubin 3.5. CRP 2.31     24 seen and examined medical distress, vital signs stable, no complaints, still having right knee pain and swelling.  Discussed with RN.  Consulted ID.  Tmax 100.6.  24 patient seen in bed no acute distress, vital signs stable, underwent right Total Knee Arthroplasty Revision discussed with RN.  Tolerating antibiotics.  7/15/2024 patient seen and examined in medical distress, no new complaints, pain well-controlled.  Discussed with RN, tolerating antibiotics.  Awaiting  Subjective: Patient seen and examined. No new events except as noted.   S/P successful aflutter ablation now NSR  Feels well no cp or sob   c/o of pain in foot though better than before  MEDICATIONS:  MEDICATIONS  (STANDING):  apixaban 5 milliGRAM(s) Oral every 12 hours  atorvastatin 10 milliGRAM(s) Oral at bedtime  calcitriol   Capsule 0.25 MICROGram(s) Oral daily  clopidogrel Tablet 75 milliGRAM(s) Oral daily  docusate sodium 100 milliGRAM(s) Oral three times a day  furosemide   Injectable 80 milliGRAM(s) IV Push daily  latanoprost 0.005% Ophthalmic Solution 1 Drop(s) Both EYES at bedtime  metoprolol tartrate 50 milliGRAM(s) Oral two times a day  nicotine -   7 mG/24Hr(s) Patch 1 Patch Transdermal daily  pantoprazole    Tablet 40 milliGRAM(s) Oral before breakfast  senna 2 Tablet(s) Oral at bedtime      PHYSICAL EXAM:  T(C): 36.7 (04-06-18 @ 09:54), Max: 36.7 (04-06-18 @ 09:54)  HR: 74 (04-06-18 @ 09:54) (67 - 128)  BP: 112/80 (04-06-18 @ 09:54) (99/80 - 120/72)  RR: 17 (04-06-18 @ 09:54) (15 - 32)  SpO2: 97% (04-06-18 @ 09:54) (94% - 100%)  Wt(kg): --  I&O's Summary    05 Apr 2018 07:01  -  06 Apr 2018 07:00  --------------------------------------------------------  IN: 200 mL / OUT: 275 mL / NET: -75 mL          Appearance: Normal no compalints	  HEENT:   Normal oral mucosa, PERRL, EOMI	  Cardiovascular: Normal S1 S2, No JVD, No murmurs ,  Respiratory: Lungs basilar rales with decreased BS	  Gastrointestinal:  Soft, Non-tender, + BS	  Skin: No rashes, No ecchymoses, No cyanosis, warm to touch  Musculoskeletal: Normal range of motion, normal strength  Psyc2+ pitting edema  Peripheral pulses palpable 2+ bilaterally      LABS:    CARDIAC MARKERS:                                11.3   5.06  )-----------( 195      ( 06 Apr 2018 07:36 )             39.1     04-06    138  |  92<L>  |  74<H>  ----------------------------<  131<H>  4.9   |  29  |  2.08<H>    Ca    9.4      06 Apr 2018 10:39  Phos  4.4     04-06  Mg     2.0     04-06      proBNP:   Lipid Profile:   HgA1c:   TSH:     ECG NSR placement  7/16/24 patient seen and examined in bed no acute distress, complaining of knee pain.  Vital signs stable, discussed with RN, discussed with case , awaiting placement  7/17/2024 patient seen and examined in bed no acute distress, vital signs stable, tolerating antibiotics, his white count went up to 22.  Creatinine went up to 1.62 today 1.5.  7/18/2024 patient seen and examined in bed no acute distress, new complaints, awaiting placement, tolerating antibiotics.  Creatinine one 1.49 on IV fluids.  WBC count 21.  Review of Systems  constitutional:  Positive for fever. Negative for chills.   Respiratory:  Negative for shortness of breath.    Cardiovascular:  Negative for chest pain.   Gastrointestinal:  Negative for abdominal pain, diarrhea, nausea and vomiting.   Genitourinary:  Negative for dysuria.   Musculoskeletal:  Positive for gait problem.        Right knee pain   Objective:     Vital Signs  Temp:  [98.8 °F (37.1 °C)-99.7 °F (37.6 °C)] 98.8 °F (37.1 °C)  Heart Rate:  [71-91] 71  Resp:  [16-17] 16  BP: ()/(50-73) 140/68   Body mass index is 25.77 kg/m².    Physical Exam     General: He is not in acute distress.     Appearance: Normal appearance. He is not ill-appearing or toxic-appearing.   HENT:      Head: Normocephalic and atraumatic.      Mouth/Throat:      Mouth: Mucous membranes are moist.   Eyes:      Extraocular Movements: Extraocular movements intact.      Pupils: Pupils are equal, round, and reactive to light.   Cardiovascular:      Rate and Rhythm: Normal rate. Rhythm irregular.      Pulses: Normal pulses.      Heart sounds: Normal heart sounds.   Pulmonary:      Effort: Pulmonary effort is normal. No respiratory distress.      Breath sounds: Normal breath sounds.   Abdominal:      General: Bowel sounds are normal. There is no distension.      Palpations: Abdomen is soft.      Tenderness: There is no abdominal tenderness.   Musculoskeletal:      Comments: Right knee  swelling with warmth.    Skin:     General: Skin is warm and dry.   Neurological:      Mental Status: He is alert and oriented to person, place, and time. Mental status is at baseline.      Scheduled Meds   aspirin, 81 mg, Oral, BID  cefTRIAXone, 2,000 mg, Intravenous, Q24H  cetirizine, 10 mg, Oral, Daily  enoxaparin, 40 mg, Subcutaneous, Q24H  pantoprazole, 40 mg, Oral, QAM AC  saccharomyces boulardii, 250 mg, Oral, BID  sodium chloride, 10 mL, Intravenous, Q12H  vitamin D3, 5,000 Units, Oral, Daily       PRN Meds     acetaminophen    senna-docusate sodium **AND** polyethylene glycol **AND** bisacodyl **AND** bisacodyl    Calcium Replacement - Follow Nurse / BPA Driven Protocol    Magnesium Standard Dose Replacement - Follow Nurse / BPA Driven Protocol    Morphine    [] HYDROmorphone **AND** naloxone    ondansetron    oxyCODONE    Phosphorus Replacement - Follow Nurse / BPA Driven Protocol    Potassium Replacement - Follow Nurse / BPA Driven Protocol    [COMPLETED] Insert Peripheral IV **AND** sodium chloride    sodium chloride    sodium chloride    triamcinolone   Infusions  sodium chloride, 100 mL/hr, Last Rate: 100 mL/hr (24 1025)        Diagnostic Data    Results from last 7 days   Lab Units 24  2348   WBC 10*3/mm3 21.20*   HEMOGLOBIN g/dL 7.4*   HEMATOCRIT % 20.2*   PLATELETS 10*3/mm3 201   GLUCOSE mg/dL 102*   CREATININE mg/dL 1.49*   BUN mg/dL 34*   SODIUM mmol/L 127*   POTASSIUM mmol/L 3.8   AST (SGOT) U/L 63*   ALT (SGPT) U/L 27   ALK PHOS U/L 87   BILIRUBIN mg/dL 1.8*   ANION GAP mmol/L 6.8       US Renal Bilateral    Result Date: 2024  Impression: No evidence of hydronephrosis or cortical thinning. Electronically Signed: Phoenix Love MD  2024 6:31 PM EDT  Workstation ID: [id number]       I reviewed the patient's new clinical results.    Assessment/Plan:     Active and Resolved Problems  Active Hospital Problems    Diagnosis  POA    **Septic arthritis of knee, right [M00.9]   Yes      Resolved Hospital Problems   No resolved problems to display.     Septic arthritis of right knee:  -presents with right knee pain, swelling, warmth.   -XR: large suprapatellar joint effusion with swelling.  -s/p drainage 50ml from right knee in ED--fluid analysis pending  -blood cultures pending  -empirically treat with zosyn and vanco. Pharmacy to dose  -ortho consult. Appreciate recommendations. S/p Right Total Knee Arthroplasty Revision 7/14/24  -meets sepsis criteria on admission with fever, leukocytosis, source of infection. LA normal. CRP 2.31.   -IVFs.   -pain control.   -uric acid negative. Denies hx of RA.    -ID consulted       Hypertension:  -BP may be labile with pain response  -trend BP  -continue home medications once dosing verified with pharmacy.       Liver cirrhosis:  -follows with OP GI  -admission: AST 90. Alk phos 123. T. Bilirubin 3.5     GERD:  Protonix for GI Ppx.     VTE Prophylaxis:  Pharmacologic & mechanical VTE prophylaxis orders are present.    Code status is   Code Status and Medical Interventions:   Ordered at: 07/12/24 2393     Level Of Support Discussed With:    Patient     Code Status (Patient has no pulse and is not breathing):    CPR (Attempt to Resuscitate)     Medical Interventions (Patient has pulse or is breathing):    Full Support       Plan for disposition:home in 4 days    Time: 30 minutes    Signature: Electronically signed by Rio Griffin MD, 07/18/24, 11:47 EDT.  [facility] Hospitalist Team

## 2024-07-18 NOTE — PROGRESS NOTES
"RENAL/KCC:     LOS: 6 days    Patient Care Team:  Tamar Eugene APRN as PCP - General (Nurse Practitioner)    Chief Complaint:  RACH    Subjective     Interval History:   Chart reviewed  No new complaints    Objective     Vital Sign Min/Max for last 24 hours  Temp  Min: 98.8 °F (37.1 °C)  Max: 99.7 °F (37.6 °C)   BP  Min: 139/64  Max: 157/73   Pulse  Min: 71  Max: 91   Resp  Min: 16  Max: 17   SpO2  Min: 90 %  Max: 96 %   No data recorded   No data recorded     Flowsheet Rows      Flowsheet Row First Filed Value   Admission Height 182.9 cm (72\") Documented at 07/12/2024 1432   Admission Weight 86.2 kg (190 lb) Documented at 07/12/2024 1432            I/O this shift:  In: 240 [P.O.:240]  Out: 425 [Urine:425]  I/O last 3 completed shifts:  In: 960 [P.O.:960]  Out: 1055 [Urine:1025; Drains:30]    Physical Exam:  GEN: Awake, NAD  ENT: PERRL, EOMI, MMM  NECK: Supple, no JVD  CHEST: CTAB, no W/R/C  CV: RRR, no M/G/R, trace edema  ABD: Soft, NT, +BS  SKIN: Warm and Dry  NEURO: CN's intact      WBC WBC   Date Value Ref Range Status   07/17/2024 21.20 (H) 3.40 - 10.80 10*3/mm3 Final   07/17/2024 22.97 (H) 3.40 - 10.80 10*3/mm3 Final   07/16/2024 16.28 (H) 3.40 - 10.80 10*3/mm3 Final      HGB Hemoglobin   Date Value Ref Range Status   07/17/2024 7.4 (L) 13.0 - 17.7 g/dL Final   07/17/2024 8.2 (L) 13.0 - 17.7 g/dL Final   07/16/2024 7.8 (L) 13.0 - 17.7 g/dL Final      HCT Hematocrit   Date Value Ref Range Status   07/17/2024 20.2 (C) 37.5 - 51.0 % Final   07/17/2024 22.6 (L) 37.5 - 51.0 % Final   07/16/2024 21.4 (L) 37.5 - 51.0 % Final      Platlets No results found for: \"LABPLAT\"   MCV MCV   Date Value Ref Range Status   07/17/2024 102.5 (H) 79.0 - 97.0 fL Final   07/17/2024 103.7 (H) 79.0 - 97.0 fL Final   07/16/2024 102.4 (H) 79.0 - 97.0 fL Final          Sodium Sodium   Date Value Ref Range Status   07/17/2024 127 (L) 136 - 145 mmol/L Final   07/17/2024 128 (L) 136 - 145 mmol/L Final   07/17/2024 129 (L) 136 - 145 " "mmol/L Final   07/16/2024 127 (L) 136 - 145 mmol/L Final      Potassium Potassium   Date Value Ref Range Status   07/17/2024 3.8 3.5 - 5.2 mmol/L Final     Comment:     Slight hemolysis detected by analyzer. Result may be falsely elevated.   07/17/2024 3.9 3.5 - 5.2 mmol/L Final   07/17/2024 3.8 3.5 - 5.2 mmol/L Final   07/16/2024 4.2 3.5 - 5.2 mmol/L Final      Chloride Chloride   Date Value Ref Range Status   07/17/2024 100 98 - 107 mmol/L Final   07/17/2024 98 98 - 107 mmol/L Final   07/17/2024 97 (L) 98 - 107 mmol/L Final   07/16/2024 99 98 - 107 mmol/L Final      CO2 CO2   Date Value Ref Range Status   07/17/2024 20.2 (L) 22.0 - 29.0 mmol/L Final   07/17/2024 21.8 (L) 22.0 - 29.0 mmol/L Final   07/17/2024 20.8 (L) 22.0 - 29.0 mmol/L Final   07/16/2024 22.0 22.0 - 29.0 mmol/L Final      BUN BUN   Date Value Ref Range Status   07/17/2024 34 (H) 8 - 23 mg/dL Final   07/17/2024 36 (H) 8 - 23 mg/dL Final   07/17/2024 35 (H) 8 - 23 mg/dL Final   07/16/2024 33 (H) 8 - 23 mg/dL Final      Creatinine Creatinine   Date Value Ref Range Status   07/17/2024 1.49 (H) 0.76 - 1.27 mg/dL Final   07/17/2024 1.52 (H) 0.76 - 1.27 mg/dL Final   07/17/2024 1.62 (H) 0.76 - 1.27 mg/dL Final   07/16/2024 1.46 (H) 0.76 - 1.27 mg/dL Final      Calcium Calcium   Date Value Ref Range Status   07/17/2024 7.6 (L) 8.6 - 10.5 mg/dL Final   07/17/2024 7.6 (L) 8.6 - 10.5 mg/dL Final   07/17/2024 7.5 (L) 8.6 - 10.5 mg/dL Final   07/16/2024 6.9 (L) 8.6 - 10.5 mg/dL Final      PO4 No results found for: \"CAPO4\"   Albumin Albumin   Date Value Ref Range Status   07/17/2024 2.2 (L) 3.5 - 5.2 g/dL Final   07/17/2024 2.5 (L) 3.5 - 5.2 g/dL Final      Magnesium Magnesium   Date Value Ref Range Status   07/17/2024 1.6 1.6 - 2.4 mg/dL Final   07/17/2024 1.9 1.6 - 2.4 mg/dL Final   07/16/2024 2.1 1.6 - 2.4 mg/dL Final      Uric Acid No results found for: \"URICACID\"        Results Review:     I reviewed the patient's new clinical results.    aspirin, 81 mg, " Oral, BID  cefTRIAXone, 2,000 mg, Intravenous, Q24H  cetirizine, 10 mg, Oral, Daily  enoxaparin, 40 mg, Subcutaneous, Q24H  pantoprazole, 40 mg, Oral, QAM AC  saccharomyces boulardii, 250 mg, Oral, BID  sodium chloride, 10 mL, Intravenous, Q12H  vitamin D3, 5,000 Units, Oral, Daily      sodium chloride, 100 mL/hr, Last Rate: 100 mL/hr (07/18/24 0001)        Medication Review: Reviewed    Assessment & Plan     1) RACH - pre-renal/ATN  2) Hyponatremia - Hypovolemic + Cirrhosis  3) Hypotension - on Midodrine  4) Septic arthritis  5) Bacteremia  6) Hypocalcemia  7) Cirrhosis    Plan: Cr better at 1.49.  Na slightly lower at 127.  D/C IVF.  Will give Lasix 40 mg IV x 1 today.  Continue fluid restriction.  Will follow.      Linus Tristan MD  [facility]  07/18/24  10:04 EDT

## 2024-07-19 ENCOUNTER — APPOINTMENT (OUTPATIENT)
Dept: CT IMAGING | Facility: HOSPITAL | Age: 64
DRG: 466 | End: 2024-07-19
Payer: MEDICARE

## 2024-07-19 ENCOUNTER — APPOINTMENT (OUTPATIENT)
Dept: GENERAL RADIOLOGY | Facility: HOSPITAL | Age: 64
DRG: 466 | End: 2024-07-19
Payer: MEDICARE

## 2024-07-19 LAB
ANION GAP SERPL CALCULATED.3IONS-SCNC: 13.3 MMOL/L (ref 5–15)
BUN SERPL-MCNC: 29 MG/DL (ref 8–23)
BUN/CREAT SERPL: 21.3 (ref 7–25)
CALCIUM SPEC-SCNC: 7.5 MG/DL (ref 8.6–10.5)
CHLORIDE SERPL-SCNC: 101 MMOL/L (ref 98–107)
CO2 SERPL-SCNC: 17.7 MMOL/L (ref 22–29)
CREAT SERPL-MCNC: 1.36 MG/DL (ref 0.76–1.27)
EGFRCR SERPLBLD CKD-EPI 2021: 57.4 ML/MIN/1.73
GLUCOSE SERPL-MCNC: 131 MG/DL (ref 65–99)
MAGNESIUM SERPL-MCNC: 2.3 MG/DL (ref 1.6–2.4)
NT-PROBNP SERPL-MCNC: 361 PG/ML (ref 0–900)
POTASSIUM SERPL-SCNC: 4.3 MMOL/L (ref 3.5–5.2)
SODIUM SERPL-SCNC: 132 MMOL/L (ref 136–145)

## 2024-07-19 PROCEDURE — 97116 GAIT TRAINING THERAPY: CPT

## 2024-07-19 PROCEDURE — 97530 THERAPEUTIC ACTIVITIES: CPT

## 2024-07-19 PROCEDURE — 83880 ASSAY OF NATRIURETIC PEPTIDE: CPT | Performed by: INTERNAL MEDICINE

## 2024-07-19 PROCEDURE — 25010000002 PIPERACILLIN SOD-TAZOBACTAM PER 1 G: Performed by: NURSE PRACTITIONER

## 2024-07-19 PROCEDURE — 25010000002 ENOXAPARIN PER 10 MG: Performed by: ORTHOPAEDIC SURGERY

## 2024-07-19 PROCEDURE — 71045 X-RAY EXAM CHEST 1 VIEW: CPT

## 2024-07-19 PROCEDURE — 71250 CT THORAX DX C-: CPT

## 2024-07-19 PROCEDURE — 25010000002 FUROSEMIDE PER 20 MG: Performed by: INTERNAL MEDICINE

## 2024-07-19 PROCEDURE — 25010000002 CEFTRIAXONE PER 250 MG: Performed by: ORTHOPAEDIC SURGERY

## 2024-07-19 PROCEDURE — 80048 BASIC METABOLIC PNL TOTAL CA: CPT | Performed by: INTERNAL MEDICINE

## 2024-07-19 PROCEDURE — 83735 ASSAY OF MAGNESIUM: CPT | Performed by: INTERNAL MEDICINE

## 2024-07-19 PROCEDURE — 97110 THERAPEUTIC EXERCISES: CPT

## 2024-07-19 PROCEDURE — 25010000002 MAGNESIUM SULFATE 2 GM/50ML SOLUTION: Performed by: INTERNAL MEDICINE

## 2024-07-19 RX ORDER — FUROSEMIDE 10 MG/ML
40 INJECTION INTRAMUSCULAR; INTRAVENOUS ONCE
Status: COMPLETED | OUTPATIENT
Start: 2024-07-19 | End: 2024-07-19

## 2024-07-19 RX ORDER — POLYETHYLENE GLYCOL 3350 17 G/17G
17 POWDER, FOR SOLUTION ORAL DAILY PRN
Qty: 30 EACH | Refills: 0 | Status: SHIPPED | OUTPATIENT
Start: 2024-07-19

## 2024-07-19 RX ORDER — AMOXICILLIN 250 MG
2 CAPSULE ORAL 2 TIMES DAILY PRN
Qty: 30 TABLET | Refills: 0 | Status: SHIPPED | OUTPATIENT
Start: 2024-07-19

## 2024-07-19 RX ORDER — ASPIRIN 81 MG/1
81 TABLET, CHEWABLE ORAL 2 TIMES DAILY
Qty: 30 TABLET | Refills: 0 | Status: SHIPPED | OUTPATIENT
Start: 2024-07-19

## 2024-07-19 RX ORDER — POTASSIUM CHLORIDE 20 MEQ/1
40 TABLET, EXTENDED RELEASE ORAL EVERY 4 HOURS
Status: COMPLETED | OUTPATIENT
Start: 2024-07-19 | End: 2024-07-19

## 2024-07-19 RX ORDER — OXYCODONE AND ACETAMINOPHEN 10; 325 MG/1; MG/1
1 TABLET ORAL EVERY 6 HOURS PRN
Qty: 21 TABLET | Refills: 0 | Status: SHIPPED | OUTPATIENT
Start: 2024-07-19

## 2024-07-19 RX ORDER — MAGNESIUM SULFATE HEPTAHYDRATE 40 MG/ML
2 INJECTION, SOLUTION INTRAVENOUS
Status: DISPENSED | OUTPATIENT
Start: 2024-07-19 | End: 2024-07-19

## 2024-07-19 RX ORDER — MIDODRINE HYDROCHLORIDE 5 MG/1
5 TABLET ORAL
Qty: 90 TABLET | Refills: 0 | Status: SHIPPED | OUTPATIENT
Start: 2024-07-19

## 2024-07-19 RX ORDER — SACCHAROMYCES BOULARDII 250 MG
250 CAPSULE ORAL 2 TIMES DAILY
Qty: 60 CAPSULE | Refills: 1 | Status: SHIPPED | OUTPATIENT
Start: 2024-07-19 | End: 2024-08-27

## 2024-07-19 RX ORDER — MIDODRINE HYDROCHLORIDE 5 MG/1
TABLET ORAL
Status: COMPLETED
Start: 2024-07-19 | End: 2024-07-19

## 2024-07-19 RX ADMIN — ASPIRIN 81 MG CHEWABLE TABLET 81 MG: 81 TABLET CHEWABLE at 08:40

## 2024-07-19 RX ADMIN — ENOXAPARIN SODIUM 40 MG: 100 INJECTION SUBCUTANEOUS at 17:12

## 2024-07-19 RX ADMIN — MAGNESIUM SULFATE HEPTAHYDRATE 2 G: 40 INJECTION, SOLUTION INTRAVENOUS at 15:10

## 2024-07-19 RX ADMIN — ASPIRIN 81 MG CHEWABLE TABLET 81 MG: 81 TABLET CHEWABLE at 21:31

## 2024-07-19 RX ADMIN — CETIRIZINE HYDROCHLORIDE 10 MG: 10 TABLET, FILM COATED ORAL at 09:00

## 2024-07-19 RX ADMIN — CEFTRIAXONE SODIUM 2000 MG: 2 INJECTION, POWDER, FOR SOLUTION INTRAMUSCULAR; INTRAVENOUS at 10:55

## 2024-07-19 RX ADMIN — FUROSEMIDE 40 MG: 10 INJECTION, SOLUTION INTRAMUSCULAR; INTRAVENOUS at 17:10

## 2024-07-19 RX ADMIN — PANTOPRAZOLE SODIUM 40 MG: 40 TABLET, DELAYED RELEASE ORAL at 08:40

## 2024-07-19 RX ADMIN — PIPERACILLIN AND TAZOBACTAM 3.38 G: 3; .375 INJECTION, POWDER, FOR SOLUTION INTRAVENOUS at 23:57

## 2024-07-19 RX ADMIN — Medication 250 MG: at 21:31

## 2024-07-19 RX ADMIN — PIPERACILLIN AND TAZOBACTAM 3.38 G: 3; .375 INJECTION, POWDER, FOR SOLUTION INTRAVENOUS at 17:10

## 2024-07-19 RX ADMIN — MAGNESIUM SULFATE HEPTAHYDRATE 2 G: 40 INJECTION, SOLUTION INTRAVENOUS at 06:15

## 2024-07-19 RX ADMIN — Medication 10 ML: at 21:31

## 2024-07-19 RX ADMIN — MIDODRINE HYDROCHLORIDE 5 MG: 5 TABLET ORAL at 09:00

## 2024-07-19 RX ADMIN — POTASSIUM CHLORIDE 40 MEQ: 1500 TABLET, EXTENDED RELEASE ORAL at 06:15

## 2024-07-19 RX ADMIN — Medication 250 MG: at 08:40

## 2024-07-19 RX ADMIN — Medication 5000 UNITS: at 08:40

## 2024-07-19 RX ADMIN — POTASSIUM CHLORIDE 40 MEQ: 1500 TABLET, EXTENDED RELEASE ORAL at 11:40

## 2024-07-19 NOTE — CASE MANAGEMENT/SOCIAL WORK
Continued Stay Note  DOMINICK Chin     Patient Name: Erik Fuentes  MRN: [id number]  Today's Date: 7/19/2024    Admit Date: 7/12/2024    Plan: Accepted to [facility]. No precert needed. PASRR approved, Will go on IV antibiotics.   Discharge Plan       Row Name 07/19/24 1603       Plan    Plan Accepted to [facility]. No precert needed. PASRR approved, Will go on IV antibiotics.    Plan Comments DC barriers: pod #5 right knee hardware removal and antibiotic spacer place.  Monitoring hgb, wbc, renal numbers, and electolytes.  on oxygen                Lavinia Cleaning RN    SIPS 1  [email or web address]  Office [phone]  Cell [phone]

## 2024-07-19 NOTE — PLAN OF CARE
Goal Outcome Evaluation:                      Pt a&o x4, Dressing clean, dry and intact. Pt able to make needs known. Safety precautions in place. Plan of care ongoing.

## 2024-07-19 NOTE — PLAN OF CARE
"Assessment: Erik Fuentes presents with functional mobility impairments which indicate the need for skilled intervention. Tolerating session today without incident. Will continue to follow and progress as tolerated.     Plan/Recommendations:   If medically appropriate, Moderate Intensity Therapy recommended post-acute care. This is recommended as therapy feels the patient would require 3-4 days per week and wouldn't tolerate \"3 hour daily\" rehab intensity. SNF would be the preferred choice. If the patient does not agree to SNF, arrange HH or OP depending on home bound status. If patient is medically complex, consider LTACH. Pt requires no DME at discharge.     Pt desires Skilled Rehab placement at discharge. Pt cooperative; agreeable to therapeutic recommendations and plan of care.     "
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
## 2024-07-19 NOTE — THERAPY TREATMENT NOTE
"Subjective: Pt agreeable to therapeutic plan of care.    Objective:     Bed mobility - Supine to sitting Min-A using bedrails.     Transfers - CGA and with rolling walker. Cued for positioning.     Ambulation - 15 feet CGA and with rolling walker. Antalgic gait pattern.     Therapeutic Exercise - 15 Reps R Lower Extremity AAROM lying supine    Vitals: WNL on 2L    Pain: 2 VAS   Location: R knee  Intervention for pain: Repositioned, Increased Activity, and Therapeutic Presence    Education: Provided education on the importance of mobility in the acute care setting, Verbal/Tactile Cues, Transfer Training, and Gait Training    Assessment: Erik Fuentes presents with functional mobility impairments which indicate the need for skilled intervention. Tolerating session today without incident. Will continue to follow and progress as tolerated.     Plan/Recommendations:   If medically appropriate, Moderate Intensity Therapy recommended post-acute care. This is recommended as therapy feels the patient would require 3-4 days per week and wouldn't tolerate \"3 hour daily\" rehab intensity. SNF would be the preferred choice. If the patient does not agree to SNF, arrange HH or OP depending on home bound status. If patient is medically complex, consider LTACH. Pt requires no DME at discharge.     Pt desires Skilled Rehab placement at discharge. Pt cooperative; agreeable to therapeutic recommendations and plan of care.         Basic Mobility 6-click:  Rollin = Total, A lot = 2, A little = 3; 4 = None  Supine>Sit:   1 = Total, A lot = 2, A little = 3; 4 = None   Sit>Stand with arms:  1 = Total, A lot = 2, A little = 3; 4 = None  Bed>Chair:   1 = Total, A lot = 2, A little = 3; 4 = None  Ambulate in room:  1 = Total, A lot = 2, A little = 3; 4 = None  3-5 Steps with railin = Total, A lot = 2, A little = 3; 4 = None  Score: 16    Modified Taylor: N/A = No pre-op stroke/TIA    Post-Tx Position: Up in Chair, Alarms " activated, and Call light and personal items within reach  PPE: gloves

## 2024-07-19 NOTE — DISCHARGE SUMMARY
[facility] Medicine Services  Discharge Summary    Date of Service: 24  Patient Name: Erik Fuentes  : 1960  MRN: [id number]    Date of Admission: 2024  Discharge Diagnosis: Septic arthritis  Date of Discharge:  24  Primary Care Physician: Tamar Eugene APRN      Presenting Problem:   Septic arthritis of knee, right [M00.9]  History of right knee joint replacement [Z96.651]  Pyogenic arthritis of right knee joint, due to unspecified organism [M00.9]    Active and Resolved Hospital Problems:  Active Hospital Problems    Diagnosis POA    **Septic arthritis of knee, right [M00.9] Yes    Cirrhosis of liver without ascites [K74.60] Yes    Peripheral edema [R60.0] Yes    Essential hypertension [I10] Yes    Alcohol abuse [F10.10] Yes    Hemochromatosis, hereditary [E83.110] Yes    Age-related nuclear cataract of both eyes [H25.13] Yes      Resolved Hospital Problems   No resolved problems to display.     Septic arthritis of right knee:  -presents with right knee pain, swelling, warmth.   -XR: large suprapatellar joint effusion with swelling.  -s/p drainage 50ml from right knee in ED--fluid analysis pending  -blood cultures pending  -empirically treat with zosyn and vanco. Pharmacy to dose  -ortho consult. Appreciate recommendations. S/p Right Total Knee Arthroplasty Revision 24  -meets sepsis criteria on admission with fever, leukocytosis, source of infection. LA normal. CRP 2.31.   -IVFs.   -pain control.   -uric acid negative. Denies hx of RA.    -ID consulted       Hypertension:  -BP may be labile with pain response  -trend BP  -continue home medications once dosing verified with pharmacy.       Liver cirrhosis:  -follows with OP GI  -admission: AST 90. Alk phos 123. T. Bilirubin 3.5     GERD:  Protonix for GI Ppx.     RACH - pre-renal/ATN   Cr better at 1.49.  Na slightly lower at 127.  D/C IVF.  Will give Lasix 40 mg IV x 1 today.  Continue fluid restriction    Hospital Course     History of Present Illness: Erik Fuentes is a 64 y.o. male with a CMH of BPH, hemochromatosis, prostate cancer, liver cirrhosis, esophageal varices, GERD, HTN who presented to [facility] on 7/12/2024 with right knee pain. Lives at home with wife, independent with ADLs.   Presents to ED with right knee pains. States intermittent right knee pain over past 2 years. Today, after returning home from chiropractor with worsening right knee pain and swelling. Denies recent illness or injury. States was in [place] 2 weeks ago with abrasion to left hand. On arrival to ED VS: 100.6-87-/89-96% room air.      Upon evaluation, awake, alert, resting in bed, wife at bedside. Current VS: 96-/84-96% room air. Right knee with swelling and warmth. No injury to RLE or right foot noted. Endorses unable to bear weight RLE.   XR right knee reveals large suprapatellar joint effusion with swelling. Right total knee prosthesis without periprosthetic fracture  Blood work reveals WBC 13.9, Hgb 13.8, Hct 39, platelets 171, sodium 135, AST 90. Alk phos 123. T. Bilirubin 3.5. CRP 2.31     7/13/24 seen and examined medical distress, vital signs stable, no complaints, still having right knee pain and swelling.  Discussed with RN.  Consulted ID.  Tmax 100.6.  7/14/24 patient seen in bed no acute distress, vital signs stable, underwent right Total Knee Arthroplasty Revision discussed with RN.  Tolerating antibiotics.  7/15/2024 patient seen and examined in medical distress, no new complaints, pain well-controlled.  Discussed with RN, tolerating antibiotics.  Awaiting placement  7/16/24 patient seen and examined in bed no acute distress, complaining of knee pain.  Vital signs stable, discussed with RN, discussed with case , awaiting placement  7/17/2024 patient seen and examined in bed no acute distress, vital signs stable, tolerating antibiotics, his white count went up to 22.  Creatinine  went up to 1.62 today 1.5.  7/18/2024 patient seen and examined in bed no acute distress, new complaints, awaiting placement, tolerating antibiotics.  Creatinine one 1.49 on IV fluids.  WBC count 21.  7/19/24 seen in bed NAD, feeling better today, will dc to nursing home, condition om dc stable.      DISCHARGE Follow Up Recommendations for labs and diagnostics: follow with pcp in one week  Follow with ortho 4-6 weeks      Reasons For Change In Medications and Indications for New Medications:  START taking:  aspirin   cefTRIAXone 2,000 mg in sodium chloride 0.9 % 100 mL IVPB   midodrine (PROAMATINE)   oxyCODONE-acetaminophen (Percocet)   polyethylene glycol (MIRALAX)   saccharomyces boulardii (FLORASTOR)   sennosides-docusate (PERICOLACE)    STOP taking:  benazepril 40 MG tablet (LOTENSIN)     Day of Discharge     Vital Signs:  Temp:  [97.8 °F (36.6 °C)-98.3 °F (36.8 °C)] 97.8 °F (36.6 °C)  Heart Rate:  [75-76] 76  Resp:  [14-26] 22  BP: ()/(50-91) 161/76  Flow (L/min):  [2] 2      Physical Exam      General: He is not in acute distress.     Appearance: Normal appearance. He is not ill-appearing or toxic-appearing.   HENT:      Head: Normocephalic and atraumatic.      Mouth/Throat:      Mouth: Mucous membranes are moist.   Eyes:      Extraocular Movements: Extraocular movements intact.      Pupils: Pupils are equal, round, and reactive to light.   Cardiovascular:      Rate and Rhythm: Normal rate. Rhythm irregular.      Pulses: Normal pulses.      Heart sounds: Normal heart sounds.   Pulmonary:      Effort: Pulmonary effort is normal. No respiratory distress.      Breath sounds: Normal breath sounds.   Abdominal:      General: Bowel sounds are normal. There is no distension.      Palpations: Abdomen is soft.      Tenderness: There is no abdominal tenderness.   Musculoskeletal:      Comments: Right knee swelling with warmth.    Skin:     General: Skin is warm and dry.   Neurological:      Mental Status: He is  alert and oriented to person, place, and time. Mental status is at baseline.      Scheduled Meds       Pertinent  and/or Most Recent Results     LAB RESULTS:      Lab 07/18/24  2327 07/17/24  2348 07/17/24  0032 07/16/24  0229 07/15/24  0141 07/12/24  2259 07/12/24  1524 07/12/24  1441   WBC 22.35* 21.20* 22.97* 16.28* 13.05*   < >  --  13.90*   HEMOGLOBIN 7.2* 7.4* 8.2* 7.8* 9.2*   < >  --  13.8   HEMATOCRIT 20.0* 20.2* 22.6* 21.4* 25.3*   < >  --  39.0   PLATELETS 234 201 220 150 144   < >  --  171   NEUTROS ABS 16.72* 16.08* 18.12* 13.37* 10.31*   < >  --  11.54*   IMMATURE GRANS (ABS) 1.40* 0.59* 0.56* 0.19* 0.07*   < >  --  0.05   LYMPHS ABS 1.34 1.89 1.75 0.92 0.93   < >  --  0.92   MONOS ABS 2.13* 2.08* 2.35* 1.76* 1.73*   < >  --  1.33*   EOS ABS 0.71* 0.52* 0.16 0.02 0.00   < >  --  0.01   .6* 102.5* 103.7* 102.4* 104.1*   < >  --  102.9*   SED RATE  --   --   --   --   --   --   --  14   CRP  --   --   --   --   --   --   --  2.31*   LACTATE  --   --   --   --   --   --  0.7  --     < > = values in this interval not displayed.         Lab 07/18/24 2327 07/17/24 2348 07/17/24 0932 07/17/24  0032 07/16/24  0229 07/15/24  0141   SODIUM 129* 127* 128* 129* 127* 130*   POTASSIUM 3.6 3.8 3.9 3.8 4.2 4.8   CHLORIDE 100 100 98 97* 99 101   CO2 21.9* 20.2* 21.8* 20.8* 22.0 22.3   ANION GAP 7.1 6.8 8.2 11.2 6.0 6.7   BUN 32* 34* 36* 35* 33* 22   CREATININE 1.50* 1.49* 1.52* 1.62* 1.46* 1.29*   EGFR 51.0* 51.4* 50.2* 46.5* 52.7* 61.2   GLUCOSE 122* 102* 124* 111* 118* 140*   CALCIUM 7.4* 7.6* 7.6* 7.5* 6.9* 7.3*   IONIZED CALCIUM  --   --   --  1.10*  --   --    MAGNESIUM 1.5* 1.6  --  1.9 2.1 1.4*   PHOSPHORUS  --  3.0  --  2.7  --   --          Lab 07/17/24  2348 07/17/24  0032 07/12/24  1441   TOTAL PROTEIN 4.4* 4.9* 6.4   ALBUMIN 2.2* 2.5* 3.4*   GLOBULIN 2.2 2.4 3.0   ALT (SGPT) 27 30 41   AST (SGOT) 63* 61* 90*   BILIRUBIN 1.8* 1.6* 3.5*   ALK PHOS 87 89 123*         Lab 07/17/24  0932   PROBNP 269.0              Lab 07/13/24  1601   ABO TYPING A   RH TYPING Positive   ANTIBODY SCREEN Negative         Brief Urine Lab Results  (Last result in the past 365 days)        Color   Clarity   Blood   Leuk Est   Nitrite   Protein   CREAT   Urine HCG        07/16/24 1406 Dark Yellow   Clear   Negative   Negative   Negative   Negative                 Microbiology Results (last 10 days)       Procedure Component Value - Date/Time    Body Fluid Culture - Body Fluid, Knee, Right [[id number]]  (Abnormal)  (Susceptibility) Collected: 07/12/24 1711    Lab Status: Final result Specimen: Body Fluid from Knee, Right Updated: 07/15/24 0840     Body Fluid Culture Streptococcus mitis / oralis     Gram Stain Many (4+) WBCs per low power field      No organisms seen    Narrative:      In broth only.    Susceptibility        Streptococcus mitis / oralis      VAIBHAV      Ceftriaxone Susceptible      Penicillin G Susceptible      Vancomycin Susceptible                           Blood Culture - Blood, Arm, Left [[id number]]  (Abnormal) Collected: 07/12/24 1516    Lab Status: Final result Specimen: Blood from Arm, Left Updated: 07/15/24 0642     Blood Culture Staphylococcus, coagulase negative     Isolated from Aerobic Bottle     Gram Stain Aerobic Bottle Gram positive cocci in clusters    Narrative:      Probable contaminant requires clinical correlation, susceptibility not performed unless requested by physician.      Blood Culture ID, PCR - Blood, Arm, Left [[id number]]  (Abnormal) Collected: 07/12/24 1516    Lab Status: Final result Specimen: Blood from Arm, Left Updated: 07/13/24 2147     BCID, PCR Staph spp, not aureus or lugdunensis. Identification by BCID2 PCR.     BOTTLE TYPE Aerobic Bottle    Blood Culture - Blood, Arm, Left [[id number]]  (Normal) Collected: 07/12/24 1441    Lab Status: Final result Specimen: Blood from Arm, Left Updated: 07/17/24 1445     Blood Culture No growth at 5 days            US Renal Bilateral    Result Date:  7/16/2024  Impression: Impression: No evidence of hydronephrosis or cortical thinning. Electronically Signed: Phoenix Love MD  7/16/2024 6:31 PM EDT  Workstation ID: [id number]    XR Knee 1 or 2 View Right    Result Date: 7/14/2024  Impression: Impression: Revision of right knee prosthesis as above. Electronically Signed: Emanuel Humphrey MD  7/14/2024 11:12 AM EDT  Workstation ID: [id number]    XR Knee 3 View Right    Result Date: 7/12/2024  Impression: Impression: 1. Large suprapatellar joint effusion and soft tissue swelling. Findings could reflect cellulitis with infection possible given the clinical symptoms. 2. Right total knee prosthesis without evidence of periprosthetic fracture. Electronically Signed: Cesar Alejandra  7/12/2024 3:00 PM EDT  Workstation ID: [id number]                 Labs Pending at Discharge:      Procedures Performed  Procedure(s):  KNEE ARTHROPLASTY REVISION         Consults:   Consults       Date and Time Order Name Status Description    7/16/2024  9:29 AM Inpatient Nephrology Consult Completed     7/13/2024 10:07 AM Inpatient Infectious Diseases Consult Completed     7/12/2024  4:47 PM Hospitalist (on-call MD unless specified)      7/12/2024  4:11 PM Ortho (on-call MD unless specified) Completed             Assessment & Plan  Antimicrobial Therapy   1.  IV Rocephin                                                                                                                                               Assessment    Right total knee infection.  Patient had a aspiration of the right knee with fluid analysis indicating infection.  Cultures are growing Streptococcus mitis/oralis.  Patient is status post hardware removal and antibiotic spacer placement to the right knee on 7/14/2024.     Positive blood culture and 1 out of 2 blood cultures from admission growing a coagulase-negative Staphylococcus.  Patient did not come with a line, port and has no known history of cardiac valve history or  device placement.  This is likely contamination     Liver cirrhosis-patient has been sober for approximately 2 months.  Elevated liver transaminase and hyperbilirubinemia are improving since admission     History of prostate cancer     Acute kidney injury probably secondary to volume contraction.  Nephrology now following     Worsening leukocytosis-could be related to surgery/postsurgical hematoma.  Patient denies any diarrhea shortness of breath or urinary symptoms     Plan     Continue IV Rocephin 2 g every 24 hours-patient will need 6 weeks of treatment from surgery-last day on August 24, 2024     Patient will need a PICC line before discharge-please remove when IV antibiotics are completed.  Standard weekly PICC line care  Weekly labs CBC, creatinine, sed rate x 6 weeks  Patient will need to be on 6 weeks of IV antibiotics from surgery, then show no signs of infection for 2 weeks before new hardware should be considered for reimplantation  Continue supportive care  Okay to discharge to rehab from ID standpoint     Please fax all post discharge lab results, imaging studies and correspondence to this fax number [phone]  For any question or concern please contact our service number [phone]        CARMEN Nicole  07/18/24  17:17 EDT       Discharge Details        Discharge Medications        New Medications        Instructions Start Date   aspirin 81 MG chewable tablet   81 mg, Oral, 2 Times Daily      cefTRIAXone 2,000 mg in sodium chloride 0.9 % 100 mL IVPB   2,000 mg, Intravenous, Every 24 Hours      midodrine 5 MG tablet  Commonly known as: PROAMATINE   5 mg, Oral, 3 Times Daily Before Meals      oxyCODONE-acetaminophen  MG per tablet  Commonly known as: Percocet   1 tablet, Oral, Every 6 Hours PRN      polyethylene glycol 17 g packet  Commonly known as: MIRALAX   17 g, Oral, Daily PRN      saccharomyces boulardii 250 MG capsule  Commonly known as: FLORASTOR   250 mg, Oral, 2 Times  Daily      sennosides-docusate 8.6-50 MG per tablet  Commonly known as: PERICOLACE   2 tablets, Oral, 2 Times Daily PRN             Continue These Medications        Instructions Start Date   fexofenadine 180 MG tablet  Commonly known as: ALLEGRA   180 mg, Oral, Daily      OSTEO BI-FLEX REGULAR STRENGTH PO   1 tablet, Oral, Daily      pantoprazole 40 MG EC tablet  Commonly known as: PROTONIX   40 mg, Oral, Every Morning Before Breakfast      PATADAY OP   1 drop, Both Eyes, 2 times daily      Soolantra 1 % cream  Generic drug: Ivermectin   1 Application, Topical, Daily PRN      SUPER B COMPLEX/VITAMIN C PO   1 tablet, Oral, Daily      triamcinolone 0.1 % cream  Commonly known as: KENALOG   1 Application, Topical, Daily PRN      Triamcinolone Acetonide 55 MCG/ACT nasal inhaler  Commonly known as: NASACORT   2 sprays, Nasal, Daily      Vitamin D3 25 MCG (1000 UT) capsule   1,000 Units, Oral, Daily             Stop These Medications      benazepril 40 MG tablet  Commonly known as: LOTENSIN              Allergies   Allergen Reactions    Morphine Headache         Discharge Disposition:   Skilled Nursing Facility ([place] - External)    Diet:  Hospital:  Diet Order   Procedures    Diet: Regular/House, Fluid Restriction (240 mL/tray); 2000 mL/day; Fluid Consistency: Thin (IDDSI 0)         Discharge Activity:   Activity Instructions       Gradually Increase Activity Until at Pre-Hospitalization Level                CODE STATUS:  Code Status and Medical Interventions:   Ordered at: 07/12/24 8529     Level Of Support Discussed With:    Patient     Code Status (Patient has no pulse and is not breathing):    CPR (Attempt to Resuscitate)     Medical Interventions (Patient has pulse or is breathing):    Full Support         Future Appointments   Date Time Provider Department Center   9/11/2024 10:30 AM Tamar Eugene APRN [facility]       Additional Instructions for the Follow-ups that You Need to Schedule       Discharge  Follow-up with PCP   As directed       Currently Documented PCP:    Tamar Eugene APRN    PCP Phone Number:    [phone]     Follow Up Details: 1 week        Discharge Follow-up with Specified Provider: renal; 1 Week   As directed      To: renal   Follow Up: 1 Week                Time spent on Discharge including face to face service:  >30 minutes    Signature: Electronically signed by Rio Griffin MD, 07/19/24, 08:25 EDT.  [facility] Hospitalist Team

## 2024-07-19 NOTE — PROGRESS NOTES
Infectious Diseases Progress Note      LOS: 7 days   Patient Care Team:  Tamar Eugene APRN as PCP - General (Nurse Practitioner)    Chief Complaint: Right knee pain and swelling at admission    Subjective       The patient has been afebrile for the last 24 hours.  The patient is on room air, hemodynamically stable, and is tolerating antimicrobial therapy.  Patient seems more seems somewhat depressed today with a flat affect.  He is currently in the chair with 2 L of oxygen by nasal cannula.  LPN at bedside states that right knee dressing did still have some bleeding but was less saturated than previous dressing changes.  Patient is coughing up yellow sputum    Review of Systems:   Review of Systems   Constitutional:  Positive for fatigue.   HENT: Negative.     Eyes: Negative.    Respiratory:  Positive for shortness of breath.    Cardiovascular: Negative.    Gastrointestinal: Negative.    Endocrine: Negative.    Genitourinary: Negative.    Musculoskeletal:  Positive for joint swelling.   Skin: Negative.    Neurological: Negative.    Psychiatric/Behavioral: Negative.     All other systems reviewed and are negative.       Objective     Vital Signs  Temp:  [97.8 °F (36.6 °C)-98.2 °F (36.8 °C)] 97.8 °F (36.6 °C)  Heart Rate:  [75-76] 76  Resp:  [22-26] 22  BP: (152-166)/(56-76) 161/76    Physical Exam:  Physical Exam  Vitals and nursing note reviewed.   Constitutional:       General: He is not in acute distress.     Appearance: Normal appearance. He is well-developed and normal weight. He is not diaphoretic.   HENT:      Head: Normocephalic and atraumatic.   Eyes:      Conjunctiva/sclera: Conjunctivae normal.      Pupils: Pupils are equal, round, and reactive to light.   Cardiovascular:      Rate and Rhythm: Normal rate and regular rhythm.      Heart sounds: Normal heart sounds, S1 normal and S2 normal.   Pulmonary:      Effort: Pulmonary effort is normal. No respiratory distress.      Breath sounds: Normal breath  sounds. No stridor. No wheezing or rales.      Comments: Crackles in the left lung  Abdominal:      General: Bowel sounds are normal. There is no distension.      Palpations: Abdomen is soft. There is no mass.      Tenderness: There is no abdominal tenderness. There is no guarding.   Musculoskeletal:         General: No deformity.      Cervical back: Neck supple.      Comments: Surgical dressings in place on right knee along with Hemovac with bloody drainage   Skin:     General: Skin is warm and dry.      Coloration: Skin is not pale.      Findings: No erythema or rash.   Neurological:      Mental Status: He is alert and oriented to person, place, and time.          Results Review:    I have reviewed all clinical data, test, lab, and imaging results.     Radiology  No Radiology Exams Resulted Within Past 24 Hours    Cardiology    Laboratory    Results from last 7 days   Lab Units 07/18/24 2327 07/17/24 2348 07/17/24 0032 07/16/24 0229 07/15/24  0141 07/14/24  0432 07/12/24  2259   WBC 10*3/mm3 22.35* 21.20* 22.97* 16.28* 13.05* 14.87* 18.32*   HEMOGLOBIN g/dL 7.2* 7.4* 8.2* 7.8* 9.2* 13.5 12.2*   HEMATOCRIT % 20.0* 20.2* 22.6* 21.4* 25.3* 36.9* 34.0*   PLATELETS 10*3/mm3 234 201 220 150 144 154 138*     Results from last 7 days   Lab Units 07/18/24 2327 07/17/24 2348 07/17/24  0932 07/17/24  0032 07/16/24 0229 07/15/24  0141 07/14/24  0432   SODIUM mmol/L 129* 127* 128* 129* 127* 130* 131*   POTASSIUM mmol/L 3.6 3.8 3.9 3.8 4.2 4.8 3.6   CHLORIDE mmol/L 100 100 98 97* 99 101 98   CO2 mmol/L 21.9* 20.2* 21.8* 20.8* 22.0 22.3 23.7   BUN mg/dL 32* 34* 36* 35* 33* 22 10   CREATININE mg/dL 1.50* 1.49* 1.52* 1.62* 1.46* 1.29* 0.69*   GLUCOSE mg/dL 122* 102* 124* 111* 118* 140* 88   ALBUMIN g/dL  --  2.2*  --  2.5*  --   --   --    BILIRUBIN mg/dL  --  1.8*  --  1.6*  --   --   --    ALK PHOS U/L  --  87  --  89  --   --   --    AST (SGOT) U/L  --  63*  --  61*  --   --   --    ALT (SGPT) U/L  --  27  --  30  --    --   --    CALCIUM mg/dL 7.4* 7.6* 7.6* 7.5* 6.9* 7.3* 8.1*                   Microbiology   Microbiology Results (last 10 days)       Procedure Component Value - Date/Time    Body Fluid Culture - Body Fluid, Knee, Right [[id number]]  (Abnormal)  (Susceptibility) Collected: 07/12/24 1711    Lab Status: Final result Specimen: Body Fluid from Knee, Right Updated: 07/15/24 0840     Body Fluid Culture Streptococcus mitis / oralis     Gram Stain Many (4+) WBCs per low power field      No organisms seen    Narrative:      In broth only.    Susceptibility        Streptococcus mitis / oralis      VAIBHAV      Ceftriaxone Susceptible      Penicillin G Susceptible      Vancomycin Susceptible                           Blood Culture - Blood, Arm, Left [[id number]]  (Abnormal) Collected: 07/12/24 1516    Lab Status: Final result Specimen: Blood from Arm, Left Updated: 07/15/24 0642     Blood Culture Staphylococcus, coagulase negative     Isolated from Aerobic Bottle     Gram Stain Aerobic Bottle Gram positive cocci in clusters    Narrative:      Probable contaminant requires clinical correlation, susceptibility not performed unless requested by physician.      Blood Culture ID, PCR - Blood, Arm, Left [[id number]]  (Abnormal) Collected: 07/12/24 1516    Lab Status: Final result Specimen: Blood from Arm, Left Updated: 07/13/24 2147     BCID, PCR Staph spp, not aureus or lugdunensis. Identification by BCID2 PCR.     BOTTLE TYPE Aerobic Bottle    Blood Culture - Blood, Arm, Left [[id number]]  (Normal) Collected: 07/12/24 1441    Lab Status: Final result Specimen: Blood from Arm, Left Updated: 07/17/24 1445     Blood Culture No growth at 5 days            Medication Review:       Schedule Meds  aspirin, 81 mg, Oral, BID  cefTRIAXone, 2,000 mg, Intravenous, Q24H  cetirizine, 10 mg, Oral, Daily  enoxaparin, 40 mg, Subcutaneous, Q24H  pantoprazole, 40 mg, Oral, QAM AC  saccharomyces boulardii, 250 mg, Oral, BID  sodium chloride, 500 mL,  Intravenous, Once  sodium chloride, 10 mL, Intravenous, Q12H  vitamin D3, 5,000 Units, Oral, Daily        Infusion Meds         PRN Meds    acetaminophen    senna-docusate sodium **AND** polyethylene glycol **AND** bisacodyl **AND** bisacodyl    Calcium Replacement - Follow Nurse / BPA Driven Protocol    Magnesium Standard Dose Replacement - Follow Nurse / BPA Driven Protocol    Morphine    [] HYDROmorphone **AND** naloxone    ondansetron    oxyCODONE    Phosphorus Replacement - Follow Nurse / BPA Driven Protocol    Potassium Replacement - Follow Nurse / BPA Driven Protocol    [COMPLETED] Insert Peripheral IV **AND** sodium chloride    sodium chloride    sodium chloride    triamcinolone        Assessment & Plan       Antimicrobial Therapy   1.  IV Rocephin        2.  IV Zosyn       3.        4.        5.          Assessment    Right total knee infection.  Patient had a aspiration of the right knee with fluid analysis indicating infection.  Cultures are growing Streptococcus mitis/oralis.  Patient is status post hardware removal and antibiotic spacer placement to the right knee on 2024.    Positive blood culture and 1 out of 2 blood cultures from admission growing a coagulase-negative Staphylococcus.  Patient did not come with a line, port and has no known history of cardiac valve history or device placement.  This is likely contamination     Liver cirrhosis-patient has been sober for approximately 2 months.  Elevated liver transaminase and hyperbilirubinemia are improving since admission     History of prostate cancer    Acute kidney injury probably secondary to volume contraction.  Nephrology now following    Worsening leukocytosis-could be related to surgery/postsurgical hematoma.  Patient denies any diarrhea shortness of breath or urinary symptoms    Worsening hypoxia-patient on 2 L of oxygen by nasal cannula.  Concern for developing pneumonia with a productive cough     Plan     Discontinue IV  Rocephin for now  Start IV Zosyn 3.375 g every 8 hours to cover for knee infection and pneumonia  Sputum culture  MRSA of the nares screen  CT chest without contrast    Patient will need a PICC line before discharge-please remove when IV antibiotics are completed.  Standard weekly PICC line care  Weekly labs CBC, creatinine, sed rate x 6 weeks  Patient will need to be on 6 weeks of IV antibiotics from surgery, then show no signs of infection for 2 weeks before new hardware should be considered for reimplantation  Continue supportive care  A.m. labs  Case discussed with patient, wife and LPN at bedside      Please fax all post discharge lab results, imaging studies and correspondence to this fax number [phone]  For any question or concern please contact our service number [phone]      Geri Vieyra, CARMEN  07/19/24  16:15 EDT    Note is dictated utilizing voice recognition software/Dragon

## 2024-07-20 LAB
ANION GAP SERPL CALCULATED.3IONS-SCNC: 8 MMOL/L (ref 5–15)
B PARAPERT DNA SPEC QL NAA+PROBE: NOT DETECTED
B PERT DNA SPEC QL NAA+PROBE: NOT DETECTED
BASOPHILS # BLD AUTO: 0.08 10*3/MM3 (ref 0–0.2)
BASOPHILS NFR BLD AUTO: 0.4 % (ref 0–1.5)
BUN SERPL-MCNC: 28 MG/DL (ref 8–23)
BUN/CREAT SERPL: 21.1 (ref 7–25)
C PNEUM DNA NPH QL NAA+NON-PROBE: NOT DETECTED
CALCIUM SPEC-SCNC: 7.4 MG/DL (ref 8.6–10.5)
CHLORIDE SERPL-SCNC: 99 MMOL/L (ref 98–107)
CO2 SERPL-SCNC: 21 MMOL/L (ref 22–29)
CREAT SERPL-MCNC: 1.33 MG/DL (ref 0.76–1.27)
DEPRECATED RDW RBC AUTO: 53.5 FL (ref 37–54)
EGFRCR SERPLBLD CKD-EPI 2021: 59 ML/MIN/1.73
EOSINOPHIL # BLD AUTO: 0.77 10*3/MM3 (ref 0–0.4)
EOSINOPHIL NFR BLD AUTO: 3.8 % (ref 0.3–6.2)
ERYTHROCYTE [DISTWIDTH] IN BLOOD BY AUTOMATED COUNT: 14.3 % (ref 12.3–15.4)
FLUAV SUBTYP SPEC NAA+PROBE: NOT DETECTED
FLUBV RNA ISLT QL NAA+PROBE: NOT DETECTED
GLUCOSE SERPL-MCNC: 97 MG/DL (ref 65–99)
HADV DNA SPEC NAA+PROBE: NOT DETECTED
HCOV 229E RNA SPEC QL NAA+PROBE: NOT DETECTED
HCOV HKU1 RNA SPEC QL NAA+PROBE: NOT DETECTED
HCOV NL63 RNA SPEC QL NAA+PROBE: NOT DETECTED
HCOV OC43 RNA SPEC QL NAA+PROBE: NOT DETECTED
HCT VFR BLD AUTO: 21.9 % (ref 37.5–51)
HGB BLD-MCNC: 7.7 G/DL (ref 13–17.7)
HMPV RNA NPH QL NAA+NON-PROBE: NOT DETECTED
HPIV1 RNA ISLT QL NAA+PROBE: NOT DETECTED
HPIV2 RNA SPEC QL NAA+PROBE: NOT DETECTED
HPIV3 RNA NPH QL NAA+PROBE: NOT DETECTED
HPIV4 P GENE NPH QL NAA+PROBE: NOT DETECTED
IMM GRANULOCYTES # BLD AUTO: 0.93 10*3/MM3 (ref 0–0.05)
IMM GRANULOCYTES NFR BLD AUTO: 4.6 % (ref 0–0.5)
L PNEUMO1 AG UR QL IA: NEGATIVE
LYMPHOCYTES # BLD AUTO: 1.62 10*3/MM3 (ref 0.7–3.1)
LYMPHOCYTES NFR BLD AUTO: 8 % (ref 19.6–45.3)
M PNEUMO IGG SER IA-ACNC: NOT DETECTED
MAGNESIUM SERPL-MCNC: 1.9 MG/DL (ref 1.6–2.4)
MCH RBC QN AUTO: 36.7 PG (ref 26.6–33)
MCHC RBC AUTO-ENTMCNC: 35.2 G/DL (ref 31.5–35.7)
MCV RBC AUTO: 104.3 FL (ref 79–97)
MONOCYTES # BLD AUTO: 1.79 10*3/MM3 (ref 0.1–0.9)
MONOCYTES NFR BLD AUTO: 8.8 % (ref 5–12)
NEUTROPHILS NFR BLD AUTO: 15.13 10*3/MM3 (ref 1.7–7)
NEUTROPHILS NFR BLD AUTO: 74.4 % (ref 42.7–76)
NRBC BLD AUTO-RTO: 0.1 /100 WBC (ref 0–0.2)
PHOSPHATE SERPL-MCNC: 3.2 MG/DL (ref 2.5–4.5)
PLATELET # BLD AUTO: 257 10*3/MM3 (ref 140–450)
PMV BLD AUTO: 11 FL (ref 6–12)
POTASSIUM SERPL-SCNC: 3.7 MMOL/L (ref 3.5–5.2)
RBC # BLD AUTO: 2.1 10*6/MM3 (ref 4.14–5.8)
RHINOVIRUS RNA SPEC NAA+PROBE: NOT DETECTED
RSV RNA NPH QL NAA+NON-PROBE: NOT DETECTED
S PNEUM AG SPEC QL LA: NEGATIVE
SARS-COV-2 RNA NPH QL NAA+NON-PROBE: NOT DETECTED
SODIUM SERPL-SCNC: 128 MMOL/L (ref 136–145)
WBC NRBC COR # BLD AUTO: 20.32 10*3/MM3 (ref 3.4–10.8)

## 2024-07-20 PROCEDURE — 87070 CULTURE OTHR SPECIMN AEROBIC: CPT | Performed by: NURSE PRACTITIONER

## 2024-07-20 PROCEDURE — 84100 ASSAY OF PHOSPHORUS: CPT | Performed by: INTERNAL MEDICINE

## 2024-07-20 PROCEDURE — 87205 SMEAR GRAM STAIN: CPT | Performed by: NURSE PRACTITIONER

## 2024-07-20 PROCEDURE — 25010000002 ENOXAPARIN PER 10 MG: Performed by: ORTHOPAEDIC SURGERY

## 2024-07-20 PROCEDURE — 85025 COMPLETE CBC W/AUTO DIFF WBC: CPT | Performed by: NURSE PRACTITIONER

## 2024-07-20 PROCEDURE — 83735 ASSAY OF MAGNESIUM: CPT | Performed by: INTERNAL MEDICINE

## 2024-07-20 PROCEDURE — 0202U NFCT DS 22 TRGT SARS-COV-2: CPT | Performed by: INTERNAL MEDICINE

## 2024-07-20 PROCEDURE — 25010000002 CEFTRIAXONE PER 250 MG: Performed by: INTERNAL MEDICINE

## 2024-07-20 PROCEDURE — 25010000002 PIPERACILLIN SOD-TAZOBACTAM PER 1 G: Performed by: NURSE PRACTITIONER

## 2024-07-20 PROCEDURE — 87449 NOS EACH ORGANISM AG IA: CPT | Performed by: INTERNAL MEDICINE

## 2024-07-20 PROCEDURE — 80048 BASIC METABOLIC PNL TOTAL CA: CPT | Performed by: INTERNAL MEDICINE

## 2024-07-20 RX ORDER — SODIUM CHLORIDE 0.9 % (FLUSH) 0.9 %
10 SYRINGE (ML) INJECTION AS NEEDED
Status: DISCONTINUED | OUTPATIENT
Start: 2024-07-20 | End: 2024-07-21 | Stop reason: HOSPADM

## 2024-07-20 RX ORDER — SODIUM CHLORIDE 0.9 % (FLUSH) 0.9 %
10 SYRINGE (ML) INJECTION EVERY 12 HOURS SCHEDULED
Status: DISCONTINUED | OUTPATIENT
Start: 2024-07-20 | End: 2024-07-21 | Stop reason: HOSPADM

## 2024-07-20 RX ORDER — SODIUM CHLORIDE 0.9 % (FLUSH) 0.9 %
20 SYRINGE (ML) INJECTION AS NEEDED
Status: DISCONTINUED | OUTPATIENT
Start: 2024-07-20 | End: 2024-07-21 | Stop reason: HOSPADM

## 2024-07-20 RX ORDER — FUROSEMIDE 40 MG/1
40 TABLET ORAL DAILY
Status: DISCONTINUED | OUTPATIENT
Start: 2024-07-20 | End: 2024-07-21 | Stop reason: HOSPADM

## 2024-07-20 RX ADMIN — Medication 10 ML: at 09:07

## 2024-07-20 RX ADMIN — ASPIRIN 81 MG CHEWABLE TABLET 81 MG: 81 TABLET CHEWABLE at 20:01

## 2024-07-20 RX ADMIN — Medication 10 ML: at 20:12

## 2024-07-20 RX ADMIN — ENOXAPARIN SODIUM 40 MG: 100 INJECTION SUBCUTANEOUS at 15:22

## 2024-07-20 RX ADMIN — PANTOPRAZOLE SODIUM 40 MG: 40 TABLET, DELAYED RELEASE ORAL at 09:06

## 2024-07-20 RX ADMIN — CEFTRIAXONE 2000 MG: 2 INJECTION, POWDER, FOR SOLUTION INTRAMUSCULAR; INTRAVENOUS at 17:17

## 2024-07-20 RX ADMIN — PIPERACILLIN AND TAZOBACTAM 3.38 G: 3; .375 INJECTION, POWDER, FOR SOLUTION INTRAVENOUS at 09:05

## 2024-07-20 RX ADMIN — Medication 5000 UNITS: at 09:06

## 2024-07-20 RX ADMIN — Medication 10 ML: at 09:08

## 2024-07-20 RX ADMIN — CETIRIZINE HYDROCHLORIDE 10 MG: 10 TABLET, FILM COATED ORAL at 09:06

## 2024-07-20 RX ADMIN — Medication 250 MG: at 09:06

## 2024-07-20 RX ADMIN — FUROSEMIDE 40 MG: 40 TABLET ORAL at 13:06

## 2024-07-20 RX ADMIN — BISACODYL 5 MG: 5 TABLET, COATED ORAL at 11:26

## 2024-07-20 RX ADMIN — ASPIRIN 81 MG CHEWABLE TABLET 81 MG: 81 TABLET CHEWABLE at 09:06

## 2024-07-20 RX ADMIN — Medication 250 MG: at 20:01

## 2024-07-20 RX ADMIN — PIPERACILLIN AND TAZOBACTAM 3.38 G: 3; .375 INJECTION, POWDER, FOR SOLUTION INTRAVENOUS at 15:22

## 2024-07-20 NOTE — PROGRESS NOTES
Infectious Diseases Progress Note      LOS: 8 days   Patient Care Team:  Tamar Eugene APRN as PCP - General (Nurse Practitioner)    Chief Complaint: Right knee pain and swelling at admission    Subjective       The patient has been afebrile for the last 24 hours.  The patient is on room air, hemodynamically stable, and is tolerating antimicrobial therapy.  The patient is feeling much better today.  Currently on room air.  Denies shortness of    Review of Systems:   Review of Systems   Constitutional:  Positive for fatigue.   HENT: Negative.     Eyes: Negative.    Cardiovascular: Negative.    Gastrointestinal: Negative.    Endocrine: Negative.    Genitourinary: Negative.    Musculoskeletal:  Positive for joint swelling.   Skin: Negative.    Neurological: Negative.    Psychiatric/Behavioral: Negative.     All other systems reviewed and are negative.       Objective     Vital Signs  Temp:  [97.7 °F (36.5 °C)-98.3 °F (36.8 °C)] 97.9 °F (36.6 °C)  Heart Rate:  [80-91] 86  Resp:  [18-20] 18  BP: (150-159)/(70-92) 152/71    Physical Exam:  Physical Exam  Vitals and nursing note reviewed.   Constitutional:       General: He is not in acute distress.     Appearance: Normal appearance. He is well-developed and normal weight. He is not diaphoretic.   HENT:      Head: Normocephalic and atraumatic.   Eyes:      Conjunctiva/sclera: Conjunctivae normal.      Pupils: Pupils are equal, round, and reactive to light.   Cardiovascular:      Rate and Rhythm: Normal rate and regular rhythm.      Heart sounds: Normal heart sounds, S1 normal and S2 normal.   Pulmonary:      Effort: Pulmonary effort is normal. No respiratory distress.      Breath sounds: Normal breath sounds. No stridor. No wheezing or rales.   Abdominal:      General: Bowel sounds are normal. There is no distension.      Palpations: Abdomen is soft. There is no mass.      Tenderness: There is no abdominal tenderness. There is no guarding.   Musculoskeletal:          General: No deformity.      Cervical back: Neck supple.      Comments: Surgical dressings in place on right knee along with Hemovac with bloody drainage   Skin:     General: Skin is warm and dry.      Coloration: Skin is not pale.      Findings: No erythema or rash.   Neurological:      Mental Status: He is alert and oriented to person, place, and time.          Results Review:    I have reviewed all clinical data, test, lab, and imaging results.     Radiology  CT Chest Without Contrast Diagnostic    Result Date: 7/19/2024  CT CHEST WO CONTRAST DIAGNOSTIC Date of Exam: 7/19/2024 9:03 PM EDT Indication: Concern for developing pneumonia. Comparison: Same day chest radiograph Technique: Axial CT images were obtained of the chest without contrast administration.  Sagittal and coronal reconstructions were performed.  Automated exposure control and iterative reconstruction methods were used. Findings: Thyroid: The visualized portion of the thyroid is unremarkable. Hilum, Mediastinum, Heart, and Great vessels: No pathologically enlarged lymph nodes. Normal heart size. No pericardial effusion. Small coronary calcifications are visualized. The thoracic aorta is normal in caliber and contour. Small scattered atheromatous calcifications are visualized. Patient is post right upper extremity PICC placement with the tip at the level of the right atrium. Lung Parenchyma and Pleura: Extensive bilateral patchy groundglass airspace disease is visualized, left greater than right. Trace left pleural effusion is visualized. Very small right-sided pleural calcifications are visualized in the costophrenic sulcus. No evidence of pneumothorax. Central airways are patent. Upper Abdomen: Cirrhotic appearing liver is visualized. Low-density lesion in the right hepatic lobe is visualized measuring 1.3 cm, indeterminate. Evaluation is incomplete on this noncontrast chest CT. Moderate volume ascites is visualized. There is suggestion of small  gallstones. Soft tissues: Unremarkable. Osseous structures: No acute fracture or aggressive lesions.     Impression: Extensive patchy bilateral groundglass airspace disease, left greater than right. Finding may be secondary to atypical pneumonia versus ARDS versus less likely pulmonary edema. Trace left pleural effusion. Cirrhotic appearing liver. Low-density lesion in the right hepatic lobe measures 1.3 cm, indeterminate. Correlate with hepatic protocol CT or MR as clinically warranted. Moderate volume ascites. Electronically Signed: Phoenix Love MD  7/19/2024 10:16 PM EDT  Workstation ID: [id number]     Cardiology    Laboratory    Results from last 7 days   Lab Units 07/20/24  0526 07/18/24  2327 07/17/24  2348 07/17/24  0032 07/16/24  0229 07/15/24  0141 07/14/24  0432   WBC 10*3/mm3 20.32* 22.35* 21.20* 22.97* 16.28* 13.05* 14.87*   HEMOGLOBIN g/dL 7.7* 7.2* 7.4* 8.2* 7.8* 9.2* 13.5   HEMATOCRIT % 21.9* 20.0* 20.2* 22.6* 21.4* 25.3* 36.9*   PLATELETS 10*3/mm3 257 234 201 220 150 144 154     Results from last 7 days   Lab Units 07/20/24  0526 07/19/24  1716 07/18/24 2327 07/17/24  2348 07/17/24  0932 07/17/24  0032 07/16/24  0229   SODIUM mmol/L 128* 132* 129* 127* 128* 129* 127*   POTASSIUM mmol/L 3.7 4.3 3.6 3.8 3.9 3.8 4.2   CHLORIDE mmol/L 99 101 100 100 98 97* 99   CO2 mmol/L 21.0* 17.7* 21.9* 20.2* 21.8* 20.8* 22.0   BUN mg/dL 28* 29* 32* 34* 36* 35* 33*   CREATININE mg/dL 1.33* 1.36* 1.50* 1.49* 1.52* 1.62* 1.46*   GLUCOSE mg/dL 97 131* 122* 102* 124* 111* 118*   ALBUMIN g/dL  --   --   --  2.2*  --  2.5*  --    BILIRUBIN mg/dL  --   --   --  1.8*  --  1.6*  --    ALK PHOS U/L  --   --   --  87  --  89  --    AST (SGOT) U/L  --   --   --  63*  --  61*  --    ALT (SGPT) U/L  --   --   --  27  --  30  --    CALCIUM mg/dL 7.4* 7.5* 7.4* 7.6* 7.6* 7.5* 6.9*                   Microbiology   Microbiology Results (last 10 days)       Procedure Component Value - Date/Time    Legionella Antigen, Urine - Urine,  Urine, Clean Catch [[id number]]  (Normal) Collected: 07/20/24 1347    Lab Status: Final result Specimen: Urine, Clean Catch Updated: 07/20/24 1413     LEGIONELLA ANTIGEN, URINE Negative    S. Pneumo Ag Urine or CSF - Urine, Urine, Clean Catch [[id number]]  (Normal) Collected: 07/20/24 1347    Lab Status: Final result Specimen: Urine, Clean Catch Updated: 07/20/24 1413     Strep Pneumo Ag Negative    Respiratory Panel PCR w/COVID-19(SARS-CoV-2) CASTRO/ZAINA/CARLOS ALBERTO/PAD/COR/KATHERIN In-House, NP Swab in UTM/VTM, 2 HR TAT - Swab, Nasopharynx [[id number]]  (Normal) Collected: 07/20/24 1312    Lab Status: Final result Specimen: Swab from Nasopharynx Updated: 07/20/24 1411     ADENOVIRUS, PCR Not Detected     Coronavirus 229E Not Detected     Coronavirus HKU1 Not Detected     Coronavirus NL63 Not Detected     Coronavirus OC43 Not Detected     COVID19 Not Detected     Human Metapneumovirus Not Detected     Human Rhinovirus/Enterovirus Not Detected     Influenza A PCR Not Detected     Influenza B PCR Not Detected     Parainfluenza Virus 1 Not Detected     Parainfluenza Virus 2 Not Detected     Parainfluenza Virus 3 Not Detected     Parainfluenza Virus 4 Not Detected     RSV, PCR Not Detected     Bordetella pertussis pcr Not Detected     Bordetella parapertussis PCR Not Detected     Chlamydophila pneumoniae PCR Not Detected     Mycoplasma pneumo by PCR Not Detected    Narrative:      In the setting of a positive respiratory panel with a viral infection PLUS a negative procalcitonin without other underlying concern for bacterial infection, consider observing off antibiotics or discontinuation of antibiotics and continue supportive care. If the respiratory panel is positive for atypical bacterial infection (Bordetella pertussis, Chlamydophila pneumoniae, or Mycoplasma pneumoniae), consider antibiotic de-escalation to target atypical bacterial infection.    Respiratory Culture - Sputum, Cough [[id number]] Collected: 07/20/24 1139    Lab Status:  Preliminary result Specimen: Sputum from Cough Updated: 07/20/24 1307     Gram Stain Many (4+) WBCs per low power field      Rare (1+) Epithelial cells per low power field      Rare (1+) Gram positive cocci    Body Fluid Culture - Body Fluid, Knee, Right [[id number]]  (Abnormal)  (Susceptibility) Collected: 07/12/24 1711    Lab Status: Final result Specimen: Body Fluid from Knee, Right Updated: 07/15/24 0840     Body Fluid Culture Streptococcus mitis / oralis     Gram Stain Many (4+) WBCs per low power field      No organisms seen    Narrative:      In broth only.    Susceptibility        Streptococcus mitis / oralis      VAIBHAV      Ceftriaxone Susceptible      Penicillin G Susceptible      Vancomycin Susceptible                           Blood Culture - Blood, Arm, Left [[id number]]  (Abnormal) Collected: 07/12/24 1516    Lab Status: Final result Specimen: Blood from Arm, Left Updated: 07/15/24 0642     Blood Culture Staphylococcus, coagulase negative     Isolated from Aerobic Bottle     Gram Stain Aerobic Bottle Gram positive cocci in clusters    Narrative:      Probable contaminant requires clinical correlation, susceptibility not performed unless requested by physician.      Blood Culture ID, PCR - Blood, Arm, Left [[id number]]  (Abnormal) Collected: 07/12/24 1516    Lab Status: Final result Specimen: Blood from Arm, Left Updated: 07/13/24 2147     BCID, PCR Staph spp, not aureus or lugdunensis. Identification by BCID2 PCR.     BOTTLE TYPE Aerobic Bottle    Blood Culture - Blood, Arm, Left [[id number]]  (Normal) Collected: 07/12/24 1441    Lab Status: Final result Specimen: Blood from Arm, Left Updated: 07/17/24 1445     Blood Culture No growth at 5 days            Medication Review:       Schedule Meds  aspirin, 81 mg, Oral, BID  cetirizine, 10 mg, Oral, Daily  enoxaparin, 40 mg, Subcutaneous, Q24H  furosemide, 40 mg, Oral, Daily  pantoprazole, 40 mg, Oral, QAM AC  piperacillin-tazobactam, 3.375 g, Intravenous,  Q8H  saccharomyces boulardii, 250 mg, Oral, BID  sodium chloride, 500 mL, Intravenous, Once  sodium chloride, 10 mL, Intravenous, Q12H  sodium chloride, 10 mL, Intravenous, Q12H  vitamin D3, 5,000 Units, Oral, Daily        Infusion Meds         PRN Meds    acetaminophen    senna-docusate sodium **AND** polyethylene glycol **AND** bisacodyl **AND** bisacodyl    Calcium Replacement - Follow Nurse / BPA Driven Protocol    Magnesium Standard Dose Replacement - Follow Nurse / BPA Driven Protocol    Morphine    [] HYDROmorphone **AND** naloxone    ondansetron    oxyCODONE    Phosphorus Replacement - Follow Nurse / BPA Driven Protocol    Potassium Replacement - Follow Nurse / BPA Driven Protocol    [COMPLETED] Insert Peripheral IV **AND** sodium chloride    sodium chloride    sodium chloride    sodium chloride    sodium chloride    triamcinolone        Assessment & Plan       Antimicrobial Therapy   1.  IV Zosyn        2       3.        4.        5.                Assessment    Right total knee infection.  Patient had a aspiration of the right knee with fluid analysis indicating infection.  Cultures are growing Streptococcus mitis/oralis.  Patient is status post hardware removal and antibiotic spacer placement to the right knee on 2024.    Positive blood culture and 1 out of 2 blood cultures from admission growing a coagulase-negative Staphylococcus.  Patient did not come with a line, port and has no known history of cardiac valve history or device placement.  This is likely contamination     Liver cirrhosis-patient has been sober for approximately 2 months.  Elevated liver transaminase and hyperbilirubinemia are improving since admission     History of prostate cancer    Acute kidney injury probably secondary to volume contraction.  Nephrology now following    Worsening leukocytosis-could be related to surgery/postsurgical hematoma.  Patient denies any diarrhea shortness of breath or urinary  symptoms    Worsening hypoxia-patient on 2 L of oxygen by nasal cannula.  CT scan of the chest was mostly consistent with fluid overload may be small infiltrate     Plan     Discontinue IV Zosyn  Restart IV ceftriaxone 2 g daily for a total of 6 weeks of treatment last day will be August 25, 2024.    Patient will need a PICC line before discharge-please remove when IV antibiotics are completed.  Standard weekly PICC line care    Weekly labs CBC, creatinine, sed rate x 6 weeks    Patient will need to be on 6 weeks of IV antibiotics from surgery, then show no signs of infection for 2 weeks before new hardware should be considered for reimplantation    Okay to transfer patient to rehab facility at any time      Please fax all post discharge lab results, imaging studies and correspondence to this fax number [phone]  For any question or concern please contact our service number [phone]      Brian García MD  07/20/24  16:12 EDT    Note is dictated utilizing voice recognition software/Dragon

## 2024-07-20 NOTE — CASE MANAGEMENT/SOCIAL WORK
Continued Stay Note   Giuseppe     Patient Name: Erik Fuentes  MRN: [id number]  Today's Date: 7/20/2024    Admit Date: 7/12/2024    Plan: Accepted to [facility]. No precert needed. PASRR approved, Will go on IV antibiotics.   Discharge Plan       Row Name 07/20/24 1418       Plan    Plan Comments CM called facility to verify bed ready.  Was told that bed 104 is not quite ready.  Facility confirmed it would be ready on 7/21.  CM updated MD.                      Expected Discharge Date and Time       Expected Discharge Date Expected Discharge Time    Jul 20, 2024               Mague Guillaume RN

## 2024-07-20 NOTE — PLAN OF CARE
Goal Outcome Evaluation:         Pt in bed appears to be resting, no c/o pain at this time. Fluids and call light in reach, plan continues
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
## 2024-07-20 NOTE — PROGRESS NOTES
[facility] MEDICINE SERVICE  DAILY PROGRESS NOTE    NAME: Erik Fuentes  : 1960  MRN: [id number]      LOS: 8 days     PROVIDER OF SERVICE: Rio Griffin MD    Chief Complaint: Septic arthritis of knee, right    Subjective:     Interval History:  History taken from: patient    Subjective       Chief Complaint: right knee pain     History of Present Illness: Erik Fuentes is a 64 y.o. male with a CMH of BPH, hemochromatosis, prostate cancer, liver cirrhosis, esophageal varices, GERD, HTN who presented to [facility] on 2024 with right knee pain. Lives at home with wife, independent with ADLs.   Presents to ED with right knee pains. States intermittent right knee pain over past 2 years. Today, after returning home from chiropractor with worsening right knee pain and swelling. Denies recent illness or injury. States was in [place] 2 weeks ago with abrasion to left hand. On arrival to ED VS: 100.6-87-/89-96% room air.      Upon evaluation, awake, alert, resting in bed, wife at bedside. Current VS: 96-/84-96% room air. Right knee with swelling and warmth. No injury to RLE or right foot noted. Endorses unable to bear weight RLE.   XR right knee reveals large suprapatellar joint effusion with swelling. Right total knee prosthesis without periprosthetic fracture  Blood work reveals WBC 13.9, Hgb 13.8, Hct 39, platelets 171, sodium 135, AST 90. Alk phos 123. T. Bilirubin 3.5. CRP 2.31     24 seen and examined medical distress, vital signs stable, no complaints, still having right knee pain and swelling.  Discussed with RN.  Consulted ID.  Tmax 100.6.  24 patient seen in bed no acute distress, vital signs stable, underwent right Total Knee Arthroplasty Revision discussed with RN.  Tolerating antibiotics.  7/15/2024 patient seen and examined in medical distress, no new complaints, pain well-controlled.  Discussed with RN, tolerating antibiotics.  Awaiting  placement  7/16/24 patient seen and examined in bed no acute distress, complaining of knee pain.  Vital signs stable, discussed with RN, discussed with case , awaiting placement  7/17/2024 patient seen and examined in bed no acute distress, vital signs stable, tolerating antibiotics, his white count went up to 22.  Creatinine went up to 1.62 today 1.5.  7/18/2024 patient seen and examined in bed no acute distress, new complaints, awaiting placement, tolerating antibiotics.  Creatinine one 1.49 on IV fluids.  WBC count 21.  7/19/24 seen in bed NAD, having worsening dyspnea, CXR showing possible pneumonia, DW RN, sputum culture   7/20/2024 patient seen and examined in bed acute distress, doing better today, anxious to go home.  Awaiting sputum cultures, discussed with RN.  Vital signs stable, WBC 20.  Renal function improving creatinine 1.3      Review of Systems  constitutional:  Positive for fever. Negative for chills.   Respiratory:  Negative for shortness of breath.    Cardiovascular:  Negative for chest pain.   Gastrointestinal:  Negative for abdominal pain, diarrhea, nausea and vomiting.   Genitourinary:  Negative for dysuria.   Musculoskeletal:  Positive for gait problem.        Right knee pain   Objective:     Vital Signs  Temp:  [97.7 °F (36.5 °C)-98.3 °F (36.8 °C)] 98.3 °F (36.8 °C)  Heart Rate:  [80-91] 86  Resp:  [18-20] 18  BP: (150-159)/(70-92) 159/92  Flow (L/min):  [2] 2   Body mass index is 25.77 kg/m².    Physical Exam     General: He is not in acute distress.     Appearance: Normal appearance. He is not ill-appearing or toxic-appearing.   HENT:      Head: Normocephalic and atraumatic.      Mouth/Throat:      Mouth: Mucous membranes are moist.   Eyes:      Extraocular Movements: Extraocular movements intact.      Pupils: Pupils are equal, round, and reactive to light.   Cardiovascular:      Rate and Rhythm: Normal rate. Rhythm irregular.      Pulses: Normal pulses.      Heart sounds:  Normal heart sounds.   Pulmonary:      Effort: Pulmonary effort is normal. No respiratory distress.      Breath sounds: Normal breath sounds.   Abdominal:      General: Bowel sounds are normal. There is no distension.      Palpations: Abdomen is soft.      Tenderness: There is no abdominal tenderness.   Musculoskeletal:      Comments: Right knee swelling with warmth.    Skin:     General: Skin is warm and dry.   Neurological:      Mental Status: He is alert and oriented to person, place, and time. Mental status is at baseline.      Scheduled Meds   aspirin, 81 mg, Oral, BID  cetirizine, 10 mg, Oral, Daily  enoxaparin, 40 mg, Subcutaneous, Q24H  pantoprazole, 40 mg, Oral, QAM AC  piperacillin-tazobactam, 3.375 g, Intravenous, Q8H  saccharomyces boulardii, 250 mg, Oral, BID  sodium chloride, 500 mL, Intravenous, Once  sodium chloride, 10 mL, Intravenous, Q12H  sodium chloride, 10 mL, Intravenous, Q12H  vitamin D3, 5,000 Units, Oral, Daily       PRN Meds     acetaminophen    senna-docusate sodium **AND** polyethylene glycol **AND** bisacodyl **AND** bisacodyl    Calcium Replacement - Follow Nurse / BPA Driven Protocol    Magnesium Standard Dose Replacement - Follow Nurse / BPA Driven Protocol    Morphine    [] HYDROmorphone **AND** naloxone    ondansetron    oxyCODONE    Phosphorus Replacement - Follow Nurse / BPA Driven Protocol    Potassium Replacement - Follow Nurse / BPA Driven Protocol    [COMPLETED] Insert Peripheral IV **AND** sodium chloride    sodium chloride    sodium chloride    sodium chloride    sodium chloride    triamcinolone   Infusions         Diagnostic Data    Results from last 7 days   Lab Units 24  0526 24  2327 24  2348   WBC 10*3/mm3 20.32*   < > 21.20*   HEMOGLOBIN g/dL 7.7*   < > 7.4*   HEMATOCRIT % 21.9*   < > 20.2*   PLATELETS 10*3/mm3 257   < > 201   GLUCOSE mg/dL 97   < > 102*   CREATININE mg/dL 1.33*   < > 1.49*   BUN mg/dL 28*   < > 34*   SODIUM mmol/L 128*   < >  127*   POTASSIUM mmol/L 3.7   < > 3.8   AST (SGOT) U/L  --   --  63*   ALT (SGPT) U/L  --   --  27   ALK PHOS U/L  --   --  87   BILIRUBIN mg/dL  --   --  1.8*   ANION GAP mmol/L 8.0   < > 6.8    < > = values in this interval not displayed.       CT Chest Without Contrast Diagnostic    Result Date: 7/19/2024  Impression: Extensive patchy bilateral groundglass airspace disease, left greater than right. Finding may be secondary to atypical pneumonia versus ARDS versus less likely pulmonary edema. Trace left pleural effusion. Cirrhotic appearing liver. Low-density lesion in the right hepatic lobe measures 1.3 cm, indeterminate. Correlate with hepatic protocol CT or MR as clinically warranted. Moderate volume ascites. Electronically Signed: Phoenix Love MD  7/19/2024 10:16 PM EDT  Workstation ID: [id number]       I reviewed the patient's new clinical results.    Assessment/Plan:     Active and Resolved Problems  Active Hospital Problems    Diagnosis  POA    **Septic arthritis of knee, right [M00.9]  Yes    Cirrhosis of liver without ascites [K74.60]  Yes    Peripheral edema [R60.0]  Yes    Essential hypertension [I10]  Yes    Alcohol abuse [F10.10]  Yes    Hemochromatosis, hereditary [E83.110]  Yes    Age-related nuclear cataract of both eyes [H25.13]  Yes      Resolved Hospital Problems   No resolved problems to display.     Septic arthritis of right knee:  -presents with right knee pain, swelling, warmth.   -XR: large suprapatellar joint effusion with swelling.  -s/p drainage 50ml from right knee in ED--fluid analysis pending  -blood cultures Staphylococcus, coagulase negative Critical  -empirically treat with zosyn and vanco. Pharmacy to dose  -ortho consult. Appreciate recommendations. S/p Right Total Knee Arthroplasty Revision 7/14/24  -meets sepsis criteria on admission with fever, leukocytosis, source of infection. LA normal. CRP 2.31.   -IVFs.   -pain control.   -uric acid negative. Denies hx of RA.    -ID  consulted   -dyspnea-possible pneumonia      Hypertension:  -BP may be labile with pain response  -trend BP  -continue home medications once dosing verified with pharmacy.       Liver cirrhosis:  -follows with OP GI  -admission: AST 90. Alk phos 123. T. Bilirubin 3.5     GERD:  Protonix for GI Ppx.     VTE Prophylaxis:  Pharmacologic & mechanical VTE prophylaxis orders are present.    Code status is   Code Status and Medical Interventions:   Ordered at: 07/12/24 9931     Level Of Support Discussed With:    Patient     Code Status (Patient has no pulse and is not breathing):    CPR (Attempt to Resuscitate)     Medical Interventions (Patient has pulse or is breathing):    Full Support       Plan for disposition:home in 4 days    Time: 30 minutes    Signature: Electronically signed by Rio Griffin MD, 07/20/24, 11:12 EDT.  [facility] Hospitalist Team

## 2024-07-20 NOTE — PROGRESS NOTES
[facility] MEDICINE SERVICE  DAILY PROGRESS NOTE    NAME: Erik Fuentes  : 1960  MRN: [id number]      LOS: 7 days     PROVIDER OF SERVICE: Rio Griffin MD    Chief Complaint: Septic arthritis of knee, right    Subjective:     Interval History:  History taken from: patient    Subjective       Chief Complaint: right knee pain     History of Present Illness: Erik Fuentes is a 64 y.o. male with a CMH of BPH, hemochromatosis, prostate cancer, liver cirrhosis, esophageal varices, GERD, HTN who presented to [facility] on 2024 with right knee pain. Lives at home with wife, independent with ADLs.   Presents to ED with right knee pains. States intermittent right knee pain over past 2 years. Today, after returning home from chiropractor with worsening right knee pain and swelling. Denies recent illness or injury. States was in [place] 2 weeks ago with abrasion to left hand. On arrival to ED VS: 100.6-87-/89-96% room air.      Upon evaluation, awake, alert, resting in bed, wife at bedside. Current VS: 96-/84-96% room air. Right knee with swelling and warmth. No injury to RLE or right foot noted. Endorses unable to bear weight RLE.   XR right knee reveals large suprapatellar joint effusion with swelling. Right total knee prosthesis without periprosthetic fracture  Blood work reveals WBC 13.9, Hgb 13.8, Hct 39, platelets 171, sodium 135, AST 90. Alk phos 123. T. Bilirubin 3.5. CRP 2.31     24 seen and examined medical distress, vital signs stable, no complaints, still having right knee pain and swelling.  Discussed with RN.  Consulted ID.  Tmax 100.6.  24 patient seen in bed no acute distress, vital signs stable, underwent right Total Knee Arthroplasty Revision discussed with RN.  Tolerating antibiotics.  7/15/2024 patient seen and examined in medical distress, no new complaints, pain well-controlled.  Discussed with RN, tolerating antibiotics.  Awaiting  placement  7/16/24 patient seen and examined in bed no acute distress, complaining of knee pain.  Vital signs stable, discussed with RN, discussed with case , awaiting placement  7/17/2024 patient seen and examined in bed no acute distress, vital signs stable, tolerating antibiotics, his white count went up to 22.  Creatinine went up to 1.62 today 1.5.  7/18/2024 patient seen and examined in bed no acute distress, new complaints, awaiting placement, tolerating antibiotics.  Creatinine one 1.49 on IV fluids.  WBC count 21.  7/19/24 seen in bed NAD, having worsening dyspnea, CXR showing possible pneumonia, DW RN, sputum culture       Review of Systems  constitutional:  Positive for fever. Negative for chills.   Respiratory:  Negative for shortness of breath.    Cardiovascular:  Negative for chest pain.   Gastrointestinal:  Negative for abdominal pain, diarrhea, nausea and vomiting.   Genitourinary:  Negative for dysuria.   Musculoskeletal:  Positive for gait problem.        Right knee pain   Objective:     Vital Signs  Temp:  [97.7 °F (36.5 °C)-97.8 °F (36.6 °C)] 97.7 °F (36.5 °C)  Heart Rate:  [76-91] 91  Resp:  [20-22] 20  BP: (151-161)/(76-77) 151/77  Flow (L/min):  [2] 2   Body mass index is 25.77 kg/m².    Physical Exam     General: He is not in acute distress.     Appearance: Normal appearance. He is not ill-appearing or toxic-appearing.   HENT:      Head: Normocephalic and atraumatic.      Mouth/Throat:      Mouth: Mucous membranes are moist.   Eyes:      Extraocular Movements: Extraocular movements intact.      Pupils: Pupils are equal, round, and reactive to light.   Cardiovascular:      Rate and Rhythm: Normal rate. Rhythm irregular.      Pulses: Normal pulses.      Heart sounds: Normal heart sounds.   Pulmonary:      Effort: Pulmonary effort is normal. No respiratory distress.      Breath sounds: Normal breath sounds.   Abdominal:      General: Bowel sounds are normal. There is no distension.       Palpations: Abdomen is soft.      Tenderness: There is no abdominal tenderness.   Musculoskeletal:      Comments: Right knee swelling with warmth.    Skin:     General: Skin is warm and dry.   Neurological:      Mental Status: He is alert and oriented to person, place, and time. Mental status is at baseline.      Scheduled Meds   aspirin, 81 mg, Oral, BID  cetirizine, 10 mg, Oral, Daily  enoxaparin, 40 mg, Subcutaneous, Q24H  pantoprazole, 40 mg, Oral, QAM AC  piperacillin-tazobactam, 3.375 g, Intravenous, Q8H  saccharomyces boulardii, 250 mg, Oral, BID  sodium chloride, 500 mL, Intravenous, Once  sodium chloride, 10 mL, Intravenous, Q12H  vitamin D3, 5,000 Units, Oral, Daily       PRN Meds     acetaminophen    senna-docusate sodium **AND** polyethylene glycol **AND** bisacodyl **AND** bisacodyl    Calcium Replacement - Follow Nurse / BPA Driven Protocol    Magnesium Standard Dose Replacement - Follow Nurse / BPA Driven Protocol    Morphine    [] HYDROmorphone **AND** naloxone    ondansetron    oxyCODONE    Phosphorus Replacement - Follow Nurse / BPA Driven Protocol    Potassium Replacement - Follow Nurse / BPA Driven Protocol    [COMPLETED] Insert Peripheral IV **AND** sodium chloride    sodium chloride    sodium chloride    triamcinolone   Infusions         Diagnostic Data    Results from last 7 days   Lab Units 24  1716 24  2327 24  2348   WBC 10*3/mm3  --  22.35* 21.20*   HEMOGLOBIN g/dL  --  7.2* 7.4*   HEMATOCRIT %  --  20.0* 20.2*   PLATELETS 10*3/mm3  --  234 201   GLUCOSE mg/dL 131* 122* 102*   CREATININE mg/dL 1.36* 1.50* 1.49*   BUN mg/dL 29* 32* 34*   SODIUM mmol/L 132* 129* 127*   POTASSIUM mmol/L 4.3 3.6 3.8   AST (SGOT) U/L  --   --  63*   ALT (SGPT) U/L  --   --  27   ALK PHOS U/L  --   --  87   BILIRUBIN mg/dL  --   --  1.8*   ANION GAP mmol/L 13.3 7.1 6.8       No radiology results for the last day      I reviewed the patient's new clinical results.    Assessment/Plan:      Active and Resolved Problems  Active Hospital Problems    Diagnosis  POA    **Septic arthritis of knee, right [M00.9]  Yes    Cirrhosis of liver without ascites [K74.60]  Yes    Peripheral edema [R60.0]  Yes    Essential hypertension [I10]  Yes    Alcohol abuse [F10.10]  Yes    Hemochromatosis, hereditary [E83.110]  Yes    Age-related nuclear cataract of both eyes [H25.13]  Yes      Resolved Hospital Problems   No resolved problems to display.     Septic arthritis of right knee:  -presents with right knee pain, swelling, warmth.   -XR: large suprapatellar joint effusion with swelling.  -s/p drainage 50ml from right knee in ED--fluid analysis pending  -blood cultures Staphylococcus, coagulase negative Critical  -empirically treat with zosyn and vanco. Pharmacy to dose  -ortho consult. Appreciate recommendations. S/p Right Total Knee Arthroplasty Revision 7/14/24  -meets sepsis criteria on admission with fever, leukocytosis, source of infection. LA normal. CRP 2.31.   -IVFs.   -pain control.   -uric acid negative. Denies hx of RA.    -ID consulted   -dyspnea-possible pneumonia      Hypertension:  -BP may be labile with pain response  -trend BP  -continue home medications once dosing verified with pharmacy.       Liver cirrhosis:  -follows with OP GI  -admission: AST 90. Alk phos 123. T. Bilirubin 3.5     GERD:  Protonix for GI Ppx.     VTE Prophylaxis:  Pharmacologic & mechanical VTE prophylaxis orders are present.    Code status is   Code Status and Medical Interventions:   Ordered at: 07/12/24 0413     Level Of Support Discussed With:    Patient     Code Status (Patient has no pulse and is not breathing):    CPR (Attempt to Resuscitate)     Medical Interventions (Patient has pulse or is breathing):    Full Support       Plan for disposition:home in 4 days    Time: 30 minutes    Signature: Electronically signed by Rio Griffin MD, 07/19/24, 22:09 EDT.  [facility] Hospitalist Team

## 2024-07-20 NOTE — PLAN OF CARE
Goal Outcome Evaluation:  Plan of Care Reviewed With: patient        Progress: improving          PT alert and able to make needs known. Pt VS stable. Pt with complaints of pain only while moving. Personal items and call light within reach. Plan of care ongoing.

## 2024-07-21 VITALS
DIASTOLIC BLOOD PRESSURE: 75 MMHG | RESPIRATION RATE: 18 BRPM | OXYGEN SATURATION: 91 % | HEIGHT: 72 IN | SYSTOLIC BLOOD PRESSURE: 149 MMHG | BODY MASS INDEX: 25.73 KG/M2 | HEART RATE: 82 BPM | TEMPERATURE: 97.9 F | WEIGHT: 190 LBS

## 2024-07-21 LAB
ALBUMIN SERPL-MCNC: 2.2 G/DL (ref 3.5–5.2)
ALBUMIN/GLOB SERPL: 1 G/DL
ALP SERPL-CCNC: 98 U/L (ref 39–117)
ALT SERPL W P-5'-P-CCNC: 24 U/L (ref 1–41)
ANION GAP SERPL CALCULATED.3IONS-SCNC: 8.6 MMOL/L (ref 5–15)
AST SERPL-CCNC: 57 U/L (ref 1–40)
BASOPHILS # BLD AUTO: 0.08 10*3/MM3 (ref 0–0.2)
BASOPHILS NFR BLD AUTO: 0.4 % (ref 0–1.5)
BILIRUB SERPL-MCNC: 2 MG/DL (ref 0–1.2)
BUN SERPL-MCNC: 27 MG/DL (ref 8–23)
BUN/CREAT SERPL: 19.1 (ref 7–25)
CALCIUM SPEC-SCNC: 7.2 MG/DL (ref 8.6–10.5)
CHLORIDE SERPL-SCNC: 101 MMOL/L (ref 98–107)
CO2 SERPL-SCNC: 22.4 MMOL/L (ref 22–29)
CREAT SERPL-MCNC: 1.41 MG/DL (ref 0.76–1.27)
DEPRECATED RDW RBC AUTO: 54 FL (ref 37–54)
EGFRCR SERPLBLD CKD-EPI 2021: 55 ML/MIN/1.73
EOSINOPHIL # BLD AUTO: 0.84 10*3/MM3 (ref 0–0.4)
EOSINOPHIL NFR BLD AUTO: 4.3 % (ref 0.3–6.2)
ERYTHROCYTE [DISTWIDTH] IN BLOOD BY AUTOMATED COUNT: 14.4 % (ref 12.3–15.4)
GLOBULIN UR ELPH-MCNC: 2.1 GM/DL
GLUCOSE SERPL-MCNC: 92 MG/DL (ref 65–99)
HCT VFR BLD AUTO: 21 % (ref 37.5–51)
HGB BLD-MCNC: 7.5 G/DL (ref 13–17.7)
IMM GRANULOCYTES # BLD AUTO: 0.62 10*3/MM3 (ref 0–0.05)
IMM GRANULOCYTES NFR BLD AUTO: 3.2 % (ref 0–0.5)
LYMPHOCYTES # BLD AUTO: 1.62 10*3/MM3 (ref 0.7–3.1)
LYMPHOCYTES NFR BLD AUTO: 8.4 % (ref 19.6–45.3)
MCH RBC QN AUTO: 37.5 PG (ref 26.6–33)
MCHC RBC AUTO-ENTMCNC: 35.7 G/DL (ref 31.5–35.7)
MCV RBC AUTO: 105 FL (ref 79–97)
MONOCYTES # BLD AUTO: 1.52 10*3/MM3 (ref 0.1–0.9)
MONOCYTES NFR BLD AUTO: 7.8 % (ref 5–12)
NEUTROPHILS NFR BLD AUTO: 14.71 10*3/MM3 (ref 1.7–7)
NEUTROPHILS NFR BLD AUTO: 75.9 % (ref 42.7–76)
NRBC BLD AUTO-RTO: 0 /100 WBC (ref 0–0.2)
PLATELET # BLD AUTO: 246 10*3/MM3 (ref 140–450)
PMV BLD AUTO: 10.6 FL (ref 6–12)
POTASSIUM SERPL-SCNC: 3.7 MMOL/L (ref 3.5–5.2)
PROT SERPL-MCNC: 4.3 G/DL (ref 6–8.5)
RBC # BLD AUTO: 2 10*6/MM3 (ref 4.14–5.8)
SODIUM SERPL-SCNC: 132 MMOL/L (ref 136–145)
WBC NRBC COR # BLD AUTO: 19.39 10*3/MM3 (ref 3.4–10.8)

## 2024-07-21 PROCEDURE — 97116 GAIT TRAINING THERAPY: CPT

## 2024-07-21 PROCEDURE — 97530 THERAPEUTIC ACTIVITIES: CPT

## 2024-07-21 PROCEDURE — 85025 COMPLETE CBC W/AUTO DIFF WBC: CPT | Performed by: INTERNAL MEDICINE

## 2024-07-21 PROCEDURE — 80053 COMPREHEN METABOLIC PANEL: CPT | Performed by: INTERNAL MEDICINE

## 2024-07-21 RX ORDER — AMOXICILLIN 250 MG
1 CAPSULE ORAL 2 TIMES DAILY
Status: DISCONTINUED | OUTPATIENT
Start: 2024-07-21 | End: 2024-07-21 | Stop reason: HOSPADM

## 2024-07-21 RX ORDER — BISACODYL 10 MG
10 SUPPOSITORY, RECTAL RECTAL DAILY
Status: DISCONTINUED | OUTPATIENT
Start: 2024-07-21 | End: 2024-07-21 | Stop reason: HOSPADM

## 2024-07-21 RX ORDER — BISACODYL 5 MG/1
5 TABLET, DELAYED RELEASE ORAL DAILY PRN
Qty: 30 TABLET | Refills: 0 | Status: SHIPPED | OUTPATIENT
Start: 2024-07-21

## 2024-07-21 RX ORDER — FUROSEMIDE 40 MG/1
20 TABLET ORAL DAILY
Qty: 14 TABLET | Refills: 0 | Status: SHIPPED | OUTPATIENT
Start: 2024-07-21

## 2024-07-21 RX ORDER — POLYETHYLENE GLYCOL 3350 17 G/17G
17 POWDER, FOR SOLUTION ORAL DAILY
Status: DISCONTINUED | OUTPATIENT
Start: 2024-07-21 | End: 2024-07-21 | Stop reason: HOSPADM

## 2024-07-21 RX ADMIN — ACETAMINOPHEN 650 MG: 325 TABLET, FILM COATED ORAL at 00:34

## 2024-07-21 RX ADMIN — NALOXEGOL OXALATE 12.5 MG: 25 TABLET, FILM COATED ORAL at 09:42

## 2024-07-21 RX ADMIN — POLYETHYLENE GLYCOL 3350 17 G: 17 POWDER, FOR SOLUTION ORAL at 09:42

## 2024-07-21 RX ADMIN — FUROSEMIDE 40 MG: 40 TABLET ORAL at 09:42

## 2024-07-21 RX ADMIN — ASPIRIN 81 MG CHEWABLE TABLET 81 MG: 81 TABLET CHEWABLE at 09:41

## 2024-07-21 RX ADMIN — PANTOPRAZOLE SODIUM 40 MG: 40 TABLET, DELAYED RELEASE ORAL at 09:42

## 2024-07-21 RX ADMIN — Medication 250 MG: at 09:46

## 2024-07-21 RX ADMIN — BISACODYL 10 MG: 10 SUPPOSITORY RECTAL at 09:42

## 2024-07-21 RX ADMIN — Medication 10 ML: at 09:43

## 2024-07-21 RX ADMIN — SENNOSIDES AND DOCUSATE SODIUM 2 TABLET: 50; 8.6 TABLET ORAL at 06:05

## 2024-07-21 RX ADMIN — CETIRIZINE HYDROCHLORIDE 10 MG: 10 TABLET, FILM COATED ORAL at 09:42

## 2024-07-21 RX ADMIN — Medication 10 ML: at 09:45

## 2024-07-21 RX ADMIN — Medication 5000 UNITS: at 09:41

## 2024-07-21 NOTE — THERAPY TREATMENT NOTE
"Subjective: Pt agreeable to therapeutic plan of care. Pt expressed feeling nauseous during brief gait training.    Objective:     WB'ing status: R Lower Extremity Weight Bearing As Tolerated    Precautions: High falls risk    Bed mobility - Min-A  Transfers - Min-A and with rolling walker  Ambulation - 5 feet CGA, Min-A, and with rolling walker    Therapeutic Exercise - Pt educated on ther ex throughout day    Vitals: WNL    Pain: 0 VAS   Location: N/a  Intervention for pain: N/A    Education: Provided education on the importance of mobility in the acute care setting, Verbal/Tactile Cues, Transfer Training, Gait Training, and Energy conservation strategies    Assessment: Erik Fuentes presents with functional mobility impairments which indicate the need for skilled intervention. Pt required min A during bed mobility due to decreased functional strength in RLE. Pt required cues for proper hand placement during STS utilizing RW. Pt ambulated ~5' before requesting to sit down due to feeling nauseous. Pt seated on bedside commode as pt asked to sit and attempt using restroom. Tolerating session today without incident. Will continue to follow and progress as tolerated.     Plan/Recommendations:   If medically appropriate, Moderate Intensity Therapy recommended post-acute care. This is recommended as therapy feels the patient would require 3-4 days per week and wouldn't tolerate \"3 hour daily\" rehab intensity. SNF would be the preferred choice. If the patient does not agree to SNF, arrange HH or OP depending on home bound status. If patient is medically complex, consider LTACH. Pt requires no DME at discharge.     Pt desires Skilled Rehab placement at discharge. Pt cooperative; agreeable to therapeutic recommendations and plan of care.         Basic Mobility 6-click:  Rollin = Total, A lot = 2, A little = 3; 4 = None  Supine>Sit:   1 = Total, A lot = 2, A little = 3; 4 = None   Sit>Stand with arms:  1 = " Total, A lot = 2, A little = 3; 4 = None  Bed>Chair:   1 = Total, A lot = 2, A little = 3; 4 = None  Ambulate in room:  1 = Total, A lot = 2, A little = 3; 4 = None  3-5 Steps with railin = Total, A lot = 2, A little = 3; 4 = None  Score: 16    Modified [place]: N/A = No pre-op stroke/TIA    Post-Tx Position: Call light and personal items within reach; pt seated on bedside commode as pt asked to have privacy   PPE: gloves and surgical mask

## 2024-07-21 NOTE — PLAN OF CARE
Goal Outcome Evaluation:                      Erik Fuentes presents with functional mobility impairments which indicate the need for skilled intervention. Pt required min A during bed mobility due to decreased functional strength in RLE. Pt required cues for proper hand placement during STS utilizing RW. Pt ambulated ~5' before requesting to sit down due to feeling nauseous. Pt seated on bedside commode as pt asked to sit and attempt using restroom. Tolerating session today without incident. Will continue to follow and progress as tolerated.

## 2024-07-21 NOTE — PLAN OF CARE
Goal Outcome Evaluation:      Patient able to make needs known. Pain treated per MAR. VSS on room air. Personal items and call light in reach. Plan of care on going.

## 2024-07-21 NOTE — DISCHARGE SUMMARY
[facility] Medicine Services  Discharge Summary    Date of Service: 24  Patient Name: Erik Fuentes  : 1960  MRN: [id number]    Date of Admission: 2024  Discharge Diagnosis: Septic arthritis  Date of Discharge:  24  Primary Care Physician: Tamar Eugene APRN      Presenting Problem:   Septic arthritis of knee, right [M00.9]  History of right knee joint replacement [Z96.651]  Pyogenic arthritis of right knee joint, due to unspecified organism [M00.9]    Active and Resolved Hospital Problems:  Active Hospital Problems    Diagnosis POA    **Septic arthritis of knee, right [M00.9] Yes    Cirrhosis of liver without ascites [K74.60] Yes    Peripheral edema [R60.0] Yes    Essential hypertension [I10] Yes    Alcohol abuse [F10.10] Yes    Hemochromatosis, hereditary [E83.110] Yes    Age-related nuclear cataract of both eyes [H25.13] Yes      Resolved Hospital Problems   No resolved problems to display.     Septic arthritis of right knee:  -presents with right knee pain, swelling, warmth.   -XR: large suprapatellar joint effusion with swelling.  -s/p drainage 50ml from right knee in ED--fluid analysis pending  -blood cultures pending  -empirically treat with zosyn and vanco. Pharmacy to dose  -ortho consult. Appreciate recommendations. S/p Right Total Knee Arthroplasty Revision 24  -meets sepsis criteria on admission with fever, leukocytosis, source of infection. LA normal. CRP 2.31.   -IVFs.   -pain control.   -uric acid negative. Denies hx of RA.    -ID consulted       Hypertension:  -BP may be labile with pain response  -trend BP  -continue home medications once dosing verified with pharmacy.       Liver cirrhosis:  -follows with OP GI  -admission: AST 90. Alk phos 123. T. Bilirubin 3.5     GERD:  Protonix for GI Ppx.     RACH - pre-renal/ATN   Cr better at 1.49.  Na slightly lower at 127.  D/C IVF.  Will give Lasix 40 mg IV x 1 today.      Hyponatremia-Continue fluid  restriction   Hospital Course     History of Present Illness: Erik Fuentes is a 64 y.o. male with a CMH of BPH, hemochromatosis, prostate cancer, liver cirrhosis, esophageal varices, GERD, HTN who presented to [facility] on 7/12/2024 with right knee pain. Lives at home with wife, independent with ADLs.   Presents to ED with right knee pains. States intermittent right knee pain over past 2 years. Today, after returning home from chiropractor with worsening right knee pain and swelling. Denies recent illness or injury. States was in [place] 2 weeks ago with abrasion to left hand. On arrival to ED VS: 100.6-87-/89-96% room air.      Upon evaluation, awake, alert, resting in bed, wife at bedside. Current VS: 96-/84-96% room air. Right knee with swelling and warmth. No injury to RLE or right foot noted. Endorses unable to bear weight RLE.   XR right knee reveals large suprapatellar joint effusion with swelling. Right total knee prosthesis without periprosthetic fracture  Blood work reveals WBC 13.9, Hgb 13.8, Hct 39, platelets 171, sodium 135, AST 90. Alk phos 123. T. Bilirubin 3.5. CRP 2.31     7/13/24 seen and examined medical distress, vital signs stable, no complaints, still having right knee pain and swelling.  Discussed with RN.  Consulted ID.  Tmax 100.6.  7/14/24 patient seen in bed no acute distress, vital signs stable, underwent right Total Knee Arthroplasty Revision discussed with RN.  Tolerating antibiotics.  7/15/2024 patient seen and examined in medical distress, no new complaints, pain well-controlled.  Discussed with RN, tolerating antibiotics.  Awaiting placement  7/16/24 patient seen and examined in bed no acute distress, complaining of knee pain.  Vital signs stable, discussed with RN, discussed with case , awaiting placement  7/17/2024 patient seen and examined in bed no acute distress, vital signs stable, tolerating antibiotics, his white count went up to 22.   Creatinine went up to 1.62 today 1.5.  7/18/2024 patient seen and examined in bed no acute distress, new complaints, awaiting placement, tolerating antibiotics.  Creatinine one 1.49 on IV fluids.  WBC count 21.  7/19/24 seen in bed NAD, feeling better today, will dc to nursing home, condition om dc stable.  7/20/2024 patient seen and examined in bed acute distress, doing better today, anxious to go home.  Awaiting sputum cultures, discussed with RN.  Vital signs stable, WBC 20.  Renal function improving creatinine 1.3   7/21/2024 patient seen and examined in bed no acute distress, vital signs stable, anxious to go home.  Plan to discharge to nursing home today.  Condition on discharge stable.    DISCHARGE Follow Up Recommendations for labs and diagnostics: follow with pcp in one week  Follow with ortho 4-6 weeks      Reasons For Change In Medications and Indications for New Medications:  START taking:  aspirin   cefTRIAXone 2,000 mg in sodium chloride 0.9 % 100 mL IVPB   midodrine (PROAMATINE)   oxyCODONE-acetaminophen (Percocet)   polyethylene glycol (MIRALAX)   saccharomyces boulardii (FLORASTOR)   sennosides-docusate (PERICOLACE)    STOP taking:  benazepril 40 MG tablet (LOTENSIN)     Day of Discharge     Vital Signs:  Temp:  [97.9 °F (36.6 °C)-99.2 °F (37.3 °C)] 98.8 °F (37.1 °C)  Heart Rate:  [79-98] 79  Resp:  [17-18] 17  BP: (140-152)/(66-80) 149/75      Physical Exam      General: He is not in acute distress.     Appearance: Normal appearance. He is not ill-appearing or toxic-appearing.   HENT:      Head: Normocephalic and atraumatic.      Mouth/Throat:      Mouth: Mucous membranes are moist.   Eyes:      Extraocular Movements: Extraocular movements intact.      Pupils: Pupils are equal, round, and reactive to light.   Cardiovascular:      Rate and Rhythm: Normal rate. Rhythm irregular.      Pulses: Normal pulses.      Heart sounds: Normal heart sounds.   Pulmonary:      Effort: Pulmonary effort is normal.  No respiratory distress.      Breath sounds: Normal breath sounds.   Abdominal:      General: Bowel sounds are normal. There is no distension.      Palpations: Abdomen is soft.      Tenderness: There is no abdominal tenderness.   Musculoskeletal:      Comments: Right knee swelling with warmth.    Skin:     General: Skin is warm and dry.   Neurological:      Mental Status: He is alert and oriented to person, place, and time. Mental status is at baseline.      Scheduled Meds       Pertinent  and/or Most Recent Results     LAB RESULTS:      Lab 07/21/24  0605 07/20/24  0526 07/18/24  2327 07/17/24  2348 07/17/24  0032   WBC 19.39* 20.32* 22.35* 21.20* 22.97*   HEMOGLOBIN 7.5* 7.7* 7.2* 7.4* 8.2*   HEMATOCRIT 21.0* 21.9* 20.0* 20.2* 22.6*   PLATELETS 246 257 234 201 220   NEUTROS ABS 14.71* 15.13* 16.72* 16.08* 18.12*   IMMATURE GRANS (ABS) 0.62* 0.93* 1.40* 0.59* 0.56*   LYMPHS ABS 1.62 1.62 1.34 1.89 1.75   MONOS ABS 1.52* 1.79* 2.13* 2.08* 2.35*   EOS ABS 0.84* 0.77* 0.71* 0.52* 0.16   .0* 104.3* 103.6* 102.5* 103.7*         Lab 07/21/24  0605 07/20/24  0526 07/19/24  1716 07/18/24  2327 07/17/24  2348 07/17/24  0932 07/17/24  0032   SODIUM 132* 128* 132* 129* 127*   < > 129*   POTASSIUM 3.7 3.7 4.3 3.6 3.8   < > 3.8   CHLORIDE 101 99 101 100 100   < > 97*   CO2 22.4 21.0* 17.7* 21.9* 20.2*   < > 20.8*   ANION GAP 8.6 8.0 13.3 7.1 6.8   < > 11.2   BUN 27* 28* 29* 32* 34*   < > 35*   CREATININE 1.41* 1.33* 1.36* 1.50* 1.49*   < > 1.62*   EGFR 55.0* 59.0* 57.4* 51.0* 51.4*   < > 46.5*   GLUCOSE 92 97 131* 122* 102*   < > 111*   CALCIUM 7.2* 7.4* 7.5* 7.4* 7.6*   < > 7.5*   IONIZED CALCIUM  --   --   --   --   --   --  1.10*   MAGNESIUM  --  1.9 2.3 1.5* 1.6  --  1.9   PHOSPHORUS  --  3.2  --   --  3.0  --  2.7    < > = values in this interval not displayed.         Lab 07/21/24  0605 07/17/24  2348 07/17/24  0032   TOTAL PROTEIN 4.3* 4.4* 4.9*   ALBUMIN 2.2* 2.2* 2.5*   GLOBULIN 2.1 2.2 2.4   ALT (SGPT) 24 27 30    AST (SGOT) 57* 63* 61*   BILIRUBIN 2.0* 1.8* 1.6*   ALK PHOS 98 87 89         Lab 07/19/24  1716 07/17/24  0932   PROBNP 361.0 269.0                   Brief Urine Lab Results  (Last result in the past 365 days)        Color   Clarity   Blood   Leuk Est   Nitrite   Protein   CREAT   Urine HCG        07/16/24 1406 Dark Yellow   Clear   Negative   Negative   Negative   Negative                 Microbiology Results (last 10 days)       Procedure Component Value - Date/Time    Legionella Antigen, Urine - Urine, Urine, Clean Catch [[id number]]  (Normal) Collected: 07/20/24 1347    Lab Status: Final result Specimen: Urine, Clean Catch Updated: 07/20/24 1413     LEGIONELLA ANTIGEN, URINE Negative    S. Pneumo Ag Urine or CSF - Urine, Urine, Clean Catch [[id number]]  (Normal) Collected: 07/20/24 1347    Lab Status: Final result Specimen: Urine, Clean Catch Updated: 07/20/24 1413     Strep Pneumo Ag Negative    Respiratory Panel PCR w/COVID-19(SARS-CoV-2) [facility]/PAD/COR/KATHERIN In-House, NP Swab in UTM/VTM, 2 HR TAT - Swab, Nasopharynx [[id number]]  (Normal) Collected: 07/20/24 1312    Lab Status: Final result Specimen: Swab from Nasopharynx Updated: 07/20/24 1411     ADENOVIRUS, PCR Not Detected     Coronavirus 229E Not Detected     Coronavirus HKU1 Not Detected     Coronavirus NL63 Not Detected     Coronavirus OC43 Not Detected     COVID19 Not Detected     Human Metapneumovirus Not Detected     Human Rhinovirus/Enterovirus Not Detected     Influenza A PCR Not Detected     Influenza B PCR Not Detected     Parainfluenza Virus 1 Not Detected     Parainfluenza Virus 2 Not Detected     Parainfluenza Virus 3 Not Detected     Parainfluenza Virus 4 Not Detected     RSV, PCR Not Detected     Bordetella pertussis pcr Not Detected     Bordetella parapertussis PCR Not Detected     Chlamydophila pneumoniae PCR Not Detected     Mycoplasma pneumo by PCR Not Detected    Narrative:      In the setting of a positive respiratory panel with a  viral infection PLUS a negative procalcitonin without other underlying concern for bacterial infection, consider observing off antibiotics or discontinuation of antibiotics and continue supportive care. If the respiratory panel is positive for atypical bacterial infection (Bordetella pertussis, Chlamydophila pneumoniae, or Mycoplasma pneumoniae), consider antibiotic de-escalation to target atypical bacterial infection.    Respiratory Culture - Sputum, Cough [[id number]] Collected: 07/20/24 1139    Lab Status: Preliminary result Specimen: Sputum from Cough Updated: 07/21/24 0946     Respiratory Culture Scant growth (1+) The culture consists of normal respiratory laverne. This is a preliminary report; final report to follow.     Gram Stain Many (4+) WBCs per low power field      Rare (1+) Epithelial cells per low power field      Rare (1+) Gram positive cocci    Body Fluid Culture - Body Fluid, Knee, Right [[id number]]  (Abnormal)  (Susceptibility) Collected: 07/12/24 1711    Lab Status: Final result Specimen: Body Fluid from Knee, Right Updated: 07/15/24 0840     Body Fluid Culture Streptococcus mitis / oralis     Gram Stain Many (4+) WBCs per low power field      No organisms seen    Narrative:      In broth only.    Susceptibility        Streptococcus mitis / oralis      VAIBHAV      Ceftriaxone Susceptible      Penicillin G Susceptible      Vancomycin Susceptible                           Blood Culture - Blood, Arm, Left [[id number]]  (Abnormal) Collected: 07/12/24 1516    Lab Status: Final result Specimen: Blood from Arm, Left Updated: 07/15/24 0642     Blood Culture Staphylococcus, coagulase negative     Isolated from Aerobic Bottle     Gram Stain Aerobic Bottle Gram positive cocci in clusters    Narrative:      Probable contaminant requires clinical correlation, susceptibility not performed unless requested by physician.      Blood Culture ID, PCR - Blood, Arm, Left [[id number]]  (Abnormal) Collected: 07/12/24 1516     Lab Status: Final result Specimen: Blood from Arm, Left Updated: 07/13/24 2147     BCID, PCR Staph spp, not aureus or lugdunensis. Identification by BCID2 PCR.     BOTTLE TYPE Aerobic Bottle    Blood Culture - Blood, Arm, Left [[id number]]  (Normal) Collected: 07/12/24 1441    Lab Status: Final result Specimen: Blood from Arm, Left Updated: 07/17/24 1445     Blood Culture No growth at 5 days            XR Chest 1 View    Result Date: 7/20/2024  Impression: Impression: 1. Left and right groundglass and interstitial opacities. Differential includes pulmonary edema or atypical infection. Electronically Signed: Shoaib Churchill MD  7/20/2024 6:59 PM EDT  Workstation ID: [id number]    CT Chest Without Contrast Diagnostic    Result Date: 7/19/2024  Impression: Impression: Extensive patchy bilateral groundglass airspace disease, left greater than right. Finding may be secondary to atypical pneumonia versus ARDS versus less likely pulmonary edema. Trace left pleural effusion. Cirrhotic appearing liver. Low-density lesion in the right hepatic lobe measures 1.3 cm, indeterminate. Correlate with hepatic protocol CT or MR as clinically warranted. Moderate volume ascites. Electronically Signed: Phoenix Love MD  7/19/2024 10:16 PM EDT  Workstation ID: [id number]    US Renal Bilateral    Result Date: 7/16/2024  Impression: Impression: No evidence of hydronephrosis or cortical thinning. Electronically Signed: Phoenix Love MD  7/16/2024 6:31 PM EDT  Workstation ID: [id number]    XR Knee 1 or 2 View Right    Result Date: 7/14/2024  Impression: Impression: Revision of right knee prosthesis as above. Electronically Signed: Emanuel Humphrey MD  7/14/2024 11:12 AM EDT  Workstation ID: [id number]    XR Knee 3 View Right    Result Date: 7/12/2024  Impression: Impression: 1. Large suprapatellar joint effusion and soft tissue swelling. Findings could reflect cellulitis with infection possible given the clinical symptoms. 2. Right total knee prosthesis  without evidence of periprosthetic fracture. Electronically Signed: Cesar Palumboelor  7/12/2024 3:00 PM EDT  Workstation ID: [id number]                 Labs Pending at Discharge:  Pending Labs       Order Current Status    Respiratory Culture - Sputum, Cough Preliminary result            Procedures Performed  Procedure(s):  KNEE ARTHROPLASTY REVISION         Consults:   Consults       Date and Time Order Name Status Description    7/20/2024  8:14 AM Inpatient Pulmonology Consult      7/16/2024  9:29 AM Inpatient Nephrology Consult Completed     7/13/2024 10:07 AM Inpatient Infectious Diseases Consult Completed     7/12/2024  4:47 PM Hospitalist (on-call MD unless specified)      7/12/2024  4:11 PM Ortho (on-call MD unless specified) Completed             Assessment & Plan  Antimicrobial Therapy   1.  IV Rocephin                                                                                                                                               Assessment    Right total knee infection.  Patient had a aspiration of the right knee with fluid analysis indicating infection.  Cultures are growing Streptococcus mitis/oralis.  Patient is status post hardware removal and antibiotic spacer placement to the right knee on 7/14/2024.     Positive blood culture and 1 out of 2 blood cultures from admission growing a coagulase-negative Staphylococcus.  Patient did not come with a line, port and has no known history of cardiac valve history or device placement.  This is likely contamination     Liver cirrhosis-patient has been sober for approximately 2 months.  Elevated liver transaminase and hyperbilirubinemia are improving since admission     History of prostate cancer     Acute kidney injury probably secondary to volume contraction.  Nephrology now following     Worsening leukocytosis-could be related to surgery/postsurgical hematoma.  Patient denies any diarrhea shortness of breath or urinary symptoms     Plan     Continue  IV Rocephin 2 g every 24 hours-patient will need 6 weeks of treatment from surgery-last day on August 24, 2024     Patient will need a PICC line before discharge-please remove when IV antibiotics are completed.  Standard weekly PICC line care  Weekly labs CBC, creatinine, sed rate x 6 weeks  Patient will need to be on 6 weeks of IV antibiotics from surgery, then show no signs of infection for 2 weeks before new hardware should be considered for reimplantation  Continue supportive care  Okay to discharge to rehab from ID standpoint     Please fax all post discharge lab results, imaging studies and correspondence to this fax number [phone]  For any question or concern please contact our service number [phone]        Geri Vieyra, CRAMEN  07/18/24  17:17 EDT       Discharge Details        Discharge Medications        New Medications        Instructions Start Date   aspirin 81 MG chewable tablet   81 mg, Oral, 2 Times Daily      bisacodyl 5 MG EC tablet  Commonly known as: DULCOLAX   5 mg, Oral, Daily PRN      cefTRIAXone 2,000 mg in sodium chloride 0.9 % 100 mL IVPB   2,000 mg, Intravenous, Every 24 Hours      furosemide 40 MG tablet  Commonly known as: LASIX   20 mg, Oral, Daily      midodrine 5 MG tablet  Commonly known as: PROAMATINE   5 mg, Oral, 3 Times Daily Before Meals      oxyCODONE-acetaminophen  MG per tablet  Commonly known as: Percocet   1 tablet, Oral, Every 6 Hours PRN      polyethylene glycol 17 g packet  Commonly known as: MIRALAX   17 g, Oral, Daily PRN      saccharomyces boulardii 250 MG capsule  Commonly known as: FLORASTOR   250 mg, Oral, 2 Times Daily      sennosides-docusate 8.6-50 MG per tablet  Commonly known as: PERICOLACE   2 tablets, Oral, 2 Times Daily PRN             Continue These Medications        Instructions Start Date   fexofenadine 180 MG tablet  Commonly known as: ALLEGRA   180 mg, Oral, Daily      OSTEO BI-FLEX REGULAR STRENGTH PO   1 tablet, Oral, Daily       pantoprazole 40 MG EC tablet  Commonly known as: PROTONIX   40 mg, Oral, Every Morning Before Breakfast      PATADAY OP   1 drop, Both Eyes, 2 times daily      Soolantra 1 % cream  Generic drug: Ivermectin   1 Application, Topical, Daily PRN      SUPER B COMPLEX/VITAMIN C PO   1 tablet, Oral, Daily      triamcinolone 0.1 % cream  Commonly known as: KENALOG   1 Application, Topical, Daily PRN      Triamcinolone Acetonide 55 MCG/ACT nasal inhaler  Commonly known as: NASACORT   2 sprays, Nasal, Daily      Vitamin D3 25 MCG (1000 UT) capsule   1,000 Units, Oral, Daily             Stop These Medications      benazepril 40 MG tablet  Commonly known as: LOTENSIN              Allergies   Allergen Reactions    Morphine Headache         Discharge Disposition:   Skilled Nursing Facility (DC - External)    Diet:  Hospital:  Diet Order   Procedures    Diet: Regular/House, Fluid Restriction (240 mL/tray); 2000 mL/day; Fluid Consistency: Thin (IDDSI 0)         Discharge Activity:   Activity Instructions       Gradually Increase Activity Until at Pre-Hospitalization Level      Gradually Increase Activity Until at Pre-Hospitalization Level                CODE STATUS:  Code Status and Medical Interventions:   Ordered at: 07/12/24 0376     Level Of Support Discussed With:    Patient     Code Status (Patient has no pulse and is not breathing):    CPR (Attempt to Resuscitate)     Medical Interventions (Patient has pulse or is breathing):    Full Support         Future Appointments   Date Time Provider Department Center   9/11/2024 10:30 AM Tamar Eugene APRN [facility]       Additional Instructions for the Follow-ups that You Need to Schedule       Discharge Follow-up with PCP   As directed       Currently Documented PCP:    Tamar Eugene APRN    PCP Phone Number:    [phone]     Follow Up Details: 1 week        Discharge Follow-up with Specified Provider: renal; 1 Week   As directed      To: renal   Follow Up: 1  Week                Time spent on Discharge including face to face service:  >30 minutes    Signature: Electronically signed by Rio Griffin MD, 07/21/24, 11:23 EDT.  [facility] Hospitalist Team

## 2024-07-21 NOTE — CASE MANAGEMENT/SOCIAL WORK
Case Management Discharge Note      Final Note: [facility]    Provided Post Acute Provider List?: Yes  Post Acute Provider List: Nursing Home  Delivered To: Patient  Method of Delivery: In person    Selected Continued Care - Discharged on 7/21/2024 Admission date: 7/12/2024 - Discharge disposition: Skilled Nursing Facility (DC - External)      Destination Coordination complete.      Service Provider Selected Services Address Phone Fax Patient Preferred    [facility] [address] [address] [phone] --                 Transportation Services  Private: Car    Final Discharge Disposition Code: 03 - skilled nursing facility (SNF)

## 2024-07-21 NOTE — CASE MANAGEMENT/SOCIAL WORK
Continued Stay Note  DOMINICK Chin     Patient Name: Erik Fuentes  MRN: [id number]  Today's Date: 7/21/2024    Admit Date: 7/12/2024    Plan: Accepted to [facility]. No precert needed. PASRR approved, Will go on IV antibiotics.   Discharge Plan       Row Name 07/21/24 1254       Plan    Plan Comments updated liasion that pt is ready for d/c and confirmed bed was avail.  family to transport.                 Expected Discharge Date and Time       Expected Discharge Date Expected Discharge Time    Jul 21, 2024               Mague Guillaume RN

## 2024-07-21 NOTE — DISCHARGE INSTR - LAB
Call Monday to make an appointment with renal [phone]     Call Monday to make a follow up appointment with Ortho- make for 4 weeks [phone]
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
## 2024-07-22 LAB
BACTERIA SPEC RESP CULT: NORMAL
GRAM STN SPEC: NORMAL

## 2024-07-23 DIAGNOSIS — E83.19 IRON OVERLOAD: ICD-10-CM

## 2024-07-23 DIAGNOSIS — E83.110 HEMOCHROMATOSIS, HEREDITARY: Primary | ICD-10-CM

## 2024-07-24 ENCOUNTER — TELEPHONE (OUTPATIENT)
Dept: INFUSION THERAPY | Facility: HOSPITAL | Age: 64
End: 2024-07-24

## 2024-07-24 NOTE — TELEPHONE ENCOUNTER
Spoke to patient her currently in [facility]. They are taking care of his labs. He will call us back to schedule when he is ready to return for his labs.

## 2024-07-29 NOTE — PROGRESS NOTES
"Enter Query Response Below      Query Response: - Septic Arthritis only Electronically signed by Rio Griffin MD, 24, 2:05 PM EDT.              If applicable, please update the problem list.     Patient: Erik Fuentes        : 1960  Account: [id number]           Admit Date: 2024        How to Respond to this query:       a. Click New Note     b. Answer query within the yellow box.                c. Update the Problem List, if applicable.      ,    64 year old male with PMH of Liver Cirrhosis, HTN, h/o Total knee replacement admitted  for Septic arthritis of the right knee.  Admit labs WBC 13.90, CRP 2.3, Absolute Neutrophils, Glucose 111 (in non-diabetic).  Admit VS Temp 100.6  H&P, progress notes - and the discharge summary include \"Septic Arthritis\" and \"meets sepsis criteria on admission with fever, leukocytosis, source of infection. LA normal. CRP 2.31.\"  Patient was treated with Vancomycin (- 7/15), Rocephin (- ), NS boluses (total 1.5 L on ), Zosyn (, , , )  Patient was discharged on Rocephin Q 24 hrs for an additional 36 doses.      Please clarify the following:    - Septic Arthritis only  - Sepsis cause by Septic Arthritis ruled in, present on admission  - Other- specify______  - Unable to determine    By submitting this query, we are merely seeking further clarification of documentation to accurately reflect all conditions that you are monitoring, evaluating, treating or that extend the hospitalization or utilize additional resources of care. Please utilize your independent clinical judgment when addressing the question(s) above.     This query and your response, once completed, will be entered into the legal medical record.    Sincerely,  Man Clay RN CDIS  Clinical Documentation Integrity Program   [email or web address]  "

## 2024-08-28 ENCOUNTER — TELEPHONE (OUTPATIENT)
Dept: FAMILY MEDICINE CLINIC | Facility: CLINIC | Age: 64
End: 2024-08-28
Payer: MEDICARE

## 2024-08-28 NOTE — TELEPHONE ENCOUNTER
He was at [facility] for Septic arthritis, went to [facility] for IV antibiotics and is now needing the transitional home health

## 2024-08-28 NOTE — TELEPHONE ENCOUNTER
Katie with [facility] is calling for a verbal ok to receive home health,nursing and therapy  Katie- [phone]

## 2024-09-09 NOTE — PROGRESS NOTES
"Subjective   Erik Fuentes is a 64 y.o. male.       HPI   Pt is here today for 6 month follow up on chronic medical conditions.    1) HTN - currently on benazepril 40 mg daily. Denies any CP; palpitations; SOA; dizziness; headache; trouble with vision.  BP running 110-120's/70-80's at home.    2) GERD - currently on pantoprazole 40 mg daily. Symptoms stable.     3) Recently at [facility] for infection in right knee.           7/12/24-7/21/24. Then went to [facility] for rehab; left rehab 8/27/24.  Now has home health therapy.  Scheduled to have right knee replaced again with Dr. Rollins on 10/3/24.    .    Hospital course per hospital records: \"Erik Fuentes is a 64 y.o. male with a CMH of BPH, hemochromatosis, prostate cancer, liver cirrhosis, esophageal varices, GERD, HTN who presented to [facility] on 7/12/2024 with right knee pain. Lives at home with wife, independent with ADLs.   Presents to ED with right knee pains. States intermittent right knee pain over past 2 years. Today, after returning home from chiropractor with worsening right knee pain and swelling. Denies recent illness or injury. States was in [place] 2 weeks ago with abrasion to left hand. On arrival to ED VS: 100.6-87-/89-96% room air.      Upon evaluation, awake, alert, resting in bed, wife at bedside. Current VS: 96-/84-96% room air. Right knee with swelling and warmth. No injury to RLE or right foot noted. Endorses unable to bear weight RLE.   XR right knee reveals large suprapatellar joint effusion with swelling. Right total knee prosthesis without periprosthetic fracture  Blood work reveals WBC 13.9, Hgb 13.8, Hct 39, platelets 171, sodium 135, AST 90. Alk phos 123. T. Bilirubin 3.5. CRP 2.31     7/13/24 seen and examined medical distress, vital signs stable, no complaints, still having right knee pain and swelling.  Discussed with RN.  Consulted ID.  Tmax 100.6.  7/14/24 patient seen in bed no acute distress, vital " "signs stable, underwent right Total Knee Arthroplasty Revision discussed with RN.  Tolerating antibiotics.  7/15/2024 patient seen and examined in medical distress, no new complaints, pain well-controlled.  Discussed with RN, tolerating antibiotics.  Awaiting placement  7/16/24 patient seen and examined in bed no acute distress, complaining of knee pain.  Vital signs stable, discussed with RN, discussed with case , awaiting placement  7/17/2024 patient seen and examined in bed no acute distress, vital signs stable, tolerating antibiotics, his white count went up to 22.  Creatinine went up to 1.62 today 1.5.  7/18/2024 patient seen and examined in bed no acute distress, new complaints, awaiting placement, tolerating antibiotics.  Creatinine one 1.49 on IV fluids.  WBC count 21.  7/19/24 seen in bed NAD, feeling better today, will dc to nursing home, condition om dc stable.  7/20/2024 patient seen and examined in bed acute distress, doing better today, anxious to go home.  Awaiting sputum cultures, discussed with RN.  Vital signs stable, WBC 20.  Renal function improving creatinine 1.3   7/21/2024 patient seen and examined in bed no acute distress, vital signs stable, anxious to go home.  Plan to discharge to nursing home today.  Condition on discharge stable.     DISCHARGE Follow Up Recommendations for labs and diagnostics: follow with pcp in one week  Follow with ortho 4-6 weeks   Reasons For Change In Medications and Indications for New Medications:  START taking:  aspirin   cefTRIAXone 2,000 mg in sodium chloride 0.9 % 100 mL IVPB   midodrine (PROAMATINE)   oxyCODONE-acetaminophen (Percocet)   polyethylene glycol (MIRALAX)   saccharomyces boulardii (FLORASTOR)   sennosides-docusate (PERICOLACE)    STOP taking:  benazepril 40 MG tablet (LOTENSIN) \"        The following portions of the patient's history were reviewed and updated as appropriate: allergies, current medications, past family history, past " medical history, past social history, past surgical history, and problem list.    Review of Systems   Constitutional:  Negative for chills, fatigue and fever.   Respiratory:  Negative for cough, shortness of breath and wheezing.    Cardiovascular:  Negative for chest pain and palpitations.   Gastrointestinal:  Negative for abdominal pain, blood in stool, constipation, diarrhea, nausea, vomiting and GERD.   Genitourinary:  Negative for dysuria, frequency, hematuria and urgency.   Neurological:  Negative for dizziness, weakness, light-headedness and headache.   Psychiatric/Behavioral:  Negative for depressed mood. The patient is not nervous/anxious.        Objective   Physical Exam  Vitals reviewed.   Constitutional:       General: He is not in acute distress.     Appearance: Normal appearance.   Cardiovascular:      Rate and Rhythm: Normal rate and regular rhythm.      Pulses: Normal pulses.      Heart sounds: Normal heart sounds. No murmur heard.  Pulmonary:      Effort: Pulmonary effort is normal. No respiratory distress.      Breath sounds: Normal breath sounds. No wheezing or rhonchi.   Chest:      Chest wall: No tenderness.   Abdominal:      Tenderness: There is no right CVA tenderness or left CVA tenderness.   Skin:     General: Skin is warm and dry.      Findings: No erythema.   Neurological:      General: No focal deficit present.      Mental Status: He is alert and oriented to person, place, and time.   Psychiatric:         Mood and Affect: Mood normal.                  Procedures   Assessment & Plan   Diagnoses and all orders for this visit:    1. Essential hypertension (Primary)  Comments:  Stable.   Cont. current medication.   Cont. home monitoring.   Labs ordered.   RTO in 6 mo.  Orders:  -     Comprehensive metabolic panel; Future  -     Lipid panel; Future    2. Gastroesophageal reflux disease, unspecified whether esophagitis present  Comments:  Stable.   Cont. current medication.   Labs ordered.   RTO  in 6 mo.    3. Iron deficiency anemia, unspecified iron deficiency anemia type  Comments:  Has followup with GI tomorrow.    4. Pyogenic bacterial arthritis of right knee  Comments:  Keep follow up with Ortho.

## 2024-09-11 ENCOUNTER — OFFICE VISIT (OUTPATIENT)
Dept: FAMILY MEDICINE CLINIC | Facility: CLINIC | Age: 64
End: 2024-09-11
Payer: MEDICARE

## 2024-09-11 VITALS
OXYGEN SATURATION: 96 % | SYSTOLIC BLOOD PRESSURE: 132 MMHG | WEIGHT: 192 LBS | HEIGHT: 72 IN | HEART RATE: 84 BPM | BODY MASS INDEX: 26.01 KG/M2 | DIASTOLIC BLOOD PRESSURE: 72 MMHG

## 2024-09-11 DIAGNOSIS — K21.9 GASTROESOPHAGEAL REFLUX DISEASE, UNSPECIFIED WHETHER ESOPHAGITIS PRESENT: ICD-10-CM

## 2024-09-11 DIAGNOSIS — I10 ESSENTIAL HYPERTENSION: Primary | ICD-10-CM

## 2024-09-11 DIAGNOSIS — M00.861 PYOGENIC BACTERIAL ARTHRITIS OF RIGHT KNEE: ICD-10-CM

## 2024-09-11 DIAGNOSIS — D50.9 IRON DEFICIENCY ANEMIA, UNSPECIFIED IRON DEFICIENCY ANEMIA TYPE: ICD-10-CM

## 2024-09-11 PROCEDURE — 3075F SYST BP GE 130 - 139MM HG: CPT | Performed by: NURSE PRACTITIONER

## 2024-09-11 PROCEDURE — 99214 OFFICE O/P EST MOD 30 MIN: CPT | Performed by: NURSE PRACTITIONER

## 2024-09-11 PROCEDURE — G2211 COMPLEX E/M VISIT ADD ON: HCPCS | Performed by: NURSE PRACTITIONER

## 2024-09-11 PROCEDURE — 1159F MED LIST DOCD IN RCRD: CPT | Performed by: NURSE PRACTITIONER

## 2024-09-11 PROCEDURE — 3078F DIAST BP <80 MM HG: CPT | Performed by: NURSE PRACTITIONER

## 2024-09-11 PROCEDURE — 1160F RVW MEDS BY RX/DR IN RCRD: CPT | Performed by: NURSE PRACTITIONER

## 2024-09-11 RX ORDER — BENAZEPRIL HYDROCHLORIDE 40 MG/1
40 TABLET ORAL DAILY
COMMUNITY

## 2024-09-12 ENCOUNTER — OFFICE (AMBULATORY)
Age: 64
End: 2024-09-12

## 2024-09-12 ENCOUNTER — OFFICE (AMBULATORY)
Dept: URBAN - METROPOLITAN AREA CLINIC 64 | Facility: CLINIC | Age: 64
End: 2024-09-12

## 2024-09-12 VITALS
WEIGHT: 195 LBS | WEIGHT: 195 LBS | HEART RATE: 73 BPM | HEART RATE: 73 BPM | SYSTOLIC BLOOD PRESSURE: 137 MMHG | SYSTOLIC BLOOD PRESSURE: 159 MMHG | DIASTOLIC BLOOD PRESSURE: 79 MMHG | HEIGHT: 72 IN | DIASTOLIC BLOOD PRESSURE: 79 MMHG | SYSTOLIC BLOOD PRESSURE: 159 MMHG | DIASTOLIC BLOOD PRESSURE: 79 MMHG | SYSTOLIC BLOOD PRESSURE: 137 MMHG | HEIGHT: 72 IN | HEART RATE: 73 BPM | HEIGHT: 72 IN | DIASTOLIC BLOOD PRESSURE: 79 MMHG | DIASTOLIC BLOOD PRESSURE: 96 MMHG | WEIGHT: 195 LBS | HEIGHT: 72 IN | WEIGHT: 195 LBS | SYSTOLIC BLOOD PRESSURE: 159 MMHG | SYSTOLIC BLOOD PRESSURE: 137 MMHG | HEIGHT: 72 IN | HEART RATE: 73 BPM | DIASTOLIC BLOOD PRESSURE: 79 MMHG | SYSTOLIC BLOOD PRESSURE: 137 MMHG | DIASTOLIC BLOOD PRESSURE: 96 MMHG | SYSTOLIC BLOOD PRESSURE: 137 MMHG | HEIGHT: 72 IN | SYSTOLIC BLOOD PRESSURE: 137 MMHG | SYSTOLIC BLOOD PRESSURE: 137 MMHG | DIASTOLIC BLOOD PRESSURE: 96 MMHG | SYSTOLIC BLOOD PRESSURE: 159 MMHG | HEART RATE: 73 BPM | SYSTOLIC BLOOD PRESSURE: 159 MMHG | HEART RATE: 73 BPM | WEIGHT: 195 LBS | HEIGHT: 72 IN | WEIGHT: 195 LBS | DIASTOLIC BLOOD PRESSURE: 96 MMHG | HEART RATE: 73 BPM | SYSTOLIC BLOOD PRESSURE: 159 MMHG | DIASTOLIC BLOOD PRESSURE: 96 MMHG | WEIGHT: 195 LBS | DIASTOLIC BLOOD PRESSURE: 96 MMHG | SYSTOLIC BLOOD PRESSURE: 159 MMHG | DIASTOLIC BLOOD PRESSURE: 79 MMHG | DIASTOLIC BLOOD PRESSURE: 96 MMHG | DIASTOLIC BLOOD PRESSURE: 79 MMHG

## 2024-09-12 DIAGNOSIS — Z86.010 PERSONAL HISTORY OF COLON POLYPS: ICD-10-CM

## 2024-09-12 DIAGNOSIS — E83.19 OTHER DISORDERS OF IRON METABOLISM: ICD-10-CM

## 2024-09-12 DIAGNOSIS — K70.30 ALCOHOLIC CIRRHOSIS OF LIVER WITHOUT ASCITES: ICD-10-CM

## 2024-09-12 PROCEDURE — 99214 OFFICE O/P EST MOD 30 MIN: CPT | Performed by: INTERNAL MEDICINE

## 2024-09-26 ENCOUNTER — LAB (OUTPATIENT)
Dept: LAB | Facility: HOSPITAL | Age: 64
End: 2024-09-26
Payer: MEDICARE

## 2024-09-26 ENCOUNTER — HOSPITAL ENCOUNTER (OUTPATIENT)
Dept: GENERAL RADIOLOGY | Facility: HOSPITAL | Age: 64
Discharge: HOME | End: 2024-09-26
Payer: MEDICARE

## 2024-09-26 ENCOUNTER — HOSPITAL ENCOUNTER (OUTPATIENT)
Dept: CARDIOLOGY | Facility: HOSPITAL | Age: 64
Discharge: HOME | End: 2024-09-26
Payer: MEDICARE

## 2024-09-26 LAB
ABO GROUP BLD: NORMAL
ALBUMIN SERPL-MCNC: 3.3 G/DL (ref 3.5–5.2)
ALBUMIN/GLOB SERPL: 1.2 G/DL
ALP SERPL-CCNC: 132 U/L (ref 39–117)
ALT SERPL W P-5'-P-CCNC: 18 U/L (ref 1–41)
ANION GAP SERPL CALCULATED.3IONS-SCNC: 7.2 MMOL/L (ref 5–15)
AST SERPL-CCNC: 44 U/L (ref 1–40)
BASOPHILS # BLD AUTO: 0.06 10*3/MM3 (ref 0–0.2)
BASOPHILS NFR BLD AUTO: 1.2 % (ref 0–1.5)
BILIRUB SERPL-MCNC: 1.5 MG/DL (ref 0–1.2)
BILIRUB UR QL STRIP: NEGATIVE
BLD GP AB SCN SERPL QL: NEGATIVE
BUN SERPL-MCNC: 13 MG/DL (ref 8–23)
BUN/CREAT SERPL: 16.5 (ref 7–25)
CALCIUM SPEC-SCNC: 8.9 MG/DL (ref 8.6–10.5)
CHLORIDE SERPL-SCNC: 104 MMOL/L (ref 98–107)
CLARITY UR: CLEAR
CO2 SERPL-SCNC: 26.8 MMOL/L (ref 22–29)
COLOR UR: YELLOW
CREAT SERPL-MCNC: 0.79 MG/DL (ref 0.76–1.27)
CRP SERPL-MCNC: 0.39 MG/DL (ref 0–0.5)
DEPRECATED RDW RBC AUTO: 55.5 FL (ref 37–54)
EGFRCR SERPLBLD CKD-EPI 2021: 98 ML/MIN/1.73
EOSINOPHIL # BLD AUTO: 0.19 10*3/MM3 (ref 0–0.4)
EOSINOPHIL NFR BLD AUTO: 3.8 % (ref 0.3–6.2)
ERYTHROCYTE [DISTWIDTH] IN BLOOD BY AUTOMATED COUNT: 15.7 % (ref 12.3–15.4)
ERYTHROCYTE [SEDIMENTATION RATE] IN BLOOD: 25 MM/HR (ref 0–20)
GLOBULIN UR ELPH-MCNC: 2.8 GM/DL
GLUCOSE SERPL-MCNC: 101 MG/DL (ref 65–99)
GLUCOSE UR STRIP-MCNC: NEGATIVE MG/DL
HBA1C MFR BLD: 4.89 % (ref 4.8–5.6)
HCT VFR BLD AUTO: 30.7 % (ref 37.5–51)
HGB BLD-MCNC: 10.5 G/DL (ref 13–17.7)
HGB UR QL STRIP.AUTO: NEGATIVE
IMM GRANULOCYTES # BLD AUTO: 0.02 10*3/MM3 (ref 0–0.05)
IMM GRANULOCYTES NFR BLD AUTO: 0.4 % (ref 0–0.5)
INR PPP: 1.37 (ref 0.93–1.1)
KETONES UR QL STRIP: NEGATIVE
LEUKOCYTE ESTERASE UR QL STRIP.AUTO: NEGATIVE
LYMPHOCYTES # BLD AUTO: 1.11 10*3/MM3 (ref 0.7–3.1)
LYMPHOCYTES NFR BLD AUTO: 21.9 % (ref 19.6–45.3)
MCH RBC QN AUTO: 33.3 PG (ref 26.6–33)
MCHC RBC AUTO-ENTMCNC: 34.2 G/DL (ref 31.5–35.7)
MCV RBC AUTO: 97.5 FL (ref 79–97)
MONOCYTES # BLD AUTO: 0.64 10*3/MM3 (ref 0.1–0.9)
MONOCYTES NFR BLD AUTO: 12.6 % (ref 5–12)
MRSA DNA SPEC QL NAA+PROBE: NORMAL
NEUTROPHILS NFR BLD AUTO: 3.04 10*3/MM3 (ref 1.7–7)
NEUTROPHILS NFR BLD AUTO: 60.1 % (ref 42.7–76)
NITRITE UR QL STRIP: NEGATIVE
NRBC BLD AUTO-RTO: 0 /100 WBC (ref 0–0.2)
PH UR STRIP.AUTO: 7 [PH] (ref 5–8)
PLATELET # BLD AUTO: 205 10*3/MM3 (ref 140–450)
PMV BLD AUTO: 10.6 FL (ref 6–12)
POTASSIUM SERPL-SCNC: 3.5 MMOL/L (ref 3.5–5.2)
PROT SERPL-MCNC: 6.1 G/DL (ref 6–8.5)
PROT UR QL STRIP: NEGATIVE
PROTHROMBIN TIME: 14.6 SECONDS (ref 9.6–11.7)
QT INTERVAL: 409 MS
QTC INTERVAL: 440 MS
RBC # BLD AUTO: 3.15 10*6/MM3 (ref 4.14–5.8)
RH BLD: POSITIVE
SODIUM SERPL-SCNC: 138 MMOL/L (ref 136–145)
SP GR UR STRIP: 1.01 (ref 1–1.03)
T&S EXPIRATION DATE: NORMAL
UROBILINOGEN UR QL STRIP: NORMAL
WBC NRBC COR # BLD AUTO: 5.06 10*3/MM3 (ref 3.4–10.8)

## 2024-09-26 PROCEDURE — 81003 URINALYSIS AUTO W/O SCOPE: CPT

## 2024-09-26 PROCEDURE — 86901 BLOOD TYPING SEROLOGIC RH(D): CPT

## 2024-09-26 PROCEDURE — 86900 BLOOD TYPING SEROLOGIC ABO: CPT

## 2024-09-26 PROCEDURE — 87641 MR-STAPH DNA AMP PROBE: CPT

## 2024-09-26 PROCEDURE — 86850 RBC ANTIBODY SCREEN: CPT

## 2024-09-26 PROCEDURE — 80053 COMPREHEN METABOLIC PANEL: CPT

## 2024-09-26 PROCEDURE — 83036 HEMOGLOBIN GLYCOSYLATED A1C: CPT

## 2024-09-26 PROCEDURE — 71046 X-RAY EXAM CHEST 2 VIEWS: CPT

## 2024-09-26 PROCEDURE — 86140 C-REACTIVE PROTEIN: CPT | Performed by: ORTHOPAEDIC SURGERY

## 2024-09-26 PROCEDURE — 93005 ELECTROCARDIOGRAM TRACING: CPT | Performed by: ORTHOPAEDIC SURGERY

## 2024-09-26 PROCEDURE — 85025 COMPLETE CBC W/AUTO DIFF WBC: CPT | Performed by: ORTHOPAEDIC SURGERY

## 2024-09-26 PROCEDURE — 85652 RBC SED RATE AUTOMATED: CPT | Performed by: ORTHOPAEDIC SURGERY

## 2024-09-26 PROCEDURE — 36415 COLL VENOUS BLD VENIPUNCTURE: CPT | Performed by: ORTHOPAEDIC SURGERY

## 2024-09-26 PROCEDURE — 85610 PROTHROMBIN TIME: CPT

## 2024-10-01 ENCOUNTER — ANESTHESIA EVENT (OUTPATIENT)
Dept: PERIOP | Facility: HOSPITAL | Age: 64
End: 2024-10-01
Payer: MEDICARE

## 2024-10-03 ENCOUNTER — ANESTHESIA (OUTPATIENT)
Dept: PERIOP | Facility: HOSPITAL | Age: 64
End: 2024-10-03
Payer: MEDICARE

## 2024-10-03 ENCOUNTER — APPOINTMENT (OUTPATIENT)
Dept: GENERAL RADIOLOGY | Facility: HOSPITAL | Age: 64
DRG: 467 | End: 2024-10-03
Payer: MEDICARE

## 2024-10-03 ENCOUNTER — HOSPITAL ENCOUNTER (INPATIENT)
Facility: HOSPITAL | Age: 64
LOS: 2 days | Discharge: HOME | DRG: 467 | End: 2024-10-05
Attending: ORTHOPAEDIC SURGERY | Admitting: ORTHOPAEDIC SURGERY
Payer: MEDICARE

## 2024-10-03 DIAGNOSIS — M00.9 PYOGENIC ARTHRITIS OF RIGHT KNEE JOINT, DUE TO UNSPECIFIED ORGANISM: ICD-10-CM

## 2024-10-03 PROBLEM — M17.11 UNILATERAL PRIMARY OSTEOARTHRITIS, RIGHT KNEE: Status: ACTIVE | Noted: 2024-10-03

## 2024-10-03 PROBLEM — Z96.659 S/P REVISION OF TOTAL KNEE: Status: ACTIVE | Noted: 2024-10-03

## 2024-10-03 PROBLEM — Z96.659 KNEE JOINT REPLACEMENT STATUS: Status: ACTIVE | Noted: 2024-10-03

## 2024-10-03 LAB
ANION GAP SERPL CALCULATED.3IONS-SCNC: 7.3 MMOL/L (ref 5–15)
BUN SERPL-MCNC: 13 MG/DL (ref 8–23)
BUN/CREAT SERPL: 15.1 (ref 7–25)
CALCIUM SPEC-SCNC: 8.4 MG/DL (ref 8.6–10.5)
CHLORIDE SERPL-SCNC: 104 MMOL/L (ref 98–107)
CO2 SERPL-SCNC: 25.7 MMOL/L (ref 22–29)
CREAT SERPL-MCNC: 0.86 MG/DL (ref 0.76–1.27)
DEPRECATED RDW RBC AUTO: 57.1 FL (ref 37–54)
EGFRCR SERPLBLD CKD-EPI 2021: 95.5 ML/MIN/1.73
ERYTHROCYTE [DISTWIDTH] IN BLOOD BY AUTOMATED COUNT: 15.6 % (ref 12.3–15.4)
GLUCOSE SERPL-MCNC: 157 MG/DL (ref 65–99)
HCT VFR BLD AUTO: 27.8 % (ref 37.5–51)
HGB BLD-MCNC: 9.4 G/DL (ref 13–17.7)
MCH RBC QN AUTO: 33.7 PG (ref 26.6–33)
MCHC RBC AUTO-ENTMCNC: 33.8 G/DL (ref 31.5–35.7)
MCV RBC AUTO: 99.6 FL (ref 79–97)
PLATELET # BLD AUTO: 188 10*3/MM3 (ref 140–450)
PMV BLD AUTO: 10.9 FL (ref 6–12)
POTASSIUM SERPL-SCNC: 4.3 MMOL/L (ref 3.5–5.2)
RBC # BLD AUTO: 2.79 10*6/MM3 (ref 4.14–5.8)
SODIUM SERPL-SCNC: 137 MMOL/L (ref 136–145)
WBC NRBC COR # BLD AUTO: 7.1 10*3/MM3 (ref 3.4–10.8)

## 2024-10-03 PROCEDURE — 25010000002 CLONIDINE PER 1 MG: Performed by: ORTHOPAEDIC SURGERY

## 2024-10-03 PROCEDURE — 25810000003 SODIUM CHLORIDE 0.9 % SOLUTION: Performed by: ORTHOPAEDIC SURGERY

## 2024-10-03 PROCEDURE — C1776 JOINT DEVICE (IMPLANTABLE): HCPCS | Performed by: ORTHOPAEDIC SURGERY

## 2024-10-03 PROCEDURE — 73560 X-RAY EXAM OF KNEE 1 OR 2: CPT

## 2024-10-03 PROCEDURE — 85027 COMPLETE CBC AUTOMATED: CPT | Performed by: ORTHOPAEDIC SURGERY

## 2024-10-03 PROCEDURE — 25010000002 FENTANYL CITRATE (PF) 50 MCG/ML SOLUTION: Performed by: ANESTHESIOLOGIST ASSISTANT

## 2024-10-03 PROCEDURE — 25010000002 EPINEPHRINE 1 MG/ML SOLUTION: Performed by: ORTHOPAEDIC SURGERY

## 2024-10-03 PROCEDURE — 25010000002 KETOROLAC TROMETHAMINE PER 15 MG: Performed by: ORTHOPAEDIC SURGERY

## 2024-10-03 PROCEDURE — 0SPC0JZ REMOVAL OF SYNTHETIC SUBSTITUTE FROM RIGHT KNEE JOINT, OPEN APPROACH: ICD-10-PCS | Performed by: ORTHOPAEDIC SURGERY

## 2024-10-03 PROCEDURE — 25010000002 HYDROMORPHONE 1 MG/ML SOLUTION: Performed by: ANESTHESIOLOGIST ASSISTANT

## 2024-10-03 PROCEDURE — 25010000002 ROPIVACAINE PER 1 MG: Performed by: ANESTHESIOLOGY

## 2024-10-03 PROCEDURE — 97530 THERAPEUTIC ACTIVITIES: CPT

## 2024-10-03 PROCEDURE — C1713 ANCHOR/SCREW BN/BN,TIS/BN: HCPCS | Performed by: ORTHOPAEDIC SURGERY

## 2024-10-03 PROCEDURE — 97162 PT EVAL MOD COMPLEX 30 MIN: CPT

## 2024-10-03 PROCEDURE — 0SRC0J9 REPLACEMENT OF RIGHT KNEE JOINT WITH SYNTHETIC SUBSTITUTE, CEMENTED, OPEN APPROACH: ICD-10-PCS | Performed by: ORTHOPAEDIC SURGERY

## 2024-10-03 PROCEDURE — 25010000002 HYDROMORPHONE 1 MG/ML SOLUTION: Performed by: ORTHOPAEDIC SURGERY

## 2024-10-03 PROCEDURE — 25010000002 PROPOFOL 10 MG/ML EMULSION: Performed by: ANESTHESIOLOGIST ASSISTANT

## 2024-10-03 PROCEDURE — 25010000002 MIDAZOLAM PER 1 MG: Performed by: ANESTHESIOLOGY

## 2024-10-03 PROCEDURE — 25010000002 CEFAZOLIN PER 500 MG: Performed by: ORTHOPAEDIC SURGERY

## 2024-10-03 PROCEDURE — 25010000002 DEXAMETHASONE PER 1 MG: Performed by: ANESTHESIOLOGY

## 2024-10-03 PROCEDURE — 25010000002 VANCOMYCIN 1 G RECONSTITUTED SOLUTION: Performed by: ORTHOPAEDIC SURGERY

## 2024-10-03 PROCEDURE — 25810000003 LACTATED RINGERS PER 1000 ML: Performed by: ORTHOPAEDIC SURGERY

## 2024-10-03 PROCEDURE — 25010000002 ROPIVACAINE PER 1 MG: Performed by: ORTHOPAEDIC SURGERY

## 2024-10-03 PROCEDURE — 25010000002 BUPIVACAINE IN DEXTROSE 0.75-8.25 % SOLUTION: Performed by: ANESTHESIOLOGY

## 2024-10-03 PROCEDURE — 80048 BASIC METABOLIC PNL TOTAL CA: CPT | Performed by: ORTHOPAEDIC SURGERY

## 2024-10-03 DEVICE — LEGION OXINIUM CONSTRAINED FEMORAL                                    SIZE 6 RIGHT
Type: IMPLANTABLE DEVICE | Site: KNEE | Status: FUNCTIONAL
Brand: LEGION

## 2024-10-03 DEVICE — BONE PREPARATION KIT
Type: IMPLANTABLE DEVICE | Site: KNEE | Status: FUNCTIONAL
Brand: BIOPREP

## 2024-10-03 DEVICE — LEGION REVISION TIBIAL BASEPLATE                                    SIZE 5 RIGHT
Type: IMPLANTABLE DEVICE | Site: KNEE | Status: FUNCTIONAL
Brand: LEGION

## 2024-10-03 DEVICE — DEV CONTRL TISS STRATAFIX SPIRAL MNCRYL UD 3/0 PLS 30CM: Type: IMPLANTABLE DEVICE | Site: KNEE | Status: FUNCTIONAL

## 2024-10-03 DEVICE — CMT BONE PALACOS R HI/VISC 1X40: Type: IMPLANTABLE DEVICE | Site: KNEE | Status: FUNCTIONAL

## 2024-10-03 DEVICE — JOURNEY 7.5 ROUND RESURF PAT 35MM STANDARD
Type: IMPLANTABLE DEVICE | Site: KNEE | Status: FUNCTIONAL
Brand: JOURNEY

## 2024-10-03 DEVICE — LEGION RK/HK FULL TIBIAL WEDGE SZ                                    5-6 15MM
Type: IMPLANTABLE DEVICE | Site: KNEE | Status: FUNCTIONAL
Brand: LEGION

## 2024-10-03 DEVICE — DEV WND/CLS CONTRL TISS STRATAFIX SYMM PDS PLS CTX 60CM VIL: Type: IMPLANTABLE DEVICE | Site: KNEE | Status: FUNCTIONAL

## 2024-10-03 DEVICE — LEGION TIB CONE ID 18 SHORT
Type: IMPLANTABLE DEVICE | Site: KNEE | Status: FUNCTIONAL
Brand: LEGION

## 2024-10-03 DEVICE — GENESIS II CONSTRAINED ARTICULAR                                    INSERT SIZE 5-6 18MM
Type: IMPLANTABLE DEVICE | Site: KNEE | Status: FUNCTIONAL
Brand: GENESIS II

## 2024-10-03 DEVICE — LEGION SCREW-ON LWEDGE 15MM DISTAL                                    X 5MM POSTERIOR SIZE 6
Type: IMPLANTABLE DEVICE | Site: KNEE | Status: FUNCTIONAL
Brand: LEGION

## 2024-10-03 DEVICE — LEGION CEMENTED STEM 14MM X 120MM STRAIGHT
Type: IMPLANTABLE DEVICE | Site: KNEE | Status: FUNCTIONAL
Brand: LEGION

## 2024-10-03 RX ORDER — OXYCODONE HYDROCHLORIDE 5 MG/1
5 TABLET ORAL EVERY 4 HOURS PRN
Status: DISCONTINUED | OUTPATIENT
Start: 2024-10-03 | End: 2024-10-05 | Stop reason: HOSPADM

## 2024-10-03 RX ORDER — ONDANSETRON 2 MG/ML
4 INJECTION INTRAMUSCULAR; INTRAVENOUS ONCE AS NEEDED
Status: DISCONTINUED | OUTPATIENT
Start: 2024-10-03 | End: 2024-10-03 | Stop reason: HOSPADM

## 2024-10-03 RX ORDER — DIPHENHYDRAMINE HYDROCHLORIDE 50 MG/ML
12.5 INJECTION INTRAMUSCULAR; INTRAVENOUS
Status: DISCONTINUED | OUTPATIENT
Start: 2024-10-03 | End: 2024-10-03 | Stop reason: HOSPADM

## 2024-10-03 RX ORDER — HYDRALAZINE HYDROCHLORIDE 20 MG/ML
5 INJECTION INTRAMUSCULAR; INTRAVENOUS
Status: DISCONTINUED | OUTPATIENT
Start: 2024-10-03 | End: 2024-10-03 | Stop reason: HOSPADM

## 2024-10-03 RX ORDER — NALOXONE HCL 0.4 MG/ML
0.1 VIAL (ML) INJECTION
Status: DISCONTINUED | OUTPATIENT
Start: 2024-10-03 | End: 2024-10-05 | Stop reason: HOSPADM

## 2024-10-03 RX ORDER — TRANEXAMIC ACID 10 MG/ML
1000 INJECTION, SOLUTION INTRAVENOUS ONCE
Status: DISCONTINUED | OUTPATIENT
Start: 2024-10-03 | End: 2024-10-03 | Stop reason: HOSPADM

## 2024-10-03 RX ORDER — SODIUM CHLORIDE 0.9 % (FLUSH) 0.9 %
10 SYRINGE (ML) INJECTION EVERY 12 HOURS SCHEDULED
Status: DISCONTINUED | OUTPATIENT
Start: 2024-10-03 | End: 2024-10-03 | Stop reason: HOSPADM

## 2024-10-03 RX ORDER — PROMETHAZINE HYDROCHLORIDE 25 MG/1
25 SUPPOSITORY RECTAL ONCE AS NEEDED
Status: DISCONTINUED | OUTPATIENT
Start: 2024-10-03 | End: 2024-10-03 | Stop reason: HOSPADM

## 2024-10-03 RX ORDER — DIPHENHYDRAMINE HCL 25 MG
25 CAPSULE ORAL
Status: DISCONTINUED | OUTPATIENT
Start: 2024-10-03 | End: 2024-10-03 | Stop reason: HOSPADM

## 2024-10-03 RX ORDER — MIDAZOLAM HYDROCHLORIDE 1 MG/ML
INJECTION INTRAMUSCULAR; INTRAVENOUS
Status: COMPLETED | OUTPATIENT
Start: 2024-10-03 | End: 2024-10-03

## 2024-10-03 RX ORDER — METOCLOPRAMIDE HYDROCHLORIDE 5 MG/ML
10 INJECTION INTRAMUSCULAR; INTRAVENOUS ONCE AS NEEDED
Status: DISCONTINUED | OUTPATIENT
Start: 2024-10-03 | End: 2024-10-03 | Stop reason: HOSPADM

## 2024-10-03 RX ORDER — IPRATROPIUM BROMIDE AND ALBUTEROL SULFATE 2.5; .5 MG/3ML; MG/3ML
3 SOLUTION RESPIRATORY (INHALATION) ONCE AS NEEDED
Status: DISCONTINUED | OUTPATIENT
Start: 2024-10-03 | End: 2024-10-03 | Stop reason: HOSPADM

## 2024-10-03 RX ORDER — NALOXONE HCL 0.4 MG/ML
0.4 VIAL (ML) INJECTION AS NEEDED
Status: DISCONTINUED | OUTPATIENT
Start: 2024-10-03 | End: 2024-10-03 | Stop reason: HOSPADM

## 2024-10-03 RX ORDER — DEXAMETHASONE SODIUM PHOSPHATE 4 MG/ML
INJECTION, SOLUTION INTRA-ARTICULAR; INTRALESIONAL; INTRAMUSCULAR; INTRAVENOUS; SOFT TISSUE
Status: COMPLETED | OUTPATIENT
Start: 2024-10-03 | End: 2024-10-03

## 2024-10-03 RX ORDER — ONDANSETRON 4 MG/1
4 TABLET, ORALLY DISINTEGRATING ORAL EVERY 6 HOURS PRN
Status: DISCONTINUED | OUTPATIENT
Start: 2024-10-03 | End: 2024-10-05 | Stop reason: HOSPADM

## 2024-10-03 RX ORDER — BUPIVACAINE HYDROCHLORIDE 7.5 MG/ML
INJECTION, SOLUTION INTRASPINAL
Status: COMPLETED | OUTPATIENT
Start: 2024-10-03 | End: 2024-10-03

## 2024-10-03 RX ORDER — ROPIVACAINE HYDROCHLORIDE 5 MG/ML
INJECTION, SOLUTION EPIDURAL; INFILTRATION; PERINEURAL
Status: COMPLETED | OUTPATIENT
Start: 2024-10-03 | End: 2024-10-03

## 2024-10-03 RX ORDER — OXYCODONE HYDROCHLORIDE 5 MG/1
10 TABLET ORAL ONCE AS NEEDED
Status: DISCONTINUED | OUTPATIENT
Start: 2024-10-03 | End: 2024-10-03 | Stop reason: HOSPADM

## 2024-10-03 RX ORDER — FLUMAZENIL 0.1 MG/ML
0.5 INJECTION INTRAVENOUS AS NEEDED
Status: DISCONTINUED | OUTPATIENT
Start: 2024-10-03 | End: 2024-10-03 | Stop reason: HOSPADM

## 2024-10-03 RX ORDER — DEXAMETHASONE SODIUM PHOSPHATE 4 MG/ML
8 INJECTION, SOLUTION INTRA-ARTICULAR; INTRALESIONAL; INTRAMUSCULAR; INTRAVENOUS; SOFT TISSUE ONCE AS NEEDED
Status: DISCONTINUED | OUTPATIENT
Start: 2024-10-03 | End: 2024-10-03 | Stop reason: HOSPADM

## 2024-10-03 RX ORDER — MEPERIDINE HYDROCHLORIDE 25 MG/ML
12.5 INJECTION INTRAMUSCULAR; INTRAVENOUS; SUBCUTANEOUS
Status: DISCONTINUED | OUTPATIENT
Start: 2024-10-03 | End: 2024-10-03 | Stop reason: HOSPADM

## 2024-10-03 RX ORDER — MIDAZOLAM HYDROCHLORIDE 1 MG/ML
1 INJECTION INTRAMUSCULAR; INTRAVENOUS
Status: DISCONTINUED | OUTPATIENT
Start: 2024-10-03 | End: 2024-10-03 | Stop reason: HOSPADM

## 2024-10-03 RX ORDER — SODIUM CHLORIDE, SODIUM LACTATE, POTASSIUM CHLORIDE, CALCIUM CHLORIDE 600; 310; 30; 20 MG/100ML; MG/100ML; MG/100ML; MG/100ML
1000 INJECTION, SOLUTION INTRAVENOUS CONTINUOUS
Status: DISPENSED | OUTPATIENT
Start: 2024-10-03 | End: 2024-10-04

## 2024-10-03 RX ORDER — DIPHENHYDRAMINE HYDROCHLORIDE 50 MG/ML
12.5 INJECTION INTRAMUSCULAR; INTRAVENOUS ONCE AS NEEDED
Status: DISCONTINUED | OUTPATIENT
Start: 2024-10-03 | End: 2024-10-03 | Stop reason: HOSPADM

## 2024-10-03 RX ORDER — ACETAMINOPHEN 500 MG
1000 TABLET ORAL EVERY 6 HOURS
Status: DISCONTINUED | OUTPATIENT
Start: 2024-10-03 | End: 2024-10-05 | Stop reason: HOSPADM

## 2024-10-03 RX ORDER — ASPIRIN 81 MG/1
81 TABLET ORAL EVERY 12 HOURS SCHEDULED
Status: DISCONTINUED | OUTPATIENT
Start: 2024-10-03 | End: 2024-10-05 | Stop reason: HOSPADM

## 2024-10-03 RX ORDER — CEFAZOLIN SODIUM 1 G/3ML
INJECTION, POWDER, FOR SOLUTION INTRAMUSCULAR; INTRAVENOUS AS NEEDED
Status: DISCONTINUED | OUTPATIENT
Start: 2024-10-03 | End: 2024-10-03 | Stop reason: HOSPADM

## 2024-10-03 RX ORDER — SODIUM CHLORIDE 9 MG/ML
40 INJECTION, SOLUTION INTRAVENOUS AS NEEDED
Status: DISCONTINUED | OUTPATIENT
Start: 2024-10-03 | End: 2024-10-03 | Stop reason: HOSPADM

## 2024-10-03 RX ORDER — ACETAMINOPHEN 325 MG/1
650 TABLET ORAL ONCE AS NEEDED
Status: DISCONTINUED | OUTPATIENT
Start: 2024-10-03 | End: 2024-10-05 | Stop reason: HOSPADM

## 2024-10-03 RX ORDER — SODIUM CHLORIDE 0.9 % (FLUSH) 0.9 %
10 SYRINGE (ML) INJECTION AS NEEDED
Status: DISCONTINUED | OUTPATIENT
Start: 2024-10-03 | End: 2024-10-03 | Stop reason: HOSPADM

## 2024-10-03 RX ORDER — MELOXICAM 15 MG/1
15 TABLET ORAL
Status: DISCONTINUED | OUTPATIENT
Start: 2024-10-03 | End: 2024-10-05 | Stop reason: HOSPADM

## 2024-10-03 RX ORDER — LIDOCAINE HYDROCHLORIDE 10 MG/ML
0.5 INJECTION, SOLUTION EPIDURAL; INFILTRATION; INTRACAUDAL; PERINEURAL ONCE AS NEEDED
Status: DISCONTINUED | OUTPATIENT
Start: 2024-10-03 | End: 2024-10-03 | Stop reason: HOSPADM

## 2024-10-03 RX ORDER — FENTANYL CITRATE 50 UG/ML
INJECTION, SOLUTION INTRAMUSCULAR; INTRAVENOUS AS NEEDED
Status: DISCONTINUED | OUTPATIENT
Start: 2024-10-03 | End: 2024-10-03 | Stop reason: SURG

## 2024-10-03 RX ORDER — TRANEXAMIC ACID 100 MG/ML
INJECTION, SOLUTION INTRAVENOUS AS NEEDED
Status: DISCONTINUED | OUTPATIENT
Start: 2024-10-03 | End: 2024-10-03 | Stop reason: HOSPADM

## 2024-10-03 RX ORDER — ROCURONIUM BROMIDE 10 MG/ML
INJECTION, SOLUTION INTRAVENOUS AS NEEDED
Status: DISCONTINUED | OUTPATIENT
Start: 2024-10-03 | End: 2024-10-03 | Stop reason: SURG

## 2024-10-03 RX ORDER — TRANEXAMIC ACID 100 MG/ML
INJECTION, SOLUTION INTRAVENOUS AS NEEDED
Status: DISCONTINUED | OUTPATIENT
Start: 2024-10-03 | End: 2024-10-03 | Stop reason: SURG

## 2024-10-03 RX ORDER — FENTANYL CITRATE 50 UG/ML
100 INJECTION, SOLUTION INTRAMUSCULAR; INTRAVENOUS
Status: DISCONTINUED | OUTPATIENT
Start: 2024-10-03 | End: 2024-10-03 | Stop reason: HOSPADM

## 2024-10-03 RX ORDER — PROCHLORPERAZINE EDISYLATE 5 MG/ML
10 INJECTION INTRAMUSCULAR; INTRAVENOUS ONCE AS NEEDED
Status: DISCONTINUED | OUTPATIENT
Start: 2024-10-03 | End: 2024-10-03 | Stop reason: HOSPADM

## 2024-10-03 RX ORDER — ONDANSETRON 2 MG/ML
4 INJECTION INTRAMUSCULAR; INTRAVENOUS EVERY 6 HOURS PRN
Status: DISCONTINUED | OUTPATIENT
Start: 2024-10-03 | End: 2024-10-05 | Stop reason: HOSPADM

## 2024-10-03 RX ORDER — LISINOPRIL 5 MG/1
10 TABLET ORAL
Status: DISCONTINUED | OUTPATIENT
Start: 2024-10-04 | End: 2024-10-05 | Stop reason: HOSPADM

## 2024-10-03 RX ORDER — EPHEDRINE SULFATE 5 MG/ML
5 INJECTION INTRAVENOUS ONCE AS NEEDED
Status: DISCONTINUED | OUTPATIENT
Start: 2024-10-03 | End: 2024-10-03 | Stop reason: HOSPADM

## 2024-10-03 RX ORDER — PROMETHAZINE HYDROCHLORIDE 25 MG/1
25 TABLET ORAL ONCE AS NEEDED
Status: DISCONTINUED | OUTPATIENT
Start: 2024-10-03 | End: 2024-10-03 | Stop reason: HOSPADM

## 2024-10-03 RX ORDER — DOCUSATE SODIUM 100 MG/1
100 CAPSULE, LIQUID FILLED ORAL 2 TIMES DAILY PRN
Status: DISCONTINUED | OUTPATIENT
Start: 2024-10-03 | End: 2024-10-05 | Stop reason: HOSPADM

## 2024-10-03 RX ORDER — LABETALOL HYDROCHLORIDE 5 MG/ML
5 INJECTION, SOLUTION INTRAVENOUS
Status: DISCONTINUED | OUTPATIENT
Start: 2024-10-03 | End: 2024-10-03 | Stop reason: HOSPADM

## 2024-10-03 RX ORDER — SODIUM CHLORIDE 9 MG/ML
100 INJECTION, SOLUTION INTRAVENOUS CONTINUOUS
Status: DISCONTINUED | OUTPATIENT
Start: 2024-10-03 | End: 2024-10-05 | Stop reason: HOSPADM

## 2024-10-03 RX ORDER — VANCOMYCIN HYDROCHLORIDE 1 G/20ML
INJECTION, POWDER, LYOPHILIZED, FOR SOLUTION INTRAVENOUS AS NEEDED
Status: DISCONTINUED | OUTPATIENT
Start: 2024-10-03 | End: 2024-10-03 | Stop reason: HOSPADM

## 2024-10-03 RX ORDER — TRANEXAMIC ACID 10 MG/ML
1000 INJECTION, SOLUTION INTRAVENOUS ONCE
Status: COMPLETED | OUTPATIENT
Start: 2024-10-03 | End: 2024-10-03

## 2024-10-03 RX ORDER — OXYCODONE HYDROCHLORIDE 5 MG/1
10 TABLET ORAL EVERY 4 HOURS PRN
Status: DISCONTINUED | OUTPATIENT
Start: 2024-10-03 | End: 2024-10-05 | Stop reason: HOSPADM

## 2024-10-03 RX ADMIN — TRANEXAMIC ACID 1000 MG: 100 INJECTION, SOLUTION INTRAVENOUS at 08:05

## 2024-10-03 RX ADMIN — DEXAMETHASONE SODIUM PHOSPHATE 4 MG: 4 INJECTION, SOLUTION INTRAMUSCULAR; INTRAVENOUS at 07:00

## 2024-10-03 RX ADMIN — HYDROMORPHONE HYDROCHLORIDE 0.5 MG: 1 INJECTION, SOLUTION INTRAMUSCULAR; INTRAVENOUS; SUBCUTANEOUS at 09:28

## 2024-10-03 RX ADMIN — Medication 10 ML: at 09:44

## 2024-10-03 RX ADMIN — TRANEXAMIC ACID 1000 MG: 10 INJECTION, SOLUTION INTRAVENOUS at 21:05

## 2024-10-03 RX ADMIN — SODIUM CHLORIDE 2 G: 900 INJECTION INTRAVENOUS at 16:17

## 2024-10-03 RX ADMIN — SODIUM CHLORIDE 2000 MG: 900 INJECTION INTRAVENOUS at 07:55

## 2024-10-03 RX ADMIN — SODIUM CHLORIDE 100 ML/HR: 9 INJECTION, SOLUTION INTRAVENOUS at 15:32

## 2024-10-03 RX ADMIN — ROCURONIUM BROMIDE 30 MG: 10 INJECTION, SOLUTION INTRAVENOUS at 09:07

## 2024-10-03 RX ADMIN — BUPIVACAINE HYDROCHLORIDE IN DEXTROSE 1 ML: 7.5 INJECTION, SOLUTION SUBARACHNOID at 07:50

## 2024-10-03 RX ADMIN — HYDROMORPHONE HYDROCHLORIDE 0.5 MG: 1 INJECTION, SOLUTION INTRAMUSCULAR; INTRAVENOUS; SUBCUTANEOUS at 09:18

## 2024-10-03 RX ADMIN — MIDAZOLAM 2 MG: 1 INJECTION INTRAMUSCULAR; INTRAVENOUS at 07:00

## 2024-10-03 RX ADMIN — HYDROMORPHONE HYDROCHLORIDE 1 MG: 1 INJECTION, SOLUTION INTRAMUSCULAR; INTRAVENOUS; SUBCUTANEOUS at 21:05

## 2024-10-03 RX ADMIN — OXYCODONE 5 MG: 5 TABLET ORAL at 20:07

## 2024-10-03 RX ADMIN — ROPIVACAINE HYDROCHLORIDE 30 ML: 5 INJECTION EPIDURAL; INFILTRATION; PERINEURAL at 07:00

## 2024-10-03 RX ADMIN — SODIUM CHLORIDE, POTASSIUM CHLORIDE, SODIUM LACTATE AND CALCIUM CHLORIDE 1000 ML: 600; 310; 30; 20 INJECTION, SOLUTION INTRAVENOUS at 07:56

## 2024-10-03 RX ADMIN — TRANEXAMIC ACID 1000 MG: 100 INJECTION, SOLUTION INTRAVENOUS at 09:10

## 2024-10-03 RX ADMIN — ACETAMINOPHEN 1000 MG: 500 TABLET, FILM COATED ORAL at 15:26

## 2024-10-03 RX ADMIN — PROPOFOL 100 MCG/KG/MIN: 10 INJECTION, EMULSION INTRAVENOUS at 08:00

## 2024-10-03 RX ADMIN — ACETAMINOPHEN 1000 MG: 500 TABLET, FILM COATED ORAL at 20:07

## 2024-10-03 RX ADMIN — MELOXICAM 15 MG: 15 TABLET ORAL at 15:26

## 2024-10-03 RX ADMIN — FENTANYL CITRATE 100 MCG: 50 INJECTION, SOLUTION INTRAMUSCULAR; INTRAVENOUS at 08:00

## 2024-10-03 RX ADMIN — HYDROMORPHONE HYDROCHLORIDE 1 MG: 1 INJECTION, SOLUTION INTRAMUSCULAR; INTRAVENOUS; SUBCUTANEOUS at 09:06
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
## 2024-10-03 NOTE — ANESTHESIA POSTPROCEDURE EVALUATION
Patient: Erik Fuentes    Procedure Summary       Date: 10/03/24 Room / Location: [facility] OR 06 / [facility] MAIN OR    Anesthesia Start: 0745 Anesthesia Stop: 0940    Procedure: TOTAL KNEE ARTHROPLASTY REVISION WITH CORI ROBOT (Right: Knee) Diagnosis:       Unilateral primary osteoarthritis, right knee      (Unilateral primary osteoarthritis, right knee [M17.11])    Surgeons: Elan Rollins II, MD Provider: Patrick Churchill MD    Anesthesia Type: spinal, ERAS Protocol ASA Status: 3            Anesthesia Type: spinal, ERAS Protocol    Vitals  Vitals Value Taken Time   /63 10/03/24 1126   Temp 97.5 °F (36.4 °C) 10/03/24 1125   Pulse 83 10/03/24 1127   Resp 11 10/03/24 1125   SpO2 99 % 10/03/24 1127   Vitals shown include unfiled device data.        Post Anesthesia Care and Evaluation    Patient location during evaluation: PACU  Patient participation: complete - patient participated  Level of consciousness: awake  Pain scale: See nurse's notes for pain score.  Pain management: adequate    Airway patency: patent  Anesthetic complications: No anesthetic complications  PONV Status: none  Cardiovascular status: acceptable  Respiratory status: acceptable and spontaneous ventilation  Hydration status: acceptable    Comments: Patient seen and examined postoperatively; vital signs stable; SpO2 greater than or equal to 90%; cardiopulmonary status stable; nausea/vomiting adequately controlled; pain adequately controlled; no apparent anesthesia complications; patient discharged from anesthesia care when discharge criteria were met

## 2024-10-03 NOTE — PLAN OF CARE
Goal Outcome Evaluation:              Outcome Evaluation: New admission from surgery

## 2024-10-03 NOTE — CASE MANAGEMENT/SOCIAL WORK
Discharge Planning Assessment   [facility]     Patient Name: Erik Fuentes  MRN: [id number]  Today's Date: 10/3/2024    Admit Date: 10/3/2024    Plan: DC PLAN: Routine home with wife. Current with [facility] weekly.       Discharge Needs Assessment       Row Name 10/03/24 1535       Living Environment    People in Home spouse    Current Living Arrangements home    Potentially Unsafe Housing Conditions none    In the past 12 months has the electric, gas, oil, or water company threatened to shut off services in your home? No    Primary Care Provided by self    Provides Primary Care For no one    Family Caregiver if Needed spouse    Quality of Family Relationships helpful;involved;supportive    Able to Return to Prior Arrangements yes       Resource/Environmental Concerns    Resource/Environmental Concerns none    Transportation Concerns none       Transportation Needs    In the past 12 months, has lack of transportation kept you from medical appointments or from getting medications? no    In the past 12 months, has lack of transportation kept you from meetings, work, or from getting things needed for daily living? No       Food Insecurity    Within the past 12 months, you worried that your food would run out before you got the money to buy more. Never true    Within the past 12 months, the food you bought just didn't last and you didn't have money to get more. Never true       Transition Planning    Patient/Family Anticipates Transition to home with family    Patient/Family Anticipated Services at Transition none    Transportation Anticipated car, drives self;family or friend will provide       Discharge Needs Assessment    Readmission Within the Last 30 Days no previous admission in last 30 days    Equipment Currently Used at Home walker, rolling    Anticipated Changes Related to Illness none    Equipment Needed After Discharge none                   Discharge Plan       Row Name 10/03/24 1536       Plan    Plan DC  PLAN: Routine home with wife. Current with [facility] weekly.        Patient/Family in Agreement with Plan yes    Plan Comments CM met with patient at bedside, from routine home with wife. Independent with ADL's uses cane and walker. PCP is [facility], [facility]acy is [facility]. Able to afford medications and denies any issues with food or utilities. Denies any transportation issues, still drives and wife will provide when needed. Current with [facility] PT services weekly. Recently was in [facility], discharged 8/27. history of ETOH, MSW to follow. Denies any concerns about return home.                      Continued Care and Services - Admitted Since 10/3/2024    No active coordination exists for this encounter.       Selected Continued Care - Prior Encounters Includes continued care and service providers with selected services from prior encounters from 7/5/2024 to 10/3/2024      Discharged on 7/21/2024 Admission date: 7/12/2024 - Discharge disposition: Skilled Nursing Facility (DC - External)      Destination       Service Provider Selected Services Address Phone Fax Patient Preferred    [facility] [address] [place] [address] [phone] --                          Expected Discharge Date and Time       Expected Discharge Date Expected Discharge Time    Oct 4, 2024            Demographic Summary       Row Name 10/03/24 1534       General Information    Admission Type inpatient    Arrived From emergency department    Required Notices Provided Important Message from Medicare    Referral Source admission list    Reason for Consult discharge planning    Preferred Language English       Contact Information    Permission Granted to Share Info With     Contact Information Obtained for                    Functional Status       Row Name 10/03/24 1531       Functional Status    Usual Activity Tolerance excellent    Current Activity Tolerance excellent        Physical Activity    On average, how many days per week do you engage in moderate to strenuous exercise (like a brisk walk)? 0 days    On average, how many minutes do you engage in exercise at this level? 0 min    Number of minutes of exercise per week 0       Assessment of Health Literacy    How often do you have someone help you read hospital materials? Sometimes    How often do you have problems learning about your medical condition because of difficulty understanding written information? Sometimes    How often do you have a problem understanding what is told to you about your medical condition? Sometimes    How confident are you filling out medical forms by yourself? Somewhat    Health Literacy Moderate       Functional Status, IADL    Medications independent    Meal Preparation independent    Housekeeping independent    Laundry independent    Shopping independent       Mental Status    General Appearance WDL WDL       Mental Status Summary    Recent Changes in Mental Status/Cognitive Functioning no changes                         Patient Forms       Row Name 10/03/24 1533       Patient Forms    Important Message from Medicare (IMM) Delivered  IMM 10/3/2024 per registration    Delivered to Patient    Method of delivery In person                    Met with patient in room wearing PPE:     Maintained distance greater than six feet and spent less than 15 minutes in the room.    Sharyn Quinones RN  Case Management  [phone]

## 2024-10-03 NOTE — OP NOTE
Total Knee Robotic Revision Operative Note  Dr. ELO Rollins II  [phone]    PATIENT NAME: Erik Fuentes  MRN: [id number]  : 1960 AGE: 64 y.o. GENDER: male  DATE OF OPERATION: 10/3/2024  PREOPERATIVE DIAGNOSIS:  History of knee infection status post revision with instability and loose implants  POSTOPERATIVE DIAGNOSIS: Same  OPERATION PERFORMED: Right Total Knee Arthroplasty Revision  SURGEON: Elan Rollins MD  Circulator: Abby Oropeza RN; Lisa Bright RN  Scrub Person: Lin Gipson  Vendor Representative: Darren Moreno; Marquita Rogers  Assistant: Rojelio Greene CSFA  ANESTHESIA: Spinal with Block and conversion to general  ASSISTANT: Rojelio Greene. This case would not have been possible without another set of skilled surgical hands for retraction, use of instrumentation, and general assistance.  This assistance was vital to the success of the case.   ESTIMATED BLOOD LOSS: 150cc  SPONGE AND NEEDLE COUNT: Correct  INDICATIONS:  This patient had a previous knee infection and underwent revision to cure the infection.  His last CRP was normal.  However, his implants that were put in with copious amounts of antibiotic cement have not held up and he has significant instability and loosening.  A discussion of operative versus nonoperative treatment was had with the patient and they failed conservative management. They elected to undergo total knee arthroplasty revision. The risks of surgery were discussed and included the risk of anesthesia, infection, damage to neurovascular structures, implant loosening/failure, fracture, hardware prominence, continued pain, early failure, the need for further procedures, medical complications, and others. No guarantees were made. The patient wished to proceed with surgery and a surgical consent was signed.    COMPONENTS:   Standard 35 mm journey resurfacing component patellar component  Size 6 right constrained nonporous femoral component  with two 15 x 5 wedges and a short cemented 14 x 120 cemented stem  Legion revision size 5 tibial baseplate with a 15 mm wedge and a 14 x 120 short cemented stem  18 mm tibial cone  18 mm constrained articular insert    PERTINENT FINDINGS: No sign of infection but incompetent lateral ligaments    DETAILS OF PROCEDURE:  The patient was met in the preoperative area. The site was marked. The consent and H&P were reviewed. The patient was then wheeled back to the operative suite and transferred to the operative table. The patient underwent anesthesia. A tourniquet was placed on the upper thigh.  A bump was placed under the operative hip. The Luther baseplate was secured to the table. Surgical alcohol was used to thoroughly clean the entire operative extremity.     The leg was then prepped in the normal sterile fashion, which included Chloroprep, multiple layers of sterile drapes, and surgical space suits for the entire operative team. The Luther boot was applied to the foot after adequate padding. An Ioban dressing was applied to the knee after the surgical incision was marked.  New outer gloves were used by all sterile surgical team members after final draping. After a surgical timeout in which administration of preoperative antibiotics as well as 1g of tranexamic acid (if not contraindicated) and the surgical site were confirmed, the tourniquet was put up.     2 tibial and 2 femoral pins were placed for the robot apparatus and the robotic arrays were attached.    In flexion, a midline knee incision was utilized through the old scar from the prior surgery.  Dissection was carried down to the capsule.  Medial and lateral flaps were created to allow adequate exposure.  Next a medial parapatellar arthrotomy was made.       The tissues themselves appeared fairly healthy.  There was a lot of scar tissue.  A complete synovectomy was then performed.  The patella was exposed and I felt that there was adequate bone stock  for patellar resurfacing.  A cleanup cut was made and locals were drilled.  The patella was protected for the rest of the case.  I then turned my attention to the robot and the knee was mapped and planned in standard fashion.  Afterwards, the femoral and tibial components were removed using an osteotome and mallet.  All excess cement was then removed.  The remaining bone was then mapped again using the robot and the need for augments was noted.  I then prepared for the augments using the robotic bur on the femur and the saw with robotic guidance on the tibia.  At that point, I prepared for tibial and femoral stems as well as a tibial cone.  The knee was then trialed and real implants were open.  Canal restrictors were utilized in both the tibia and the femur.  1 batch of cement was mixed with the tibia and the tibia was cemented.  A second batch was then mixed and the femur was cemented.  A trial polyethylene was then utilized and the knee was brought into extension.  The patella was cemented in place and then the tourniquet was taken down.  Hemostasis was achieved as best as possible but due to the copious amounts of scar tissue excision I did elect to open a drain which would be left later.  The knee was injected with analgesic cocktail.  After all cement was hardened, the knee was trialed 1 final time.  In extension there was very good balance but in flexion his lateral collateral ligament was a bit incompetent likely due to the amount of bone loss from his previous infection.  Therefore, I did utilize a constrained liner to help with stability.  Due to his young age I did not want to utilize a hinge.  A constrained liner was opened and inserted and ensured to be fully seated.  The knee was irrigated again.  A deep drain was left.    The knee was then closed in layers and a sterile dressing was applied    The patient was awoken from anesthesia, moved to the [facility] and taken to the recovery room in stable  "condition. Sponge and needle count were correct. There were no complications. Patient tolerated the procedure well.    R \"Harvinder\" Ria ACUNA MD  Orthopaedic Surgery  [facility]  [phone]                "

## 2024-10-03 NOTE — CASE MANAGEMENT/SOCIAL WORK
Social Work Assessment  [facility]     Patient Name: Erik Fuentes  MRN: [id number]  Today's Date: 10/3/2024    Admit Date: 10/3/2024     Discharge Plan       Row Name 10/03/24 1621       Plan    Plan Comments Notified by CM that patient has a history of ETOH use, noted in chart current report was one drink in the past eight months. Ethanol screen not completed on admit. CIWA not ordered, no current needs to see patient. If patient requests directly to be seen for ETOH resources, please re-consult . Will not continue to follow at this time.             MERE Aguilera, LSW, [facility]    Phone: [phone]  Cell: [phone]  Fax: [phone]  [email or web address]

## 2024-10-03 NOTE — ANESTHESIA PROCEDURE NOTES
Peripheral Block      Patient reassessed immediately prior to procedure    Patient location during procedure: pre-op  Start time: 10/3/2024 6:55 AM  Stop time: 10/3/2024 7:00 AM  Reason for block: procedure for pain, at surgeon's request and post-op pain management  Performed by  Anesthesiologist: Patrick Churchill MD  Preanesthetic Checklist  Completed: patient identified, IV checked, site marked, risks and benefits discussed, surgical consent, monitors and equipment checked, pre-op evaluation and timeout performed  Prep:  Pt Position: supine  Sterile barriers:gloves and sterile barriers  Prep: ChloraPrep  Patient monitoring: blood pressure monitoring, continuous pulse oximetry and EKG  Procedure    Sedation: yes  Performed under: PNB  Guidance:ultrasound guided    ULTRASOUND INTERPRETATION.  Using ultrasound guidance a 20 G gauge needle was placed in close proximity to the nerve, at which point, under ultrasound guidance anesthetic was injected in the area of the nerve and spread of the anesthesia was seen on ultrasound in close proximity thereto.  There were no abnormalities seen on ultrasound; a digital image was taken; and the patient tolerated the procedure with no complications. Images:still images obtained, printed/placed on chart    Laterality:right  Block Type:adductor canal block  Injection Technique:single-shot  Needle Type:echogenic  Needle Gauge:20 G  Resistance on Injection: less than 15 psi  Sedation medications used: midazolam (VERSED) injection - Intravenous   2 mg - 10/3/2024 7:00:00 AM  Medications Used: dexamethasone (DECADRON) injection - Injection   4 mg - 10/3/2024 7:00:00 AM  ropivacaine (NAROPIN) 0.5 % injection - Perineural   30 mL - 10/3/2024 7:00:00 AM      Post Assessment  Injection Assessment: negative aspiration for heme, no paresthesia on injection and incremental injection  Patient Tolerance:comfortable throughout block  Complications:no  Additional Notes  25 ml of 0.5% Ropivicaine  plus Decadron 4 ml. No problem with block   Performed by: Patrick Churchill MD

## 2024-10-03 NOTE — ANESTHESIA PROCEDURE NOTES
Spinal Block      Patient reassessed immediately prior to procedure    Patient location during procedure: OR  Start Time: 10/3/2024 7:50 AM  Stop Time: 10/3/2024 7:55 AM  Indication:at surgeon's request  Performed By  Anesthesiologist: Patrick Churchill MD  Preanesthetic Checklist  Completed: patient identified, IV checked, site marked, risks and benefits discussed, surgical consent, monitors and equipment checked, pre-op evaluation and timeout performed  Spinal Block Prep:  Patient Position:sitting  Sterile Tech:gloves, mask and sterile barriers  Prep:Chloraprep  Patient Monitoring:blood pressure monitoring, continuous pulse oximetry and EKG    Spinal Block Procedure  Approach:right paramedian  Guidance:landmark technique and palpation technique  Location:L4-L5  Needle Type:Sprotte  Needle Gauge:22 G  Placement of Spinal needle event:cerebrospinal fluid aspirated  Paresthesia: no  Fluid Appearance:clear  Medications: bupivacaine in dextrose (MARCAINE SPINAL) 0.75-8.25 % injection - Intrathecal   1 mL - 10/3/2024 7:50:00 AM   Post Assessment  Patient Tolerance:patient tolerated the procedure well with no apparent complications  Complications no  Additional Notes  25 failed. 22 g R Paramedian, no heme.
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
## 2024-10-03 NOTE — PLAN OF CARE
Goal Outcome Evaluation:  Plan of Care Reviewed With: patient, spouse           Outcome Evaluation: 65 yo male seen s/p R TKA revision earlier today, 10/3/24. Pt had recently gone to SNF for PT as he was nwb w/ abx knee spacer in place. Now from home w/ wife. PMH: etoh abuse; djd; cirrhosis of liver; liver failure. prior to first knee replacement, pt was able to amb w/o assistive device. Has been using rw since abx spacer was placed. At baseline, pt lives w/ wife in single level function home but has 13 stairs to enter home from basement garage. Today, pt is alert, oriented, cooperative. Had some excess bleeding from incision site. RN reinforced bandaging. Pt has normal strength in BUEs and in LLE, but R quad function is still quite weak from nerve block. Pt reports intact sensation but also reports only 3/10 pain. Has 2-/5 quad function and R knee rodrigo when pt attempts to bear wt. Not able to ambulate this date due to this. Recommend home health PT at d/c. Will follow and progress as tolerated.  PT reviewed home exercise program and home activity instructions w/ pt/wife and gave handout.

## 2024-10-04 LAB
HCT VFR BLD AUTO: 21.4 % (ref 37.5–51)
HGB BLD-MCNC: 7.7 G/DL (ref 13–17.7)

## 2024-10-04 PROCEDURE — 25010000002 HYDROMORPHONE 1 MG/ML SOLUTION: Performed by: ORTHOPAEDIC SURGERY

## 2024-10-04 PROCEDURE — 85014 HEMATOCRIT: CPT | Performed by: ORTHOPAEDIC SURGERY

## 2024-10-04 PROCEDURE — 85018 HEMOGLOBIN: CPT | Performed by: ORTHOPAEDIC SURGERY

## 2024-10-04 PROCEDURE — 97530 THERAPEUTIC ACTIVITIES: CPT

## 2024-10-04 PROCEDURE — 97110 THERAPEUTIC EXERCISES: CPT

## 2024-10-04 PROCEDURE — 25010000002 CEFAZOLIN PER 500 MG: Performed by: ORTHOPAEDIC SURGERY

## 2024-10-04 RX ORDER — TRANEXAMIC ACID 10 MG/ML
1000 INJECTION, SOLUTION INTRAVENOUS ONCE
Status: COMPLETED | OUTPATIENT
Start: 2024-10-04 | End: 2024-10-04

## 2024-10-04 RX ADMIN — ACETAMINOPHEN 1000 MG: 500 TABLET, FILM COATED ORAL at 20:36

## 2024-10-04 RX ADMIN — OXYCODONE 10 MG: 5 TABLET ORAL at 09:18

## 2024-10-04 RX ADMIN — ACETAMINOPHEN 1000 MG: 500 TABLET, FILM COATED ORAL at 13:54

## 2024-10-04 RX ADMIN — OXYCODONE 10 MG: 5 TABLET ORAL at 13:54

## 2024-10-04 RX ADMIN — SODIUM CHLORIDE 2 G: 900 INJECTION INTRAVENOUS at 00:18

## 2024-10-04 RX ADMIN — ACETAMINOPHEN 1000 MG: 500 TABLET, FILM COATED ORAL at 09:18

## 2024-10-04 RX ADMIN — OXYCODONE 5 MG: 5 TABLET ORAL at 04:17

## 2024-10-04 RX ADMIN — TRANEXAMIC ACID 1000 MG: 10 INJECTION, SOLUTION INTRAVENOUS at 09:18

## 2024-10-04 RX ADMIN — OXYCODONE 5 MG: 5 TABLET ORAL at 00:19

## 2024-10-04 RX ADMIN — HYDROMORPHONE HYDROCHLORIDE 1 MG: 1 INJECTION, SOLUTION INTRAMUSCULAR; INTRAVENOUS; SUBCUTANEOUS at 03:35

## 2024-10-04 NOTE — PLAN OF CARE
"Goal Outcome Evaluation:     Bed mobility - CGA  supine to sit.  Pt sat at edge of the bed for a few minutes but pt did not report any dizziness.   Transfers - CGA and with rolling walker  sit to stand from edge of the bed.  Pt stood for about 20 seconds before transferring to bedside chair.   Ambulation - N/A or Not attempted.  Hold per nursing    Therapeutic Exercise - 10 Reps R Lower Extremity AROM lying supine  hip ab/add, ap's, quad sets        If medically appropriate, Low Intensity Therapy recommended post-acute care - This is recommended as therapy feels this patient would require 2-3 visits per week. OP or HH would be the best option depending on patient's home bound status. Consider, if the patient has other  \"skilled\" needs such as wounds, IV antibiotics, etc. Combined with \"low intensity\" could also equate to a SNF. If patient is medically complex, consider LTAC. Pt requires no DME at discharge.     Pt desires Home with family assist and Home Health at discharge. Pt cooperative; agreeable to therapeutic recommendations and plan of care.                                      "

## 2024-10-04 NOTE — PLAN OF CARE
Goal Outcome Evaluation:                   VSS on room air. Pain treated per PRN PO orders. Pt s/p dose of IV TXA this A.M. New drainage noted to BUCK. Pt able to make needs known. Safety measures in place. Plan of care ongoing.

## 2024-10-04 NOTE — PLAN OF CARE
Goal Outcome Evaluation:                 Pt is resting quietly abed at this time. No s/sx of any distress are noted. Pain is well controlled at this time. Surgical incision is still bleeding at this time, but has slowed since the beginning of the shift. Pt is noted to have leg elevated with ice packs in place and ace wrap as ordered. Pt is AOx4 and is able to make wants and needs known. Bed is in low position with call light in reach and alarm active. Care plan is ongoing.

## 2024-10-04 NOTE — THERAPY TREATMENT NOTE
"Subjective: Pt agreeable to therapeutic plan of care.    Objective:     Precautions -   pt has been bleeding a lot.   Nursing reported that Dr. Rollins does not want pt bending his knee or doing any waking today.  Nursing does want pt to get up to the chair.  Hgb 7.7    Bed mobility - CGA  supine to sit.  Pt sat at edge of the bed for a few minutes but pt did not report any dizziness.   Transfers - CGA and with rolling walker  sit to stand from edge of the bed.  Pt stood for about 20 seconds before transferring to bedside chair.   Ambulation - N/A or Not attempted.  Hold per nursing    Therapeutic Exercise - 10 Reps R Lower Extremity AROM lying supine  hip ab/add, ap's, quad sets    Vitals: WNL    Pain: 3 VAS   Location: right knee  Intervention for pain: Repositioned, RN provided medication, Increased Activity, and Therapeutic Presence  ice pack to right kneke    Education: Provided education on the importance of mobility in the acute care setting, Verbal/Tactile Cues, Transfer Training, and Gait Training    Assessment: Erik Fuentes presents with functional mobility impairments which indicate the need for skilled intervention. Tolerating session today without incident. Pt with good tolerance of activity but was very limited due to nursing trying to get bleed to right knee controlled.   Pt should be safe to return home with his wife at d/c,  Will continue to follow and progress as tolerated.     Plan/Recommendations:   If medically appropriate, Low Intensity Therapy recommended post-acute care - This is recommended as therapy feels this patient would require 2-3 visits per week. OP or HH would be the best option depending on patient's home bound status. Consider, if the patient has other  \"skilled\" needs such as wounds, IV antibiotics, etc. Combined with \"low intensity\" could also equate to a SNF. If patient is medically complex, consider LTAC. Pt requires no DME at discharge.     Pt desires Home with family " assist and Home Health at discharge. Pt cooperative; agreeable to therapeutic recommendations and plan of care.     Basic Mobility 6-click:  Rollin = Total, A lot = 2, A little = 3; 4 = None  Supine>Sit:   1 = Total, A lot = 2, A little = 3; 4 = None   Sit>Stand with arms:  1 = Total, A lot = 2, A little = 3; 4 = None  Bed>Chair:   1 = Total, A lot = 2, A little = 3; 4 = None  Ambulate in room:  1 = Total, A lot = 2, A little = 3; 4 = None  3-5 Steps with railin = Total, A lot = 2, A little = 3; 4 = None  Score: 19    Post-Tx Position: Up in Chair and Call light and personal items within reach  PPE: gloves and gown

## 2024-10-04 NOTE — PROGRESS NOTES
Patient with significant bleeding from the knee overnight.  We will hold off on any knee bending exercises.  He will keep it straight.  Plan dressing change this morning to remove sam in place new compressive wrap.  As long as the bleeding slows down, he may discharge tomorrow.  I will give him another dose of transonic acid today   left flank pain

## 2024-10-04 NOTE — CASE MANAGEMENT/SOCIAL WORK
Continued Stay Note  DOMINICK Chin     Patient Name: Erik Fuentes  MRN: [id number]  Today's Date: 10/4/2024    Admit Date: 10/3/2024    Plan: DC PLAN: Routine home with wife. Current with [facility] weekly       Discharge Plan       Row Name 10/04/24 1219       Plan    Plan DC PLAN: Routine home with wife. Current with [facility] weekly        Patient/Family in Agreement with Plan yes    Plan Comments DC BARRIERS: Significant bleeding from knee overnight. Monitor one more day, plans to discharge tomorrow 10/5.                      Expected Discharge Date and Time       Expected Discharge Date Expected Discharge Time    Oct 4, 2024           Sharyn Quinones RN   Case Management  [phone]

## 2024-10-05 ENCOUNTER — READMISSION MANAGEMENT (OUTPATIENT)
Dept: CALL CENTER | Facility: HOSPITAL | Age: 64
End: 2024-10-05
Payer: MEDICARE

## 2024-10-05 VITALS
TEMPERATURE: 97.7 F | SYSTOLIC BLOOD PRESSURE: 131 MMHG | HEART RATE: 80 BPM | BODY MASS INDEX: 26.84 KG/M2 | DIASTOLIC BLOOD PRESSURE: 68 MMHG | OXYGEN SATURATION: 96 % | RESPIRATION RATE: 16 BRPM | HEIGHT: 72 IN | WEIGHT: 198.19 LBS

## 2024-10-05 PROCEDURE — 25010000002 HYDROMORPHONE 1 MG/ML SOLUTION: Performed by: ORTHOPAEDIC SURGERY

## 2024-10-05 RX ORDER — ONDANSETRON 4 MG/1
4 TABLET, FILM COATED ORAL EVERY 6 HOURS PRN
Qty: 30 TABLET | Refills: 0 | Status: SHIPPED | OUTPATIENT
Start: 2024-10-05

## 2024-10-05 RX ORDER — OXYCODONE AND ACETAMINOPHEN 10; 325 MG/1; MG/1
1 TABLET ORAL EVERY 6 HOURS PRN
Qty: 50 TABLET | Refills: 0 | Status: SHIPPED | OUTPATIENT
Start: 2024-10-05

## 2024-10-05 RX ORDER — ASPIRIN 81 MG/1
81 TABLET ORAL EVERY 12 HOURS
Qty: 60 TABLET | Refills: 0 | Status: SHIPPED | OUTPATIENT
Start: 2024-10-05 | End: 2024-11-04

## 2024-10-05 RX ADMIN — ACETAMINOPHEN 1000 MG: 500 TABLET, FILM COATED ORAL at 08:31

## 2024-10-05 RX ADMIN — HYDROMORPHONE HYDROCHLORIDE 1 MG: 1 INJECTION, SOLUTION INTRAMUSCULAR; INTRAVENOUS; SUBCUTANEOUS at 00:08

## 2024-10-05 RX ADMIN — LISINOPRIL 10 MG: 5 TABLET ORAL at 08:31

## 2024-10-05 RX ADMIN — MELOXICAM 15 MG: 15 TABLET ORAL at 08:31

## 2024-10-05 NOTE — OUTREACH NOTE
Prep Survey      Flowsheet Row Responses   [facility] facility patient discharged from? [facility]   Is LACE score < 7 ? No   Eligibility [facility]   Date of Admission 10/03/24   Date of Discharge 10/05/24   Discharge Disposition Home-Health Care Svc   Discharge diagnosis [place] ARTHRP KNE CONDYLE&PLATU MEDIAL&LAT COMPARTMENTS (17821) (TOTAL KNEE ARTHROPLASTY REVISION WITH CORI ROBOT)   Does the patient have one of the following disease processes/diagnoses(primary or secondary)? Total Joint Replacement   Does the patient have Home health ordered? Yes   What is the Home health agency?  [facility]   Prep survey completed? Yes            Esthela SAMUELS - Registered Nurse

## 2024-10-05 NOTE — PLAN OF CARE
Goal Outcome Evaluation:      Pt alert and oriented. Pt complaints of pain managed with IM medication. BUCK in place, dried drainage. Hemovac in place, no output currently. Pt receiving IV fluids. VS stable. Plan of care ongoing.                                         Instructions: This plan will send the code FBSE to the PM system.  DO NOT or CHANGE the price. Detail Level: Generalized Price (Do Not Change): 0.00

## 2024-10-05 NOTE — DISCHARGE SUMMARY
Orthopaedic Discharge Summary  Dr. GASCA “Harvinder” Ria ACUNA  [phone]    NAME: Erik Fuentes PCP: Tamar Eugene APRN   :  MRN: [id number]  [id number] LOS:  ADMIT: 2 days  10/3/2024   AGE/SEX: 64 y.o. 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]    Surgery Performed: [place] ARTHRP KNE CONDYLE&PLATU MEDIAL&LAT COMPARTMENTS [99834] (TOTAL KNEE ARTHROPLASTY REVISION WITH CORI ROBOT)    Discharge Medications:         Discharge Medications        New Medications        Instructions Start Date   aspirin 81 MG EC tablet   81 mg, Oral, Every 12 Hours      ondansetron 4 MG tablet  Commonly known as: Zofran   4 mg, Oral, Every 6 Hours PRN             Continue These Medications        Instructions Start Date   benazepril 40 MG tablet  Commonly known as: LOTENSIN   40 mg, Oral, Daily      fexofenadine 180 MG tablet  Commonly known as: ALLEGRA   180 mg, Oral, Daily      OSTEO BI-FLEX REGULAR STRENGTH PO   1 tablet, Oral, Daily      oxyCODONE-acetaminophen  MG per tablet  Commonly known as: Percocet   1 tablet, Oral, Every 6 Hours PRN      pantoprazole 40 MG EC tablet  Commonly known as: PROTONIX   40 mg, Oral, Every Morning Before Breakfast      PATADAY OP   1 drop, Both Eyes, 2 times daily      Soolantra 1 % cream  Generic drug: Ivermectin   1 Application, Topical, Daily PRN      SUPER B COMPLEX/VITAMIN C PO   1 tablet, Oral, Daily      triamcinolone 0.1 % cream  Commonly known as: KENALOG   1 Application, Topical, Daily PRN      Triamcinolone Acetonide 55 MCG/ACT nasal inhaler  Commonly known as: NASACORT   2 sprays, Nasal, Daily      Vitamin D3 25 MCG (1000 UT) capsule   1,000 Units, Oral, Daily               Vitals:     Vitals:    10/04/24 1134 10/04/24 2111 10/05/24 0442 10/05/24 0831   BP: 114/58 123/55 146/70 131/68   BP Location: Left arm Left arm Left arm    Patient  Position: Lying Lying Lying    Pulse: 73 81 84 80   Resp: 16 12 16    Temp: 98.1 °F (36.7 °C) 97.8 °F (36.6 °C) 97.7 °F (36.5 °C)    TempSrc: Oral Oral Oral    SpO2: 97% 96% 96%    Weight:       Height:           Labs:      Admission on 10/03/2024   Component Date Value Ref Range Status    Sed Rate 09/26/2024 25 (H)  0 - 20 mm/hr Final    C-Reactive Protein 09/26/2024 0.39  0.00 - 0.50 mg/dL Final    WBC 09/26/2024 5.06  3.40 - 10.80 10*3/mm3 Final    RBC 09/26/2024 3.15 (L)  4.14 - 5.80 10*6/mm3 Final    Hemoglobin 09/26/2024 10.5 (L)  13.0 - 17.7 g/dL Final    Hematocrit 09/26/2024 30.7 (L)  37.5 - 51.0 % Final    MCV 09/26/2024 97.5 (H)  79.0 - 97.0 fL Final    MCH 09/26/2024 33.3 (H)  26.6 - 33.0 pg Final    MCHC 09/26/2024 34.2  31.5 - 35.7 g/dL Final    RDW 09/26/2024 15.7 (H)  12.3 - 15.4 % Final    RDW-SD 09/26/2024 55.5 (H)  37.0 - 54.0 fl Final    MPV 09/26/2024 10.6  6.0 - 12.0 fL Final    Platelets 09/26/2024 205  140 - 450 10*3/mm3 Final    Neutrophil % 09/26/2024 60.1  42.7 - 76.0 % Final    Lymphocyte % 09/26/2024 21.9  19.6 - 45.3 % Final    Monocyte % 09/26/2024 12.6 (H)  5.0 - 12.0 % Final    Eosinophil % 09/26/2024 3.8  0.3 - 6.2 % Final    Basophil % 09/26/2024 1.2  0.0 - 1.5 % Final    Immature Grans % 09/26/2024 0.4  0.0 - 0.5 % Final    Neutrophils, Absolute 09/26/2024 3.04  1.70 - 7.00 10*3/mm3 Final    Lymphocytes, Absolute 09/26/2024 1.11  0.70 - 3.10 10*3/mm3 Final    Monocytes, Absolute 09/26/2024 0.64  0.10 - 0.90 10*3/mm3 Final    Eosinophils, Absolute 09/26/2024 0.19  0.00 - 0.40 10*3/mm3 Final    Basophils, Absolute 09/26/2024 0.06  0.00 - 0.20 10*3/mm3 Final    Immature Grans, Absolute 09/26/2024 0.02  0.00 - 0.05 10*3/mm3 Final    nRBC 09/26/2024 0.0  0.0 - 0.2 /100 WBC Final    Glucose 10/03/2024 157 (H)  65 - 99 mg/dL Final    BUN 10/03/2024 13  8 - 23 mg/dL Final    Creatinine 10/03/2024 0.86  0.76 - 1.27 mg/dL Final    Sodium 10/03/2024 137  136 - 145 mmol/L Final    Potassium  10/03/2024 4.3  3.5 - 5.2 mmol/L Final    Chloride 10/03/2024 104  98 - 107 mmol/L Final    CO2 10/03/2024 25.7  22.0 - 29.0 mmol/L Final    Calcium 10/03/2024 8.4 (L)  8.6 - 10.5 mg/dL Final    BUN/Creatinine Ratio 10/03/2024 15.1  7.0 - 25.0 Final    Anion Gap 10/03/2024 7.3  5.0 - 15.0 mmol/L Final    eGFR 10/03/2024 95.5  >60.0 mL/min/1.73 Final    WBC 10/03/2024 7.10  3.40 - 10.80 10*3/mm3 Final    RBC 10/03/2024 2.79 (L)  4.14 - 5.80 10*6/mm3 Final    Hemoglobin 10/03/2024 9.4 (L)  13.0 - 17.7 g/dL Final    Hematocrit 10/03/2024 27.8 (L)  37.5 - 51.0 % Final    MCV 10/03/2024 99.6 (H)  79.0 - 97.0 fL Final    MCH 10/03/2024 33.7 (H)  26.6 - 33.0 pg Final    MCHC 10/03/2024 33.8  31.5 - 35.7 g/dL Final    RDW 10/03/2024 15.6 (H)  12.3 - 15.4 % Final    RDW-SD 10/03/2024 57.1 (H)  37.0 - 54.0 fl Final    MPV 10/03/2024 10.9  6.0 - 12.0 fL Final    Platelets 10/03/2024 188  140 - 450 10*3/mm3 Final    Hemoglobin 10/04/2024 7.7 (L)  13.0 - 17.7 g/dL Final    Hematocrit 10/04/2024 21.4 (L)  37.5 - 51.0 % Final        No results found.    Hospital Course:   64 y.o. 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 Post-operatively the patient transferred to the floor where the patient underwent mobilization therapy. Opioids were titrated to achieve appropriate pain management to allow for participation in mobilization exercises. Vital signs and laboratory values are now within safe parameters for discharge. The dressings and/or incision is intact without signs or symptoms of active infection. Operative extremity neurovascular status remains intact as compared to the preoperative exam. Appropriate education re:  "incision care, activity levels, medications, and follow up visits was completed and all questions were answered. The patient is now deemed stable for discharge.  He did have significant postoperative bleeding the first night after surgery.  However, that did resolve.    HOME: The patient progressed well with physical therapy. There were cleared for discharge to home. The patietn was sent home in good condition}.       R \"Harvinder\" Ria ACUNA MD  Orthopaedic Surgery  [facility]  [phone]                                               "

## 2024-10-05 NOTE — DISCHARGE INSTRUCTIONS
Total Knee  Discharge Instructions  Dr. ELO Carrizales” Ria II  [phone]    INCISION CARE  Wash your hands prior to dressing changes  BUCK Wound VAC: Postoperatively you had a BUCK Wound Vac placed on the incision. This was placed under sterile conditions in the operating room. It remains in place for 7 days postoperatively. After 7 days, the entire dressing must be removed, including all of the sticky adhesive. The dressing and battery pack provide gentle suction to the incision and provide several benefits over a traditional dressing:  It maintains the sterile environment of the OR and reduces the risk of infection  The suction removes unwanted buildup of blood/hematoma under the skin to reduce swelling  The suction also promotes fresh blood supply to the skin and soft tissue to speed up healing  The postoperative scar is reduced in size  Showering is permitted immediately after surgery, but the battery pack must be protected or removed during the shower.   After 7 days the BUCK Wound Vac is removed. If there is no drainage, no dressing is required. If there is some scant drainage a dry bandage can be applied and changed daily until seen in the office or until the drainage stops.   No creams or ointments to the incisions until 4 weeks post op.  Do not touch or pick at the incision  Check incision every day and notify surgeon immediately if any of the following signs or symptoms are seen:  Increase in redness  Increase in swelling around the incision and of the entire extremity  Increase in pain  NEW drainage or oozing from the incision  Pulling apart of the edges of the incision  Increase in overall body temperature (greater than 100.4 degrees)  Zip-Line: your incision was closed with a state of the art device.   Is a non-invasive and easy to use wound closure device that replaces sutures, staples and glue for surgical incisions  It minimizes scarring and eliminating “railroad” marks that come with staples or  sutures  It makes removal as atraumatic as peeling off a bandage  Can be removed at home or by a physical therapist or nurse at 14 days postoperatively    ACTIVITIES  Exercises:  Physical therapy will begin immediately while in the hospital. Patients going to a nursing home will get therapy as part of their care at the SNU/SNF facility. Patients going home may also have a therapist come to the house to help them mobilize until they can safely get to an outpatient therapy facility.  Elevate the affected leg most of the day during the first week post operatively. Caution must be taken to avoid pillow placement directly under the heel of the leg, as this can cause pressure ulcers even with a soft pillow. All pillows and blankets should be placed underneath of the thigh and calf so that the heel is free-floating.  Use cold packs for 20-30 minutes approximately 5 times per day.  You should perform the daily stretching and strengthening exercises as taught by the therapist as often as possible. This can be done many times a day.  Full weight bearing is allowed after surgery. It will be sore/painful to put weight on the leg, but this will help the bone to heal and prevent complications such as pneumonia, bed sores and blood clots. Mobilization is vital to the recovery process.  Activities of Daily Living:  No tub baths, hot tubs, or swimming pools for 4 weeks.  May shower and let water run over the incision immediately after surgery. The battery pack of the BUCK Wound Vac must be protected or removed while in the shower. After the BUCK is removed 7 days after surgery showering is permitted as long as there is no drainage from the wound.     Restrictions  Weight: It is ok to allow full weight bearing after surgery. Weight on the leg actually quickens the recovery process. While it will be sore/painful to put weight on the leg, it is safe to do so. Hip replacement after hip fracture has a much slower recover process. It can  take months to heal fully from a hip fracture and patients even make some slow benefits up to a year afterwards.   Driving: Many patients have questions about when it is safe to return to driving. The answer is that this is extremely variable. It depends on the extent of the surgery, as well as how quickly you heal. Certainly left leg surgeries make returning to driving easier while right leg surgeries require more extensive rehabilitation before driving can be safe. Until you can press down on the brake hard, and are off of all narcotics, driving is not permitted. Your surgeon cannot “clear” you to return to driving, only you can make the decision when you feel it is safe.    Medications  Anticoagulants: After upper extremity surgery most patients do not require an anticoagulant unless you have another injury that will be keeping you from mobilizing. Lower extremity surgery typically does require use of an anticoagulation medicine.   IF YOU HAD LOWER EXTREMITY SURGERY AND ARE NOT DISCHARGED HOME WITH ANY ANTICOAGULANT MEDICINE YOU SHOULD TAKE ASPIRIN 325mg DAILY FOR 30 DAYS POSTOPERATIVELY.  If you are discharged home with an anticoagulant such as Aspirin, Xarelto, Eliquis, Coumadin, or Lovenox, follow these simple instructions:   Notify surgeon immediately if any gena bleeding is noted in the urine, stool, emesis, or from the nose or the incision. Blood in the stool will often appear as black rather than red. Blood in urine may appear as pink. Blood in emesis may appear as brown/black like coffee grounds.  You will need to apply pressure for longer periods of time to any cuts or abrasions to stop bleeding  Avoid alcohol while taking anticoagulants  Most anticoagulants are to be taken for 30 days postoperatively. After this time, you may stop using them unless instructed otherwise.   If you were already taking an anticoagulant (commonly Aspirin, Coumadin, or Plavix) you will likely be resuming your normal dose  postoperatively and will be continuing that medication at the discretion of the prescribing physician.  Stool Softeners: You will be at greater risk of constipation after surgery due to being less mobile and the pain medications.  Take stool softeners as needed. Over the counter Colace 100 mg 1-2 capsules twice daily can be taken.  If stools become too loose or too frequent, please decreases the dosage or stop the stool softener.  If constipation occurs despite use of stool softeners, you are to continue the stool softeners and add a laxative (Milk of Magnesia 1 ounce daily as needed)  Drink plenty of fluids, and eat fruits and vegetables during your recovery time. Getting up and mobilizing will help the bowels to recover their regular function, as will weaning off of all narcotics when the pain becomes tolerable.  Pain Medications: Utilized after surgery are narcotics. This is some general information about these medications.  CLASSIFICATION: Pain medications are called Opioids and are narcotics  LEGALITIES: It is illegal to share narcotics with others  DRIVING: it is illegal to drive while under the influence of narcotics. Doing so is a DUI.  POTENTIAL SIDE EFFECTS: nausea, vomiting, itching, dizziness, drowsiness, dry mouth, constipation, and difficulty urinating.  POTENTIAL ADVERSE EFFECTS:  Opioid tolerance can develop with use of pain medications and this simply means that it requires more and more of the medication to control pain. However, this is seen more in patients that use opioids for longer periods of time.  Opioid dependence can develop with use of Opioids. People with opioid dependence will experience withdrawal symptoms upon cessation of the medication.  Opioid addiction can develop with use of Opioids. The incidence of this is very unlikely in patients who take the medications as ordered and stop the medications as instructed.  Opioid overdose can be dangerous, but is unlikely when the medication  is taken as ordered and stopped when ordered. It is important not to mix opioids with alcohol as this can lead to over sedation and respiratory difficulty.  DOSAGE:  After the initial surgical pain begins to resolve, you may begin to decrease the pain medication. By the end of a few weeks, you should be off of pain medications.  Refills will not be given by the office during evening hours, on weekends, or after 6 weeks post-op. You are responsible for weaning off of pain medication. You can increase the time between narcotic pills, taking one every 4 then 6 then 8 hours and so on.  To seek refills on pain medications during the initial 6-week post-operative period, you must call the office to request the refill. The office will then notify you when to  the prescription. DO NOT wait until you are out of the medication to request a refill. Prescriptions will not be filled over the weekend and depending on the schedule, it may take a couple days for the prescription to be available. Someone will have to pick the prescription in person at the office.    FOLLOW-UP VISITS  You will need to follow up in the office with your surgeon in 3 weeks, or as instructed elsewhere in your discharge paperwork. Please call this number [phone] to schedule this appointment. If you are going to an SNF/SNU facility, they will arrange for you to follow up in the office.  If you have any concerns or suspected complications prior to your follow up visit, please call the office. Do not wait until your appointment time if you suspect complications. These will need to be addressed in the office promptly.      Elan Rollins II, MD  Orthopaedic Surgery  [facility]

## 2024-10-05 NOTE — PROGRESS NOTES
Patient is postop day 2 total knee revision.  Significantly less bleeding overnight.  Plan to remove drain and change sam 1 final time this morning and okay for discharge later today.  Follow-up as scheduled

## 2024-10-06 NOTE — CASE MANAGEMENT/SOCIAL WORK
Case Management Discharge Note      Final Note: Routine home with family.         Selected Continued Care - Discharged on 10/5/2024 Admission date: 10/3/2024 - Discharge disposition: Home or Self Care            Transportation Services  Private: Car    Final Discharge Disposition Code: 01 - home or self-care

## 2024-10-07 ENCOUNTER — TRANSITIONAL CARE MANAGEMENT TELEPHONE ENCOUNTER (OUTPATIENT)
Dept: CALL CENTER | Facility: HOSPITAL | Age: 64
End: 2024-10-07
Payer: MEDICARE

## 2024-10-07 ENCOUNTER — TELEPHONE (OUTPATIENT)
Dept: FAMILY MEDICINE CLINIC | Facility: CLINIC | Age: 64
End: 2024-10-07
Payer: MEDICARE

## 2024-10-07 NOTE — TELEPHONE ENCOUNTER
Akanksha nurse with [facility]   Pt just got out of hospital. Dr. Rollins did knee surgery.  HH will be seeing him 1 times a week for 2 weeks. Calling for verbal okay for this. Pt starts outpatient therapy next week.   She will send orders.

## 2024-10-07 NOTE — OUTREACH NOTE
Call Center TCM Note      Flowsheet Row Responses   [facility] patient discharged from? [facility]   Does the patient have one of the following disease processes/diagnoses(primary or secondary)? Total Joint Replacement   Joint surgery performed? Knee   TCM attempt successful? Yes   Call start time 0824   Call end time 0827   Discharge diagnosis [place] ARTHRP KNE CONDYLE&PLATU MEDIAL&LAT COMPARTMENTS (99125) (TOTAL KNEE ARTHROPLASTY REVISION WITH CORI ROBOT)   Does the patient have all medications related to this admission filled (includes all antibiotics, pain medications, etc.) Yes   Is the patient taking all medications as directed (includes completed medication regime)? Yes   Is the patient able to teach back alternate methods of pain control? Ice, Knee-elevation/no pillow under knee   Does the patient have an appointment with their PCP within 7-14 days of discharge? No   Nursing Interventions Patient desires to follow up with specialty only   What is the Home health agency?  [facility]   Has home health visited the patient within 72 hours of discharge? Call prior to 72 hours   Home health comments Will be there today   Psychosocial issues? No   Has the patient began therapy sessions (either in the home or as an out patient)? No   Has the patient fallen since discharge? No   Did the patient receive a copy of their discharge instructions? Yes   Nursing interventions Reviewed instructions with patient   What is the patient's perception of their functional status since discharge? Improving   Is the patient able to teach back signs and symptoms of infection? Temp >100.4 for 24h or longer, Incisional drainage   Is the patient able to teach back how to prevent infection? Wash hands before and after touching incision, Keep incision covered if drainage, Check incision daily, No lotion or creams, Eat well-balanced diet   Is the patient able to teach back signs and symptoms of DVT? Redness in calf, Swelling in calf, Severe pain  in calf, Area hot to touch, Shortness of breath or chest pain   If the patient is a current smoker, are they able to teach back resources for cessation? Not a smoker   Is the patient/caregiver able to teach back the hierarchy of who to call/visit for symptoms/problems? PCP, Specialist, Home health nurse, Urgent Care, ED, 911 Yes   Additional teach back comments States he is doing well.  This is the second knee surgery so he is aware of the things he needs to do.   TCM call completed? Yes   Wrap up additional comments Denies questions or needs at this time.   Call end time 0827            Kaylene Berger LPN    10/7/2024, 08:28 EDT

## 2024-10-08 ENCOUNTER — TELEPHONE (OUTPATIENT)
Dept: FAMILY MEDICINE CLINIC | Facility: CLINIC | Age: 64
End: 2024-10-08
Payer: MEDICARE

## 2024-10-08 NOTE — TELEPHONE ENCOUNTER
Caller: GER[facility]D    Best call back number: [phone]     What was the call regarding: PATIENT WILL DO HOME HEALTH PHYSICAL THERAPY 3 TIMES THIS WEEK AND OUT PATIENT NEXT WEEK

## 2024-10-11 ENCOUNTER — TELEPHONE (OUTPATIENT)
Dept: FAMILY MEDICINE CLINIC | Facility: CLINIC | Age: 64
End: 2024-10-11
Payer: MEDICARE

## 2024-10-15 ENCOUNTER — READMISSION MANAGEMENT (OUTPATIENT)
Dept: CALL CENTER | Facility: HOSPITAL | Age: 64
End: 2024-10-15
Payer: MEDICARE

## 2024-10-15 NOTE — OUTREACH NOTE
Total Joint Week 2 Survey      Flowsheet Row Responses   [facility]? [facility]   Does the patient have one of the following disease processes/diagnoses(primary or secondary)? Total Joint Replacement   Joint surgery performed? Knee   Week 2 attempt successful? No   Unsuccessful attempts Attempt 1            CHANI LEA - Registered Nurse

## 2024-10-18 ENCOUNTER — READMISSION MANAGEMENT (OUTPATIENT)
Dept: CALL CENTER | Facility: HOSPITAL | Age: 64
End: 2024-10-18
Payer: MEDICARE

## 2024-11-04 ENCOUNTER — READMISSION MANAGEMENT (OUTPATIENT)
Dept: CALL CENTER | Facility: HOSPITAL | Age: 64
End: 2024-11-04
Payer: MEDICARE

## 2024-11-04 NOTE — OUTREACH NOTE
Total Joint Month 1 Survey      Flowsheet Row Responses   [facility] patient discharged from? [facility]   Does the patient have one of the following disease processes/diagnoses(primary or secondary)? Total Joint Replacement   Joint surgery performed? Knee   Month 1 attempt successful? Yes   Call start time 1654   Call end time 1658   Has the patient been back in either the hospital or Emergency Department since discharge? No   Person spoke with today (if not patient) and relationship patient   Is the patient taking all medications as directed (includes completed medication regime)? Yes   Comments regarding appointments Pt has seen surgeon   Has the patient kept scheduled appointments due by today? Yes   Is the patient still receiving Home Health Services? Yes   DME comments using cane   Is the patient still attending therapy sessions(either in the home or as an outpatient)? Yes   Has the patient fallen since discharge? No   Comments 3x per week at therapy   What is the patient's perception of their functional status since discharge? Improving   If the patient is a current smoker, are they able to teach back resources for cessation? Not a smoker   Is the patient/caregiver able to teach back the hierarchy of who to call/visit for symptoms/problems? PCP, Specialist, Home health nurse, Urgent Care, ED, 911 Yes   Additional teach back comments pt fell last week, he states his walker got caught hitting bed and wall.  He broke toes but had xray of knee which was intact with no damage.  He did have fluid drained off of knee at this time.   Month 1 call completed? Yes   Graduated Yes   Is the patient interested in additional calls from an ambulatory ? No   Would this patient benefit from a Referral to Amb Social Work? No   Wrap up additional comments Pt states he does have pain.  he has f/u with surgeon.  He did sustain a fall but no injury to the knee.   Call end time 1658            CHRISTINA ROSALES - Registered  Nurse

## 2024-12-09 ENCOUNTER — TELEPHONE (OUTPATIENT)
Dept: FAMILY MEDICINE CLINIC | Facility: CLINIC | Age: 64
End: 2024-12-09
Payer: MEDICARE

## 2024-12-09 RX ORDER — BENAZEPRIL HYDROCHLORIDE 40 MG/1
40 TABLET ORAL DAILY
Qty: 90 TABLET | Refills: 1 | Status: SHIPPED | OUTPATIENT
Start: 2024-12-09

## 2024-12-09 NOTE — TELEPHONE ENCOUNTER
Pt states he is completely out of his BP meds.  He was unsure of the name of the medication.  Please send in ASAP.

## 2024-12-24 NOTE — PROGRESS NOTES
"Chief Complaint  Hypertension (Pt states ever since his last radiation treatment in the fall he has had some issues with high BP.  )    Subjective        Erik Fuentes presents to [facility]  History of Present Illness     Pt is here today with concerns of elevated blood pressure since his last radiation treatment for prostate cancer on 09/28/2022.   At home blood pressures have been ranging high 140s-160s/70-80s. This worries him as he has had 3 esophageal varices clipped in the past, and is at risk for those bleeding.    He has been on lisinopril in the past that he reports stopped working. Amlodipine was tried as well, but caused too much swelling in his legs. He wants to avoid a water pill if at all possible due to trouble urinating from prostate cancer.     He reports a decreased appetite, lost 35 lbs since having radiation. He is still eating 3 meals a day. His energy levels are good.     Alcoholism- hasn't drank since September 09/16.     Will start on Propranolol as this is recommended to decrease bleeding from esophageal varices. He has an appointment with Tamar on 02/27. I wanted to repeat a CMP today, his AST and ALT were stable a year ago. He wants to wait for his appointment with [facility] since he was not fasting today, to be able to get everything drawn in one stick.    Objective   Vital Signs:  /77 (BP Location: Left arm, Patient Position: Sitting, Cuff Size: Adult)   Pulse 79   Temp 98.6 °F (37 °C) (Temporal)   Ht 182.9 cm (72\")   Wt 88 kg (194 lb)   SpO2 100%   BMI 26.31 kg/m²   Estimated body mass index is 26.31 kg/m² as calculated from the following:    Height as of this encounter: 182.9 cm (72\").    Weight as of this encounter: 88 kg (194 lb).          Review of Systems   Constitutional: Positive for appetite change. Negative for chills, fatigue and fever.   Eyes: Negative for visual disturbance.   Respiratory: Negative for cough, chest tightness, " Med Rec - Discharge shortness of breath and wheezing.    Cardiovascular: Negative for chest pain, palpitations and leg swelling.   Gastrointestinal: Negative for abdominal distention, abdominal pain, blood in stool, nausea and vomiting.   Musculoskeletal: Negative for gait problem, joint swelling and myalgias.   Skin: Negative for rash and wound.   Neurological: Negative for dizziness, syncope, light-headedness and confusion.   Hematological: Bruises/bleeds easily.   Psychiatric/Behavioral: Negative for agitation. The patient is not nervous/anxious.         Physical Exam  Vitals reviewed.   Constitutional:       General: He is not in acute distress.     Appearance: Normal appearance. He is not ill-appearing.   Cardiovascular:      Rate and Rhythm: Normal rate and regular rhythm.      Pulses: Normal pulses.      Heart sounds: Normal heart sounds.   Pulmonary:      Effort: Pulmonary effort is normal. No respiratory distress.      Breath sounds: Normal breath sounds. No wheezing, rhonchi or rales.   Abdominal:      General: There is no distension.   Musculoskeletal:      Right lower leg: No edema.      Left lower leg: No edema.   Skin:     General: Skin is warm and dry.      Capillary Refill: Capillary refill takes less than 2 seconds.   Neurological:      General: No focal deficit present.      Mental Status: He is alert and oriented to person, place, and time.   Psychiatric:         Mood and Affect: Mood normal.         Behavior: Behavior normal.        Result Review :  The following data was reviewed by: CARMEN Pelaez on 02/02/2023:                Assessment and Plan   Diagnoses and all orders for this visit:    1. Essential hypertension (Primary)  -     Cancel: Comprehensive metabolic panel; Future  -     Discontinue: propranolol (INDERAL) 40 MG tablet; Take 1 tablet by mouth 2 (Two) Times a Day for 30 days.  Dispense: 60 tablet; Refill: 0  -     propranolol LA (INDERAL LA) 80 MG 24 hr capsule; Take 1 capsule by mouth  Daily for 30 days.  Dispense: 30 capsule; Refill: 0    2. Alcoholic cirrhosis of liver without ascites (HCC)  -     propranolol LA (INDERAL LA) 80 MG 24 hr capsule; Take 1 capsule by mouth Daily for 30 days.  Dispense: 30 capsule; Refill: 0    3. Esophageal varices without bleeding, unspecified esophageal varices type (HCC)             Follow Up   Return for Next scheduled follow up.  Patient was given instructions and counseling regarding his condition or for health maintenance advice. Please see specific information pulled into the AVS if appropriate.

## 2025-03-10 ENCOUNTER — OFFICE (AMBULATORY)
Dept: URBAN - METROPOLITAN AREA CLINIC 64 | Facility: CLINIC | Age: 65
End: 2025-03-10
Payer: MEDICARE

## 2025-03-10 VITALS
WEIGHT: 208 LBS | DIASTOLIC BLOOD PRESSURE: 68 MMHG | HEART RATE: 78 BPM | HEIGHT: 72 IN | SYSTOLIC BLOOD PRESSURE: 143 MMHG

## 2025-03-10 DIAGNOSIS — K70.30 ALCOHOLIC CIRRHOSIS OF LIVER WITHOUT ASCITES: ICD-10-CM

## 2025-03-10 DIAGNOSIS — E83.19 OTHER DISORDERS OF IRON METABOLISM: ICD-10-CM

## 2025-03-10 PROCEDURE — 99213 OFFICE O/P EST LOW 20 MIN: CPT | Performed by: NURSE PRACTITIONER

## 2025-03-11 LAB
AFP, SERUM, TUMOR MARKER: 2.6 NG/ML (ref 0–8.4)
CBC WITH DIFFERENTIAL/PLATELET: BASO (ABSOLUTE): 0.1 X10E3/UL (ref 0–0.2)
CBC WITH DIFFERENTIAL/PLATELET: BASOS: 2 %
CBC WITH DIFFERENTIAL/PLATELET: EOS (ABSOLUTE): 0.1 X10E3/UL (ref 0–0.4)
CBC WITH DIFFERENTIAL/PLATELET: EOS: 3 %
CBC WITH DIFFERENTIAL/PLATELET: HEMATOCRIT: 29.3 % — LOW (ref 37.5–51)
CBC WITH DIFFERENTIAL/PLATELET: HEMATOLOGY COMMENTS: (no result)
CBC WITH DIFFERENTIAL/PLATELET: HEMOGLOBIN: 8.7 G/DL — LOW (ref 13–17.7)
CBC WITH DIFFERENTIAL/PLATELET: IMMATURE CELLS: (no result)
CBC WITH DIFFERENTIAL/PLATELET: IMMATURE GRANS (ABS): 0 X10E3/UL (ref 0–0.1)
CBC WITH DIFFERENTIAL/PLATELET: IMMATURE GRANULOCYTES: 0 %
CBC WITH DIFFERENTIAL/PLATELET: LYMPHS (ABSOLUTE): 1.2 X10E3/UL (ref 0.7–3.1)
CBC WITH DIFFERENTIAL/PLATELET: LYMPHS: 37 %
CBC WITH DIFFERENTIAL/PLATELET: MCH: 24.8 PG — LOW (ref 26.6–33)
CBC WITH DIFFERENTIAL/PLATELET: MCHC: 29.7 G/DL — LOW (ref 31.5–35.7)
CBC WITH DIFFERENTIAL/PLATELET: MCV: 84 FL (ref 79–97)
CBC WITH DIFFERENTIAL/PLATELET: MONOCYTES(ABSOLUTE): 0.7 X10E3/UL (ref 0.1–0.9)
CBC WITH DIFFERENTIAL/PLATELET: MONOCYTES: 20 %
CBC WITH DIFFERENTIAL/PLATELET: NEUTROPHILS (ABSOLUTE): 1.2 X10E3/UL — LOW (ref 1.4–7)
CBC WITH DIFFERENTIAL/PLATELET: NEUTROPHILS: 38 %
CBC WITH DIFFERENTIAL/PLATELET: NRBC: (no result)
CBC WITH DIFFERENTIAL/PLATELET: PLATELETS: 182 X10E3/UL (ref 150–450)
CBC WITH DIFFERENTIAL/PLATELET: RBC: 3.51 X10E6/UL — LOW (ref 4.14–5.8)
CBC WITH DIFFERENTIAL/PLATELET: RDW: 17.7 % — HIGH (ref 11.6–15.4)
CBC WITH DIFFERENTIAL/PLATELET: WBC: 3.2 X10E3/UL — LOW (ref 3.4–10.8)
COMP. METABOLIC PANEL (14): ALBUMIN: 3.1 G/DL — LOW (ref 3.9–4.9)
COMP. METABOLIC PANEL (14): ALKALINE PHOSPHATASE: 119 IU/L (ref 44–121)
COMP. METABOLIC PANEL (14): ALT (SGPT): 22 IU/L (ref 0–44)
COMP. METABOLIC PANEL (14): AST (SGOT): 68 IU/L — HIGH (ref 0–40)
COMP. METABOLIC PANEL (14): BILIRUBIN, TOTAL: 2.1 MG/DL — HIGH (ref 0–1.2)
COMP. METABOLIC PANEL (14): BUN/CREATININE RATIO: 13 (ref 10–24)
COMP. METABOLIC PANEL (14): BUN: 11 MG/DL (ref 8–27)
COMP. METABOLIC PANEL (14): CALCIUM: 8.3 MG/DL — LOW (ref 8.6–10.2)
COMP. METABOLIC PANEL (14): CARBON DIOXIDE, TOTAL: 24 MMOL/L (ref 20–29)
COMP. METABOLIC PANEL (14): CHLORIDE: 105 MMOL/L (ref 96–106)
COMP. METABOLIC PANEL (14): CREATININE: 0.84 MG/DL (ref 0.76–1.27)
COMP. METABOLIC PANEL (14): EGFR: 96 ML/MIN/1.73 (ref 59–?)
COMP. METABOLIC PANEL (14): GLOBULIN, TOTAL: 2.5 G/DL (ref 1.5–4.5)
COMP. METABOLIC PANEL (14): GLUCOSE: 109 MG/DL — HIGH (ref 70–99)
COMP. METABOLIC PANEL (14): POTASSIUM: 4.3 MMOL/L (ref 3.5–5.2)
COMP. METABOLIC PANEL (14): PROTEIN, TOTAL: 5.6 G/DL — LOW (ref 6–8.5)
COMP. METABOLIC PANEL (14): SODIUM: 142 MMOL/L (ref 134–144)
FERRITIN: 32 NG/ML (ref 30–400)
IRON AND TIBC: IRON BIND.CAP.(TIBC): 266 UG/DL (ref 250–450)
IRON AND TIBC: IRON SATURATION: 23 % (ref 15–55)
IRON AND TIBC: IRON: 61 UG/DL (ref 38–169)
IRON AND TIBC: UIBC: 205 UG/DL (ref 111–343)
PROTHROMBIN TIME (PT): INR: 1.4 — HIGH (ref 0.9–1.2)
PROTHROMBIN TIME (PT): PROTHROMBIN TIME: 15.3 SEC — HIGH (ref 9.1–12)

## 2025-03-11 NOTE — PROGRESS NOTES
The ABCs of the Annual Wellness Visit  Subsequent Medicare Wellness Visit    Chief Complaint   Patient presents with    Medicare Wellness-subsequent      Subjective    History of Present Illness:  Erik Fuentes is a 64 y.o. male who presents for a Subsequent Medicare Wellness Visit.  MMSE score today is 30/30.   Immunizations reviewed.   Colon screening - Oct 2023; due in 2028.    Pt is also here today for 6 month follow up on chronic medical conditions.    1) HTN - currently on benazepril 40 mg daily. Denies any CP; palpitations; SOA; dizziness; headache; trouble with vision.    2) GERD - currently on pantoprazole 40 mg daily. Symptoms stable.   3) Hx of ABAD  - no longer on an iron supplement.     The following portions of the patient's history were reviewed and   updated as appropriate: allergies, current medications, past family history, past medical history, past social history, past surgical history, and problem list.    Compared to one year ago, the patient feels his physical   health is worse.    Compared to one year ago, the patient feels his mental   health is the same.    Recent Hospitalizations:  This patient has had a [facility] admission record on file within the last 365 days.    Current Medical Providers:  Patient Care Team:  Tamar Eugene APRN as PCP - General (Nurse Practitioner)    Outpatient Medications Prior to Visit   Medication Sig Dispense Refill    B Complex-C (SUPER B COMPLEX/VITAMIN C PO) Take 1 tablet by mouth Daily.      benazepril (LOTENSIN) 40 MG tablet Take 1 tablet by mouth Daily. 90 tablet 1    Cholecalciferol (Vitamin D3) 25 MCG (1000 UT) capsule Take 1 capsule by mouth Daily.      fexofenadine (ALLEGRA) 180 MG tablet Take 1 tablet by mouth Daily.      Glucosamine-Chondroitin (OSTEO BI-FLEX REGULAR STRENGTH PO) Take 1 tablet by mouth Daily.      Olopatadine HCl (PATADAY OP) Administer 1 drop to both eyes 2 (two) times a day.      pantoprazole (PROTONIX) 40 MG EC tablet  Take 1 tablet by mouth Every Morning Before Breakfast. 90 tablet 1    Triamcinolone Acetonide (NASACORT) 55 MCG/ACT nasal inhaler Administer 2 sprays into the nostril(s) as directed by provider Daily.      ondansetron (Zofran) 4 MG tablet Take 1 tablet by mouth Every 6 (Six) Hours As Needed for Nausea or Vomiting. 30 tablet 0    oxyCODONE-acetaminophen (Percocet)  MG per tablet Take 1 tablet by mouth Every 6 (Six) Hours As Needed for Moderate Pain. 50 tablet 0    Soolantra 1 % cream Apply 1 Application topically to the appropriate area as directed Daily As Needed.      triamcinolone (KENALOG) 0.1 % cream Apply 1 Application topically to the appropriate area as directed Daily As Needed.       No facility-administered medications prior to visit.       No opioid medication identified on active medication list. I have reviewed chart for other potential  high risk medication/s and harmful drug interactions in the elderly.        Aspirin is not on active medication list.  Aspirin use is not indicated based on review of current medical condition/s. Risk of harm outweighs potential benefits.  .    Patient Active Problem List   Diagnosis    Hemochromatosis, hereditary    Alcohol abuse    Cirrhosis of liver without ascites    Acute gastric ulcer without hemorrhage or perforation    Hypomagnesemia    Peripheral edema    Essential hypertension    Gastric ulcer    Age-related nuclear cataract of both eyes    Excess skin of eyelid    Nevus of choroid of right eye    Presbyopia    Alcoholic cirrhosis of liver without ascites    Esophageal varices without bleeding    Abnormal findings on diagnostic imaging of liver and biliary tract    Abnormal results of liver function studies    Benign neoplasm of ascending colon    Benign neoplasm of transverse colon    Dvrtclos of lg int w/o perforation or abscess w/o bleeding    Encounter for screening for malignant neoplasm of colon    Hepatic failure, unspecified without coma     "History of colonic polyps    Other problems related to lifestyle    Second degree hemorrhoids    Other cirrhosis of liver    Liver disease    High blood pressure    Hereditary hemochromatosis    Presbyopia    Iron overload    Septic arthritis of knee, right    Knee joint replacement status    Unilateral primary osteoarthritis, right knee    S/P revision of total knee     Advance Care Planning  Advance Directive is on file.  ACP discussion was held with the patient during this visit. Patient has an advance directive in EMR which is still valid.     Review of Systems   Constitutional:  Negative for chills, fatigue and fever.   Respiratory:  Negative for cough, shortness of breath and wheezing.    Cardiovascular:  Negative for chest pain and palpitations.   Gastrointestinal:  Negative for abdominal pain, blood in stool, constipation, diarrhea, nausea and vomiting.   Genitourinary:  Negative for frequency, hematuria and urgency.   Neurological:  Negative for dizziness, weakness and light-headedness.   Psychiatric/Behavioral:  Negative for dysphoric mood. The patient is not nervous/anxious.         Objective    Vitals:    03/14/25 0913   BP: 138/81   BP Location: Left arm   Patient Position: Sitting   Cuff Size: Adult   Pulse: 73   SpO2: 97%   Weight: 91.2 kg (201 lb)   Height: 182.9 cm (72\")     Estimated body mass index is 27.26 kg/m² as calculated from the following:    Height as of this encounter: 182.9 cm (72\").    Weight as of this encounter: 91.2 kg (201 lb).    BMI is >= 25 and <30. (Overweight) The following options were offered after discussion;: exercise counseling/recommendations and nutrition counseling/recommendations      Does the patient have evidence of cognitive impairment? No    Physical Exam  Vitals reviewed.   Constitutional:       General: He is not in acute distress.     Appearance: Normal appearance.   Cardiovascular:      Rate and Rhythm: Normal rate and regular rhythm.      Pulses: Normal " pulses.      Heart sounds: Normal heart sounds. No murmur heard.  Pulmonary:      Effort: Pulmonary effort is normal. No respiratory distress.      Breath sounds: Normal breath sounds. No wheezing, rhonchi or rales.   Chest:      Chest wall: No tenderness.   Abdominal:      Tenderness: There is no right CVA tenderness.   Skin:     General: Skin is warm and dry.   Neurological:      General: No focal deficit present.      Mental Status: He is alert and oriented to person, place, and time.   Psychiatric:         Mood and Affect: Mood normal.                 HEALTH RISK ASSESSMENT    Smoking Status:  Social History     Tobacco Use   Smoking Status Never    Passive exposure: Never   Smokeless Tobacco Never     Alcohol Consumption:  Social History     Substance and Sexual Activity   Alcohol Use Not Currently    Alcohol/week: 7.0 standard drinks of alcohol    Types: 7 Cans of beer per week     Fall Risk Screen:    LUCAS Fall Risk Assessment was completed, and patient is at HIGH risk for falls. Assessment completed on:3/14/2025    Depression Screening:      3/14/2025     9:20 AM   PHQ-2/PHQ-9 Depression Screening   Little interest or pleasure in doing things Not at all   Feeling down, depressed, or hopeless Not at all   How difficult have these problems made it for you to do your work, take care of things at home, or get along with other people? Not difficult at all       Health Habits and Functional and Cognitive Screening:      3/14/2025     9:20 AM   Functional & Cognitive Status   Do you have difficulty preparing food and eating? No   Do you have difficulty bathing yourself, getting dressed or grooming yourself? No   Do you have difficulty using the toilet? No   Do you have difficulty moving around from place to place? No   Do you have trouble with steps or getting out of a bed or a chair? No   Current Diet Well Balanced Diet   Dental Exam Up to date   Eye Exam Up to date   Exercise (times per week) 3 times per week    Do you need help using the phone?  No   Are you deaf or do you have serious difficulty hearing?  Yes   Do you need help to go to places out of walking distance? No   Do you need help shopping? No   Do you need help preparing meals?  No   Do you need help with housework?  No   Do you need help with laundry? No   Do you need help taking your medications? No   Do you need help managing money? No   Do you ever drive or ride in a car without wearing a seat belt? No   Have you felt unusual stress, anger or loneliness in the last month? No   Who do you live with? Spouse   If you need help, do you have trouble finding someone available to you? No   Have you been bothered in the last four weeks by sexual problems? No   Do you have difficulty concentrating, remembering or making decisions? No       Age-appropriate Screening Schedule:  Refer to the list below for future screening recommendations based on patient's age, sex and/or medical conditions. Orders for these recommended tests are listed in the plan section. The patient has been provided with a written plan.    Health Maintenance   Topic Date Due    Hepatitis B (1 of 3 - Risk 3-dose series) Never done    COVID-19 Vaccine (6 - 2024-25 season) 09/01/2024    LIPID PANEL  03/11/2025    BMI FOLLOWUP  03/11/2025    INFLUENZA VACCINE  03/31/2025 (Originally 7/1/2024)    Pneumococcal Vaccine 50+ (3 of 3 - PCV20 or PCV21) 12/30/2025    ANNUAL WELLNESS VISIT  03/14/2026    COLORECTAL CANCER SCREENING  10/16/2028    TDAP/TD VACCINES (3 - Td or Tdap) 07/12/2031    HEPATITIS C SCREENING  Completed    ZOSTER VACCINE  Completed              Assessment & Plan   CMS Preventative Services Quick Reference  Risk Factors Identified During Encounter  Fall Risk-High or Moderate: Discussed Fall Prevention in the home  Immunizations Discussed/Encouraged:  none  Inactivity/Sedentary: Patient was advised to exercise at least 150 minutes a week per CDC recommendations. and Patient was advised to  do at least 150 minutes a week of moderate intensity activity such as brisk walking and do at least 2 days a week of activities that strengthen muscles such as resistance training and do activities to improve balance such as standing on one foot about 3 days a week.  The above risks/problems have been discussed with the patient.  Follow up actions/plans if indicated are seen below in the Assessment/Plan Section.  Pertinent information has been shared with the patient in the After Visit Summary.    Diagnoses and all orders for this visit:    1. Medicare annual wellness visit, subsequent (Primary)  Comments:  Medical/social/family hx reviewed.   Colonoscopy UTD.   Labs ordered.    2. Essential hypertension  Comments:  Stable.   Cont. current medication.   Labs ordered.   RTO in 6 mo.  Orders:  -     Comprehensive metabolic panel; Future  -     Lipid panel; Future    3. Mixed hyperlipidemia  Comments:  Stable.   Cont. current medication.   Labs ordered.   RTO in 6 mo.  Orders:  -     Comprehensive metabolic panel; Future  -     Lipid panel; Future    4. Iron deficiency anemia, unspecified iron deficiency anemia type  Comments:  Labs ordered.  Orders:  -     CBC w AUTO Differential; Future  -     Ferritin; Future        Follow Up:   Return in about 6 months (around 9/14/2025).     An After Visit Summary and PPPS were made available to the patient.

## 2025-03-14 ENCOUNTER — OFFICE VISIT (OUTPATIENT)
Dept: FAMILY MEDICINE CLINIC | Facility: CLINIC | Age: 65
End: 2025-03-14
Payer: MEDICARE

## 2025-03-14 ENCOUNTER — LAB (OUTPATIENT)
Dept: FAMILY MEDICINE CLINIC | Facility: CLINIC | Age: 65
End: 2025-03-14
Payer: MEDICARE

## 2025-03-14 VITALS
WEIGHT: 201 LBS | HEART RATE: 73 BPM | BODY MASS INDEX: 27.22 KG/M2 | DIASTOLIC BLOOD PRESSURE: 81 MMHG | HEIGHT: 72 IN | OXYGEN SATURATION: 97 % | SYSTOLIC BLOOD PRESSURE: 138 MMHG

## 2025-03-14 DIAGNOSIS — D50.9 IRON DEFICIENCY ANEMIA, UNSPECIFIED IRON DEFICIENCY ANEMIA TYPE: ICD-10-CM

## 2025-03-14 DIAGNOSIS — E78.2 MIXED HYPERLIPIDEMIA: ICD-10-CM

## 2025-03-14 DIAGNOSIS — Z00.00 MEDICARE ANNUAL WELLNESS VISIT, SUBSEQUENT: Primary | ICD-10-CM

## 2025-03-14 DIAGNOSIS — I10 ESSENTIAL HYPERTENSION: ICD-10-CM

## 2025-03-14 LAB
ALBUMIN SERPL-MCNC: 3.2 G/DL (ref 3.5–5.2)
ALBUMIN/GLOB SERPL: 1.1 G/DL
ALP SERPL-CCNC: 108 U/L (ref 39–117)
ALT SERPL W P-5'-P-CCNC: 20 U/L (ref 1–41)
ANION GAP SERPL CALCULATED.3IONS-SCNC: 10.6 MMOL/L (ref 5–15)
AST SERPL-CCNC: 59 U/L (ref 1–40)
BASOPHILS # BLD AUTO: 0.07 10*3/MM3 (ref 0–0.2)
BASOPHILS NFR BLD AUTO: 1.6 % (ref 0–1.5)
BILIRUB SERPL-MCNC: 2.8 MG/DL (ref 0–1.2)
BUN SERPL-MCNC: 13 MG/DL (ref 8–23)
BUN/CREAT SERPL: 14.9 (ref 7–25)
CALCIUM SPEC-SCNC: 8.3 MG/DL (ref 8.6–10.5)
CHLORIDE SERPL-SCNC: 103 MMOL/L (ref 98–107)
CHOLEST SERPL-MCNC: 147 MG/DL (ref 0–200)
CO2 SERPL-SCNC: 25.4 MMOL/L (ref 22–29)
CREAT SERPL-MCNC: 0.87 MG/DL (ref 0.76–1.27)
DEPRECATED RDW RBC AUTO: 53.9 FL (ref 37–54)
EGFRCR SERPLBLD CKD-EPI 2021: 95.2 ML/MIN/1.73
EOSINOPHIL # BLD AUTO: 0.07 10*3/MM3 (ref 0–0.4)
EOSINOPHIL NFR BLD AUTO: 1.6 % (ref 0.3–6.2)
ERYTHROCYTE [DISTWIDTH] IN BLOOD BY AUTOMATED COUNT: 18.5 % (ref 12.3–15.4)
FERRITIN SERPL-MCNC: 39.6 NG/ML (ref 30–400)
GLOBULIN UR ELPH-MCNC: 3 GM/DL
GLUCOSE SERPL-MCNC: 96 MG/DL (ref 65–99)
HCT VFR BLD AUTO: 30 % (ref 37.5–51)
HDLC SERPL-MCNC: 58 MG/DL (ref 40–60)
HGB BLD-MCNC: 8.9 G/DL (ref 13–17.7)
IMM GRANULOCYTES # BLD AUTO: 0.01 10*3/MM3 (ref 0–0.05)
IMM GRANULOCYTES NFR BLD AUTO: 0.2 % (ref 0–0.5)
LDLC SERPL CALC-MCNC: 77 MG/DL (ref 0–100)
LDLC/HDLC SERPL: 1.34 {RATIO}
LYMPHOCYTES # BLD AUTO: 1.08 10*3/MM3 (ref 0.7–3.1)
LYMPHOCYTES NFR BLD AUTO: 24.1 % (ref 19.6–45.3)
MCH RBC QN AUTO: 24.3 PG (ref 26.6–33)
MCHC RBC AUTO-ENTMCNC: 29.7 G/DL (ref 31.5–35.7)
MCV RBC AUTO: 82 FL (ref 79–97)
MONOCYTES # BLD AUTO: 0.76 10*3/MM3 (ref 0.1–0.9)
MONOCYTES NFR BLD AUTO: 17 % (ref 5–12)
NEUTROPHILS NFR BLD AUTO: 2.49 10*3/MM3 (ref 1.7–7)
NEUTROPHILS NFR BLD AUTO: 55.5 % (ref 42.7–76)
PLATELET # BLD AUTO: 177 10*3/MM3 (ref 140–450)
PMV BLD AUTO: 11.3 FL (ref 6–12)
POTASSIUM SERPL-SCNC: 3.7 MMOL/L (ref 3.5–5.2)
PROT SERPL-MCNC: 6.2 G/DL (ref 6–8.5)
RBC # BLD AUTO: 3.66 10*6/MM3 (ref 4.14–5.8)
SODIUM SERPL-SCNC: 139 MMOL/L (ref 136–145)
TRIGL SERPL-MCNC: 57 MG/DL (ref 0–150)
VLDLC SERPL-MCNC: 12 MG/DL (ref 5–40)
WBC NRBC COR # BLD AUTO: 4.48 10*3/MM3 (ref 3.4–10.8)

## 2025-03-14 PROCEDURE — 80061 LIPID PANEL: CPT | Performed by: NURSE PRACTITIONER

## 2025-03-14 PROCEDURE — 85025 COMPLETE CBC W/AUTO DIFF WBC: CPT | Performed by: NURSE PRACTITIONER

## 2025-03-14 PROCEDURE — 80053 COMPREHEN METABOLIC PANEL: CPT | Performed by: NURSE PRACTITIONER

## 2025-03-14 PROCEDURE — 36415 COLL VENOUS BLD VENIPUNCTURE: CPT

## 2025-03-14 PROCEDURE — 82728 ASSAY OF FERRITIN: CPT | Performed by: NURSE PRACTITIONER

## 2025-06-02 ENCOUNTER — TRANSCRIBE ORDERS (OUTPATIENT)
Age: 65
End: 2025-06-02
Payer: MEDICARE

## 2025-06-02 DIAGNOSIS — I87.2 VENOUS (PERIPHERAL) INSUFFICIENCY: Primary | ICD-10-CM

## 2025-06-05 ENCOUNTER — OFFICE VISIT (OUTPATIENT)
Age: 65
End: 2025-06-05
Payer: MEDICARE

## 2025-06-05 VITALS
BODY MASS INDEX: 27.22 KG/M2 | WEIGHT: 201 LBS | DIASTOLIC BLOOD PRESSURE: 64 MMHG | SYSTOLIC BLOOD PRESSURE: 120 MMHG | HEIGHT: 72 IN

## 2025-06-05 DIAGNOSIS — I89.0 LYMPHEDEMA: Primary | ICD-10-CM

## 2025-06-05 DIAGNOSIS — Z78.9 NO HISTORY OF DEEP VENOUS THROMBOSIS OR PULMONARY EMBOLUS: ICD-10-CM

## 2025-06-05 DIAGNOSIS — I87.8 VENOUS STASIS: ICD-10-CM

## 2025-06-05 RX ORDER — METHYLPREDNISOLONE 4 MG/1
TABLET ORAL
COMMUNITY
Start: 2025-05-30

## 2025-06-09 ENCOUNTER — TELEPHONE (OUTPATIENT)
Dept: FAMILY MEDICINE CLINIC | Facility: CLINIC | Age: 65
End: 2025-06-09
Payer: MEDICARE

## 2025-06-09 RX ORDER — BENAZEPRIL HYDROCHLORIDE 40 MG/1
40 TABLET ORAL DAILY
Qty: 90 TABLET | Refills: 1 | Status: SHIPPED | OUTPATIENT
Start: 2025-06-09

## 2025-06-09 NOTE — TELEPHONE ENCOUNTER
Pt stopped in the office and he is needing a refill of Benazepril 40 MG. He uses the [facility] of [address]. He has been out for 2 days. Donnie told him they sent something but I do not see any request in the computer.

## 2025-06-25 NOTE — PROGRESS NOTES
"Chief Complaint    Subjective      History of Present Illness  Erik Fuentes presents to [facility] as a new patient with complaints of bilateral lower extremity swelling.  Symptoms began approximately 5 years  ago and have gradually worsened.  Reports heaviness, fatigue, itching, swelling in the bilateral legs with the right being his worse. The swelling worse with prolonged standing and at the end of the day.  Swelling is not as severe in the mornings but it is never not there.  Reports no family history of varicose veins.  Occupation required prolonged standing.  Reports that with his  knee replacement he developed an infection that \"almost killed him.\"  He tells me that he recent quite a few antibiotic with this hospitalization.  He also shared that he is currently under treatment for prostate cancer.  History of cellulitis. Denies prior vein treatments.  Denies history of DVTs.  No family history of any DVTs.  Attempts at symptomatic control using elevation, compression and nonsteroidals have not been attempted.      Allergies: Morphine     Erik Fuentes  reports that he has never smoked. He has never been exposed to tobacco smoke. He has never used smokeless tobacco..       Objective   Vital Signs:  /64 (BP Location: Left arm)   Ht 182.9 cm (72.01\")   Wt 91.2 kg (201 lb)   BMI 27.25 kg/m²   Estimated body mass index is 27.25 kg/m² as calculated from the following:    Height as of this encounter: 182.9 cm (72.01\").    Weight as of this encounter: 91.2 kg (201 lb).             Physical Exam  Constitutional:       Appearance: Normal appearance.   Cardiovascular:      Rate and Rhythm: Normal rate and regular rhythm.      Pulses:           Dorsalis pedis pulses are 2+ on the right side and 2+ on the left side.        Posterior tibial pulses are 2+ on the right side and 2+ on the left side.      Comments: Right and Left Leg: no visible varicose " veins.  Hyperpigmentation bilateral lower extremity.  No erythema or warmth and there is no fibrotic skin changes noted.  No calf tenderness.  Pulmonary:      Effort: Pulmonary effort is normal.      Breath sounds: Normal breath sounds.   Musculoskeletal:         General: Normal range of motion.      Right lower leg: Edema present.      Left lower leg: Edema present.   Skin:     Capillary Refill: Capillary refill takes less than 2 seconds.   Neurological:      Mental Status: He is alert.        Venous Right leg:Edema  Hyperpigmentation  CEAP Classification right leg: C4    Venous Left leg:Edema  Hyperpigmentation  CEAP Classification left leg: C4    Result Review :                         Assessment and Plan     Diagnoses and all orders for this visit:    1. Lymphedema (Primary)    2. Venous stasis    3. No history of deep venous thrombosis or pulmonary embolus              We discuss secondary lymphedema of lower extremity following infection after knee surgery that could have possibly disrupted his lymphatic drainage. We have discussed the natural history and pathophysiology of venous insufficiency. He was measured for graded compression stockings to initiate compression therapy.  Will refer to lymphedema clinic for evaluate and treatment that may include manual lymphatic drainage and decongestive therapy.  He is to elevate his legs when resting; avoid prolonged dependent positioning.  Educated on skin hygiene and early signs of cellulitis.  Encouraged mobility and light exercise as tolerated.  Will follow-up in 4 to 6 weeks or sooner if worsening symptoms.   Return for Return in 6 weeks.  Patient was given instructions and counseling regarding his condition or for health maintenance advice. Please see specific information pulled into the AVS if appropriate.

## 2025-07-17 ENCOUNTER — OFFICE VISIT (OUTPATIENT)
Age: 65
End: 2025-07-17
Payer: MEDICARE

## 2025-07-17 VITALS
WEIGHT: 201 LBS | BODY MASS INDEX: 27.22 KG/M2 | HEIGHT: 72 IN | SYSTOLIC BLOOD PRESSURE: 138 MMHG | DIASTOLIC BLOOD PRESSURE: 82 MMHG

## 2025-07-17 DIAGNOSIS — I89.0 LYMPHEDEMA: Primary | ICD-10-CM

## (undated) DEVICE — SUT PDS LP 1 TP1 96IN VIO PDP880GA

## (undated) DEVICE — PK ENDO GI 50

## (undated) DEVICE — UNDRGLV SURG BIOGEL PUNCTUREINDICATION SZ7 PF STRL

## (undated) DEVICE — UNDERGLV SURG BIOGEL INDICAT PI SZ8.5 BLU

## (undated) DEVICE — LIGATOR MULTIBAND SUPERVIEW 7 BX4

## (undated) DEVICE — ADHS LIQ MASTISOL 2/3ML

## (undated) DEVICE — ANTIBACTERIAL UNDYED BRAIDED (POLYGLACTIN 910), SYNTHETIC ABSORBABLE SUTURE: Brand: COATED VICRYL

## (undated) DEVICE — CONTAINER,SPECIMEN,OR STERILE,4OZ: Brand: MEDLINE

## (undated) DEVICE — ADHS SKIN PREMIERPRO EXOFIN TOPICAL HI/VISC .5ML

## (undated) DEVICE — SINGLE-USE BIOPSY FORCEPS: Brand: RADIAL JAW 4

## (undated) DEVICE — GLV SURG SENSICARE PI ORTHO PF SZ7 LF STRL

## (undated) DEVICE — PAPR PRNT PK SONY W RIBN UPC55

## (undated) DEVICE — DUAL CUT SAGITTAL BLADE

## (undated) DEVICE — SUT ETHLN 2/0 PS 18IN 585H

## (undated) DEVICE — BITEBLOCK ENDO W/STRAP 60F A/ LF DISP

## (undated) DEVICE — INTENDED TO SUPPORT AND MAINTAIN THE POSITION OF AN ANESTHETIZED PATIENT DURING SURGERY: Brand: HERMANTOR XL PINK KNEE POSITIONING PAD

## (undated) DEVICE — PAD,ABDOMINAL,5"X9",STERILE,LF,1/PK: Brand: MEDLINE INDUSTRIES, INC.

## (undated) DEVICE — WRAP KNEE COLD THERAPY

## (undated) DEVICE — IRRIGATOR BULB ASEPTO 60CC STRL

## (undated) DEVICE — PENCL SMOKE/EVAC MEGADYNE TELESCP 15FT

## (undated) DEVICE — ZIP 24 SURGICAL SKIN CLOSURE DEVICE, PSA: Brand: ZIP 24 SURGICAL SKIN CLOSURE DEVICE

## (undated) DEVICE — DRSNG WND BORDR/ADHS NONADHR/GZ LF 4X4IN STRL

## (undated) DEVICE — TRAP WIDEEYE POLYP

## (undated) DEVICE — DRSNG SURESITE WNDW 2.38X2.75

## (undated) DEVICE — SYR LUERLOK 20CC BX/50

## (undated) DEVICE — 450 ML BOTTLE OF 0.05% CHLORHEXIDINE GLUCONATE IN 99.95% STERILE WATER FOR IRRIGATION, USP AND APPLICATOR.: Brand: IRRISEPT ANTIMICROBIAL WOUND LAVAGE

## (undated) DEVICE — SOL ISO/ALC RUB 70PCT 4OZ

## (undated) DEVICE — COAXIAL FEMORAL CANAL TIP

## (undated) DEVICE — DRSNG BURN ACTICOAT FLEX 7 1X24IN

## (undated) DEVICE — SUT ETHIB 2 CV V37 MS/4 30IN MX69G

## (undated) DEVICE — SNAR POLYP HOTSNARE/BRAIDED OVL/MINI 7F 2.8X10MM 230CM 1P/U

## (undated) DEVICE — KT DRN EVAC WND PVC 15F3/16IN 400CC

## (undated) DEVICE — GLV SURG SENSICARE PI ORTHO SZ8.5 LF STRL

## (undated) DEVICE — SYR LUERLOK 30CC

## (undated) DEVICE — PROXIMATE SKIN STAPLERS (35 WIDE) CONTAINS 35 STAINLESS STEEL STAPLES (FIXED HEAD): Brand: PROXIMATE

## (undated) DEVICE — APPL CHLORAPREP HI/LITE 26ML ORNG

## (undated) DEVICE — PAD UNDERCAST WYTEX 6IN 4YD LF STRL

## (undated) DEVICE — ZIPPERED TOGA, 2X LARGE: Brand: FLYTE

## (undated) DEVICE — MULTIPLE BAND LIGATOR: Brand: SPEEDBAND SUPERVIEW SUPER 7

## (undated) DEVICE — MIS 3.2MM X 45MM RIMMED PIN STERILE: Brand: HARMONY

## (undated) DEVICE — PK TOTL KN 50

## (undated) DEVICE — SOL IRR NACL 0.9PCT 3000ML

## (undated) DEVICE — ERBE NESSY®PLATE 170 SPLIT; 168CM²; CABLE 3M: Brand: ERBE

## (undated) DEVICE — KT SURG TURNOVER 050

## (undated) DEVICE — NEEDLE, QUINCKE, 20GX3.5": Brand: MEDLINE

## (undated) DEVICE — GAUZE,SPONGE,FLUFF,6"X6.75",STRL,5/TRAY: Brand: MEDLINE

## (undated) DEVICE — DRAPE SHEET ULTRAGARD: Brand: MEDLINE

## (undated) DEVICE — NON RIMMED SPEED PIN 65MM STERILE

## (undated) DEVICE — REAL INTELLIGENCE 5  MM                                    CYLINDRICAL BUR: Brand: REAL INTELLIGENCE

## (undated) DEVICE — CEMENT MIXING SYSTEM WITH FEMORAL BREAKWAY NOZZLE: Brand: REVOLUTION

## (undated) DEVICE — DISPOSABLE TOURNIQUET CUFF SINGLE BLADDER, SINGLE PORT AND QUICK CONNECT CONNECTOR: Brand: COLOR CUFF

## (undated) DEVICE — CORI KNEE TENSIONER CARTRIDGE: Brand: CORI

## (undated) DEVICE — PICO 7 10CM X 30CM: Brand: PICO™ 7

## (undated) DEVICE — STAPLER, SKIN, 35W, A: Brand: MEDLINE INDUSTRIES, INC.

## (undated) DEVICE — NAVIO FLAT MARKERS: Brand: NAVIO

## (undated) DEVICE — COVER,MAYO STAND,STERILE: Brand: MEDLINE

## (undated) DEVICE — SOL IRRIG NACL 1000ML